# Patient Record
Sex: FEMALE | Race: WHITE | NOT HISPANIC OR LATINO | Employment: UNEMPLOYED | URBAN - METROPOLITAN AREA
[De-identification: names, ages, dates, MRNs, and addresses within clinical notes are randomized per-mention and may not be internally consistent; named-entity substitution may affect disease eponyms.]

---
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
## 2023-05-22 ENCOUNTER — HOSPITAL ENCOUNTER (EMERGENCY)
Facility: OTHER | Age: 18
Discharge: HOME | End: 2023-05-23
Attending: EMERGENCY MEDICINE | Admitting: OBSTETRICS & GYNECOLOGY
Payer: MEDICAID

## 2023-05-22 DIAGNOSIS — Z34.90 PREGNANCY, UNSPECIFIED GESTATIONAL AGE: ICD-10-CM

## 2023-05-22 DIAGNOSIS — R10.31 RLQ ABDOMINAL PAIN: ICD-10-CM

## 2023-05-22 DIAGNOSIS — O00.201 RIGHT OVARIAN PREGNANCY WITHOUT INTRAUTERINE PREGNANCY: Primary | ICD-10-CM

## 2023-05-22 DIAGNOSIS — N93.9 VAGINAL BLEEDING: ICD-10-CM

## 2023-05-22 LAB
ABO + RH BLD: NORMAL
ALBUMIN SERPL BCP-MCNC: 4.6 G/DL (ref 3.5–5.2)
ALP SERPL-CCNC: 91 U/L (ref 55–135)
ALT SERPL W/O P-5'-P-CCNC: 16 U/L (ref 10–44)
ANION GAP SERPL CALC-SCNC: 6 MMOL/L (ref 8–16)
AST SERPL-CCNC: 18 U/L (ref 10–40)
B-HCG UR QL: POSITIVE
BACTERIA GENITAL QL WET PREP: ABNORMAL
BASOPHILS # BLD AUTO: 0.06 K/UL (ref 0–0.2)
BASOPHILS NFR BLD: 0.7 % (ref 0–1.9)
BILIRUB SERPL-MCNC: 0.5 MG/DL (ref 0.1–1)
BLD GP AB SCN CELLS X3 SERPL QL: NORMAL
BUN SERPL-MCNC: 9 MG/DL (ref 6–20)
CALCIUM SERPL-MCNC: 9.7 MG/DL (ref 8.7–10.5)
CHLORIDE SERPL-SCNC: 107 MMOL/L (ref 95–110)
CLUE CELLS VAG QL WET PREP: ABNORMAL
CO2 SERPL-SCNC: 26 MMOL/L (ref 23–29)
CREAT SERPL-MCNC: 0.7 MG/DL (ref 0.5–1.4)
CTP QC/QA: YES
DIFFERENTIAL METHOD: NORMAL
EOSINOPHIL # BLD AUTO: 0.1 K/UL (ref 0–0.5)
EOSINOPHIL NFR BLD: 0.7 % (ref 0–8)
ERYTHROCYTE [DISTWIDTH] IN BLOOD BY AUTOMATED COUNT: 13 % (ref 11.5–14.5)
EST. GFR  (NO RACE VARIABLE): >60 ML/MIN/1.73 M^2
FILAMENT FUNGI VAG WET PREP-#/AREA: ABNORMAL
GLUCOSE SERPL-MCNC: 88 MG/DL (ref 70–110)
HCG INTACT+B SERPL-ACNC: 423 MIU/ML
HCT VFR BLD AUTO: 39.9 % (ref 37–48.5)
HGB BLD-MCNC: 13.3 G/DL (ref 12–16)
IMM GRANULOCYTES # BLD AUTO: 0.02 K/UL (ref 0–0.04)
IMM GRANULOCYTES NFR BLD AUTO: 0.2 % (ref 0–0.5)
LYMPHOCYTES # BLD AUTO: 1.8 K/UL (ref 1–4.8)
LYMPHOCYTES NFR BLD: 20.3 % (ref 18–48)
MCH RBC QN AUTO: 30 PG (ref 27–31)
MCHC RBC AUTO-ENTMCNC: 33.3 G/DL (ref 32–36)
MCV RBC AUTO: 90 FL (ref 82–98)
MONOCYTES # BLD AUTO: 0.7 K/UL (ref 0.3–1)
MONOCYTES NFR BLD: 7.2 % (ref 4–15)
NEUTROPHILS # BLD AUTO: 6.4 K/UL (ref 1.8–7.7)
NEUTROPHILS NFR BLD: 70.9 % (ref 38–73)
NRBC BLD-RTO: 0 /100 WBC
PLATELET # BLD AUTO: 262 K/UL (ref 150–450)
PMV BLD AUTO: 9.8 FL (ref 9.2–12.9)
POTASSIUM SERPL-SCNC: 3.8 MMOL/L (ref 3.5–5.1)
PROT SERPL-MCNC: 7.3 G/DL (ref 6–8.4)
RBC # BLD AUTO: 4.43 M/UL (ref 4–5.4)
SODIUM SERPL-SCNC: 139 MMOL/L (ref 136–145)
SPECIMEN OUTDATE: NORMAL
SPECIMEN SOURCE: ABNORMAL
T VAGINALIS GENITAL QL WET PREP: ABNORMAL
WBC # BLD AUTO: 9.02 K/UL (ref 3.9–12.7)
WBC #/AREA VAG WET PREP: ABNORMAL
YEAST GENITAL QL WET PREP: ABNORMAL

## 2023-05-22 PROCEDURE — 87591 N.GONORRHOEAE DNA AMP PROB: CPT | Performed by: EMERGENCY MEDICINE

## 2023-05-22 PROCEDURE — 87210 SMEAR WET MOUNT SALINE/INK: CPT | Performed by: EMERGENCY MEDICINE

## 2023-05-22 PROCEDURE — 84702 CHORIONIC GONADOTROPIN TEST: CPT | Performed by: EMERGENCY MEDICINE

## 2023-05-22 PROCEDURE — 81025 URINE PREGNANCY TEST: CPT | Performed by: EMERGENCY MEDICINE

## 2023-05-22 PROCEDURE — 99285 EMERGENCY DEPT VISIT HI MDM: CPT | Mod: ,,, | Performed by: EMERGENCY MEDICINE

## 2023-05-22 PROCEDURE — 25000003 PHARM REV CODE 250: Performed by: EMERGENCY MEDICINE

## 2023-05-22 PROCEDURE — 99285 PR EMERGENCY DEPT VISIT,LEVEL V: ICD-10-PCS | Mod: ,,, | Performed by: EMERGENCY MEDICINE

## 2023-05-22 PROCEDURE — 85025 COMPLETE CBC W/AUTO DIFF WBC: CPT | Performed by: EMERGENCY MEDICINE

## 2023-05-22 PROCEDURE — 80053 COMPREHEN METABOLIC PANEL: CPT | Performed by: EMERGENCY MEDICINE

## 2023-05-22 PROCEDURE — 86900 BLOOD TYPING SEROLOGIC ABO: CPT | Performed by: EMERGENCY MEDICINE

## 2023-05-22 RX ADMIN — SODIUM CHLORIDE 1000 ML: 9 INJECTION, SOLUTION INTRAVENOUS at 08:05

## 2023-05-23 ENCOUNTER — TELEPHONE (OUTPATIENT)
Dept: OBSTETRICS AND GYNECOLOGY | Facility: CLINIC | Age: 18
End: 2023-05-23

## 2023-05-23 ENCOUNTER — HOSPITAL ENCOUNTER (EMERGENCY)
Facility: OTHER | Age: 18
Discharge: HOME | End: 2023-05-23
Attending: EMERGENCY MEDICINE
Payer: MEDICAID

## 2023-05-23 VITALS
RESPIRATION RATE: 18 BRPM | DIASTOLIC BLOOD PRESSURE: 72 MMHG | TEMPERATURE: 98 F | BODY MASS INDEX: 21.16 KG/M2 | OXYGEN SATURATION: 99 % | HEIGHT: 62 IN | HEART RATE: 75 BPM | SYSTOLIC BLOOD PRESSURE: 112 MMHG | WEIGHT: 115 LBS

## 2023-05-23 VITALS
DIASTOLIC BLOOD PRESSURE: 69 MMHG | TEMPERATURE: 99 F | WEIGHT: 114 LBS | RESPIRATION RATE: 18 BRPM | BODY MASS INDEX: 20.98 KG/M2 | HEIGHT: 62 IN | SYSTOLIC BLOOD PRESSURE: 117 MMHG | OXYGEN SATURATION: 99 % | HEART RATE: 77 BPM

## 2023-05-23 DIAGNOSIS — N93.9 VAGINAL BLEEDING: Primary | ICD-10-CM

## 2023-05-23 DIAGNOSIS — O00.201 RIGHT OVARIAN PREGNANCY WITHOUT INTRAUTERINE PREGNANCY: ICD-10-CM

## 2023-05-23 LAB
ALBUMIN SERPL BCP-MCNC: 4.5 G/DL (ref 3.5–5.2)
ALP SERPL-CCNC: 75 U/L (ref 55–135)
ALT SERPL W/O P-5'-P-CCNC: 17 U/L (ref 10–44)
ANION GAP SERPL CALC-SCNC: 11 MMOL/L (ref 8–16)
AST SERPL-CCNC: 18 U/L (ref 10–40)
BASOPHILS # BLD AUTO: 0.06 K/UL (ref 0–0.2)
BASOPHILS NFR BLD: 0.7 % (ref 0–1.9)
BILIRUB SERPL-MCNC: 0.9 MG/DL (ref 0.1–1)
BUN SERPL-MCNC: 8 MG/DL (ref 6–20)
C TRACH DNA SPEC QL NAA+PROBE: NOT DETECTED
CALCIUM SERPL-MCNC: 9.6 MG/DL (ref 8.7–10.5)
CHLORIDE SERPL-SCNC: 110 MMOL/L (ref 95–110)
CO2 SERPL-SCNC: 21 MMOL/L (ref 23–29)
CREAT SERPL-MCNC: 0.7 MG/DL (ref 0.5–1.4)
DIFFERENTIAL METHOD: NORMAL
EOSINOPHIL # BLD AUTO: 0.1 K/UL (ref 0–0.5)
EOSINOPHIL NFR BLD: 1.1 % (ref 0–8)
ERYTHROCYTE [DISTWIDTH] IN BLOOD BY AUTOMATED COUNT: 13.1 % (ref 11.5–14.5)
EST. GFR  (NO RACE VARIABLE): >60 ML/MIN/1.73 M^2
GLUCOSE SERPL-MCNC: 78 MG/DL (ref 70–110)
HCG INTACT+B SERPL-ACNC: 462 MIU/ML
HCT VFR BLD AUTO: 41.2 % (ref 37–48.5)
HGB BLD-MCNC: 13.4 G/DL (ref 12–16)
IMM GRANULOCYTES # BLD AUTO: 0.02 K/UL (ref 0–0.04)
IMM GRANULOCYTES NFR BLD AUTO: 0.2 % (ref 0–0.5)
LYMPHOCYTES # BLD AUTO: 1.8 K/UL (ref 1–4.8)
LYMPHOCYTES NFR BLD: 22 % (ref 18–48)
MCH RBC QN AUTO: 29.6 PG (ref 27–31)
MCHC RBC AUTO-ENTMCNC: 32.5 G/DL (ref 32–36)
MCV RBC AUTO: 91 FL (ref 82–98)
MONOCYTES # BLD AUTO: 0.6 K/UL (ref 0.3–1)
MONOCYTES NFR BLD: 8 % (ref 4–15)
N GONORRHOEA DNA SPEC QL NAA+PROBE: NOT DETECTED
NEUTROPHILS # BLD AUTO: 5.5 K/UL (ref 1.8–7.7)
NEUTROPHILS NFR BLD: 68 % (ref 38–73)
NRBC BLD-RTO: 0 /100 WBC
PLATELET # BLD AUTO: 266 K/UL (ref 150–450)
PMV BLD AUTO: 9.5 FL (ref 9.2–12.9)
POTASSIUM SERPL-SCNC: 4.3 MMOL/L (ref 3.5–5.1)
PROT SERPL-MCNC: 7.3 G/DL (ref 6–8.4)
RBC # BLD AUTO: 4.52 M/UL (ref 4–5.4)
SODIUM SERPL-SCNC: 142 MMOL/L (ref 136–145)
WBC # BLD AUTO: 8.05 K/UL (ref 3.9–12.7)

## 2023-05-23 PROCEDURE — 84702 CHORIONIC GONADOTROPIN TEST: CPT | Performed by: NURSE PRACTITIONER

## 2023-05-23 PROCEDURE — 99499 UNLISTED E&M SERVICE: CPT | Mod: ,,, | Performed by: OBSTETRICS & GYNECOLOGY

## 2023-05-23 PROCEDURE — 99284 EMERGENCY DEPT VISIT MOD MDM: CPT | Mod: 25,27

## 2023-05-23 PROCEDURE — 80053 COMPREHEN METABOLIC PANEL: CPT | Performed by: NURSE PRACTITIONER

## 2023-05-23 PROCEDURE — 99283 EMERGENCY DEPT VISIT LOW MDM: CPT | Mod: ,,, | Performed by: OBSTETRICS & GYNECOLOGY

## 2023-05-23 PROCEDURE — 99499 NO LOS: ICD-10-PCS | Mod: ,,, | Performed by: OBSTETRICS & GYNECOLOGY

## 2023-05-23 PROCEDURE — 99284 EMERGENCY DEPT VISIT MOD MDM: CPT

## 2023-05-23 PROCEDURE — 96360 HYDRATION IV INFUSION INIT: CPT

## 2023-05-23 PROCEDURE — 99283 PR EMERGENCY DEPT VISIT,LEVEL III: ICD-10-PCS | Mod: ,,, | Performed by: OBSTETRICS & GYNECOLOGY

## 2023-05-23 PROCEDURE — 85025 COMPLETE CBC W/AUTO DIFF WBC: CPT | Performed by: NURSE PRACTITIONER

## 2023-05-23 RX ORDER — HYDROCODONE BITARTRATE AND ACETAMINOPHEN 5; 325 MG/1; MG/1
1 TABLET ORAL EVERY 8 HOURS PRN
Qty: 6 TABLET | Refills: 0 | Status: SHIPPED | OUTPATIENT
Start: 2023-05-23 | End: 2023-05-25

## 2023-05-23 RX ORDER — ONDANSETRON 2 MG/ML
4 INJECTION INTRAMUSCULAR; INTRAVENOUS
Status: DISCONTINUED | OUTPATIENT
Start: 2023-05-23 | End: 2023-05-23 | Stop reason: HOSPADM

## 2023-05-23 RX ORDER — MORPHINE SULFATE 4 MG/ML
4 INJECTION, SOLUTION INTRAMUSCULAR; INTRAVENOUS
Status: DISCONTINUED | OUTPATIENT
Start: 2023-05-23 | End: 2023-05-23 | Stop reason: HOSPADM

## 2023-05-23 RX ORDER — ONDANSETRON 8 MG/1
8 TABLET, ORALLY DISINTEGRATING ORAL EVERY 6 HOURS PRN
Qty: 15 TABLET | Refills: 0 | Status: ON HOLD | OUTPATIENT
Start: 2023-05-23 | End: 2023-05-30

## 2023-05-23 NOTE — ED PROVIDER NOTES
"     Source of History:  Patient    Chief complaint:  Vaginal Bleeding (Pt was seen in ED yesterday for suspected ectopic pregnancy. States bleeding has increased today and lower abd feels distended and sore. Reports lower abd cramping. Instructed to come back to ED for blood work/hcg level. )      HPI:  Yaquelin Cochran is a 17 y.o. female diagnosed with right ectopic pregnancy in this ER last night.  Patient states that after leaving the hospital she went home and woke up around 7:00 a.m..  She reports that she felt like the bleeding was heavier for the 1st 2 hours after she woke up.  Now she is reporting bright red spotting.  She reports the use of 2 tampons, not fully saturated.  She does report that the pain worsened and feels sharp in the right lower quadrant.  She was discharged home with Norco and Zofran but she did not take either 1 of these medications.  She is also reporting mild nausea, no vomiting.  No dysuria, fever, chills or back pain.    This is the extent to the patients complaints today here in the emergency department.    ROS: As per HPI and below:  Per HPI    Review of patient's allergies indicates:  No Known Allergies    PMH:  As per HPI and below:  Past Medical History:   Diagnosis Date    Behavior problem in childhood     COVID-19 08/08/2021    Suicidal ideation      Past Surgical History:   Procedure Laterality Date    BACK SURGERY  2016    DILATION AND CURETTAGE OF UTERUS         Social History     Tobacco Use    Smoking status: Never    Smokeless tobacco: Never   Substance Use Topics    Alcohol use: No    Drug use: No       Physical Exam:    /72 (BP Location: Right arm, Patient Position: Lying)   Pulse 75   Temp 98.1 °F (36.7 °C) (Oral)   Resp 18   Ht 5' 2" (1.575 m)   Wt 52.2 kg (115 lb)   LMP  (LMP Unknown)   SpO2 99%   BMI 21.03 kg/m²   Nursing note and vital signs reviewed.  Appearance: No acute distress.  Eyes: No conjunctival injection.  ENT: Oropharynx clear.    Chest/ " Respiratory: Clear to auscultation bilaterally.  Good air movement.  No wheezes.  No rhonchi. No rales. No accessory muscle use.  Cardiovascular: Regular rate and rhythm.  No murmurs. No gallops. No rubs.  Abdomen: Soft.  Not distended.  TTP in right lower pelvis. No guarding.  No rebound. Non-peritoneal.  :  Deferred OBGYN  Musculoskeletal: Good range of motion all joints.  No deformities.  Neck supple.  No meningismus.  Skin: No rashes seen.  Good turgor.  No abrasions.  No ecchymoses.  Neurologic: Motor intact.  Sensation intact.  Cerebellar intact.  Cranial nerves intact.  Mental Status:  Alert and oriented x 3.  Appropriate, conversant    Labs that have been ordered have been independently reviewed and interpreted by myself.    I decided to obtain the patient's medical records.  Summary of Medical Records:      Labs Reviewed   COMPREHENSIVE METABOLIC PANEL - Abnormal; Notable for the following components:       Result Value    CO2 21 (*)     All other components within normal limits   HCG, QUANTITATIVE    Narrative:     Release to patient->Immediate   CBC W/ AUTO DIFFERENTIAL       Imaging Results    None             MDM:    Urgent evaluation of 17 y.o. female presenting with increased abdominal pain and vaginal bleeding after being diagnosed with right ectopic pregnancy earlier this morning.  Patient appears uncomfortable but is not toxic.  Abdomen with tenderness in the right lower pelvis.  Beta hCG 462, was 423 20 hours ago.  Lab work is unremarkable.  Patient was evaluated by OBGYN who states that patient is stable for discharge and will follow-up in clinic tomorrow as previously scheduled.  At that time they will likely discuss methotrexate per OBGYN.  Patient offered Zofran and morphine which she declined stating she would prefer to go home.  Patient discharged home in good condition with strict bleeding and pain precautions Patient educated on signs and symptoms to monitor for and when to return to ED.  Patient verbalized understanding agrees with treatment plan. All questions and concerns addressed. .                     Diagnostic Impression:    1. Vaginal bleeding    2. Right ovarian pregnancy without intrauterine pregnancy         ED Disposition Condition    Discharge Good            ED Prescriptions    None       Follow-up Information    None          Jaylan Early NP  05/23/23 3142

## 2023-05-23 NOTE — FIRST PROVIDER EVALUATION
Emergency Department TeleTriage Encounter Note      CHIEF COMPLAINT    Chief Complaint   Patient presents with    Vaginal Bleeding     Pt was seen in ED yesterday for suspected ectopic pregnancy. States bleeding has increased today and lower abd feels distended and sore. Reports lower abd cramping. Instructed to come back to ED for blood work/hcg level.        VITAL SIGNS   Initial Vitals [05/23/23 1256]   BP Pulse Resp Temp SpO2   129/66 88 20 98.9 °F (37.2 °C) 100 %      MAP       --            ALLERGIES    Review of patient's allergies indicates:  No Known Allergies    PROVIDER TRIAGE NOTE  TeleTriage Note: Yaquelin Cochran, a nontoxic/well appearing, 17 y.o. female, presented to the ED with c/o seen yesterday and US showed possible ectopic pregnancy. Increased cramping and bleeding that began today.     All ED beds are full at present; patient notified of this status.  Patient seen and medically screened by Nurse Practitioner via teletriage. Orders initiated at triage to expedite care.  Patient is stable to return to the waiting room and will be placed in an ED bed when available.  Care will be transferred to an alternate provider when patient has been placed in an Exam Room from the [facility] for physical exam, additional orders, and disposition.  1:41 PM Isabel Egan DNP, FNP-C        ORDERS  Labs Reviewed   HCG, QUANTITATIVE       ED Orders (720h ago, onward)      Start Ordered     Status Ordering Provider    05/23/23 1344 05/23/23 1343  hCG, quantitative  Once         Ordered ISABEL EGAN              Virtual Visit Note: The provider triage portion of this emergency department evaluation and documentation was performed via [employer], a HIPAA-compliant telemedicine application, in concert with a tele-presenter in the room. A face to face patient evaluation with one of my colleagues will occur once the patient is placed in an emergency department room.      DISCLAIMER: This note was prepared with  M*Modal voice recognition transcription software. Garbled syntax, mangled pronouns, and other bizarre constructions may be attributed to that software system.

## 2023-05-23 NOTE — CONSULTS
"HISTORY AND PHYSICAL  OBSTETRICS & GYNECOLOGY          Subjective:    Yaquelin Cochran is a 17 y.o.  who presents to ED for cramping and vaginal bleeding. The patient was seen in ED approx 12 hours earlier and dx with pregnancy of unknown location. Please see that GYN consult note for details.     This presentation, the patient has saturated 2 tampons in approximately 8hr. Her cramping is slightly increased. No n/v, fever, chills. She is tolerating PO. Slight discomfort with ambulation.     Review of Systems   Constitutional:  Negative for chills and fever.   HENT: Negative.     Eyes:  Negative for visual disturbance.   Respiratory:  Negative for shortness of breath.    Cardiovascular:  Negative for chest pain and palpitations.   Gastrointestinal:  Positive for abdominal pain. Negative for bloating, constipation, diarrhea, nausea and vomiting.   Genitourinary:  Positive for vaginal bleeding. Negative for vaginal discharge and vaginal pain.   Musculoskeletal:  Negative for back pain and myalgias.   Neurological:  Negative for headaches.   Hematological: Negative.    Psychiatric/Behavioral:  The patient is not nervous/anxious.      Past Medical/Surgical Hx: reviewed in chart  Social/Family Hx: reviewed in chart  Allergies: reviewed in chart  Meds: (Not in a hospital admission)    OBHx:   OB History    Para Term  AB Living   3 1 1 0 0 0   SAB IAB Ectopic Multiple Live Births   0 0 0 0 0      # Outcome Date GA Lbr Fran/2nd Weight Sex Delivery Anes PTL Lv   3 Current            2             1 Term                Objective:    /72 (BP Location: Right arm, Patient Position: Lying)   Pulse 75   Temp 98.1 °F (36.7 °C) (Oral)   Resp 18   Ht 5' 2" (1.575 m)   Wt 52.2 kg (115 lb)   LMP  (LMP Unknown)   SpO2 99%   BMI 21.03 kg/m²     Physical Exam  Constitutional:       Appearance: She is well-developed.   Genitourinary:      Genitourinary Comments: Peripad on for 2-3hr 10% saturated. "   HENT:      Head: Normocephalic.   Eyes:      General: No scleral icterus.     Extraocular Movements: Extraocular movements intact.   Cardiovascular:      Rate and Rhythm: Normal rate.   Pulmonary:      Effort: Pulmonary effort is normal. No respiratory distress.   Abdominal:      General: There is no distension.      Palpations: Abdomen is soft.      Tenderness: There is no abdominal tenderness. There is no guarding or rebound.   Musculoskeletal:         General: Normal range of motion.      Cervical back: Normal range of motion.   Neurological:      Mental Status: She is alert and oriented to person, place, and time.   Skin:     General: Skin is warm and dry.   Psychiatric:         Mood and Affect: Mood normal.         Behavior: Behavior normal.   Vitals reviewed.        Lab Review  Lab Results   Component Value Date    WBC 8.05 05/23/2023    HGB 13.4 05/23/2023    HCT 41.2 05/23/2023    MCV 91 05/23/2023     05/23/2023        BMP  Lab Results   Component Value Date     05/23/2023    K 4.3 05/23/2023     05/23/2023    CO2 21 (L) 05/23/2023    BUN 8 05/23/2023    CREATININE 0.7 05/23/2023    CALCIUM 9.6 05/23/2023    ANIONGAP 11 05/23/2023    ESTGFRAFRICA >60.0 03/22/2022    EGFRNONAA 54.9 (A) 03/22/2022        Imaging  None from this encounter     Assessment:       16 y/o with previously dx pregnancy of unknown location in stable condition     Plan:   Pregnancy of unkown location  - Exam very reassuring  - Hcg essentially unchanged given short timeframe  - No need for repeat imaging  - Should follow up with GYN (5/25) as scheduled with repeat hCG 48hr after initial draw  - Need to confirm that pt did receive rhogam     HENRY Elizondo MD  OBGYN PGY-4

## 2023-05-23 NOTE — TELEPHONE ENCOUNTER
----- Message from Silvana Glover MD sent at 5/23/2023  2:28 AM CDT -----  Hi,    Can we please schedule this patient for a beta HCG on 5/25 with a same day clinic appointment in the family planning clinic to discuss possible methotrexate?    Thank you!  Silvana Glover M.D.  OB/GYN PGY-3

## 2023-05-23 NOTE — ED PROVIDER NOTES
Encounter Date: 5/22/2023    SCRIBE #1 NOTE: I, Amber Yeboah, am scribing for, and in the presence of,  Alberta Cardenas MD.     History     Chief Complaint   Patient presents with    Vaginal Bleeding     Transfer from [facility] for possible ectopic pregnancy, c/o RLQ and abdominal pain     Yaquelin Cochran is a 17 y.o. female, direct ED to ED transfer from [facility], who presents to the ED for evaluation of right lower quadrant abdominal pain with vaginal bleeding. Pt reports she had unprotected sex last week and took plan B that day. She reports onset of vaginal bleeding 2 days after that which has persisted. Denies dizziness, lightheadedness, or palpitations. She reports right lower quadrant abdominal pain for the last 5-6 days which has been waxing and waning in intensity, currently 5/10 intensity. She does state her abdomen feels distended. This is the extent of the patient's complaints at this time.    The history is provided by the patient and the EMS personnel.   Review of patient's allergies indicates:  No Known Allergies  Past Medical History:   Diagnosis Date    Behavior problem in childhood     COVID-19 08/08/2021    Suicidal ideation      Past Surgical History:   Procedure Laterality Date    BACK SURGERY  2016    DILATION AND CURETTAGE OF UTERUS       Family History   Problem Relation Age of Onset    Breast cancer Neg Hx     Diabetes Neg Hx     Colon cancer Neg Hx     Ovarian cancer Neg Hx      Social History     Tobacco Use    Smoking status: Never    Smokeless tobacco: Never   Substance Use Topics    Alcohol use: No    Drug use: No     Review of Systems   Constitutional:  Negative for chills and fever.   HENT:  Negative for congestion and sore throat.    Eyes:  Negative for visual disturbance.   Respiratory:  Negative for cough and shortness of breath.    Cardiovascular:  Negative for chest pain and palpitations.   Gastrointestinal:  Positive for abdominal distention and abdominal pain. Negative for diarrhea  and vomiting.   Genitourinary:  Positive for vaginal bleeding. Negative for decreased urine volume, dysuria and vaginal discharge.   Musculoskeletal:  Negative for joint swelling, neck pain and neck stiffness.   Skin:  Negative for rash and wound.   Neurological:  Negative for weakness, numbness and headaches.   Psychiatric/Behavioral:  Negative for confusion.      Physical Exam     Initial Vitals [05/22/23 1952]   BP Pulse Resp Temp SpO2   122/72 85 15 98.5 °F (36.9 °C) 97 %      MAP       --         Physical Exam    Constitutional: She appears well-developed and well-nourished. She does not have a sickly appearance. No distress.   HENT:   Head: Normocephalic and atraumatic.   Right Ear: External ear normal.   Left Ear: External ear normal.   Eyes: Conjunctivae, EOM and lids are normal. Right eye exhibits no discharge. Left eye exhibits no discharge. Right conjunctiva is not injected. Right conjunctiva has no hemorrhage. Left conjunctiva is not injected. Left conjunctiva has no hemorrhage. No scleral icterus.   Neck: Phonation normal. No stridor present. No tracheal deviation present.   Normal range of motion.  Cardiovascular:  Normal rate, regular rhythm and normal heart sounds.     Exam reveals no friction rub.       No murmur heard.  Pulses:       Radial pulses are 2+ on the right side and 2+ on the left side.        Dorsalis pedis pulses are 2+ on the right side and 2+ on the left side.   Pulmonary/Chest: Breath sounds normal. No respiratory distress. She has no wheezes. She has no rales.   Abdominal: She exhibits distension. There is abdominal tenderness.   Mild diffuse abdominal tenderness. There is no rebound and no guarding.   Musculoskeletal:      Cervical back: Normal range of motion.     Neurological: She is alert and oriented to person, place, and time. She has normal strength. GCS eye subscore is 4. GCS verbal subscore is 5. GCS motor subscore is 6.   Skin: Skin is warm.   Psychiatric: She has a normal  mood and affect. Her speech is normal and behavior is normal. Judgment and thought content normal. Cognition and memory are normal.       ED Course   Procedures  Labs Reviewed   VAGINAL SCREEN - Abnormal; Notable for the following components:       Result Value    Clue Cells Rare (*)     WBC - Vaginal Screen Rare (*)     Bacteria - Vaginal Screen Rare (*)     All other components within normal limits    Narrative:     Release to patient->Immediate   COMPREHENSIVE METABOLIC PANEL - Abnormal; Notable for the following components:    Anion Gap 6 (*)     All other components within normal limits   POCT URINE PREGNANCY - Abnormal; Notable for the following components:    POC Preg Test, Ur Positive (*)     All other components within normal limits   C. TRACHOMATIS/N. GONORRHOEAE BY AMP DNA   CBC W/ AUTO DIFFERENTIAL   HCG, QUANTITATIVE    Narrative:     Release to patient->Immediate   TYPE & SCREEN          Imaging Results               US OB <14 Wks TransAbd & TransVag, Single Gestation (XPD) (Final result)  Result time 05/23/23 00:48:33   Procedure changed from US OB <14 Wks, TransAbd, Single Gestation     Final result by Samy Koch MD (05/23/23 00:48:33)                   Impression:      No intrauterine gestation identified. Complex tubular appearing structure in the right adnexal region which demonstrates a 0.7 cm hypoechoic structure.  Free fluid in the pelvis.  Tubal ectopic pregnancy cannot be excluded.  Recommend OBGYN consultation and continued follow-up with serial beta hCG and short-term follow-up transvaginal ultrasound.    5 cm left adnexal lesion favored to reflect hemorrhagic cyst for which follow-up pelvic ultrasound with transvaginal imaging in 6-12 weeks is recommended.    This critical information above was relayed by Miranda Nuñez MD  by telephone to Mallory Childress MD on 05/22/2023 at 23:48.    This report was flagged in Epic as abnormal.    Electronically signed by resident:  Miranda Nuñez  Date:    2023  Time:    23:31    Electronically signed by: Samy Koch MD  Date:    2023  Time:    00:48               Narrative:    EXAMINATION:  US OB <14 WEEKS, TRANSABDOM & TRANSVAG, SINGLE GESTATION (XPD)    CLINICAL HISTORY:  rlq pain; Right lower quadrant pain    TECHNIQUE:  Transabdominal sonography of the pelvis was performed, followed by transvaginal sonography to better evaluate the uterus and ovaries.    COMPARISON:  Pelvic ultrasound 2022 and multiple priors.    FINDINGS:  Intrauterine gestation(s): None    Mean gestational sac diameter: N/A    Yolk sac: Question yolk sac in the right adnexa.    Crown-rump length (CRL): N/A    Cardiac activity: N/A    Subchorionic hemorrhage: N/A    Uterus measures 7.4 x 3.0 x 5.1 cm.  Trace fluid noted within the endometrium.    Right ovary: 3.7 x 2.1 x 3.1 cm.    Tubular appearing structure in the right adnexa with heterogeneous, largely hypoechoic internal contents and a round central hypoechoic structure measuring 0.7 x 0.7 cm.    Left ovary: Measures 5.9 x 5.4 x 6.1 cm.  There is a 5.0 x 5.0 x 4.8 cm hypoechoic lesion which does not demonstrate internal color Doppler flow with a bandlike appearance favored to reflect a hemorrhagic cyst.    Miscellaneous: Free fluid is noted within the pelvis.                                       Medications   sodium chloride 0.9% bolus 1,000 mL 1,000 mL (0 mLs Intravenous Stopped 23)     Medical Decision Making:   History:   Old Medical Records: I decided to obtain old medical records.  Clinical Tests:   Lab Tests: Ordered and Reviewed  Radiological Study: Ordered and Reviewed  Additional MDM:   Comments: 17-year-old female  transferred from outside hospital for evaluation by OB secondary to concern for an ectopic pregnancy.  She presented with complaint of right lower quadrant pain that has been intermittent for a few days with vaginal spotting.  On my evaluation she was  well-appearing and hemodynamically stable and reported the pain was a 4/10 but declined any medication.  OB evaluated the patient and deemed that she is stable for discharge home with the plan for her to return 48 hours for repeat beta-hCG and same-day clinic appointment.  She will be discharged home with a prescription for Zofran and Norco.  Indications for seeking immediate re-evaluation were given..      Scribe Attestation:   Scribe #1: I performed the above scribed service and the documentation accurately describes the services I performed. I attest to the accuracy of the note.    I, Alberta Cardenas  , personally performed the services described in this documentation. All medical record entries made by the scribe were at my direction and in my presence. I have reviewed the chart and agree that the record reflects my personal performance and is accurate and complete.                    Clinical Impression:   Final diagnoses:  [N93.9] Vaginal bleeding  [R10.31] RLQ abdominal pain  [O00.201] Right ovarian pregnancy without intrauterine pregnancy (Primary)  [Z34.90] Pregnancy, unspecified gestational age        ED Disposition Condition    Discharge Stable          ED Prescriptions       Medication Sig Dispense Start Date End Date Auth. Provider    ondansetron (ZOFRAN-ODT) 8 MG TbDL Take 1 tablet (8 mg total) by mouth every 6 (six) hours as needed (nausea). 15 tablet 5/23/2023 -- Alberta Cardenas MD    HYDROcodone-acetaminophen (NORCO) 5-325 mg per tablet Take 1 tablet by mouth every 8 (eight) hours as needed for Pain (severe pain). 6 tablet 5/23/2023 5/25/2023 Alberta Cardenas MD          Follow-up Information       Follow up With Specialties Details Why Contact Info Additional Information    [facility] - OB GYN Obstetrics and Gynecology Go in 2 days as scheduled for your repeat blood work and clinic appointment [address] [facility] [address]  [facility] [id number] [phone] OB GYN - [facility], 5th Floor  Please park in [place] [address] and use [place] elevators    [facility] - Emergency Dept Emergency Medicine Go to  If symptoms worsen [address] [facility] [id number] [phone]              Alberta Cardenas MD  05/23/23 4425

## 2023-05-23 NOTE — CONSULTS
Consult Note  Gynecology      SUBJECTIVE:     History of Present Illness:  Yaquelin Cochran is a 17 y.o.  currently being evaluated for possible ectopic pregnancy. The patient states she had unprotected intercourse two weeks ago, after which she took Plan B. She states she started experiencing light vaginal bleeding and right lower abdominal pain about three days ago. She also endorses some nausea, but denies vomiting. She is voiding without difficulty and having normal bowel movements.      PMHx:   Past Medical History:   Diagnosis Date    Behavior problem in childhood     COVID-19 2021    Suicidal ideation        PSHx:   Past Surgical History:   Procedure Laterality Date    BACK SURGERY  2016    DILATION AND CURETTAGE OF UTERUS         All: Review of patient's allergies indicates:  No Known Allergies    Meds: (Not in a hospital admission)      SH:   Social History     Socioeconomic History    Marital status: Significant Other   Tobacco Use    Smoking status: Never    Smokeless tobacco: Never   Substance and Sexual Activity    Alcohol use: No    Drug use: No    Sexual activity: Yes     Partners: Male     Birth control/protection: Condom       FH:   Family History   Problem Relation Age of Onset    Breast cancer Neg Hx     Diabetes Neg Hx     Colon cancer Neg Hx     Ovarian cancer Neg Hx        Review of Systems:  Constitutional: no fever or chills  Respiratory: no cough or shortness of breath  Cardiovascular: no chest pain or palpitations  Gastrointestinal: +abdominal pain and nausea, no vomiting, tolerating diet, negative for change in bowel habits  Genitourinary: +vaginal bleeding, no hematuria or dysuria, negative for urinary incontinence  Integument/Breast: no rash or pruritis, negative for breast lump, nipple discharge and skin lesion(s)  Hematologic/Lymphatic: no easy bruising or lymphadenopathy     OBJECTIVE:     Temp:  [98.5 °F (36.9 °C)-98.6 °F (37 °C)] 98.6 °F (37 °C)  Pulse:  [72-85] 72  Resp:   [15-16] 16  SpO2:  [97 %-98 %] 98 %  BP: (114-124)/(72-83) 124/83    Recent Results (from the past 24 hour(s))   POCT urine pregnancy    Collection Time: 05/22/23  8:11 PM   Result Value Ref Range    POC Preg Test, Ur Positive (A) Negative     Acceptable Yes    CBC W/ AUTO DIFFERENTIAL    Collection Time: 05/22/23  8:13 PM   Result Value Ref Range    WBC 9.02 3.90 - 12.70 K/uL    RBC 4.43 4.00 - 5.40 M/uL    Hemoglobin 13.3 12.0 - 16.0 g/dL    Hematocrit 39.9 37.0 - 48.5 %    MCV 90 82 - 98 fL    MCH 30.0 27.0 - 31.0 pg    MCHC 33.3 32.0 - 36.0 g/dL    RDW 13.0 11.5 - 14.5 %    Platelets 262 150 - 450 K/uL    MPV 9.8 9.2 - 12.9 fL    Immature Granulocytes 0.2 0.0 - 0.5 %    Gran # (ANC) 6.4 1.8 - 7.7 K/uL    Immature Grans (Abs) 0.02 0.00 - 0.04 K/uL    Lymph # 1.8 1.0 - 4.8 K/uL    Mono # 0.7 0.3 - 1.0 K/uL    Eos # 0.1 0.0 - 0.5 K/uL    Baso # 0.06 0.00 - 0.20 K/uL    nRBC 0 0 /100 WBC    Gran % 70.9 38.0 - 73.0 %    Lymph % 20.3 18.0 - 48.0 %    Mono % 7.2 4.0 - 15.0 %    Eosinophil % 0.7 0.0 - 8.0 %    Basophil % 0.7 0.0 - 1.9 %    Differential Method Automated    hCG, quantitative, pregnancy    Collection Time: 05/22/23  8:14 PM   Result Value Ref Range    HCG Quant 423 See Text mIU/mL   Comp. Metabolic Panel    Collection Time: 05/22/23  8:15 PM   Result Value Ref Range    Sodium 139 136 - 145 mmol/L    Potassium 3.8 3.5 - 5.1 mmol/L    Chloride 107 95 - 110 mmol/L    CO2 26 23 - 29 mmol/L    Glucose 88 70 - 110 mg/dL    BUN 9 6 - 20 mg/dL    Creatinine 0.7 0.5 - 1.4 mg/dL    Calcium 9.7 8.7 - 10.5 mg/dL    Total Protein 7.3 6.0 - 8.4 g/dL    Albumin 4.6 3.5 - 5.2 g/dL    Total Bilirubin 0.5 0.1 - 1.0 mg/dL    Alkaline Phosphatase 91 55 - 135 U/L    AST 18 10 - 40 U/L    ALT 16 10 - 44 U/L    Anion Gap 6 (L) 8 - 16 mmol/L    eGFR >60.0 >60 mL/min/1.73 m^2   Type & Screen    Collection Time: 05/22/23  9:19 PM   Result Value Ref Range    Group & Rh O NEG     Indirect Barbara NEG     Specimen  Outdate 05/25/2023 23:59    Vaginal Screen    Collection Time: 05/22/23  9:35 PM    Specimen: Vagina   Result Value Ref Range    Trichomonas None None    Clue Cells Rare (A) None    Budding Yeast None None    Fungal Hyphae None None    WBC - Vaginal Screen Rare (A) None    Bacteria - Vaginal Screen Rare (A) None    Wet Prep Source Vagina          Physical Exam:  GEN: No apparent distress. Alert and oriented.   CV: Regular rate and rhythm.   LUNGS: Normal work of breathing. No distress.  ABDOMEN: Soft, non-distended. Moderately tender to deep palpation in RLQ. No guarding or rebound tenderness.  EXT: No erythema, no edema  : patient declined repeat vaginal exam as last exam was painful, minimal bleeding noted on chucks pad    EXAMINATION:  US OB <14 WEEKS, TRANSABDOM & TRANSVAG, SINGLE GESTATION (XPD)     CLINICAL HISTORY:  rlq pain; Right lower quadrant pain     TECHNIQUE:  Transabdominal sonography of the pelvis was performed, followed by transvaginal sonography to better evaluate the uterus and ovaries.     COMPARISON:  Pelvic ultrasound 03/22/2022 and multiple priors.     FINDINGS:  Intrauterine gestation(s): None  Mean gestational sac diameter: N/A  Yolk sac: Question yolk sac in the right adnexa.  Crown-rump length (CRL): N/A  Cardiac activity: N/A  Subchorionic hemorrhage: N/A  Uterus measures 7.4 x 3.0 x 5.1 cm.  Trace fluid noted within the endometrium.  Right ovary: 3.7 x 2.1 x 3.1 cm.  Tubular appearing structure in the right adnexa with heterogeneous, largely hypoechoic internal contents and a round central hypoechoic structure measuring 0.7 x 0.7 cm.  Left ovary: Measures 5.9 x 5.4 x 6.1 cm.  There is a 5.0 x 5.0 x 4.8 cm hypoechoic lesion which does not demonstrate internal color Doppler flow with a bandlike appearance favored to reflect a hemorrhagic cyst.  Miscellaneous: Free fluid is noted within the pelvis.     Impression:  No intrauterine gestation identified. Complex tubular appearing structure  in the right adnexal region which demonstrates a 0.7 cm hypoechoic structure.  Free fluid in the pelvis.  Tubal ectopic pregnancy cannot be excluded.  Recommend OBGYN consultation and continued follow-up with serial beta hCG and short-term follow-up transvaginal ultrasound.     5 cm left adnexal lesion favored to reflect hemorrhagic cyst for which follow-up pelvic ultrasound with transvaginal imaging in 6-12 weeks is recommended.    ASSESSMENT/PLAN:     Assessment:  Yaquelin Cochran is a 17 y.o.  currently being evaluated for possible ectopic pregnancy.    Plan:  Pregnancy of unknown location  - Hemodynamically stable  - Beta   - TVUS: no IUP identified, complex tubular structure in right adnexal, unable to rule out ectopic pregnancy  - Rh negative, recommend rhogam administration  - Dicussed ultrasound images with patient and the diagnosis of pregnancy of unknown location. Instructed patient that we will need to repeat bHCG in 48 hours to determine trend of pregnancy hormone to guide further management  - Discussed options for management should this be an ectopic pregnancy including Methotrexate vs surgical removal  - Patient states she would like to avoid surgery if possible and would desire Methotrexate injection   - CBC and CMP wnl  - Will arrange patient follow up in Family Planning clinic on  with same day lab unique prior to appointment      Silvana Glover M.D.  OB/GYN PGY-3      Discussed plan with staff who was in agreement.

## 2023-05-23 NOTE — ED PROVIDER NOTES
Encounter Date: 2023       History     Chief Complaint   Patient presents with    Vaginal Bleeding     Took plan B 1 week ago, having vaginal bleeding for 5-6 days. Reports light bleeding now. Now having RLQ pain/ tender to touch. Last menstrual cycle ended on .  +nausea and diarrhea.     Pt is a 18 yo F  who presents with severe right lower abdominal pain for the past 5-6 days. She reports associated bleeding, some spotting and some bright red blood.   She states she took plan B on  the same day she had unprotected sex    Pt reports LMP was the very end of April -unsure of exact date but ended on .     The history is provided by the patient.   Review of patient's allergies indicates:  No Known Allergies  Past Medical History:   Diagnosis Date    Behavior problem in childhood     COVID-19 2021    Suicidal ideation      Past Surgical History:   Procedure Laterality Date    BACK SURGERY  2016    DILATION AND CURETTAGE OF UTERUS       Family History   Problem Relation Age of Onset    Breast cancer Neg Hx     Diabetes Neg Hx     Colon cancer Neg Hx     Ovarian cancer Neg Hx      Social History     Tobacco Use    Smoking status: Never    Smokeless tobacco: Never   Substance Use Topics    Alcohol use: No    Drug use: No         Physical Exam     Initial Vitals [23]   BP Pulse Resp Temp SpO2   122/72 85 15 98.5 °F (36.9 °C) 97 %      MAP       --         Physical Exam    Nursing note and vitals reviewed.  Constitutional: She appears well-developed and well-nourished. She is not diaphoretic. No distress.   HENT:   Head: Normocephalic and atraumatic.   Eyes: Conjunctivae and EOM are normal.   Neck: Neck supple.   Normal range of motion.  Cardiovascular:  Normal rate and regular rhythm.           Pulmonary/Chest: No respiratory distress.   Abdominal: She exhibits no distension.   Genitourinary:    Genitourinary Comments: Nurse chaperone present  TTP R adnexa  Cervix is closed  No blood  in vaginal vault     Musculoskeletal:      Cervical back: Normal range of motion and neck supple.     Neurological: She is alert and oriented to person, place, and time. GCS score is 15. GCS eye subscore is 4. GCS verbal subscore is 5. GCS motor subscore is 6.   Skin: Skin is warm and dry.   Psychiatric: She has a normal mood and affect. Her behavior is normal. Judgment and thought content normal.       ED Course   Procedures  Labs Reviewed   VAGINAL SCREEN - Abnormal; Notable for the following components:       Result Value    Clue Cells Rare (*)     WBC - Vaginal Screen Rare (*)     Bacteria - Vaginal Screen Rare (*)     All other components within normal limits    Narrative:     Release to patient->Immediate   COMPREHENSIVE METABOLIC PANEL - Abnormal; Notable for the following components:    Anion Gap 6 (*)     All other components within normal limits   POCT URINE PREGNANCY - Abnormal; Notable for the following components:    POC Preg Test, Ur Positive (*)     All other components within normal limits   C. TRACHOMATIS/N. GONORRHOEAE BY AMP DNA   CBC W/ AUTO DIFFERENTIAL   HCG, QUANTITATIVE    Narrative:     Release to patient->Immediate   TYPE & SCREEN          Imaging Results              US OB <14 Wks TransAbd & TransVag, Single Gestation (XPD) (In process)  Result time 23 22:26:52   Procedure changed from US OB <14 Wks, TransAbd, Single Gestation                    Medications   sodium chloride 0.9% bolus 1,000 mL 1,000 mL (0 mLs Intravenous Stopped 23)     Medical Decision Making:   History:   Old Medical Records: I decided to obtain old medical records.  Old Records Summarized: records from clinic visits and records from previous admission(s).  Differential Diagnosis:   Ectopic pregnancy, threatened , incomplete , pregnancy  Clinical Tests:   Lab Tests: Ordered and Reviewed  Radiological Study: Ordered and Reviewed  ED Management:  Pt presented today with pos preg test, pt  was unaware she was preg  She has signs of an ectopic pregnancy on US   Patient is having pain in the same area  Discussed with Dr. Palmer, Ob-Gyn at [facility] and pt will be transferred to [facility] ED for ob-gyn evaluation  Pt continues to decline pain medication while in the ED but she does endorse ongoing pain  Pt remains hemodynamically stable  Other:   I have discussed this case with another health care provider.       <> Summary of the Discussion: See above           ED Course as of 05/23/23 0000   Mon May 22, 2023   2086 I spoke with the radiology resident and he agrees with tech report that there is a mass near R adnexa concerning for ectopic [GK]      ED Course User Index  [GK] Mallory Childress MD                 Clinical Impression:   Final diagnoses:  [N93.9] Vaginal bleeding  [R10.31] RLQ abdominal pain  [O00.201] Right ovarian pregnancy without intrauterine pregnancy (Primary)  [Z34.90] Pregnancy, unspecified gestational age        ED Disposition Condition    Transfer to Another Facility Stable                Mallory Childress MD  05/23/23 0000

## 2023-05-23 NOTE — ED TRIAGE NOTES
Yaquelin Cochran, a 17 y.o. female presents to the ED w/ complaint of vaginal bleeding. Reports taking plan b one week ago and having vaginal bleeding for the last week. Reports light bleeding now. RLQ tender to touch. LMP 5th, +N/V    Adult Physical Assessment  LOC: Yaquelin Cochran, 17 y.o. female verified via two identifiers.  The patient is awake, alert, oriented and speaking appropriately at this time.  APPEARANCE: Patient resting comfortably and appears to be in no acute distress at this time. Patient is clean and well groomed, patient's clothing is properly fastened.  SKIN:The skin is warm and dry, color consistent with ethnicity, patient has normal skin turgor and moist mucus membranes, skin intact, no breakdown or brusing noted.  MUSCULOSKELETAL: Patient moving all extremities well, no obvious swelling or deformities noted.  RESPIRATORY: Airway is open and patent, respirations are spontaneous, patient has a normal effort and rate, no accessory muscle use noted.  CARDIAC: Patient has a normal rate and rhythm, no periphreal edema noted in any extremity, capillary refill < 3 seconds in all extremities  ABDOMEN: Soft and non tender to palpation, no abdominal distention noted. Bowel sounds present in all four quadrants. Reports taking plan b one week ago and having vaginal bleeding for the last week. Reports light bleeding now. RLQ tender to touch. LMP 5th, +N/V  NEUROLOGIC: Eyes open spontaneously, behavior appropriate to situation, follows commands, facial expression symmetrical, bilateral hand grasp equal and even, purposeful motor response noted, normal sensation in all extremities when touched with a finger.      Triage note:  Chief Complaint   Patient presents with    Vaginal Bleeding     Took plan B 1 week ago, having vaginal bleeding for 5-6 days. Reports light bleeding now. Now having RLQ pain/ tender to touch. Last menstrual cycle ended on 5th.  +nausea and diarrhea.     Review of patient's allergies  indicates:  No Known Allergies  Past Medical History:   Diagnosis Date    Behavior problem in childhood     COVID-19 08/08/2021    Suicidal ideation

## 2023-05-23 NOTE — FIRST PROVIDER EVALUATION
"Medical screening examination initiated.  I have conducted a focused provider triage encounter, findings are as follows:    Brief history of present illness:  18 y/o with abdominal pain and vaginal bleeding for 5 days after taking Plan B    Vitals:    05/22/23 1952   BP: 122/72   BP Location: Right arm   Patient Position: Sitting   Pulse: 85   Resp: 15   Temp: 98.5 °F (36.9 °C)   TempSrc: Oral   SpO2: 97%   Weight: 51.7 kg (114 lb)   Height: 5' 2" (1.575 m)       Pertinent physical exam:  flat affect, NAD    Brief workup plan:  labs, UPT, pelvic    Preliminary workup initiated; this workup will be continued and followed by the physician or advanced practice provider that is assigned to the patient when roomed.  "

## 2023-05-23 NOTE — ED TRIAGE NOTES
Seen yesterday with c/o cramping and bleeding. US showed no gestational sac and was told possible ectopic. Today reports continued bleeding, heavier than yesterday with some clots. No tissue reported. Continues with abdominal cramping.

## 2023-05-24 ENCOUNTER — OFFICE VISIT (OUTPATIENT)
Dept: OBSTETRICS AND GYNECOLOGY | Facility: CLINIC | Age: 18
End: 2023-05-24
Payer: MEDICAID

## 2023-05-24 ENCOUNTER — CLINICAL SUPPORT (OUTPATIENT)
Dept: OBSTETRICS AND GYNECOLOGY | Facility: CLINIC | Age: 18
End: 2023-05-24
Payer: MEDICAID

## 2023-05-24 ENCOUNTER — INFUSION (OUTPATIENT)
Dept: INFUSION THERAPY | Facility: OTHER | Age: 18
End: 2023-05-24
Attending: NURSE PRACTITIONER
Payer: MEDICAID

## 2023-05-24 VITALS
TEMPERATURE: 98 F | BODY MASS INDEX: 20.37 KG/M2 | OXYGEN SATURATION: 100 % | RESPIRATION RATE: 16 BRPM | WEIGHT: 110.69 LBS | SYSTOLIC BLOOD PRESSURE: 119 MMHG | DIASTOLIC BLOOD PRESSURE: 64 MMHG | HEIGHT: 62 IN | HEART RATE: 67 BPM

## 2023-05-24 VITALS
BODY MASS INDEX: 20.24 KG/M2 | WEIGHT: 110.69 LBS | SYSTOLIC BLOOD PRESSURE: 114 MMHG | DIASTOLIC BLOOD PRESSURE: 74 MMHG

## 2023-05-24 DIAGNOSIS — O26.891 RH NEGATIVE STATUS DURING PREGNANCY IN FIRST TRIMESTER: ICD-10-CM

## 2023-05-24 DIAGNOSIS — Z67.91 RH NEGATIVE STATUS DURING PREGNANCY IN FIRST TRIMESTER: ICD-10-CM

## 2023-05-24 DIAGNOSIS — Z29.13 NEED FOR RHOGAM DUE TO RH NEGATIVE MOTHER: Primary | ICD-10-CM

## 2023-05-24 DIAGNOSIS — O00.101 RIGHT TUBAL PREGNANCY WITHOUT INTRAUTERINE PREGNANCY: Primary | ICD-10-CM

## 2023-05-24 PROCEDURE — 99214 OFFICE O/P EST MOD 30 MIN: CPT | Mod: S$PBB,TH,, | Performed by: OBSTETRICS & GYNECOLOGY

## 2023-05-24 PROCEDURE — 96372 THER/PROPH/DIAG INJ SC/IM: CPT | Mod: PBBFAC,59

## 2023-05-24 PROCEDURE — 96402 CHEMO HORMON ANTINEOPL SQ/IM: CPT

## 2023-05-24 PROCEDURE — 1159F MED LIST DOCD IN RCRD: CPT | Mod: CPTII,,, | Performed by: OBSTETRICS & GYNECOLOGY

## 2023-05-24 PROCEDURE — 1160F PR REVIEW ALL MEDS BY PRESCRIBER/CLIN PHARMACIST DOCUMENTED: ICD-10-PCS | Mod: CPTII,,, | Performed by: OBSTETRICS & GYNECOLOGY

## 2023-05-24 PROCEDURE — 3008F PR BODY MASS INDEX (BMI) DOCUMENTED: ICD-10-PCS | Mod: CPTII,,, | Performed by: OBSTETRICS & GYNECOLOGY

## 2023-05-24 PROCEDURE — 3074F SYST BP LT 130 MM HG: CPT | Mod: CPTII,,, | Performed by: OBSTETRICS & GYNECOLOGY

## 2023-05-24 PROCEDURE — 1159F PR MEDICATION LIST DOCUMENTED IN MEDICAL RECORD: ICD-10-PCS | Mod: CPTII,,, | Performed by: OBSTETRICS & GYNECOLOGY

## 2023-05-24 PROCEDURE — 1160F RVW MEDS BY RX/DR IN RCRD: CPT | Mod: CPTII,,, | Performed by: OBSTETRICS & GYNECOLOGY

## 2023-05-24 PROCEDURE — 3078F PR MOST RECENT DIASTOLIC BLOOD PRESSURE < 80 MM HG: ICD-10-PCS | Mod: CPTII,,, | Performed by: OBSTETRICS & GYNECOLOGY

## 2023-05-24 PROCEDURE — 63600175 PHARM REV CODE 636 W HCPCS: Performed by: OBSTETRICS & GYNECOLOGY

## 2023-05-24 PROCEDURE — 99999 PR PBB SHADOW E&M-EST. PATIENT-LVL III: CPT | Mod: PBBFAC,,, | Performed by: OBSTETRICS & GYNECOLOGY

## 2023-05-24 PROCEDURE — 99214 PR OFFICE/OUTPT VISIT, EST, LEVL IV, 30-39 MIN: ICD-10-PCS | Mod: S$PBB,TH,, | Performed by: OBSTETRICS & GYNECOLOGY

## 2023-05-24 PROCEDURE — 3008F BODY MASS INDEX DOCD: CPT | Mod: CPTII,,, | Performed by: OBSTETRICS & GYNECOLOGY

## 2023-05-24 PROCEDURE — 3074F PR MOST RECENT SYSTOLIC BLOOD PRESSURE < 130 MM HG: ICD-10-PCS | Mod: CPTII,,, | Performed by: OBSTETRICS & GYNECOLOGY

## 2023-05-24 PROCEDURE — 99213 OFFICE O/P EST LOW 20 MIN: CPT | Mod: PBBFAC,TH | Performed by: OBSTETRICS & GYNECOLOGY

## 2023-05-24 PROCEDURE — 3078F DIAST BP <80 MM HG: CPT | Mod: CPTII,,, | Performed by: OBSTETRICS & GYNECOLOGY

## 2023-05-24 PROCEDURE — 99999 PR PBB SHADOW E&M-EST. PATIENT-LVL III: ICD-10-PCS | Mod: PBBFAC,,, | Performed by: OBSTETRICS & GYNECOLOGY

## 2023-05-24 RX ORDER — METHOTREXATE 25 MG/ML
50 INJECTION INTRA-ARTERIAL; INTRAMUSCULAR; INTRATHECAL; INTRAVENOUS
OUTPATIENT
Start: 2023-05-31

## 2023-05-24 RX ORDER — METHOTREXATE 25 MG/ML
50 INJECTION INTRA-ARTERIAL; INTRAMUSCULAR; INTRATHECAL; INTRAVENOUS
Status: COMPLETED | OUTPATIENT
Start: 2023-05-24 | End: 2023-05-24

## 2023-05-24 RX ORDER — METHOTREXATE 25 MG/ML
50 INJECTION INTRA-ARTERIAL; INTRAMUSCULAR; INTRATHECAL; INTRAVENOUS
Status: CANCELLED | OUTPATIENT
Start: 2023-05-24

## 2023-05-24 RX ADMIN — RHO(D) IMMUNE GLOBULIN (HUMAN) 300 MCG: 1500 SOLUTION INTRAMUSCULAR at 02:05

## 2023-05-24 RX ADMIN — METHOTREXATE 75 MG: 25 SOLUTION INTRA-ARTERIAL; INTRAMUSCULAR; INTRATHECAL; INTRAVENOUS at 03:05

## 2023-05-24 NOTE — LETTER
May 24, 2023      [facility]  [address] [address]  Phone: [phone]  Fax: [phone]       Patient: Yaquelin Cochran   YOB: 2005  Date of Visit: 05/24/2023    To Whom It May Concern:    Tomasa Cochran  was at [facility] on 05/24/2023. The patient may return to work on 5/25/2023 but should be excused if she has any health concerns. If you have any questions or concerns, or if I can be of further assistance, please do not hesitate to contact me.    Sincerely,      Lulú Xiong MD

## 2023-05-24 NOTE — PROGRESS NOTES
"Past medical, surgical, social, family, and obstetric histories; medications; prior records and results; and available outside records were reviewed and updated in the EMR.  Pertinent findings were noted below.    Reason for Visit   Follow-up    HPI   17 y.o.  F    Patient's last menstrual period was 2023 (approximate).    Patient presents for F/U evaluation in the setting of suspected R tubal ectopic pregnancy. LMP approximately 2023. The patient had unprotected intercourse on  and took Plan B for emergency contraception a few hours after sexual encounter. She initially presented to the ED on 2023 with RLQ abdominal pain and vaginal bleeding. TVUS completed during this visit revealed the following: "complex tubular appearing structure in the right adnexal region which demonstrates a 0.7 cm hypoechoic structure." Patient was discharged home after GYN consultation with plan for repeat hCG. She then re-presented to the ED on  with heavier vaginal bleeding, reports utilizing 2 tampons with bright red spotting thereafter. Pelvic examination was benign and patient was discharged home with plans for follow-up today.  Today the patient endorses RLQ abdominal pain which she describes as a soreness and pelvic tenderness, slightly increased in intensity as compared to prior but waxing and waning. States that pain is worse in certain positions, specifically lying on R side. Reports that pain occasionally twinges to the left side. Reports that vaginal bleeding is light today, has utilized 1 pad with minimal dark brown bleeding. Denies any F/C. Endorses mild nausea but denies any episodes of emesis. Last had intercourse 2 days ago prior to knowing she was pregnant.  Is not taking a PNV and does not take NSAIDs, is currently breastfeeding 1-year-old but is amenable to stopping in order to pursue treatment with MTX for ectopic pregnancy. She does not take any medications on a daily basis.  Obstetric " history is significant for term  (uncomplicated) and missed  managed with suction D&C. Denies any h/o pelvic infections, history of dysmenorrhea/endometriosis, or pelvic surgeries outside of D&C.  hCG trend: 423 () >> 462 () >> 418 ()    Exam   /74   Wt 50.2 kg (110 lb 10.7 oz)   LMP 2023 (Approximate)   BMI 20.24 kg/m²     Physical Exam  Constitutional:       Appearance: Normal appearance. She is normal weight. She is not ill-appearing or diaphoretic.   Genitourinary:      Genitourinary Comments: Deferred in setting of suspected ectopic pregnancy, pad examined with minimal dark blood, no tissue present   HENT:      Head: Normocephalic and atraumatic.   Eyes:      Extraocular Movements: Extraocular movements intact.      Conjunctiva/sclera: Conjunctivae normal.   Cardiovascular:      Rate and Rhythm: Normal rate.   Pulmonary:      Effort: Pulmonary effort is normal. No respiratory distress.   Abdominal:      Palpations: Abdomen is soft. There is no mass.      Tenderness: There is abdominal tenderness (Minimal TTP in RLQ). There is no guarding or rebound.   Musculoskeletal:         General: No swelling. Normal range of motion.      Cervical back: Normal range of motion.   Neurological:      General: No focal deficit present.      Mental Status: She is alert and oriented to person, place, and time.   Skin:     General: Skin is warm and dry.   Psychiatric:         Mood and Affect: Mood normal.         Behavior: Behavior normal.         Thought Content: Thought content normal.   Vitals reviewed.       Imaging (2023):  Narrative & Impression  EXAMINATION:  US OB <14 WEEKS, TRANSABDOM & TRANSVAG, SINGLE GESTATION (XPD)     CLINICAL HISTORY:  rlq pain; Right lower quadrant pain     TECHNIQUE:  Transabdominal sonography of the pelvis was performed, followed by transvaginal sonography to better evaluate the uterus and ovaries.     COMPARISON:  Pelvic ultrasound 2022 and  multiple priors.     FINDINGS:  Intrauterine gestation(s): None     Mean gestational sac diameter: N/A     Yolk sac: Question yolk sac in the right adnexa.     Crown-rump length (CRL): N/A     Cardiac activity: N/A     Subchorionic hemorrhage: N/A     Uterus measures 7.4 x 3.0 x 5.1 cm.  Trace fluid noted within the endometrium.     Right ovary: 3.7 x 2.1 x 3.1 cm.     Tubular appearing structure in the right adnexa with heterogeneous, largely hypoechoic internal contents and a round central hypoechoic structure measuring 0.7 x 0.7 cm.     Left ovary: Measures 5.9 x 5.4 x 6.1 cm.  There is a 5.0 x 5.0 x 4.8 cm hypoechoic lesion which does not demonstrate internal color Doppler flow with a bandlike appearance favored to reflect a hemorrhagic cyst.     Miscellaneous: Free fluid is noted within the pelvis.     Impression:     No intrauterine gestation identified. Complex tubular appearing structure in the right adnexal region which demonstrates a 0.7 cm hypoechoic structure.  Free fluid in the pelvis.  Tubal ectopic pregnancy cannot be excluded.  Recommend OBGYN consultation and continued follow-up with serial beta hCG and short-term follow-up transvaginal ultrasound.     5 cm left adnexal lesion favored to reflect hemorrhagic cyst for which follow-up pelvic ultrasound with transvaginal imaging in 6-12 weeks is recommended.    Assessment and Plan   Right tubal pregnancy without intrauterine pregnancy  -     Cancel: methotrexate (PF) injection 75 mg  -     HCG, QUANTITATIVE, PREGNANCY; Standing    Other orders  -     rho(D) immune globulin injection 300 mcg      Patient counseled on strongly suspected R tubal ectopic pregnancy given abnormal trend in hCG and symptoms. Patient counseled that ectopic pregnancy is unfortunately a non-viable pregnancy and is life-threatening in the event of rupture.  VSS, benign abdominal examination with no peritoneal signs today  Lab work-up as follows:  H/h: 13/41  Platelets: 266  LFTs:  18/17  Cr: 0.7  hCG trended as above, hCG today 418  Overall risks of ectopic pregnancy reviewed including rupture, bleeding, need for further treatment, and possible need for surgery. Patient counseled on options of methotrexate vs. laparoscopy and salpingostomy/salpingectomy:  Reviewed risks and benefits of methotrexate including skin changes, diarrhea, nausea, vomiting, fatigue, alopecia, gastric distress, dizziness, pneumonitis, and neutropenia. Reviewed labs as above and no absolute contraindications to use. Patient understands the risk that surgical intervention may still be indicted if MTX is not successful. Reviewed expected success rate at her current [facility] level.   Reviewed surgical risks of laparoscopic intervention including possible damage to surrounding structures, hemorrhage, and infection.   Patient strongly desires to avoid surgery if possible. Desires to proceed with medical management with single-dose methotrexate regimen. Will return for repeat hCG on D4 (Saturday 5/27) and then will return for repeat hCG on D7 (Tuesday 5/30). Will receive MTX 50 mg/m^2 today. Consent form for MTX reviewed and signed with patient. Patient understands and able to teach back the plan for follow-up.  Rh negative w/ negative indirect jas, Rhogam not administered in ED, recommend administration today, orders placed  Advised on recommendation for pelvic rest/avoiding intercourse, reliable contraception for the next 3 months, avoiding NSAIDs, avoid prolonged sun exposure, to stop taking prenatal vitamin or any medications with folic acid, and to avoid foods high in folate.  Counseled extensively on recommendation to stop breastfeeding, patient expressed understanding and will transition to formula feeding  Strict ectopic rupture/ED return precautions reviewed prior to D/C  Should RTC for contraceptive counseling after hCG negative, will continue to follow weekly to negative if MTX treatment is successful      Lulú  NETO Xiong M.D.  OB/GYN PGY-4

## 2023-05-24 NOTE — PLAN OF CARE
Methotrexate intramuscular injection administered, no reaction. Patient tolerated well. No apparent distress noted. Discharge instructions given to patient. Patient understands instructions.

## 2023-05-29 ENCOUNTER — ANESTHESIA (OUTPATIENT)
Dept: SURGERY | Facility: OTHER | Age: 18
End: 2023-05-29
Payer: MEDICAID

## 2023-05-29 ENCOUNTER — HOSPITAL ENCOUNTER (OUTPATIENT)
Facility: OTHER | Age: 18
Discharge: HOME | End: 2023-05-30
Attending: STUDENT IN AN ORGANIZED HEALTH CARE EDUCATION/TRAINING PROGRAM | Admitting: OBSTETRICS & GYNECOLOGY
Payer: MEDICAID

## 2023-05-29 ENCOUNTER — ANESTHESIA EVENT (OUTPATIENT)
Dept: SURGERY | Facility: OTHER | Age: 18
End: 2023-05-29
Payer: MEDICAID

## 2023-05-29 DIAGNOSIS — Z98.890 STATUS POST LAPAROSCOPY: Primary | ICD-10-CM

## 2023-05-29 DIAGNOSIS — O00.90 ECTOPIC PREGNANCY, UNSPECIFIED LOCATION, UNSPECIFIED WHETHER INTRAUTERINE PREGNANCY PRESENT: ICD-10-CM

## 2023-05-29 DIAGNOSIS — O00.90 ECTOPIC PREGNANCY: ICD-10-CM

## 2023-05-29 LAB
ALBUMIN SERPL BCP-MCNC: 4.2 G/DL (ref 3.5–5.2)
ALP SERPL-CCNC: 80 U/L (ref 55–135)
ALT SERPL W/O P-5'-P-CCNC: 15 U/L (ref 10–44)
ANION GAP SERPL CALC-SCNC: 9 MMOL/L (ref 8–16)
AST SERPL-CCNC: 16 U/L (ref 10–40)
B-HCG UR QL: POSITIVE
BASOPHILS # BLD AUTO: 0.04 K/UL (ref 0–0.2)
BASOPHILS NFR BLD: 0.5 % (ref 0–1.9)
BILIRUB SERPL-MCNC: 0.4 MG/DL (ref 0.1–1)
BILIRUB UR QL STRIP: NEGATIVE
BUN SERPL-MCNC: 6 MG/DL (ref 6–30)
BUN SERPL-MCNC: 7 MG/DL (ref 6–20)
CALCIUM SERPL-MCNC: 9.3 MG/DL (ref 8.7–10.5)
CHLORIDE SERPL-SCNC: 103 MMOL/L (ref 95–110)
CHLORIDE SERPL-SCNC: 107 MMOL/L (ref 95–110)
CLARITY UR REFRACT.AUTO: ABNORMAL
CO2 SERPL-SCNC: 24 MMOL/L (ref 23–29)
COLOR UR AUTO: YELLOW
CREAT SERPL-MCNC: 0.5 MG/DL (ref 0.5–1.4)
CREAT SERPL-MCNC: 0.7 MG/DL (ref 0.5–1.4)
CTP QC/QA: YES
DIFFERENTIAL METHOD: NORMAL
EOSINOPHIL # BLD AUTO: 0.1 K/UL (ref 0–0.5)
EOSINOPHIL NFR BLD: 1.6 % (ref 0–8)
ERYTHROCYTE [DISTWIDTH] IN BLOOD BY AUTOMATED COUNT: 13.4 % (ref 11.5–14.5)
EST. GFR  (NO RACE VARIABLE): >60 ML/MIN/1.73 M^2
GLUCOSE SERPL-MCNC: 86 MG/DL (ref 70–110)
GLUCOSE SERPL-MCNC: 91 MG/DL (ref 70–110)
GLUCOSE UR QL STRIP: NEGATIVE
HCG INTACT+B SERPL-ACNC: 391 MIU/ML
HCT VFR BLD AUTO: 37.6 % (ref 37–48.5)
HCT VFR BLD CALC: 38 %PCV (ref 36–54)
HCV AB SERPL QL IA: NORMAL
HGB BLD-MCNC: 12.8 G/DL (ref 12–16)
HGB UR QL STRIP: ABNORMAL
HIV 1+2 AB+HIV1 P24 AG SERPL QL IA: NORMAL
IMM GRANULOCYTES # BLD AUTO: 0.02 K/UL (ref 0–0.04)
IMM GRANULOCYTES NFR BLD AUTO: 0.3 % (ref 0–0.5)
KETONES UR QL STRIP: NEGATIVE
LEUKOCYTE ESTERASE UR QL STRIP: NEGATIVE
LYMPHOCYTES # BLD AUTO: 1.4 K/UL (ref 1–4.8)
LYMPHOCYTES NFR BLD: 19.1 % (ref 18–48)
MCH RBC QN AUTO: 30.4 PG (ref 27–31)
MCHC RBC AUTO-ENTMCNC: 34 G/DL (ref 32–36)
MCV RBC AUTO: 89 FL (ref 82–98)
MONOCYTES # BLD AUTO: 0.5 K/UL (ref 0.3–1)
MONOCYTES NFR BLD: 7 % (ref 4–15)
NEUTROPHILS # BLD AUTO: 5.4 K/UL (ref 1.8–7.7)
NEUTROPHILS NFR BLD: 71.5 % (ref 38–73)
NITRITE UR QL STRIP: NEGATIVE
NRBC BLD-RTO: 0 /100 WBC
PH UR STRIP: 8 [PH] (ref 5–8)
PLATELET # BLD AUTO: 220 K/UL (ref 150–450)
PMV BLD AUTO: 9.4 FL (ref 9.2–12.9)
POC IONIZED CALCIUM: 1.17 MMOL/L (ref 1.06–1.42)
POC TCO2 (MEASURED): 23 MMOL/L (ref 23–29)
POTASSIUM BLD-SCNC: 4.4 MMOL/L (ref 3.5–5.1)
POTASSIUM SERPL-SCNC: 4.4 MMOL/L (ref 3.5–5.1)
PROT SERPL-MCNC: 6.9 G/DL (ref 6–8.4)
PROT UR QL STRIP: ABNORMAL
RBC # BLD AUTO: 4.21 M/UL (ref 4–5.4)
SAMPLE: NORMAL
SODIUM BLD-SCNC: 140 MMOL/L (ref 136–145)
SODIUM SERPL-SCNC: 140 MMOL/L (ref 136–145)
SP GR UR STRIP: 1.02 (ref 1–1.03)
URN SPEC COLLECT METH UR: ABNORMAL
WBC # BLD AUTO: 7.52 K/UL (ref 3.9–12.7)

## 2023-05-29 PROCEDURE — 37000009 HC ANESTHESIA EA ADD 15 MINS: Performed by: OBSTETRICS & GYNECOLOGY

## 2023-05-29 PROCEDURE — 25000003 PHARM REV CODE 250: Performed by: STUDENT IN AN ORGANIZED HEALTH CARE EDUCATION/TRAINING PROGRAM

## 2023-05-29 PROCEDURE — D9220A PRA ANESTHESIA: ICD-10-PCS | Mod: ANES,,, | Performed by: ANESTHESIOLOGY

## 2023-05-29 PROCEDURE — 36000708 HC OR TIME LEV III 1ST 15 MIN: Performed by: OBSTETRICS & GYNECOLOGY

## 2023-05-29 PROCEDURE — 81025 URINE PREGNANCY TEST: CPT | Performed by: STUDENT IN AN ORGANIZED HEALTH CARE EDUCATION/TRAINING PROGRAM

## 2023-05-29 PROCEDURE — 99285 PR EMERGENCY DEPT VISIT,LEVEL V: ICD-10-PCS | Mod: ,,, | Performed by: STUDENT IN AN ORGANIZED HEALTH CARE EDUCATION/TRAINING PROGRAM

## 2023-05-29 PROCEDURE — 27201423 OPTIME MED/SURG SUP & DEVICES STERILE SUPPLY: Performed by: OBSTETRICS & GYNECOLOGY

## 2023-05-29 PROCEDURE — 86870 RBC ANTIBODY IDENTIFICATION: CPT

## 2023-05-29 PROCEDURE — 87389 HIV-1 AG W/HIV-1&-2 AB AG IA: CPT | Performed by: PHYSICIAN ASSISTANT

## 2023-05-29 PROCEDURE — 00840 ANES IPER PX LOWER ABD NOS: CPT | Performed by: OBSTETRICS & GYNECOLOGY

## 2023-05-29 PROCEDURE — D9220A PRA ANESTHESIA: Mod: CRNA,,, | Performed by: STUDENT IN AN ORGANIZED HEALTH CARE EDUCATION/TRAINING PROGRAM

## 2023-05-29 PROCEDURE — 59151 TREAT ECTOPIC PREGNANCY: CPT | Mod: ,,, | Performed by: OBSTETRICS & GYNECOLOGY

## 2023-05-29 PROCEDURE — 86900 BLOOD TYPING SEROLOGIC ABO: CPT

## 2023-05-29 PROCEDURE — 99284 EMERGENCY DEPT VISIT MOD MDM: CPT | Mod: ,,, | Performed by: OBSTETRICS & GYNECOLOGY

## 2023-05-29 PROCEDURE — 36000709 HC OR TIME LEV III EA ADD 15 MIN: Performed by: OBSTETRICS & GYNECOLOGY

## 2023-05-29 PROCEDURE — D9220A PRA ANESTHESIA: Mod: ANES,,, | Performed by: ANESTHESIOLOGY

## 2023-05-29 PROCEDURE — 81003 URINALYSIS AUTO W/O SCOPE: CPT | Performed by: STUDENT IN AN ORGANIZED HEALTH CARE EDUCATION/TRAINING PROGRAM

## 2023-05-29 PROCEDURE — 59151 PR RX ECTOP PREG BY SCOPE,RMV TUBE/OVRY: ICD-10-PCS | Mod: ,,, | Performed by: OBSTETRICS & GYNECOLOGY

## 2023-05-29 PROCEDURE — 84702 CHORIONIC GONADOTROPIN TEST: CPT | Mod: 91 | Performed by: STUDENT IN AN ORGANIZED HEALTH CARE EDUCATION/TRAINING PROGRAM

## 2023-05-29 PROCEDURE — 86803 HEPATITIS C AB TEST: CPT | Performed by: PHYSICIAN ASSISTANT

## 2023-05-29 PROCEDURE — 63600175 PHARM REV CODE 636 W HCPCS: Performed by: STUDENT IN AN ORGANIZED HEALTH CARE EDUCATION/TRAINING PROGRAM

## 2023-05-29 PROCEDURE — 99285 EMERGENCY DEPT VISIT HI MDM: CPT | Mod: ,,, | Performed by: STUDENT IN AN ORGANIZED HEALTH CARE EDUCATION/TRAINING PROGRAM

## 2023-05-29 PROCEDURE — 80053 COMPREHEN METABOLIC PANEL: CPT | Performed by: STUDENT IN AN ORGANIZED HEALTH CARE EDUCATION/TRAINING PROGRAM

## 2023-05-29 PROCEDURE — 99285 EMERGENCY DEPT VISIT HI MDM: CPT | Mod: 25

## 2023-05-29 PROCEDURE — 85025 COMPLETE CBC W/AUTO DIFF WBC: CPT | Performed by: STUDENT IN AN ORGANIZED HEALTH CARE EDUCATION/TRAINING PROGRAM

## 2023-05-29 PROCEDURE — 37000008 HC ANESTHESIA 1ST 15 MINUTES: Performed by: OBSTETRICS & GYNECOLOGY

## 2023-05-29 PROCEDURE — D9220A PRA ANESTHESIA: ICD-10-PCS | Mod: CRNA,,, | Performed by: STUDENT IN AN ORGANIZED HEALTH CARE EDUCATION/TRAINING PROGRAM

## 2023-05-29 PROCEDURE — 71000033 HC RECOVERY, INTIAL HOUR: Performed by: OBSTETRICS & GYNECOLOGY

## 2023-05-29 PROCEDURE — 99284 PR EMERGENCY DEPT VISIT,LEVEL IV: ICD-10-PCS | Mod: ,,, | Performed by: OBSTETRICS & GYNECOLOGY

## 2023-05-29 PROCEDURE — 71000039 HC RECOVERY, EACH ADD'L HOUR: Performed by: OBSTETRICS & GYNECOLOGY

## 2023-05-29 RX ORDER — FAMOTIDINE 20 MG/1
20 TABLET, FILM COATED ORAL
Status: DISCONTINUED | OUTPATIENT
Start: 2023-05-29 | End: 2023-05-30 | Stop reason: HOSPADM

## 2023-05-29 RX ORDER — SODIUM CHLORIDE 9 MG/ML
INJECTION, SOLUTION INTRAVENOUS CONTINUOUS
Status: CANCELLED | OUTPATIENT
Start: 2023-05-30

## 2023-05-29 RX ORDER — MUPIROCIN 20 MG/G
OINTMENT TOPICAL
Status: CANCELLED | OUTPATIENT
Start: 2023-05-29

## 2023-05-29 RX ADMIN — LIDOCAINE HYDROCHLORIDE 100 MG: 20 INJECTION, SOLUTION INTRAVENOUS at 11:05

## 2023-05-29 RX ADMIN — FENTANYL CITRATE 50 MCG: 50 INJECTION, SOLUTION INTRAMUSCULAR; INTRAVENOUS at 11:05

## 2023-05-29 RX ADMIN — PROPOFOL 200 MG: 10 INJECTION, EMULSION INTRAVENOUS at 11:05

## 2023-05-29 RX ADMIN — ROCURONIUM BROMIDE 50 MG: 10 SOLUTION INTRAVENOUS at 11:05

## 2023-05-29 RX ADMIN — SODIUM CHLORIDE, SODIUM LACTATE, POTASSIUM CHLORIDE, AND CALCIUM CHLORIDE: .6; .31; .03; .02 INJECTION, SOLUTION INTRAVENOUS at 11:05

## 2023-05-29 NOTE — ED NOTES
Patient identifiers verified and correct for  Ms Cochran  C/C:  Abdominal pain RLQ SEE NN  APPEARANCE: awake and alert in NAD. PAIN  7/10  SKIN: warm, dry and intact. No breakdown or bruising.  MUSCULOSKELETAL: Patient moving all extremities spontaneously, no obvious swelling or deformities noted. Ambulates independently.  RESPIRATORY: Denies shortness of breath.Respirations unlabored.   CARDIAC: Denies CP, 2+ distal pulses; no peripheral edema  ABDOMEN: ABdomen soft, report pain to RLQ, reports nausea, no emesis  reports intermittent light bleeding x 2 weeks   : voids spontaneously, denies difficulty  Neurologic: AAO x 4; follows commands equal strength in all extremities; denies numbness/tingling. Denies dizziness  Positive lightheaded,

## 2023-05-29 NOTE — DISCHARGE INSTRUCTIONS
Please go straight to the Emergency Department at [facility] to see OBGYN. You should go directly there.

## 2023-05-29 NOTE — ED NOTES
I-STAT Chem-8+ Results:   Value Reference Range   Sodium 140 136-145 mmol/L   Potassium  4.4 3.5-5.1 mmol/L   Chloride 103  mmol/L   Ionized Calcium 1.17 1.06-1.42 mmol/L   CO2 (measured) 23 23-29 mmol/L   Glucose 91  mg/dL   BUN 6 6-30 mg/dL   Creatinine 0.5 0.5-1.4 mg/dL   Hematocrit 38 36-54%

## 2023-05-29 NOTE — ED NOTES
Patient states onset right lower abd pain this am , states ectopic pregnancy, took Methotrexate 4-5 days ago, Had HCG lab test today. Reports intermittent light bleeding x 2 weeks

## 2023-05-29 NOTE — ED NOTES
Dick DILLARD & Tierney SCOTT at bedside updating patient on findings and plan to transfer to [employer] - patient verbalized understanding.

## 2023-05-29 NOTE — LETTER
May 30, 2023         [address]  [address]  [facility] [address]  Phone: [phone]  Fax: [phone]       Patient: Yaquelin Cochran   YOB: 2005  Date of Visit: 05/30/2023    To Whom It May Concern:    Tomasa Cochran  was at [facility] on 05/30/2023. The patient may return to work/school on 6/5/23 with no restrictions. If you have any questions or concerns, or if I can be of further assistance, please do not hesitate to contact me.    Sincerely,      HENRY Elizondo MD  OBGYN PGY-4

## 2023-05-29 NOTE — ED PROVIDER NOTES
Source of History  patient and EMR    Chief Complaint    Abdominal Pain (Took methotrexate for ectopic preg 5d ago seen at [facility],)      History of Present Illness    Yaquelin Cochran is a 17 y.o. female presenting with right adnexal pain in the setting of recently diagnostic R ectopic pregnancy.  No vaginal bleeding. Rh neg, received rhogam a few days ago. . Received mtx previously.    Review of Systems    As per HPI and below:  Constitutional symptoms:  No fever or chills   Skin symptoms:  No rash    Respiratory symptoms:  No shortness of breath  Cardiovascular symptoms:  No chest pain   Gastrointestinal symptoms:  + abdominal pain RLQ in pelvis, no nausea, no vomiting, no diarrhea, no rectal bleeding  Genitourinary symptoms:  No dysuria, no hematuria, no vaginal bleeding, no vaginal discharge, + pelvic pain  Musculoskeletal symptoms:  No back pain, No joint pains or aches    Neurologic symptoms:  No headache  Endocrine symptoms:  None except as in HPI  Psychiatric symptoms:  None except as in HPI     Past History    As per HPI and below:  Past Medical History:   Diagnosis Date    Behavior problem in childhood     COVID-19 2021    Suicidal ideation        Past Surgical History:   Procedure Laterality Date    BACK SURGERY  2016    DILATION AND CURETTAGE OF UTERUS         Social History     Socioeconomic History    Marital status: Single   Tobacco Use    Smoking status: Never    Smokeless tobacco: Never   Substance and Sexual Activity    Alcohol use: No    Drug use: No    Sexual activity: Yes     Partners: Male     Birth control/protection: Condom       Family History   Problem Relation Age of Onset    Breast cancer Neg Hx     Diabetes Neg Hx     Colon cancer Neg Hx     Ovarian cancer Neg Hx        Review of patient's allergies indicates:  No Known Allergies    No current facility-administered medications on file prior to encounter.     Current Outpatient Medications on File Prior to Encounter  "  Medication Sig Dispense Refill    hydrOXYzine HCL (ATARAX) 25 MG tablet Take 1 tablet (25 mg total) by mouth every 6 (six) hours. (Patient not taking: Reported on 5/24/2023) 30 tablet 1    ondansetron (ZOFRAN-ODT) 4 MG TbDL Take 2 tablets (8 mg total) by mouth every 12 (twelve) hours as needed (nausea/vomiting). (Patient not taking: Reported on 5/24/2023) 6 tablet 0    ondansetron (ZOFRAN-ODT) 8 MG TbDL Take 1 tablet (8 mg total) by mouth every 6 (six) hours as needed (nausea). (Patient not taking: Reported on 5/24/2023) 15 tablet 0    prenat.vits,lenny,min-iron-folic (PRENATAL VITAMIN) Tab Take 1 tablet by mouth once daily. 365 each 0    triamcinolone acetonide 0.1% (KENALOG) 0.1 % cream Apply topically 2 (two) times daily. (Patient not taking: Reported on 3/24/2022) 45 g 0    [DISCONTINUED] amoxicillin-clavulanate 875-125mg (AUGMENTIN) 875-125 mg per tablet Take 1 tablet by mouth 2 (two) times daily. (Patient not taking: Reported on 5/24/2023) 14 tablet 0       Physical Exam    Reviewed nursing notes.  Vitals:    05/29/23 1156 05/29/23 1714   BP: (!) 142/69 (!) 103/59   BP Location:  Right arm   Patient Position:  Sitting   Pulse: 81 72   Resp: 18 18   Temp: 98.2 °F (36.8 °C) 97 °F (36.1 °C)   TempSrc: Oral Oral   SpO2: 100% 100%   Weight: 51.7 kg (114 lb)    Height: 5' 1" (1.549 m)      General:  Alert, no acute distress.    Skin:  Warm, dry, intact.  No rash.  Head:  Normocephalic, atraumatic.    Neck:  Supple.   Eye:  Normal conjunctiva.   Ears, nose, mouth and throat:  Normal phonation.  Cardiovascular:  Regular rate and rhythm, Normal peripheral perfusion, No edema.    Respiratory: respirations are non-labored.  Gastrointestinal:   Soft, non tender, Non distended.   :  External genitalia normal and without lesions.  No active bleeding at introitus.    Speculum:  No discharge observed.  Normal appearance of cervix.  Normal appearance of vaginal vault. No active bleeding.  Bimanual:  Cervical os closed.  No " CMT.  Adnexal fullness to the right with tenderness.  (Chaperone present)  Back:  Nontender.  Normal ambulation with steady gait.  Neurological:  Alert and oriented to person, place, time, and situation.    Psychiatric:  Cooperative, appropriate mood & affect.       Initial MDM and Data Review    17 y.o. female presenting for evaluation of R pelvic pain with recent dx of ectopic pregnancy, no vaginal bleeding, HD stable, appears comfortable time of initial exam.      Differential includes: ruptured ectopic, free fluid, other pelvic pathology seems less likely    Work-up includes: beta quant    Interventions include: pelvic us, ob discussion    The patient has significant medical comorbidities that influence decision making for this acute process, such as: ectopic    I decided to obtain the patient's medical records and review relevant documentation from hospital records.  Pertinent information is noted.  Beta 399 one day ago    Medications - No data to display    Results and ED Course    Labs Reviewed   URINALYSIS, REFLEX TO URINE CULTURE - Abnormal; Notable for the following components:       Result Value    Appearance, UA Hazy (*)     Protein, UA Trace (*)     Occult Blood UA Trace (*)     All other components within normal limits    Narrative:     Specimen Source->Urine   POCT URINE PREGNANCY - Abnormal; Notable for the following components:    POC Preg Test, Ur Positive (*)     All other components within normal limits   HIV 1 / 2 ANTIBODY    Narrative:     Release to patient->Immediate   HEPATITIS C ANTIBODY    Narrative:     Release to patient->Immediate   CBC W/ AUTO DIFFERENTIAL    Narrative:     Release to patient->Immediate   COMPREHENSIVE METABOLIC PANEL    Narrative:     Release to patient->Immediate   HCG, QUANTITATIVE    Narrative:     Release to patient->Immediate   ISTAT PROCEDURE   ISTAT CHEM8       Imaging Results               US OB <14 Wks TransAbd & TransVag, Single Gestation (XPD) (Final  result)  Result time 05/29/23 17:02:53   Procedure changed from US OB <14 Wks, TransAbd, Single Gestation     Final result by Mary Gould MD (05/29/23 17:02:53)                   Impression:      1. Increased size of a hyperechoic elongated structure in the right adnexa with surrounding simple and complex fluid.  This may reflect hematosalpinx, edema of the fallopian tube, or hemorrhage.  Further evaluation with CT may be appropriate if clinically indicated for better characterization in this patient who has been treated with methotrexate.  2. Stable anechoic right adnexal lesion at the margin of the above elongated structure.  As noted on the prior examination, an ectopic gestational sac cannot be excluded.  No fetal pole identified.  3. Complex cystic left ovarian mass, most likely a hemorrhagic functional cyst.  Follow-up with ultrasound in 6-12 weeks is again advised.  4. Trace endometrial fluid and free pelvic fluid, with no dramatic interval change.  5. Recommend continued monitoring of clinical symptoms and serial beta-HCG.  Findings were discussed with the ordering provider, Dr. Moody Jennings, by Dr. Blue by phone at 17:00 on 5-29-23.    This report was flagged in Epic as abnormal.    Electronically signed by resident: Epifanio Blue  Date:    05/29/2023  Time:    15:55    Electronically signed by: Mary Gould MD  Date:    05/29/2023  Time:    17:02               Narrative:    EXAMINATION:  US OB <14 WEEKS, TRANSABDOM & TRANSVAG, SINGLE GESTATION (XPD)    CLINICAL HISTORY:  ectopic; Unspecified ectopic pregnancy without intrauterine pregnancy    TECHNIQUE:  Transabdominal sonography of the pelvis was performed, followed by transvaginal sonography to better evaluate the uterus and ovaries.    COMPARISON:  U/S OB 05/22/2023.    FINDINGS:  Uterus: Measures 7.4 x 4.3 x 5.3 cm.  The endometrium measures 12 mm in thickness.  Small amount of fluid within the endometrial cavity is again noted.  No  intrauterine gestational sac visualized.    Right ovary: Normal.  Measures 3.2 x 2.6 x 2.6 cm with a prominent ovarian follicle.    Left ovary: Measures 6.7 x 5.8 x 6.5 cm.  Complex predominantly anechoic thick-walled cystic mass with internal reticular echoes measuring 5.4 x 5.2 x 5.2 cm (previously 5.0 x 5.0 x 4.8 cm).  Of note, there has been some retraction of portions of the internal echoes, suggesting evolution of clot in a hemorrhagic functional cyst.    Miscellaneous: Free fluid is again identified in the pelvis, not dramatically increased when compared to the prior examination.  Within the right adnexa, the previously identified heterogeneous, elongated, predominantly hyperechoic structure has increased in size, now measuring 3.7 x 2.9 x 2.8 cm (previously 1.8 x 1.2 x 1.4 cm).  Possible tubular nature of this structure is best visualized on the cine images.  Simple and complex free fluid is identified in the right adnexa focally as on the prior study.  At the margin of the hyperechoic structure, rounded, anechoic, well-circumscribed 0.7 x 0.5 x 0.4 cm structure is again identified, stable in size (previously measured up to 0.7 cm).  As previously, no fetal pole is identified.                                      ED Course as of 05/29/23 1750   Mon May 29, 2023   1317 Preg Test, Ur(!): Positive  Ectopic [AC]   1514 HCG Quant: 391  399 8 hours ago [AC]   1706 Right adnexal masses enlarged [AC]   1747 Ob will take ED to ED for eval for ectopic to determine disposition (possible rupture?) [AC]      ED Course User Index  [AC] Moody Jennings, DO       Relevant imaging interpreted by myself  Complex fluid in R adnexal structure    Impression and Plan    17 y.o. female with findings of R adnexal mass c/w ectopic (perhaps with increase in complex fluid which may be concerning for hemorrhage) based on the work up in the emergency department as above.    Important lab/imaging findings include: cbc nml, US as  above    I consulted with: obstetrics will eval in ED at [facility], transfer patient     All tests, treatment options and disposition options were discussed with the patient.  The decision was made to admit the patient to the hospital.  Will need transfer to [facility] ED.    The patient was signed out to oncoming attending in stable condition and all further questions/concerns by patient and/or family were addressed.    PENDING TRANSFER FOR OB EVAL    Critical Care:  Date: 05/29/2023  Performed by: Dr. Moody Jennings  Authorized by: Dr. Moody Jennings   Total critical care time (exclusive of procedural time) : 35 minutes  Upon my evaluation, this patient had a high probability of imminent or life-threatening deterioration due to [ ectopic pregnancy (interval worsening) ] which required my direct attention, intervention and personal management.  Critical care was necessary to treat or prevent imminent or life-threatening deterioration.  This may include review of laboratory data, radiology results, discussion with consultants and family, and monitoring for potential decompensations. Interventions were performed as documented.                   Final diagnoses:  [O00.90] Ectopic pregnancy        ED Disposition Condition    Transfer to Another Facility Stable                  Moody Jennings DO  05/29/23 [id number]

## 2023-05-30 ENCOUNTER — TELEPHONE (OUTPATIENT)
Dept: OBSTETRICS AND GYNECOLOGY | Facility: CLINIC | Age: 18
End: 2023-05-30
Payer: MEDICAID

## 2023-05-30 PROBLEM — Z98.890 STATUS POST LAPAROSCOPY: Status: ACTIVE | Noted: 2023-05-30

## 2023-05-30 LAB
ABO + RH BLD: ABNORMAL
BLD GP AB SCN CELLS X3 SERPL QL: ABNORMAL
BLOOD GROUP ANTIBODIES SERPL: NORMAL
SPECIMEN OUTDATE: ABNORMAL

## 2023-05-30 PROCEDURE — 88305 TISSUE EXAM BY PATHOLOGIST: CPT | Performed by: PATHOLOGY

## 2023-05-30 PROCEDURE — 25000003 PHARM REV CODE 250: Performed by: STUDENT IN AN ORGANIZED HEALTH CARE EDUCATION/TRAINING PROGRAM

## 2023-05-30 PROCEDURE — 63600175 PHARM REV CODE 636 W HCPCS: Performed by: STUDENT IN AN ORGANIZED HEALTH CARE EDUCATION/TRAINING PROGRAM

## 2023-05-30 PROCEDURE — 88305 TISSUE EXAM BY PATHOLOGIST: ICD-10-PCS | Mod: 26,,, | Performed by: PATHOLOGY

## 2023-05-30 PROCEDURE — 25000003 PHARM REV CODE 250: Performed by: OBSTETRICS & GYNECOLOGY

## 2023-05-30 PROCEDURE — 88305 TISSUE EXAM BY PATHOLOGIST: CPT | Mod: 26,,, | Performed by: PATHOLOGY

## 2023-05-30 PROCEDURE — 94761 N-INVAS EAR/PLS OXIMETRY MLT: CPT

## 2023-05-30 PROCEDURE — 25000003 PHARM REV CODE 250

## 2023-05-30 PROCEDURE — 63600175 PHARM REV CODE 636 W HCPCS: Performed by: ANESTHESIOLOGY

## 2023-05-30 PROCEDURE — 36000708 HC OR TIME LEV III 1ST 15 MIN: Performed by: OBSTETRICS & GYNECOLOGY

## 2023-05-30 PROCEDURE — 36000709 HC OR TIME LEV III EA ADD 15 MIN: Performed by: OBSTETRICS & GYNECOLOGY

## 2023-05-30 PROCEDURE — 25000003 PHARM REV CODE 250: Performed by: ANESTHESIOLOGY

## 2023-05-30 RX ORDER — MEPERIDINE HYDROCHLORIDE 25 MG/ML
12.5 INJECTION INTRAMUSCULAR; INTRAVENOUS; SUBCUTANEOUS ONCE AS NEEDED
Status: DISCONTINUED | OUTPATIENT
Start: 2023-05-30 | End: 2023-05-30 | Stop reason: HOSPADM

## 2023-05-30 RX ORDER — DIPHENHYDRAMINE HYDROCHLORIDE 50 MG/ML
25 INJECTION INTRAMUSCULAR; INTRAVENOUS EVERY 6 HOURS PRN
Status: DISCONTINUED | OUTPATIENT
Start: 2023-05-30 | End: 2023-05-30 | Stop reason: HOSPADM

## 2023-05-30 RX ORDER — OXYCODONE HYDROCHLORIDE 5 MG/1
5 TABLET ORAL EVERY 4 HOURS PRN
Qty: 7 TABLET | Refills: 0 | Status: SHIPPED | OUTPATIENT
Start: 2023-05-30 | End: 2023-07-05

## 2023-05-30 RX ORDER — IBUPROFEN 600 MG/1
600 TABLET ORAL 3 TIMES DAILY
Qty: 30 TABLET | Refills: 1 | Status: SHIPPED | OUTPATIENT
Start: 2023-05-30 | End: 2023-07-05

## 2023-05-30 RX ORDER — DIPHENHYDRAMINE HCL 25 MG
25 CAPSULE ORAL EVERY 4 HOURS PRN
Status: DISCONTINUED | OUTPATIENT
Start: 2023-05-30 | End: 2023-05-30 | Stop reason: HOSPADM

## 2023-05-30 RX ORDER — ONDANSETRON 2 MG/ML
INJECTION INTRAMUSCULAR; INTRAVENOUS
Status: DISCONTINUED | OUTPATIENT
Start: 2023-05-30 | End: 2023-05-30

## 2023-05-30 RX ORDER — OXYCODONE HYDROCHLORIDE 5 MG/1
5 TABLET ORAL
Status: DISCONTINUED | OUTPATIENT
Start: 2023-05-30 | End: 2023-05-30 | Stop reason: HOSPADM

## 2023-05-30 RX ORDER — IBUPROFEN 600 MG/1
600 TABLET ORAL EVERY 6 HOURS
Qty: 30 TABLET | Refills: 1 | Status: SHIPPED | OUTPATIENT
Start: 2023-05-30 | End: 2023-05-30 | Stop reason: HOSPADM

## 2023-05-30 RX ORDER — DIPHENHYDRAMINE HYDROCHLORIDE 50 MG/ML
25 INJECTION INTRAMUSCULAR; INTRAVENOUS EVERY 4 HOURS PRN
Status: DISCONTINUED | OUTPATIENT
Start: 2023-05-30 | End: 2023-05-30 | Stop reason: HOSPADM

## 2023-05-30 RX ORDER — ONDANSETRON 8 MG/1
8 TABLET, ORALLY DISINTEGRATING ORAL EVERY 8 HOURS PRN
Status: CANCELLED | OUTPATIENT
Start: 2023-05-30

## 2023-05-30 RX ORDER — ROCURONIUM BROMIDE 10 MG/ML
INJECTION, SOLUTION INTRAVENOUS
Status: DISCONTINUED | OUTPATIENT
Start: 2023-05-29 | End: 2023-05-30

## 2023-05-30 RX ORDER — DEXTROMETHORPHAN HYDROBROMIDE, GUAIFENESIN 5; 100 MG/5ML; MG/5ML
650 LIQUID ORAL EVERY 6 HOURS
Qty: 30 TABLET | Refills: 1 | Status: SHIPPED | OUTPATIENT
Start: 2023-05-30 | End: 2023-05-30 | Stop reason: HOSPADM

## 2023-05-30 RX ORDER — MIDAZOLAM HYDROCHLORIDE 1 MG/ML
1 INJECTION INTRAMUSCULAR; INTRAVENOUS ONCE
Status: DISCONTINUED | OUTPATIENT
Start: 2023-05-30 | End: 2023-05-30 | Stop reason: HOSPADM

## 2023-05-30 RX ORDER — DEXAMETHASONE SODIUM PHOSPHATE 4 MG/ML
INJECTION, SOLUTION INTRA-ARTICULAR; INTRALESIONAL; INTRAMUSCULAR; INTRAVENOUS; SOFT TISSUE
Status: DISCONTINUED | OUTPATIENT
Start: 2023-05-30 | End: 2023-05-30

## 2023-05-30 RX ORDER — FENTANYL CITRATE 50 UG/ML
INJECTION, SOLUTION INTRAMUSCULAR; INTRAVENOUS
Status: DISCONTINUED | OUTPATIENT
Start: 2023-05-29 | End: 2023-05-30

## 2023-05-30 RX ORDER — BUPIVACAINE HYDROCHLORIDE 2.5 MG/ML
INJECTION, SOLUTION EPIDURAL; INFILTRATION; INTRACAUDAL
Status: DISCONTINUED | OUTPATIENT
Start: 2023-05-30 | End: 2023-05-30 | Stop reason: HOSPADM

## 2023-05-30 RX ORDER — SODIUM CHLORIDE 0.9 % (FLUSH) 0.9 %
3 SYRINGE (ML) INJECTION
Status: DISCONTINUED | OUTPATIENT
Start: 2023-05-30 | End: 2023-05-30 | Stop reason: HOSPADM

## 2023-05-30 RX ORDER — HYDROMORPHONE HYDROCHLORIDE 2 MG/ML
0.4 INJECTION, SOLUTION INTRAMUSCULAR; INTRAVENOUS; SUBCUTANEOUS EVERY 5 MIN PRN
Status: DISCONTINUED | OUTPATIENT
Start: 2023-05-30 | End: 2023-05-30 | Stop reason: HOSPADM

## 2023-05-30 RX ORDER — HYDROCODONE BITARTRATE AND ACETAMINOPHEN 5; 325 MG/1; MG/1
1 TABLET ORAL EVERY 4 HOURS PRN
Status: DISCONTINUED | OUTPATIENT
Start: 2023-05-30 | End: 2023-05-30 | Stop reason: HOSPADM

## 2023-05-30 RX ORDER — PROCHLORPERAZINE EDISYLATE 5 MG/ML
5 INJECTION INTRAMUSCULAR; INTRAVENOUS EVERY 30 MIN PRN
Status: DISCONTINUED | OUTPATIENT
Start: 2023-05-30 | End: 2023-05-30 | Stop reason: HOSPADM

## 2023-05-30 RX ORDER — LIDOCAINE HYDROCHLORIDE 20 MG/ML
INJECTION INTRAVENOUS
Status: DISCONTINUED | OUTPATIENT
Start: 2023-05-29 | End: 2023-05-30

## 2023-05-30 RX ORDER — PROCHLORPERAZINE EDISYLATE 5 MG/ML
5 INJECTION INTRAMUSCULAR; INTRAVENOUS EVERY 6 HOURS PRN
Status: CANCELLED | OUTPATIENT
Start: 2023-05-30

## 2023-05-30 RX ORDER — PROPOFOL 10 MG/ML
VIAL (ML) INTRAVENOUS
Status: DISCONTINUED | OUTPATIENT
Start: 2023-05-29 | End: 2023-05-30

## 2023-05-30 RX ORDER — OXYCODONE HYDROCHLORIDE 5 MG/1
5 TABLET ORAL EVERY 4 HOURS PRN
Qty: 7 TABLET | Refills: 0 | Status: SHIPPED | OUTPATIENT
Start: 2023-05-30 | End: 2023-05-30 | Stop reason: SDUPTHER

## 2023-05-30 RX ADMIN — MIDAZOLAM HYDROCHLORIDE 1 MG: 1 INJECTION INTRAMUSCULAR; INTRAVENOUS at 11:05

## 2023-05-30 RX ADMIN — ONDANSETRON HYDROCHLORIDE 4 MG: 2 INJECTION INTRAMUSCULAR; INTRAVENOUS at 12:05

## 2023-05-30 RX ADMIN — DEXAMETHASONE SODIUM PHOSPHATE 4 MG: 4 INJECTION, SOLUTION INTRAMUSCULAR; INTRAVENOUS at 12:05

## 2023-05-30 RX ADMIN — HYDROCODONE BITARTRATE AND ACETAMINOPHEN 1 TABLET: 5; 325 TABLET ORAL at 06:05

## 2023-05-30 RX ADMIN — HYDROMORPHONE HYDROCHLORIDE 0.4 MG: 2 INJECTION INTRAMUSCULAR; INTRAVENOUS; SUBCUTANEOUS at 01:05

## 2023-05-30 RX ADMIN — SODIUM CHLORIDE, SODIUM LACTATE, POTASSIUM CHLORIDE, AND CALCIUM CHLORIDE: .6; .31; .03; .02 INJECTION, SOLUTION INTRAVENOUS at 12:05

## 2023-05-30 RX ADMIN — SUGAMMADEX 200 MG: 100 INJECTION, SOLUTION INTRAVENOUS at 12:05

## 2023-05-30 RX ADMIN — OXYCODONE HYDROCHLORIDE 5 MG: 5 TABLET ORAL at 01:05

## 2023-05-30 NOTE — NURSING
Nurses Note -- 4 Eyes      5/30/2023   6:14 AM      Skin assessed during: Admit      [x] No Altered Skin Integrity Present    []Prevention Measures Documented      [] Yes- Altered Skin Integrity Present or Discovered   [x] LDA Added if Not in Epic (Describe Wound)   [] New Altered Skin Integrity was Present on Admit and Documented in LDA   [] Wound Image Taken    Wound Care Consulted? No    Attending Nurse:  Jake Frey RN     Second RN/Staff Member: Candelaria CHÁVEZ

## 2023-05-30 NOTE — PLAN OF CARE
Discharge orders noted.  IV removed.  AVS printed and reviewed with patient.  Home medications delivered to bedside.  Transport requested.  Will continue to monitor.

## 2023-05-30 NOTE — TELEPHONE ENCOUNTER
----- Message from Lulú Xiong MD sent at 5/29/2023 10:34 AM CDT -----  Called patient to discuss results. D6 hCG 399 (missed appointment on D4). Patient doing well overall following MTX treatment. Continues to endorse R-sided abdominal pain but not worsening since clinic visit. Nausea has resolved. Denies any vaginal bleeding. Will plan to repeat hCG on Thursday 6/1 for comparison value. Strict ED/pain precautions discussed. Could you please schedule her to come back for her next lab visit on Thursday morning?

## 2023-05-30 NOTE — PROGRESS NOTES
Patient is a 17-year-old female with recent diagnosis of ectopic pregnancy being treated with methotrexate who presents for right lower quadrant abdominal pain.  She is Rh negative but received RhoGAM a few days ago.  She has no vaginal bleeding.  Her serial beta HCGs have been flat.  Her ultrasound today shows increased size of a hyperechoic elongated structure in the right adnexa was surrounding simple and complex fluid that may reflect hematosalpinx, edema of the fallopian tube or hemorrhage.  These findings were discussed with OBGYN at [facility].  They agree with transferred to [facility] Emergency Department for OBGYN evaluation.  The patient and her mother were updated on this plan and agreeable to transfer.

## 2023-05-30 NOTE — CARE UPDATE
Resident to bedside for post op eval    Pt reports pain of left shoulder, does not radiate. Reports minimal soreness of abdomen. States she is very hungry and feels the need to urinate.    Temp:  [97 °F (36.1 °C)-98.2 °F (36.8 °C)] 98.1 °F (36.7 °C)  Pulse:  [] 88  Resp:  [16-20] 18  SpO2:  [99 %-100 %] 99 %  BP: (103-142)/(59-81) 113/73    Physical exam:  Abd-appropriate post op tenderness to palpation, 3 laparoscopic incisions clean, dry, intact and covered with dermabond, no oozing  MSK: both shoulders palpated with no TTP, no injuries visualized    Plan:  -Discussed pain in shoulder could be referred pain, muscles stiffness s/p anesthesia, related to chest pain, etc.  --Discussed most likely reason for shoulder pain is muscle stiffness (surgery uncomplicated and free fluid suctioned out, pain does not radiate, not associated with any other symptoms or vital sign abnormalities)  --Offered lidocaine patch and/or flexiril and pt declined  -Pt reports pain improved with PO meds  -Plan for D/C later this AM    Baylee Aviles MD  OBGYN PGY-2

## 2023-05-30 NOTE — ED NOTES
Transfer center call regarding updated ETA for patient transport to [facility]. Transfer center calling [facility] at this time. Pending updated ETA

## 2023-05-30 NOTE — TRANSFER OF CARE
"Anesthesia Transfer of Care Note    Patient: Yaquelin Cochran    Procedure(s) Performed: Procedure(s) (LRB):  LAPAROSCOPY, DIAGNOSTIC (N/A)    Patient location: PACU    Anesthesia Type: general    Transport from OR: Transported from OR on 2-3 L/min O2 by NC with adequate spontaneous ventilation    Post pain: adequate analgesia    Post assessment: no apparent anesthetic complications and tolerated procedure well    Post vital signs: stable    Level of consciousness: awake and alert    Nausea/Vomiting: no nausea/vomiting    Complications: none    Transfer of care protocol was followed      Last vitals:   Visit Vitals  BP (!) 103/59 (BP Location: Right arm, Patient Position: Sitting)   Pulse 72   Temp 36.1 °C (97 °F) (Oral)   Resp 18   Ht 5' 1" (1.549 m)   Wt 51.7 kg (114 lb)   LMP 04/30/2023   SpO2 100%   Breastfeeding Unknown   BMI 21.54 kg/m²     "

## 2023-05-30 NOTE — OP NOTE
OPERATIVE REPORT    Date of procedure: 05/30/2023    PREOPERATIVE DIAGNOSIS  1. Ectopic pregnancy, Right side    POSTOPERATIVE DIAGNOSIS  1. S/p diagnostic laparoscopy, R salpingectomy    PROCEDURE:  1. Diagnostic laparoscopy  2. R salpingectomy    SURGEON: Dr. Juana Fung    ASSISTANT: Baylee Aviles MD -PGY2    ANESTHESIA: General    COMPLICATIONS: None    EBL: 5 cc    IV FLUIDS: see anesthesia report    URINE OUTPUT: see anesthesia report    FINDINGS: Normal uterus, cervix with descensus. Ectopic pregnancy with hematosalpinx encompassing almost entire R fallopian tube, normal left fallopian tube, normal ovary on the right, enlarged left ovary with 4-5 cm hemorrhagic-appearing cyst. Some dark blood (50cc) in the pelvis.  Normal appendix, liver, gallbladder, and stomach visualized. Peristalsis of right ureter visualized at the pelvic brim.     PROCEDURE:   Patient was taken to the operating room where general anesthesia was administered and found to be adequate.  She was prepped and draped in the dorsal lithotomy position, both vaginally and abdominally. A surgical timeout was performed with patient's name, date of birth, allergies, and procedure to be performed verbalized. All OR staff were in agreement. No preoperative antibiotics were administered as none were indicated. An OG tube was placed by anesthesia. A hernandez was placed draining clear urine. Two right angle retractors were placed in the vagina.  The anterior lip of the cervix was grasped with a single tooth tenaculum.  An acorn uterine manipulator was placed in the endometrial cavity and all other instruments were removed from the vagina.    Gloves were changed.  A Veress needle was inserted into the umbilicus under tenting of the anterior abdominal wall.  Placement into the peritoneal cavity was confirmed via saline drop test.  The abdomen was insufflated to 15mm Hg using Carbon dioxide. 0.25% marcaine was injected at the site of each abdominal incision  for improved postoperative analgesia.  A 5 mm infra-umbilical skin incision was made with the scalpel.  A 5 mm Optiview trocar was advanced through this incision.  Excellent hemostasis was noted. The bowel was examined noting no injury during trocar entry. The patient was placed in deep Trendelenburg.  Abdominal and pelvic survey as noted as above. A 5 mm left lateral skin incision was made with a scalpel and a 5 mm trocar was advanced through this incision under direct visualization of the camera.  A 5 mm right lateral skin incision was made with the scalpel.  A 5 mm trocar was advanced through this incision under visualization of the camera.  Excellent hemostasis was noted.      Attention was turned to the right fallopian tube and ectopic pregnancy/hematosalpinx was visualized which was noted to encompass the entirety of the tube. The right tube was excised with the ligasure and then placed in a 5 mm bag and removed through the RLQ port site without difficulty after removal of the trocar. The trocar was reintroduced after successful removal of the specimen.    The pelvis was suctioned.  Excellent hemostasis was noted from all vascular pedicles. The right ureter was examined at the pelvic brim and good peristalsis was noted.      Attention was turned to the anterior abdominal wall. The abdomen was deflated and all trocars were removed.  The skin was closed with 4-0 Monocryl in a subcuticular fashion and Dermabond glue was placed.  Sponge, lap, and needle counts were correct x 2. The acorn uterine manipulator and hernandez catheter were removed. Bleeding was stable at the tenaculum sites. All instruments were removed from the patient's vagina and the patient was taken to the recovery room in stable condition.      Images captured. See chart     Baylee Aviles MD  OBGYN PGY-2    ATTESTATION:    I was present and scrubbed for the entire diagnostic laparoscopy/right salpingectomy for ectopic pregnancy due to failed  outpatient medical management with methotrexate and worsening abdominal pain and progressive hematosalpinx on imaging, I performed key portions of the case.  I agree with the procedure description as documented.        Juana Fung MD  Department of Obstetrics & Gynecology  [facility]

## 2023-05-30 NOTE — CONSULTS
Please see H&P dated 5/29 for further detail    In brief, pt presented to ED with R side pain in setting of known ectopic pregnancy treated with methotrexate, first dose 6 days ago. Pt has now elected for surgical management due to continued pain and anxiety over possible rupture of ectopic.    Temp:  [97 °F (36.1 °C)-98.2 °F (36.8 °C)] 97 °F (36.1 °C)  Pulse:  [72-81] 72  Resp:  [18] 18  SpO2:  [100 %] 100 %  BP: (103-142)/(59-69) 103/59    Case request placed  House supervisor notified    Baylee Aviles MD  OBGYN PGY-2

## 2023-05-30 NOTE — ED PROVIDER NOTES
Source of History:  Patient, the medical record    Chief complaint:  Abdominal Pain (Took methotrexate for ectopic preg 5d ago seen at [facility],)      HPI:  Yaquelin Cochran is a 17 y.o. female presenting with right-sided abdominal pain that worsened today.  The patient was diagnosed with an ectopic pregnancy on May 22nd.  She is been followed closely by OBGYN and received methotrexate on May 24th as well as RhoGAM.  [facility] and had imaging done which revealed concern for increasing area of complex fluid her right pelvis.  The patient denies any fever, chills, vomiting.  She reports that she had increased pain today at work, decided to go into the hospital and had some improvement while she was in the waiting area covered then had increased pain over the past several hours.  Beta-hCG was 462 at max 6 days ago, most recent value today at approximately 12:30 p.m. 391.  Hemoglobin hematocrit remained stable, most recent hemoglobin 12.8 at 12:28 p.m..      This is the extent to the patients complaints today here in the emergency department.    ROS: As per HPI and below:  General: No fever.  No chills.  Eyes: No visual changes.  ENT: No sore throat. No ear pain  Head: No headache.    Respiratory: No shortness of breath.  Cardiovascular: No chest pain.  Abdomen:  Abdominal pain as above No nausea or vomiting.  Genito-Urinary: No abnormal urination.  Neurologic: No focal weakness.  No numbness.  MSK: no back pain.  Integument: No rashes or lesions.  Hematologic: No easy bruising.  Endocrine: No excessive thirst or urination.      Review of patient's allergies indicates:  No Known Allergies    PMH:  As per HPI and below:  Past Medical History:   Diagnosis Date    Behavior problem in childhood     COVID-19 08/08/2021    Suicidal ideation      Past Surgical History:   Procedure Laterality Date    BACK SURGERY  2016    DILATION AND CURETTAGE OF UTERUS         Social History     Tobacco Use  "   Smoking status: Never    Smokeless tobacco: Never   Substance Use Topics    Alcohol use: No    Drug use: No       Physical Exam:    BP (!) 103/59 (BP Location: Right arm, Patient Position: Sitting)   Pulse 72   Temp 97 °F (36.1 °C) (Oral)   Resp 18   Ht 5' 1" (1.549 m)   Wt 51.7 kg (114 lb)   LMP 04/30/2023   SpO2 100%   Breastfeeding Unknown   BMI 21.54 kg/m²   Physical Exam  Vitals and nursing note reviewed.   Constitutional:       General: She is not in acute distress.     Appearance: She is not ill-appearing.   HENT:      Head: Normocephalic and atraumatic.      Right Ear: External ear normal.      Left Ear: External ear normal.   Eyes:      Extraocular Movements: Extraocular movements intact.   Cardiovascular:      Rate and Rhythm: Normal rate and regular rhythm.      Heart sounds: Normal heart sounds.   Pulmonary:      Effort: Pulmonary effort is normal. No respiratory distress.      Breath sounds: Normal breath sounds. No stridor. No wheezing, rhonchi or rales.   Abdominal:      General: Abdomen is flat. There is no distension.      Palpations: Abdomen is soft.      Tenderness: There is abdominal tenderness in the right lower quadrant and suprapubic area. There is no guarding or rebound. Negative signs include McBurney's sign.   Musculoskeletal:         General: Normal range of motion.   Skin:     General: Skin is warm.      Findings: No rash.   Neurological:      General: No focal deficit present.      Mental Status: She is alert. Mental status is at baseline.           I decided to obtain the patient's medical records.  Summary of Medical Records:  Several recent ED and OBGYN office visits reviewed.  Please see summary above in HPI.    Imaging:  Imaging Results               US OB <14 Wks TransAbd & TransVag, Single Gestation (XPD) (Final result)  Result time 05/29/23 17:02:53   Procedure changed from US OB <14 Wks, TransAbd, Single Gestation     Final result by Mary Gould MD (05/29/23 " 17:02:53)                   Impression:      1. Increased size of a hyperechoic elongated structure in the right adnexa with surrounding simple and complex fluid.  This may reflect hematosalpinx, edema of the fallopian tube, or hemorrhage.  Further evaluation with CT may be appropriate if clinically indicated for better characterization in this patient who has been treated with methotrexate.  2. Stable anechoic right adnexal lesion at the margin of the above elongated structure.  As noted on the prior examination, an ectopic gestational sac cannot be excluded.  No fetal pole identified.  3. Complex cystic left ovarian mass, most likely a hemorrhagic functional cyst.  Follow-up with ultrasound in 6-12 weeks is again advised.  4. Trace endometrial fluid and free pelvic fluid, with no dramatic interval change.  5. Recommend continued monitoring of clinical symptoms and serial beta-HCG.  Findings were discussed with the ordering provider, Dr. Moody Jennings, by Dr. Blue by phone at 17:00 on 5-29-23.    This report was flagged in Epic as abnormal.    Electronically signed by resident: Epifanio Blue  Date:    05/29/2023  Time:    15:55    Electronically signed by: Mary Gould MD  Date:    05/29/2023  Time:    17:02               Narrative:    EXAMINATION:  US OB <14 WEEKS, TRANSABDOM & TRANSVAG, SINGLE GESTATION (XPD)    CLINICAL HISTORY:  ectopic; Unspecified ectopic pregnancy without intrauterine pregnancy    TECHNIQUE:  Transabdominal sonography of the pelvis was performed, followed by transvaginal sonography to better evaluate the uterus and ovaries.    COMPARISON:  U/S OB 05/22/2023.    FINDINGS:  Uterus: Measures 7.4 x 4.3 x 5.3 cm.  The endometrium measures 12 mm in thickness.  Small amount of fluid within the endometrial cavity is again noted.  No intrauterine gestational sac visualized.    Right ovary: Normal.  Measures 3.2 x 2.6 x 2.6 cm with a prominent ovarian follicle.    Left ovary: Measures 6.7 x 5.8 x  6.5 cm.  Complex predominantly anechoic thick-walled cystic mass with internal reticular echoes measuring 5.4 x 5.2 x 5.2 cm (previously 5.0 x 5.0 x 4.8 cm).  Of note, there has been some retraction of portions of the internal echoes, suggesting evolution of clot in a hemorrhagic functional cyst.    Miscellaneous: Free fluid is again identified in the pelvis, not dramatically increased when compared to the prior examination.  Within the right adnexa, the previously identified heterogeneous, elongated, predominantly hyperechoic structure has increased in size, now measuring 3.7 x 2.9 x 2.8 cm (previously 1.8 x 1.2 x 1.4 cm).  Possible tubular nature of this structure is best visualized on the cine images.  Simple and complex free fluid is identified in the right adnexa focally as on the prior study.  At the margin of the hyperechoic structure, rounded, anechoic, well-circumscribed 0.7 x 0.5 x 0.4 cm structure is again identified, stable in size (previously measured up to 0.7 cm).  As previously, no fetal pole is identified.                                      Labs:  Results for orders placed or performed during the hospital encounter of 05/29/23   HIV 1/2 Ag/Ab (4th Gen)   Result Value Ref Range    HIV 1/2 Ag/Ab Non-reactive Non-reactive   Hepatitis C Antibody   Result Value Ref Range    Hepatitis C Ab Non-reactive Non-reactive   CBC W/ AUTO DIFFERENTIAL   Result Value Ref Range    WBC 7.52 3.90 - 12.70 K/uL    RBC 4.21 4.00 - 5.40 M/uL    Hemoglobin 12.8 12.0 - 16.0 g/dL    Hematocrit 37.6 37.0 - 48.5 %    MCV 89 82 - 98 fL    MCH 30.4 27.0 - 31.0 pg    MCHC 34.0 32.0 - 36.0 g/dL    RDW 13.4 11.5 - 14.5 %    Platelets 220 150 - 450 K/uL    MPV 9.4 9.2 - 12.9 fL    Immature Granulocytes 0.3 0.0 - 0.5 %    Gran # (ANC) 5.4 1.8 - 7.7 K/uL    Immature Grans (Abs) 0.02 0.00 - 0.04 K/uL    Lymph # 1.4 1.0 - 4.8 K/uL    Mono # 0.5 0.3 - 1.0 K/uL    Eos # 0.1 0.0 - 0.5 K/uL    Baso # 0.04 0.00 - 0.20 K/uL    nRBC 0 0 /100  WBC    Gran % 71.5 38.0 - 73.0 %    Lymph % 19.1 18.0 - 48.0 %    Mono % 7.0 4.0 - 15.0 %    Eosinophil % 1.6 0.0 - 8.0 %    Basophil % 0.5 0.0 - 1.9 %    Differential Method Automated    Comp. Metabolic Panel   Result Value Ref Range    Sodium 140 136 - 145 mmol/L    Potassium 4.4 3.5 - 5.1 mmol/L    Chloride 107 95 - 110 mmol/L    CO2 24 23 - 29 mmol/L    Glucose 86 70 - 110 mg/dL    BUN 7 6 - 20 mg/dL    Creatinine 0.7 0.5 - 1.4 mg/dL    Calcium 9.3 8.7 - 10.5 mg/dL    Total Protein 6.9 6.0 - 8.4 g/dL    Albumin 4.2 3.5 - 5.2 g/dL    Total Bilirubin 0.4 0.1 - 1.0 mg/dL    Alkaline Phosphatase 80 55 - 135 U/L    AST 16 10 - 40 U/L    ALT 15 10 - 44 U/L    Anion Gap 9 8 - 16 mmol/L    eGFR >60.0 >60 mL/min/1.73 m^2   Urinalysis, Reflex to Urine Culture Urine, Clean Catch    Specimen: Urine   Result Value Ref Range    Specimen UA Urine, Clean Catch     Color, UA Yellow Yellow, Straw, Corinne    Appearance, UA Hazy (A) Clear    pH, UA 8.0 5.0 - 8.0    Specific Gravity, UA 1.020 1.005 - 1.030    Protein, UA Trace (A) Negative    Glucose, UA Negative Negative    Ketones, UA Negative Negative    Bilirubin (UA) Negative Negative    Occult Blood UA Trace (A) Negative    Nitrite, UA Negative Negative    Leukocytes, UA Negative Negative   hCG, quantitative, pregnancy   Result Value Ref Range    HCG Quant 391 See Text mIU/mL   POCT urine pregnancy   Result Value Ref Range    POC Preg Test, Ur Positive (A) Negative     Acceptable Yes    ISTAT PROCEDURE   Result Value Ref Range    POC Glucose 91 70 - 110 mg/dL    POC BUN 6 6 - 30 mg/dL    POC Creatinine 0.5 0.5 - 1.4 mg/dL    POC Sodium 140 136 - 145 mmol/L    POC Potassium 4.4 3.5 - 5.1 mmol/L    POC Chloride 103 95 - 110 mmol/L    POC TCO2 (MEASURED) 23 23 - 29 mmol/L    POC Ionized Calcium 1.17 1.06 - 1.42 mmol/L    POC Hematocrit 38 36 - 54 %PCV    Sample ASHANTI          Initial Impression  17 y.o. female with right-sided abdominal pain which worsened today.  The  patient was transferred from [place] [id number] OBGYN evaluation.  Paged OB service on patient arrival.      Differential Dx:  Ectopic pregnancy, hemoperitoneum, threatened , spontaneous , implantation bleeding, placental abnormality including placenta previa or abruptio, vaginitis, UTI, rectal bleeding      MDM:      Patient evaluated by the Ob gyn service who will be admitting her for surgery tonight.  Patient is hemodynamically stable and updated on the plan.  Placed NPO.                 Diagnostic Impression:    1. Ectopic pregnancy, unspecified location, unspecified whether intrauterine pregnancy present    2. Ectopic pregnancy         ED Disposition Condition    Observation Stable                  Martina Aranda MD  [phone]

## 2023-05-30 NOTE — ANESTHESIA POSTPROCEDURE EVALUATION
Anesthesia Post Evaluation    Patient: Yaquelin Cochran    Procedure(s) Performed: Procedure(s) (LRB):  LAPAROSCOPY, DIAGNOSTIC (N/A)    Final Anesthesia Type: general      Patient location during evaluation: PACU  Patient participation: Yes- Able to Participate  Level of consciousness: awake and alert  Post-procedure vital signs: reviewed and stable  Pain management: adequate  Airway patency: patent    PONV status at discharge: No PONV  Anesthetic complications: no      Cardiovascular status: blood pressure returned to baseline and stable  Respiratory status: room air  Hydration status: euvolemic  Follow-up not needed.          Vitals Value Taken Time   /81 05/30/23 0107   Temp 97 05/30/23 0116   Pulse 91 05/30/23 0115   Resp 18 05/30/23 0116   SpO2 100 % 05/30/23 0115   Vitals shown include unvalidated device data.      No case tracking events are documented in the log.      Pain/Moe Score: No data recorded

## 2023-05-30 NOTE — PLAN OF CARE
Follow up added to AVS.  Hospital to provide Lyft ride home.  No CM needs identified.   05/30/23 1022   Final Note   Assessment Type Final Discharge Note   Anticipated Discharge Disposition Home   Hospital Resources/Appts/Education Provided Provided patient/caregiver with written discharge plan information;Appointments scheduled and added to AVS   Post-Acute Status   Discharge Delays None known at this time     [facility] Med [facility]urg ([address])  Discharge Final Note    Primary Care Provider: Primary Doctor No    Expected Discharge Date:     Final Discharge Note (most recent)       Final Note - 05/30/23 1022          Final Note    Assessment Type Final Discharge Note (P)      Anticipated Discharge Disposition Home or Self Care (P)      Hospital Resources/Appts/Education Provided Provided patient/caregiver with written discharge plan information;Appointments scheduled and added to AVS (P)         Post-Acute Status    Discharge Delays None known at this time (P)                      Important Message from Medicare             Contact Info       Resident Family Planning        Next Steps: Follow up in 2 week(s)    Instructions: post op, s/p R salpingectomy for ectopic    [place] [facility]   Specialty: Obstetrics and Gynecology    [address] [place] [address], [address]  [facility] [address]   Phone: [phone]       Next Steps: Follow up on 6/14/2023    Instructions: 2:20pm

## 2023-05-30 NOTE — H&P
H&P  Gynecology      SUBJECTIVE:     History of Present Illness:  Yaquelin Cochran is a 17 y.o.  who presents with right side abdominal pain that began earlier today in setting of known right side ectopic pregnancy (diagnosed ). Pt was managed with methotrexate, current D#6. She also received rhogam at this time. She represented to [facility] [place] today due to onset of sharp right side pain that radiates to her groin. She was transferred to [facility] for further evaluation by Gyn team.   Denies fever, chills, N/V/D, syncope, LOC. Reports very mild vaginal bleeding, less than a period, no clots. Denies abnormal vaginal discharge/itching/irritation.   Denies chest pain, SOB, headache.     Review of patient's allergies indicates:  No Known Allergies    Past Medical History:   Diagnosis Date    Behavior problem in childhood     COVID-19 2021    Suicidal ideation      Past Surgical History:   Procedure Laterality Date    BACK SURGERY  2016    DILATION AND CURETTAGE OF UTERUS       OB History          3    Para   1    Term   1       0    AB   1    Living   1         SAB   0    IAB   0    Ectopic   0    Multiple   0    Live Births   0               Family History   Problem Relation Age of Onset    Breast cancer Neg Hx     Diabetes Neg Hx     Colon cancer Neg Hx     Ovarian cancer Neg Hx      Social History     Tobacco Use    Smoking status: Never    Smokeless tobacco: Never   Substance Use Topics    Alcohol use: No    Drug use: No       Current Facility-Administered Medications   Medication    famotidine tablet 20 mg     Current Outpatient Medications   Medication Sig    hydrOXYzine HCL (ATARAX) 25 MG tablet Take 1 tablet (25 mg total) by mouth every 6 (six) hours. (Patient not taking: Reported on 2023)    ondansetron (ZOFRAN-ODT) 4 MG TbDL Take 2 tablets (8 mg total) by mouth every 12 (twelve) hours as needed (nausea/vomiting). (Patient not taking: Reported on 2023)    ondansetron  (ZOFRAN-ODT) 8 MG TbDL Take 1 tablet (8 mg total) by mouth every 6 (six) hours as needed (nausea). (Patient not taking: Reported on 5/24/2023)    prenat.vits,lenny,min-iron-folic (PRENATAL VITAMIN) Tab Take 1 tablet by mouth once daily.    triamcinolone acetonide 0.1% (KENALOG) 0.1 % cream Apply topically 2 (two) times daily. (Patient not taking: Reported on 3/24/2022)       Review of Systems:  Constitutional: no significant weight change, fever, fatigue  Eyes:  No vision changes  Cardiovascular: No chest pain  Respiratory: No shortness of breath or cough  Gastrointestinal: + Right side abdominal pain, No diarrhea, bloody stool, nausea/vomiting, constipation  Genitourinary: + vaginal bleeding, No blood in urine, painful urination, urgency of urination, frequency of urination  Skin/Breast: No painful breasts, nipple discharge, masses  Neurological: No headache  Endocrine: No hot flushes  Psychiatric: No depression or anxiety     OBJECTIVE:     Vital Signs  Temp:  [97 °F (36.1 °C)-98.2 °F (36.8 °C)] 97 °F (36.1 °C)  Pulse:  [72-81] 72  Resp:  [18] 18  SpO2:  [100 %] 100 %  BP: (103-142)/(59-69) 103/59    Physical Exam:  Gen: A&Ox3, NAD  CV: Regular rate  Pulm: normal effort/work of breathing   Abd: Soft, non-distended, minimally tender to palpation on R side/suprapubic area, without rebound or guarding, no peritoneal signs  Belly button ring present  Ext: PPP, no peripheral edema  : declined speculum/pelvic exam upon transfer to [facility]   Per OSH:    Speculum:  No discharge observed.  Normal appearance of cervix.    Normal appearance of vaginal vault. No active bleeding.  Bimanual:  Cervical os closed.  No CMT.  Adnexal fullness to the right with tenderness.  (Chaperone present)    Laboratory  Recent Results (from the past 96 hour(s))   HCG, QUANTITATIVE, PREGNANCY    Collection Time: 05/29/23  7:13 AM   Result Value Ref Range    HCG Quant 399 See Text mIU/mL   ISTAT PROCEDURE    Collection Time: 05/29/23 12:25 PM    Result Value Ref Range    POC Glucose 91 70 - 110 mg/dL    POC BUN 6 6 - 30 mg/dL    POC Creatinine 0.5 0.5 - 1.4 mg/dL    POC Sodium 140 136 - 145 mmol/L    POC Potassium 4.4 3.5 - 5.1 mmol/L    POC Chloride 103 95 - 110 mmol/L    POC TCO2 (MEASURED) 23 23 - 29 mmol/L    POC Ionized Calcium 1.17 1.06 - 1.42 mmol/L    POC Hematocrit 38 36 - 54 %PCV    Sample ASHANTI    HIV 1/2 Ag/Ab (4th Gen)    Collection Time: 05/29/23 12:28 PM   Result Value Ref Range    HIV 1/2 Ag/Ab Non-reactive Non-reactive   Hepatitis C Antibody    Collection Time: 05/29/23 12:28 PM   Result Value Ref Range    Hepatitis C Ab Non-reactive Non-reactive   CBC W/ AUTO DIFFERENTIAL    Collection Time: 05/29/23 12:28 PM   Result Value Ref Range    WBC 7.52 3.90 - 12.70 K/uL    RBC 4.21 4.00 - 5.40 M/uL    Hemoglobin 12.8 12.0 - 16.0 g/dL    Hematocrit 37.6 37.0 - 48.5 %    MCV 89 82 - 98 fL    MCH 30.4 27.0 - 31.0 pg    MCHC 34.0 32.0 - 36.0 g/dL    RDW 13.4 11.5 - 14.5 %    Platelets 220 150 - 450 K/uL    MPV 9.4 9.2 - 12.9 fL    Immature Granulocytes 0.3 0.0 - 0.5 %    Gran # (ANC) 5.4 1.8 - 7.7 K/uL    Immature Grans (Abs) 0.02 0.00 - 0.04 K/uL    Lymph # 1.4 1.0 - 4.8 K/uL    Mono # 0.5 0.3 - 1.0 K/uL    Eos # 0.1 0.0 - 0.5 K/uL    Baso # 0.04 0.00 - 0.20 K/uL    nRBC 0 0 /100 WBC    Gran % 71.5 38.0 - 73.0 %    Lymph % 19.1 18.0 - 48.0 %    Mono % 7.0 4.0 - 15.0 %    Eosinophil % 1.6 0.0 - 8.0 %    Basophil % 0.5 0.0 - 1.9 %    Differential Method Automated    Comp. Metabolic Panel    Collection Time: 05/29/23 12:28 PM   Result Value Ref Range    Sodium 140 136 - 145 mmol/L    Potassium 4.4 3.5 - 5.1 mmol/L    Chloride 107 95 - 110 mmol/L    CO2 24 23 - 29 mmol/L    Glucose 86 70 - 110 mg/dL    BUN 7 6 - 20 mg/dL    Creatinine 0.7 0.5 - 1.4 mg/dL    Calcium 9.3 8.7 - 10.5 mg/dL    Total Protein 6.9 6.0 - 8.4 g/dL    Albumin 4.2 3.5 - 5.2 g/dL    Total Bilirubin 0.4 0.1 - 1.0 mg/dL    Alkaline Phosphatase 80 55 - 135 U/L    AST 16 10 - 40 U/L     ALT 15 10 - 44 U/L    Anion Gap 9 8 - 16 mmol/L    eGFR >60.0 >60 mL/min/1.73 m^2   hCG, quantitative, pregnancy    Collection Time: 05/29/23 12:28 PM   Result Value Ref Range    HCG Quant 391 See Text mIU/mL   Urinalysis, Reflex to Urine Culture Urine, Clean Catch    Collection Time: 05/29/23 12:30 PM    Specimen: Urine   Result Value Ref Range    Specimen UA Urine, Clean Catch     Color, UA Yellow Yellow, Straw, Corinne    Appearance, UA Hazy (A) Clear    pH, UA 8.0 5.0 - 8.0    Specific Gravity, UA 1.020 1.005 - 1.030    Protein, UA Trace (A) Negative    Glucose, UA Negative Negative    Ketones, UA Negative Negative    Bilirubin (UA) Negative Negative    Occult Blood UA Trace (A) Negative    Nitrite, UA Negative Negative    Leukocytes, UA Negative Negative   POCT urine pregnancy    Collection Time: 05/29/23 12:32 PM   Result Value Ref Range    POC Preg Test, Ur Positive (A) Negative     Acceptable Yes        Diagnostic Results:  [facility] [place] (D#6) (down from 418 five days ago, 462 six days ago); current D#6 s/p methotrexate     TVUS 5/29  FINDINGS:  Uterus: Measures 7.4 x 4.3 x 5.3 cm.  The endometrium measures 12 mm in thickness.  Small amount of fluid within the endometrial cavity is again noted.  No intrauterine gestational sac visualized.     Right ovary: Normal.  Measures 3.2 x 2.6 x 2.6 cm with a prominent ovarian follicle.     Left ovary: Measures 6.7 x 5.8 x 6.5 cm.  Complex predominantly anechoic thick-walled cystic mass with internal reticular echoes measuring 5.4 x 5.2 x 5.2 cm (previously 5.0 x 5.0 x 4.8 cm).  Of note, there has been some retraction of portions of the internal echoes, suggesting evolution of clot in a hemorrhagic functional cyst.     Miscellaneous: Free fluid is again identified in the pelvis, not dramatically increased when compared to the prior examination.  Within the right adnexa, the previously identified heterogeneous, elongated, predominantly hyperechoic structure has  increased in size, now measuring 3.7 x 2.9 x 2.8 cm (previously 1.8 x 1.2 x 1.4 cm).  Possible tubular nature of this structure is best visualized on the cine images.  Simple and complex free fluid is identified in the right adnexa focally as on the prior study.  At the margin of the hyperechoic structure, rounded, anechoic, well-circumscribed 0.7 x 0.5 x 0.4 cm structure is again identified, stable in size (previously measured up to 0.7 cm).  As previously, no fetal pole is identified.     Impression:     1. Increased size of a hyperechoic elongated structure in the right adnexa with surrounding simple and complex fluid.  This may reflect hematosalpinx, edema of the fallopian tube, or hemorrhage.  Further evaluation with CT may be appropriate if clinically indicated for better characterization in this patient who has been treated with methotrexate.  2. Stable anechoic right adnexal lesion at the margin of the above elongated structure.  As noted on the prior examination, an ectopic gestational sac cannot be excluded.  No fetal pole identified.  3. Complex cystic left ovarian mass, most likely a hemorrhagic functional cyst.  Follow-up with ultrasound in 6-12 weeks is again advised.  4. Trace endometrial fluid and free pelvic fluid, with no dramatic interval change.  5. Recommend continued monitoring of clinical symptoms and serial beta-HCG.    ASSESSMENT/PLAN:     Active Hospital Problems    Diagnosis  POA    Ectopic pregnancy [O00.90]  Yes      Resolved Hospital Problems   No resolved problems to display.       Yaquelin Cochran is a 17 y.o.  who presents with R side abdominal pain in setting of known ectopic pregnancy    1. Right side ectopic pregnancy   -VSS and WNL  -Pt currently asymptomatic  -Physical exam benign, no peritoneal signs  -Labs stable  Bhcg down-trending although did not have bhcg drawn on appropriate days s/p methotrexate (given )  -s/p rhogam  -Pt counseled on all options including  continued medical management, with another dose of methotrexate to be given on day #7 (tomorrow) if decrease in hcg is <15%, expectant management, and surgical management  -Pt desires surgery at this time. All R/B/A to surgery discussed  -Consents signed and to chart  -Case request placed, house supervisor notified  -Pt last ate at 0600 this morning  -To OR for R salpingostomy/salpingectomy/possible left ovarian cyst drainage   -Pt agrees with above plan      Baylee Aviles MD  OBGYN PGY-2

## 2023-05-30 NOTE — ANESTHESIA PREPROCEDURE EVALUATION
05/29/2023  Yaquelin Cochran is a 17 y.o., female.      Pre-op Assessment    I have reviewed the Patient Summary Reports.     I have reviewed the Nursing Notes.    I have reviewed the Medications.     Review of Systems  Anesthesia Hx:  Denies Family Hx of Anesthesia complications.   Denies Personal Hx of Anesthesia complications.   Social:  Non-Smoker    Hematology/Oncology:  Hematology Normal   Oncology Normal     EENT/Dental:EENT/Dental Normal   Cardiovascular:  Cardiovascular Normal     Pulmonary:  Pulmonary Normal    Renal/:  Renal/ Normal     Hepatic/GI:  Hepatic/GI Normal    Musculoskeletal:  Musculoskeletal Normal    Neurological:  Neurology Normal    Endocrine:  Endocrine Normal    Dermatological:  Skin Normal    Psych:  Psychiatric Normal           Physical Exam  General: Well nourished, Cooperative, Alert and Oriented    Airway:  Mallampati: II   Mouth Opening: Normal  TM Distance: Normal  Tongue: Normal  Neck ROM: Normal ROM    Dental:  Intact        Anesthesia Plan  Type of Anesthesia, risks & benefits discussed:    Anesthesia Type: Gen ETT  Intra-op Monitoring Plan: Standard ASA Monitors  Post Op Pain Control Plan: multimodal analgesia  Induction:  IV  Airway Plan: Video  Informed Consent: Informed consent signed with the Patient and all parties understand the risks and agree with anesthesia plan.  All questions answered.   ASA Score: 2 Emergent    Ready For Surgery From Anesthesia Perspective.     .

## 2023-05-30 NOTE — OR NURSING
Pt's V/S stable on 2L oxygen while in PACU. Lap sites to abdomen clean, dry, intact. Analisa-pad with minimal drainage. PRN pain meds and anxiolytics given with good relief of symptoms. Patient resting comfortably at this time, safety precautions in place. Report given to JENY Joseph on 3S. Will transfer to room 380 via stretcher, safety precautions in place. Friend updated on plan of care.

## 2023-05-30 NOTE — DISCHARGE SUMMARY
Discharge Summary  Gynecology      Admit Date: 2023    Discharge Date and Time: 2023     Attending Physician: Juana Fung MD    Principal Diagnoses: Status post laparoscopy    Active Hospital Problems    Diagnosis  POA    *Status post diagnostic laparoscopy, R salpingectomy for ectopic pregnancy [Z98.890]  Not Applicable    Ectopic pregnancy [O00.90]  Yes      Resolved Hospital Problems   No resolved problems to display.       Procedures: Procedure(s) (LRB):  LAPAROSCOPY, DIAGNOSTIC (N/A)    Discharged Condition: good    Hospital Course:   Yaquelin Cochran is a 17 y.o. y.o.  female who presented on 2023   for above procedures for the treatment of right side ectopic pregnancy. Patient tolerated procedure.Post-operative course was uncomplicated.  On day of discharge, patient was urinating, ambulating, and tolerating a regular diet without difficulty. Pain was well controlled on PO medication. She was discharged home on POD#1 in stable condition with instructions to follow up with [facility] in 2 weeks.     Pt declined birth control but would like to discuss at a follow up visit with the family planning clinic. TVUS ordered (per primary surgeon) for 4-6 weeks from now to follow up enlarged suspected corpus luteum cyst on left ovary (Pt asymptomatic).  Discussed post op pain control, management of asymptomatic adnexal masses, contraception. Pt confirms understanding and is in agreement with plan.  Return precautions given. All questions answered.     Consults: None    Significant Diagnostic Studies:  Recent Labs   Lab 23  1506 23  1225 23  1228   WBC 8.05  --  7.52   HGB 13.4  --  12.8   HCT 41.2 38 37.6   MCV 91  --  89     --  220          Treatments:   1. Surgery as above    Disposition: Home or Self Care    Patient Instructions:   Current Discharge Medication List        START taking these medications    Details   ibuprofen (ADVIL,MOTRIN) 600 MG  tablet Take 1 tablet (600 mg total) by mouth 3 (three) times daily. Take 3 times daily for next 3-5 days, then take as needed for pain.  Qty: 30 tablet, Refills: 1      oxyCODONE (ROXICODONE) 5 MG immediate release tablet Take 1 tablet (5 mg total) by mouth every 4 (four) hours as needed for Pain.  Qty: 7 tablet, Refills: 0    Comments: Quantity prescribed more than 7 day supply? No           STOP taking these medications       hydrOXYzine HCL (ATARAX) 25 MG tablet Comments:   Reason for Stopping:         ondansetron (ZOFRAN-ODT) 4 MG TbDL Comments:   Reason for Stopping:         ondansetron (ZOFRAN-ODT) 8 MG TbDL Comments:   Reason for Stopping:         prenat.vits,lenny,min-iron-folic (PRENATAL VITAMIN) Tab Comments:   Reason for Stopping:         triamcinolone acetonide 0.1% (KENALOG) 0.1 % cream Comments:   Reason for Stopping:               Discharge Procedure Orders   US Transvaginal Non OB   Standing Status: Future Standing Exp. Date: 05/30/24     Order Specific Question Answer Comments   May the Radiologist modify the order per protocol to meet the clinical needs of the patient? Yes    Release to patient Immediate      Diet Adult Regular     Keep surgical extremity elevated     Ice to affected area     Lifting restrictions     Other restrictions (specify):   Order Comments: Notify MD if bleeding 1 pad/hour for 2 consecutive hours.     No driving until:   Order Comments: Do not drive while taking narcotics.     Pelvic Rest   Order Comments: Pelvic rest until cleared by MD. Nothing in vagina till cleared by MD including tampons, douching, intercourse     No dressing needed     Notify your health care provider if you experience any of the following:  temperature >100.4     Notify your health care provider if you experience any of the following:  persistent nausea and vomiting or diarrhea     Notify your health care provider if you experience any of the following:  severe uncontrolled pain     Notify your health  care provider if you experience any of the following:  redness, tenderness, or signs of infection (pain, swelling, redness, odor or green/yellow discharge around incision site)     Notify your health care provider if you experience any of the following:  difficulty breathing or increased cough     Notify your health care provider if you experience any of the following:  severe persistent headache     Notify your health care provider if you experience any of the following:  worsening rash     Notify your health care provider if you experience any of the following:  persistent dizziness, light-headedness, or visual disturbances     Notify your health care provider if you experience any of the following:  increased confusion or weakness     Notify your health care provider if you experience any of the following:   Order Comments: Notify MD if bleeding 1 pad/hour for 2 consecutive hours.     Activity as tolerated     Weight bearing restrictions (specify):        Follow-up Information       [facility] Follow up in 2 week(s).    Why: post op, s/p R salpingectomy for ectopic             [facility] up in 2 week(s).    Specialty: Obstetrics and Gynecology  Why: family planning clinic f/u  Contact information:  [address], [address]  [facility] [address] [phone]                         HENRY Elizondo MD  OBGYN PGY-4

## 2023-05-30 NOTE — ANESTHESIA PROCEDURE NOTES
Intubation    Date/Time: 5/29/2023 11:59 PM  Performed by: Jerome Ludwig CRNA  Authorized by: Andrey Medel MD     Intubation:     Induction:  Intravenous    Intubated:  Postinduction    Mask Ventilation:  Easy mask    Attempts:  1    Attempted By:  CRNA    Method of Intubation:  Video laryngoscopy    Blade:  Logan 3    Laryngeal View Grade: Grade I - full view of cords      Difficult Airway Encountered?: No      Complications:  None    Airway Device:  Oral endotracheal tube    Airway Device Size:  7.0    Style/Cuff Inflation:  Cuffed (inflated to minimal occlusive pressure)    Tube secured:  20    Secured at:  The lips    Placement Verified By:  Capnometry    Complicating Factors:  None    Findings Post-Intubation:  BS equal bilateral and atraumatic/condition of teeth unchanged

## 2023-05-31 ENCOUNTER — HOSPITAL ENCOUNTER (EMERGENCY)
Facility: HOSPITAL | Age: 18
Discharge: HOME | End: 2023-05-31
Attending: EMERGENCY MEDICINE
Payer: MEDICAID

## 2023-05-31 ENCOUNTER — NURSE TRIAGE (OUTPATIENT)
Dept: ADMINISTRATIVE | Facility: CLINIC | Age: 18
End: 2023-05-31
Payer: MEDICAID

## 2023-05-31 VITALS
HEART RATE: 73 BPM | TEMPERATURE: 99 F | HEIGHT: 62 IN | SYSTOLIC BLOOD PRESSURE: 107 MMHG | DIASTOLIC BLOOD PRESSURE: 72 MMHG | WEIGHT: 110 LBS | OXYGEN SATURATION: 100 % | BODY MASS INDEX: 20.24 KG/M2 | RESPIRATION RATE: 18 BRPM

## 2023-05-31 VITALS
BODY MASS INDEX: 21.1 KG/M2 | RESPIRATION RATE: 18 BRPM | OXYGEN SATURATION: 97 % | SYSTOLIC BLOOD PRESSURE: 109 MMHG | DIASTOLIC BLOOD PRESSURE: 67 MMHG | WEIGHT: 114.63 LBS | HEIGHT: 62 IN | HEART RATE: 83 BPM | TEMPERATURE: 99 F

## 2023-05-31 DIAGNOSIS — R50.9 FEVER: ICD-10-CM

## 2023-05-31 DIAGNOSIS — N30.00 ACUTE CYSTITIS WITHOUT HEMATURIA: ICD-10-CM

## 2023-05-31 DIAGNOSIS — N61.0 MASTITIS: Primary | ICD-10-CM

## 2023-05-31 LAB
ALBUMIN SERPL BCP-MCNC: 4.6 G/DL (ref 3.5–5.2)
ALP SERPL-CCNC: 81 U/L (ref 55–135)
ALT SERPL W/O P-5'-P-CCNC: 18 U/L (ref 10–44)
AMORPH CRY UR QL COMP ASSIST: ABNORMAL
ANION GAP SERPL CALC-SCNC: 9 MMOL/L (ref 8–16)
AST SERPL-CCNC: 18 U/L (ref 10–40)
BACTERIA #/AREA URNS AUTO: ABNORMAL /HPF
BASOPHILS # BLD AUTO: 0.02 K/UL (ref 0–0.2)
BASOPHILS NFR BLD: 0.5 % (ref 0–1.9)
BILIRUB SERPL-MCNC: 0.6 MG/DL (ref 0.1–1)
BILIRUB UR QL STRIP: NEGATIVE
BUN SERPL-MCNC: 8 MG/DL (ref 6–20)
CALCIUM SERPL-MCNC: 9.5 MG/DL (ref 8.7–10.5)
CHLORIDE SERPL-SCNC: 104 MMOL/L (ref 95–110)
CLARITY UR REFRACT.AUTO: ABNORMAL
CO2 SERPL-SCNC: 25 MMOL/L (ref 23–29)
COLOR UR AUTO: YELLOW
CREAT SERPL-MCNC: 0.7 MG/DL (ref 0.5–1.4)
DIFFERENTIAL METHOD: ABNORMAL
EOSINOPHIL # BLD AUTO: 0 K/UL (ref 0–0.5)
EOSINOPHIL NFR BLD: 0.5 % (ref 0–8)
ERYTHROCYTE [DISTWIDTH] IN BLOOD BY AUTOMATED COUNT: 13.3 % (ref 11.5–14.5)
EST. GFR  (NO RACE VARIABLE): >60 ML/MIN/1.73 M^2
GLUCOSE SERPL-MCNC: 87 MG/DL (ref 70–110)
GLUCOSE UR QL STRIP: NEGATIVE
HCT VFR BLD AUTO: 40.8 % (ref 37–48.5)
HGB BLD-MCNC: 13.6 G/DL (ref 12–16)
HGB UR QL STRIP: ABNORMAL
IMM GRANULOCYTES # BLD AUTO: 0.02 K/UL (ref 0–0.04)
IMM GRANULOCYTES NFR BLD AUTO: 0.5 % (ref 0–0.5)
INFLUENZA A, MOLECULAR: NEGATIVE
INFLUENZA B, MOLECULAR: NEGATIVE
KETONES UR QL STRIP: NEGATIVE
LACTATE SERPL-SCNC: 0.9 MMOL/L (ref 0.5–2.2)
LEUKOCYTE ESTERASE UR QL STRIP: ABNORMAL
LYMPHOCYTES # BLD AUTO: 0.5 K/UL (ref 1–4.8)
LYMPHOCYTES NFR BLD: 13.2 % (ref 18–48)
MCH RBC QN AUTO: 29.9 PG (ref 27–31)
MCHC RBC AUTO-ENTMCNC: 33.3 G/DL (ref 32–36)
MCV RBC AUTO: 90 FL (ref 82–98)
MICROSCOPIC COMMENT: ABNORMAL
MONOCYTES # BLD AUTO: 0.3 K/UL (ref 0.3–1)
MONOCYTES NFR BLD: 7.4 % (ref 4–15)
NEUTROPHILS # BLD AUTO: 2.9 K/UL (ref 1.8–7.7)
NEUTROPHILS NFR BLD: 77.9 % (ref 38–73)
NITRITE UR QL STRIP: NEGATIVE
NRBC BLD-RTO: 0 /100 WBC
PH UR STRIP: 7 [PH] (ref 5–8)
PLATELET # BLD AUTO: 186 K/UL (ref 150–450)
PMV BLD AUTO: 9.2 FL (ref 9.2–12.9)
POTASSIUM SERPL-SCNC: 4.1 MMOL/L (ref 3.5–5.1)
PROT SERPL-MCNC: 7.7 G/DL (ref 6–8.4)
PROT UR QL STRIP: NEGATIVE
RBC # BLD AUTO: 4.55 M/UL (ref 4–5.4)
RBC #/AREA URNS AUTO: 1 /HPF (ref 0–4)
SARS-COV-2 RDRP RESP QL NAA+PROBE: NEGATIVE
SODIUM SERPL-SCNC: 138 MMOL/L (ref 136–145)
SP GR UR STRIP: 1.02 (ref 1–1.03)
SPECIMEN SOURCE: NORMAL
SQUAMOUS #/AREA URNS AUTO: 11 /HPF
URN SPEC COLLECT METH UR: ABNORMAL
WBC # BLD AUTO: 3.78 K/UL (ref 3.9–12.7)
WBC #/AREA URNS AUTO: 28 /HPF (ref 0–5)

## 2023-05-31 PROCEDURE — 87086 URINE CULTURE/COLONY COUNT: CPT

## 2023-05-31 PROCEDURE — 99285 EMERGENCY DEPT VISIT HI MDM: CPT | Mod: 25

## 2023-05-31 PROCEDURE — 85025 COMPLETE CBC W/AUTO DIFF WBC: CPT

## 2023-05-31 PROCEDURE — 83605 ASSAY OF LACTIC ACID: CPT

## 2023-05-31 PROCEDURE — 80053 COMPREHEN METABOLIC PANEL: CPT

## 2023-05-31 PROCEDURE — 87502 INFLUENZA DNA AMP PROBE: CPT

## 2023-05-31 PROCEDURE — U0002 COVID-19 LAB TEST NON-CDC: HCPCS

## 2023-05-31 PROCEDURE — 81001 URINALYSIS AUTO W/SCOPE: CPT

## 2023-05-31 PROCEDURE — 25500020 PHARM REV CODE 255: Performed by: EMERGENCY MEDICINE

## 2023-05-31 PROCEDURE — 99284 EMERGENCY DEPT VISIT MOD MDM: CPT | Mod: ,,,

## 2023-05-31 PROCEDURE — 25000003 PHARM REV CODE 250

## 2023-05-31 PROCEDURE — 99284 PR EMERGENCY DEPT VISIT,LEVEL IV: ICD-10-PCS | Mod: ,,,

## 2023-05-31 PROCEDURE — 96360 HYDRATION IV INFUSION INIT: CPT

## 2023-05-31 RX ORDER — CEPHALEXIN 500 MG/1
500 CAPSULE ORAL
Status: DISCONTINUED | OUTPATIENT
Start: 2023-05-31 | End: 2023-05-31

## 2023-05-31 RX ORDER — ACETAMINOPHEN 325 MG/1
650 TABLET ORAL
Status: COMPLETED | OUTPATIENT
Start: 2023-05-31 | End: 2023-05-31

## 2023-05-31 RX ORDER — CEPHALEXIN 500 MG/1
500 CAPSULE ORAL 4 TIMES DAILY
Qty: 28 CAPSULE | Refills: 0 | Status: SHIPPED | OUTPATIENT
Start: 2023-05-31 | End: 2023-06-07

## 2023-05-31 RX ORDER — CEPHALEXIN 500 MG/1
500 CAPSULE ORAL
Status: COMPLETED | OUTPATIENT
Start: 2023-05-31 | End: 2023-05-31

## 2023-05-31 RX ADMIN — ACETAMINOPHEN 650 MG: 325 TABLET ORAL at 01:05

## 2023-05-31 RX ADMIN — IOHEXOL 75 ML: 350 INJECTION, SOLUTION INTRAVENOUS at 03:05

## 2023-05-31 RX ADMIN — CEPHALEXIN 500 MG: 500 CAPSULE ORAL at 06:05

## 2023-05-31 RX ADMIN — SODIUM CHLORIDE 1000 ML: 9 INJECTION, SOLUTION INTRAVENOUS at 01:05

## 2023-05-31 NOTE — ED NOTES
Pt presents to ED via personal transport w/ c/o fever. Pt reports subjective fever at home (TMAX 99.7); 100.2 upon arrival to ED. Pt had right salpingectomy yesterday at [facility]. Pt states she was prescribed oxycodone post-surgery; took 1 PO this morning. States due to hospital visits + medication, she has had difficulty breastfeeding. States when she has tried to breastfeed, minimal milk is expressed and she is not feeling relieved. Reports discomfort in both breasts. Also endorsing bilateral shoulder pain and headache.    Patient identifiers for Yaquelin Cochran 17 y.o. female checked and correct.  Chief Complaint   Patient presents with    Fever     Currently breast feeding. Concerned for mastitis. Recent R salpingectomy for ectopic

## 2023-05-31 NOTE — DISCHARGE INSTRUCTIONS
You will be started on antibiotics for UTI and mastitis   Take as directed  If you feel worse such as fever, diarrhea, pain, nausea or vomiting return for re-evaluation.   Continue appts with OBGYN

## 2023-05-31 NOTE — TELEPHONE ENCOUNTER
Patient states s/p Salpingectomy for Ectopic Pregnancy on 5/29/23. Patient states current c/o chest pain radiating to her jaw and shoulder.    Care Advice given to Go to ED Now for evaluation/treatment and to have another adult drive or transport to ED. Patient states understanding of care advice.     Reason for Disposition   Pain also in shoulder(s) or arm(s) or jaw    Additional Information   Negative: Sounds like a life-threatening emergency to the triager   Chest pain   Negative: SEVERE difficulty breathing (e.g., struggling for each breath, speaks in single words)   Negative: Passed out (i.e., fainted, collapsed and was not responding)   Negative: Difficult to awaken or acting confused (e.g., disoriented, slurred speech)   Negative: Shock suspected (e.g., cold/pale/clammy skin, too weak to stand, low BP, rapid pulse)   Negative: Chest pain lasting longer than 5 minutes and ANY of the following:* Over 44 years old* Over 30 years old and at least one cardiac risk factor (e.g., diabetes mellitus, high blood pressure, high cholesterol, smoker, or strong family history of heart disease)* History of heart disease (i.e., angina, heart attack, heart failure, bypass surgery, takes nitroglycerin)* Pain is crushing, pressure-like, or heavy   Negative: Heart beating < 50 beats per minute OR > 140 beats per minute   Negative: Visible sweat on face or sweat dripping down face   Negative: Sounds like a life-threatening emergency to the triager   Negative: Followed an injury to chest   Negative: SEVERE chest pain    Protocols used: Post-Op Symptoms and [id number], Chest Pain-A-OH

## 2023-05-31 NOTE — ED PROVIDER NOTES
Encounter Date: 2023       History     Chief Complaint   Patient presents with    Fever     Currently breast feeding. Concerned for mastitis. Recent R salpingectomy for ectopic      17-year-old female  s/p R. Salpingectomy 23 due to ectopic pregnancy presents to [facility] ED for new onset fever this morning. Fever at home reported 99.6. Pt also endorses difficulty breast feeding and pain in breast bilaterally. She reports her abdominal pain is controlled at this time only experiencing minimal soreness. Her vaginal bleeding she reports is light in presentation only using two pads per day, previously took methotrexate.  She denies change in vaginal bleeding since she was last evaluated. No new cough or sore throat. No urinary changes.     Review of patient's allergies indicates:  No Known Allergies  Past Medical History:   Diagnosis Date    Behavior problem in childhood     COVID-19 2021    Suicidal ideation      Past Surgical History:   Procedure Laterality Date    BACK SURGERY  2016    DIAGNOSTIC LAPAROSCOPY N/A 2023    Procedure: LAPAROSCOPY, DIAGNOSTIC;  Surgeon: Juana Fung MD;  Location: [facility];  Service: OB/GYN;  Laterality: N/A;    DILATION AND CURETTAGE OF UTERUS       Family History   Problem Relation Age of Onset    Breast cancer Neg Hx     Diabetes Neg Hx     Colon cancer Neg Hx     Ovarian cancer Neg Hx      Social History     Tobacco Use    Smoking status: Never    Smokeless tobacco: Never   Substance Use Topics    Alcohol use: No    Drug use: No     Review of Systems   Constitutional:  Positive for fever. Negative for activity change.   HENT:  Negative for congestion.    Gastrointestinal:  Negative for abdominal pain, diarrhea, nausea and vomiting.   Genitourinary:  Negative for difficulty urinating, menstrual problem and vaginal pain.   Musculoskeletal:  Positive for myalgias.   Skin:  Negative for color change.   Allergic/Immunologic: Negative for immunocompromised state.    Neurological:  Negative for dizziness.     Physical Exam     Initial Vitals [05/31/23 1133]   BP Pulse Resp Temp SpO2   137/77 110 15 100.2 °F (37.9 °C) 98 %      MAP       --         Physical Exam    Nursing note and vitals reviewed.  Constitutional: She appears well-developed and well-nourished.   Pt appears flushed    HENT:   Head: Normocephalic and atraumatic.   Eyes: Conjunctivae and EOM are normal.   Neck:   Normal range of motion.  Cardiovascular:  Normal rate.           Pulmonary/Chest: No respiratory distress.   Abdominal: Abdomen is soft. She exhibits no distension.   Laparoscopic incisional scar on abdomen, no drainage or erythema    Musculoskeletal:         General: Normal range of motion.      Cervical back: Normal range of motion.     Neurological: She is alert and oriented to person, place, and time.   Skin: Skin is warm and dry.   Chaperoned breast exam performed   Breast tenderess bilaterally, no area of erythema,induration or fluctuance.    Psychiatric: She has a normal mood and affect. Thought content normal.       ED Course   Procedures  Labs Reviewed   CBC W/ AUTO DIFFERENTIAL - Abnormal; Notable for the following components:       Result Value    WBC 3.78 (*)     Lymph # 0.5 (*)     Gran % 77.9 (*)     Lymph % 13.2 (*)     All other components within normal limits   URINALYSIS, REFLEX TO URINE CULTURE - Abnormal; Notable for the following components:    Appearance, UA Cloudy (*)     Occult Blood UA 2+ (*)     Leukocytes, UA 2+ (*)     All other components within normal limits    Narrative:     Specimen Source->Urine   URINALYSIS MICROSCOPIC - Abnormal; Notable for the following components:    WBC, UA 28 (*)     All other components within normal limits    Narrative:     Specimen Source->Urine   INFLUENZA A & B BY MOLECULAR   CULTURE, URINE   SARS-COV-2 RNA AMPLIFICATION, QUAL   COMPREHENSIVE METABOLIC PANEL   LACTIC ACID, PLASMA          Imaging Results               CT Abdomen Pelvis With  Contrast (Final result)  Result time 05/31/23 16:36:28      Final result by Tadeo Davidson MD (05/31/23 16:36:28)                   Impression:      Pelvic free fluid with attenuation slightly higher than expected for simple fluid, possibly evolving blood products in the setting of recent salpingectomy.  Diffuse intraperitoneal and extraperitoneal free air likely related to recent surgery.  Clinical correlation advised.    Left ovarian hypodense mass measuring 6.3 x 4.6 cm.  Please see prior pelvic ultrasound for follow-up recommendations.    Nonobstructive left nephrolithiasis.    This report was flagged in Epic as abnormal.    Electronically signed by resident: Raoul Cooney  Date:    05/31/2023  Time:    16:12    Electronically signed by: Tadeo Davidson MD  Date:    05/31/2023  Time:    16:36               Narrative:    EXAMINATION:  CT ABDOMEN PELVIS WITH CONTRAST    CLINICAL HISTORY:  Abdominal abscess/infection suspected;s/p r. salpingectomy fever;    TECHNIQUE:  The patient was surveyed from the lung bases through the pelvis after the administration of 75 cc Omni 350 IV contrast.  Oral contrast was administered. The data was reconstructed for coronal, sagittal, and axial images.    COMPARISON:  Ultrasound 05/29/2023    FINDINGS:  Lungs/Pleura: Lung bases are unremarkable.  No focal consolidation, pneumothorax, or pleural effusion is present.    Heart: The visualized portions of the heart are normal. No pericardial effusion.    Liver: The liver is normal in size and attenuation.  Small geographic focus of hypoattenuation at the anterior left lobe likely focal fatty infiltration.    Gallbladder/Bile ducts: The gallbladder is unremarkable.  No intrahepatic or extrahepatic biliary ductal dilatation.    Spleen:Unremarkable.    Stomach: Unremarkable.    Pancreas: Unremarkable.    Adrenals: Unremarkable.    Renal/Ureters: The kidneys are normal in size and location. Mild prominence of the right collecting system  without evidence of overt hydronephrosis.  Nonobstructive 3-4 mm left upper pole renal stone.    The ureters are normal in course and caliber. The urinary bladder is unremarkable.    Reproductive: Uterus is tilted towards the right.  There is fluid within the endometrial canal.left adnexal well-circumscribed oval-shaped hypodense mass measuring 6.3 x 4.6 cm with average 19 Hounsfield units and thin enhancing rim.  Right ovary is unremarkable.    Bowel: The visualized loops of small and large bowel show no evidence of obstruction or inflammation.  Appendix not definitely visualized however no evidence of cecal wall thickening or adjacent inflammatory stranding.    Peritoneum: Free fluid in the pelvis with attenuation higher than expected for simple cyst likely representing evolving blood products in the setting of recent salpingectomy.  There is scattered intraperitoneal free air compatible recent surgery.    Lymph Nodes: No pathologic parveen enlargement in the abdomen or pelvis.    Vasculature: The abdominal aorta is normal in course and caliber.  No  atherosclerotic calcifications.    Bones: Posterior instrumented fusion of the thoracolumbar spine.  No acute fractures or osseous destructive lesions.    Soft Tissues: Small amount of air along the subcutaneous soft tissues along the ventral abdominal wall and musculature.                                       X-Ray Chest PA And Lateral (Final result)  Result time 05/31/23 13:53:39      Final result by Mitchel Graves MD (05/31/23 13:53:39)                   Impression:      Free air under the diaphragm likely due to recent surgery.  Recommend clinical correlation.      Electronically signed by: Mitchel Graves MD  Date:    05/31/2023  Time:    13:53               Narrative:    EXAMINATION:  XR CHEST PA AND LATERAL    CLINICAL HISTORY:  Fever, unspecified    TECHNIQUE:  PA and lateral views of the chest were performed.    COMPARISON:  Chest radiograph dated October 23,  2022    FINDINGS:  The trachea and cardiomediastinal silhouette are within normal limits.  There is no evidence of pleural effusions, pneumothoraces or consolidations.  Lungs are clear.  Osseous structures demonstrate no evidence for acute fractures or dislocations.  Spinal rods are noted.    There appears to be free air under the diaphragm bilaterally.  When correlated to recent history of laparoscopic salpingectomy, free air likely is due to recent surgery.                                       Medications   acetaminophen tablet 650 mg (650 mg Oral Given 5/31/23 1318)   sodium chloride 0.9% bolus 1,000 mL 1,000 mL (0 mLs Intravenous Stopped 5/31/23 1430)   iohexoL (OMNIPAQUE 350) injection 75 mL (75 mLs Intravenous Given 5/31/23 1510)   cephALEXin capsule 500 mg (500 mg Oral Given 5/31/23 1819)     Medical Decision Making:   Differential Diagnosis:   Viral syndrome, Mastitis, atelectasis, considered intraabdominal abscess post procedure   Clinical Tests:   Lab Tests: Ordered  Radiological Study: Ordered  ED Management:  16 y/o f presents with fever, breast pain and myalgia s/p day 1 r. Salpingectomy. She was previously breast feeding her one year old but due to difficulty is pumping. Fluid given with improvement of tachycardia. Fever also improved with tylenol.   No leukocytosis, normal lactate. CXR not concerning for post surgical pneumonia.   Although low suspicion of intraabdominal infection at this time CT abd ordered to r/o concern of abscess. Complex fluid seen likely secondary to surgery, however no concern of focal fluid collection at this time. No abd discomfort on exam, appropriate for pt to continue to f/u with GYN.   UA contaminated but possible UTI. I will treat pt with abx covering both mastitis due to clinical sxs and UTI   Return precautions discussed   PT discharged home           Attending Attestation:     Physician Attestation Statement for NP/PA:   I have directed and reviewed the workup  performed by the PA/NP.  I performed the substantive portion of the medical decision making.     Other NP/PA Attestation Additions:    History of Present Illness: Fever    Medical Decision Making: I performed a face-to-face evaluation of the patient                        Clinical Impression:   Final diagnoses:  [R50.9] Fever  [N30.00] Acute cystitis without hematuria  [N61.0] Mastitis (Primary)        ED Disposition Condition    Discharge Stable          ED Prescriptions       Medication Sig Dispense Start Date End Date Auth. Provider    cephALEXin (KEFLEX) 500 MG capsule Take 1 capsule (500 mg total) by mouth 4 (four) times daily. for 7 days 28 capsule 5/31/2023 6/7/2023 Adela Bolivar PA-C          Follow-up Information    None          Adela Bolivar PA-C  05/31/23 2119       Adela Bolivar PA-C  05/31/23 2119       Basim Sheikh III, MD  06/01/23 2126

## 2023-06-01 LAB
BACTERIA UR CULT: NORMAL
BACTERIA UR CULT: NORMAL

## 2023-06-02 LAB
FINAL PATHOLOGIC DIAGNOSIS: NORMAL
GROSS: NORMAL
Lab: NORMAL

## 2023-06-07 ENCOUNTER — HOSPITAL ENCOUNTER (EMERGENCY)
Facility: HOSPITAL | Age: 18
Discharge: HOME | End: 2023-06-07
Attending: EMERGENCY MEDICINE
Payer: MEDICAID

## 2023-06-07 ENCOUNTER — TELEPHONE (OUTPATIENT)
Dept: OBSTETRICS AND GYNECOLOGY | Facility: CLINIC | Age: 18
End: 2023-06-07
Payer: MEDICAID

## 2023-06-07 ENCOUNTER — NURSE TRIAGE (OUTPATIENT)
Dept: ADMINISTRATIVE | Facility: CLINIC | Age: 18
End: 2023-06-07
Payer: MEDICAID

## 2023-06-07 VITALS
OXYGEN SATURATION: 100 % | RESPIRATION RATE: 18 BRPM | DIASTOLIC BLOOD PRESSURE: 72 MMHG | SYSTOLIC BLOOD PRESSURE: 114 MMHG | TEMPERATURE: 99 F | HEART RATE: 88 BPM

## 2023-06-07 DIAGNOSIS — O00.90 ECTOPIC PREGNANCY, UNSPECIFIED LOCATION, UNSPECIFIED WHETHER INTRAUTERINE PREGNANCY PRESENT: Primary | ICD-10-CM

## 2023-06-07 PROCEDURE — 99283 EMERGENCY DEPT VISIT LOW MDM: CPT | Mod: ,,, | Performed by: PHYSICIAN ASSISTANT

## 2023-06-07 PROCEDURE — 99282 EMERGENCY DEPT VISIT SF MDM: CPT | Mod: 25

## 2023-06-07 PROCEDURE — 99283 PR EMERGENCY DEPT VISIT,LEVEL III: ICD-10-PCS | Mod: ,,, | Performed by: PHYSICIAN ASSISTANT

## 2023-06-07 NOTE — TELEPHONE ENCOUNTER
Under belly button has an incision from surgery for tubal removal on 5/31/23. Incision is open and she can see inside of her open incision  Reason for Disposition   [1] Incision gaping open AND [2] < 48 hours since wound re-opened    Additional Information   Negative: [1] Major abdominal surgical incision AND [2] wound gaping open AND [3] visible internal organs   Negative: Sounds like a life-threatening emergency to the triager   Negative: Patient has a Negative Pressure Wound Therapy device   Negative: Patient is followed by a wound clinic or wound specialist for this wound   Negative: [1] Bleeding from incision AND [2] won't stop after 10 minutes of direct pressure   Negative: [1] Bleeding (more than a few drops) from incision AND [2] tracheostomy or blood vessel surgery (e.g., carotid endarterectomy, femoral bypass graft, kidney dialysis fistula)   Negative: [1] Widespread rash AND [2] bright red, sunburn-like   Negative: Severe pain in the incision    Protocols used: Post-Op Incision Symptoms and [id number]

## 2023-06-07 NOTE — ED PROVIDER NOTES
Encounter Date: 6/7/2023       History     Chief Complaint   Patient presents with    Post-op Problem     Pt states that she had a tube removed laparoscopically on the 29th. Pt states that she has incision site on abdomen that was glued toghther, glue came off tonight and pt was concerned. No bleeding or drainage noted.       17-year-old presents to the ER concerned about wound dehiscence.  Patient recently had laparoscopic surgery at [facility] with OB for an ectopic pregnancy.  The incision site just below the umbilicus slightly opened up.  The patient was picking at the glue on the skin, she accidentally removed the glue and became concerned.  This prompted her visit to the ED.  No pain.  No swelling.  No redness.  No drainage.    Review of patient's allergies indicates:  No Known Allergies  Past Medical History:   Diagnosis Date    Behavior problem in childhood     COVID-19 08/08/2021    Suicidal ideation      Past Surgical History:   Procedure Laterality Date    BACK SURGERY  2016    DIAGNOSTIC LAPAROSCOPY N/A 5/29/2023    Procedure: LAPAROSCOPY, DIAGNOSTIC;  Surgeon: Juana Fung MD;  Location: [facility];  Service: OB/GYN;  Laterality: N/A;    DILATION AND CURETTAGE OF UTERUS       Family History   Problem Relation Age of Onset    Breast cancer Neg Hx     Diabetes Neg Hx     Colon cancer Neg Hx     Ovarian cancer Neg Hx      Social History     Tobacco Use    Smoking status: Never    Smokeless tobacco: Never   Substance Use Topics    Alcohol use: No    Drug use: No     Review of Systems   Constitutional:  Negative for fever.   HENT:  Negative for sore throat.    Respiratory:  Negative for shortness of breath.    Cardiovascular:  Negative for chest pain.   Gastrointestinal:  Negative for nausea.   Genitourinary:  Negative for dysuria.   Musculoskeletal:  Negative for back pain.   Skin:  Negative for rash.   Neurological:  Negative for weakness.   Hematological:  Does not bruise/bleed easily.     Physical Exam      Initial Vitals [06/07/23 0245]   BP Pulse Resp Temp SpO2   114/72 88 18 98.5 °F (36.9 °C) 100 %      MAP       --         Physical Exam    Constitutional: Vital signs are normal. She appears well-developed and well-nourished.   HENT:   Head: Normocephalic and atraumatic.   Right Ear: Hearing normal.   Left Ear: Hearing normal.   Eyes: Conjunctivae are normal.   Cardiovascular:  Normal rate and regular rhythm.           Abdominal: Abdomen is soft. Bowel sounds are normal.   Musculoskeletal:         General: Normal range of motion.     Neurological: She is alert and oriented to person, place, and time.   Skin: Skin is warm and intact.   Three laparoscopic incision sites identified on the abdomen, the 2 in sinus to the pelvic region or completely closed with glue over the skin.  The incision site just below the umbilicus the patient picked away the glue.  There is no significant wound dehiscence.  There is minimal opening of the skin, subcentimeter.  No drainage, no redness, no swelling, no pain   Psychiatric: She has a normal mood and affect. Her speech is normal and behavior is normal. Cognition and memory are normal.       ED Course   Procedures  Labs Reviewed - No data to display       Imaging Results    None          Medications - No data to display  Medical Decision Making:   History:   Old Medical Records: I decided to obtain old medical records.  Old Records Summarized: records from clinic visits.  Initial Assessment:   17-year-old female presenting after she accidentally removed the glue from a recent laparoscopic incision site.  Surgery was performed on the 30th of May, approximately 7 days ago.  Differential Diagnosis:   Wound dehiscence, cellulitis, abscess, seroma, postop wound  ED Management:  Plan:   Reassuring physical exam.  No evidence infection.  There was no fluctuance.  No redness, no swelling, no drainage.  I considered but I am not concerned about a postop infection, cellulitis or seroma.  No  signs of an abscess.  No significant wound dehiscence requiring emergent intervention.  Patient does not require transfer for OB care.  I cleaned the area and applied a Steri-Strip.  She was advised to call OBGYN in the morning.                        Clinical Impression:   Final diagnoses:  [O00.90] Ectopic pregnancy, unspecified location, unspecified whether intrauterine pregnancy present (Primary)        ED Disposition Condition    Discharge Stable          ED Prescriptions    None       Follow-up Information    None          Ramsey Mckenzie PA-C  06/07/23 7135

## 2023-06-07 NOTE — TELEPHONE ENCOUNTER
Called pt in regards to nurse triage notes. No answer. LVM. Message was also forwarded to Dr. Lulú Xiong, who say pt with Dr[facility] Javier [facility] to further advise due to Dr. Javier being out the office.

## 2023-06-07 NOTE — DISCHARGE INSTRUCTIONS
Keep the wound clean daily with soap and water   Call your OB tomorrow   Return to the ED as needed    Thank you for coming to our Emergency Department today. It is important to remember that some problems are difficult to diagnose and may not be found during your Emergency Department visit. Be sure to follow up with your primary care doctor and review all labs/imaging/tests that were performed during this visit with them. Some labs/tests may be outside of the normal range and require non-emergent follow-up and further investigation to help diagnose/exclude/prevent complications or other medical conditions.    If you do not have a primary care doctor, you may contact the one listed on your discharge paperwork or you may also call the [facility] Appointment Desk at [phone] to schedule an appointment and establish care with one. It is important to your health that you have a primary care doctor.    Please take all medications as directed. All medications may potentially have side-effects and it is impossible to predict which medications may give you side-effects or what side-effects (if any) they will give you.. If you feel that you are having a negative effect or side-effect of any medication you should immediately stop taking them and seek medical attention. If you feel that you are having a life-threatening reaction call 911.    Return to the ER with any questions/concerns, new/concerning symptoms, worsening or failure to improve.     Do not drive, swim, climb to height, take a bath or make any important decisions for 24 hours if you have received any pain medications, sedatives or mood altering drugs during your ER visit.

## 2023-06-08 ENCOUNTER — TELEPHONE (OUTPATIENT)
Dept: OBSTETRICS AND GYNECOLOGY | Facility: HOSPITAL | Age: 18
End: 2023-06-08
Payer: MEDICAID

## 2023-06-08 PROCEDURE — 99284 EMERGENCY DEPT VISIT MOD MDM: CPT | Mod: ,,, | Performed by: EMERGENCY MEDICINE

## 2023-06-08 PROCEDURE — 99284 PR EMERGENCY DEPT VISIT,LEVEL IV: ICD-10-PCS | Mod: ,,, | Performed by: EMERGENCY MEDICINE

## 2023-06-08 PROCEDURE — 99284 EMERGENCY DEPT VISIT MOD MDM: CPT | Mod: 25

## 2023-06-08 NOTE — TELEPHONE ENCOUNTER
"Attempted to call patient to check in following ER visit for umbilical incision site. Per ED note "no significant wound dehiscence" noted and patient discharged home after reassuring examination. No answer, left voicemail with call-back information. Patient has post-operative F/U scheduled on 6/14.    Lulú Xiong M.D.  OB/GYN PGY-4  "

## 2023-06-09 ENCOUNTER — TELEPHONE (OUTPATIENT)
Dept: OBSTETRICS AND GYNECOLOGY | Facility: HOSPITAL | Age: 18
End: 2023-06-09
Payer: MEDICAID

## 2023-06-09 ENCOUNTER — HOSPITAL ENCOUNTER (EMERGENCY)
Facility: HOSPITAL | Age: 18
Discharge: HOME | End: 2023-06-09
Attending: EMERGENCY MEDICINE
Payer: MEDICAID

## 2023-06-09 VITALS
DIASTOLIC BLOOD PRESSURE: 56 MMHG | SYSTOLIC BLOOD PRESSURE: 105 MMHG | OXYGEN SATURATION: 99 % | TEMPERATURE: 99 F | WEIGHT: 110 LBS | RESPIRATION RATE: 16 BRPM | BODY MASS INDEX: 20.12 KG/M2 | HEART RATE: 71 BPM

## 2023-06-09 DIAGNOSIS — R50.9 FEVER, UNSPECIFIED FEVER CAUSE: Primary | ICD-10-CM

## 2023-06-09 DIAGNOSIS — R50.9 FEVER: ICD-10-CM

## 2023-06-09 LAB
ALBUMIN SERPL BCP-MCNC: 3.9 G/DL (ref 3.5–5.2)
ALP SERPL-CCNC: 97 U/L (ref 55–135)
ALT SERPL W/O P-5'-P-CCNC: 37 U/L (ref 10–44)
ANION GAP SERPL CALC-SCNC: 9 MMOL/L (ref 8–16)
AST SERPL-CCNC: 38 U/L (ref 10–40)
B-HCG UR QL: NEGATIVE
BASOPHILS # BLD AUTO: ABNORMAL K/UL (ref 0–0.2)
BASOPHILS NFR BLD: 0 % (ref 0–1.9)
BILIRUB SERPL-MCNC: 0.8 MG/DL (ref 0.1–1)
BILIRUB UR QL STRIP: NEGATIVE
BUN SERPL-MCNC: 7 MG/DL (ref 6–20)
BURR CELLS BLD QL SMEAR: ABNORMAL
CALCIUM SERPL-MCNC: 9.2 MG/DL (ref 8.7–10.5)
CHLORIDE SERPL-SCNC: 105 MMOL/L (ref 95–110)
CLARITY UR REFRACT.AUTO: CLEAR
CO2 SERPL-SCNC: 24 MMOL/L (ref 23–29)
COLOR UR AUTO: YELLOW
CREAT SERPL-MCNC: 0.7 MG/DL (ref 0.5–1.4)
CTP QC/QA: YES
DIFFERENTIAL METHOD: ABNORMAL
EOSINOPHIL # BLD AUTO: ABNORMAL K/UL (ref 0–0.5)
EOSINOPHIL NFR BLD: 1 % (ref 0–8)
ERYTHROCYTE [DISTWIDTH] IN BLOOD BY AUTOMATED COUNT: 13.6 % (ref 11.5–14.5)
EST. GFR  (NO RACE VARIABLE): >60 ML/MIN/1.73 M^2
GLUCOSE SERPL-MCNC: 83 MG/DL (ref 70–110)
GLUCOSE UR QL STRIP: NEGATIVE
HCT VFR BLD AUTO: 36.7 % (ref 37–48.5)
HGB BLD-MCNC: 12 G/DL (ref 12–16)
HGB UR QL STRIP: NEGATIVE
IMM GRANULOCYTES # BLD AUTO: ABNORMAL K/UL (ref 0–0.04)
IMM GRANULOCYTES NFR BLD AUTO: ABNORMAL % (ref 0–0.5)
INFLUENZA A, MOLECULAR: NOT DETECTED
INFLUENZA B, MOLECULAR: NOT DETECTED
KETONES UR QL STRIP: NEGATIVE
LEUKOCYTE ESTERASE UR QL STRIP: NEGATIVE
LYMPHOCYTES # BLD AUTO: ABNORMAL K/UL (ref 1–4.8)
LYMPHOCYTES NFR BLD: 18 % (ref 18–48)
MCH RBC QN AUTO: 28.8 PG (ref 27–31)
MCHC RBC AUTO-ENTMCNC: 32.7 G/DL (ref 32–36)
MCV RBC AUTO: 88 FL (ref 82–98)
METAMYELOCYTES NFR BLD MANUAL: 1 %
MONOCYTES # BLD AUTO: ABNORMAL K/UL (ref 0.3–1)
MONOCYTES NFR BLD: 11 % (ref 4–15)
MYELOCYTES NFR BLD MANUAL: 1 %
NEUTROPHILS NFR BLD: 60 % (ref 38–73)
NEUTS BAND NFR BLD MANUAL: 8 %
NITRITE UR QL STRIP: NEGATIVE
NRBC BLD-RTO: 0 /100 WBC
PH UR STRIP: 6 [PH] (ref 5–8)
PLATELET # BLD AUTO: 192 K/UL (ref 150–450)
PLATELET BLD QL SMEAR: ABNORMAL
PMV BLD AUTO: 9.7 FL (ref 9.2–12.9)
POTASSIUM SERPL-SCNC: 3.7 MMOL/L (ref 3.5–5.1)
PROT SERPL-MCNC: 7.1 G/DL (ref 6–8.4)
PROT UR QL STRIP: NEGATIVE
RBC # BLD AUTO: 4.16 M/UL (ref 4–5.4)
RSV AG BY MOLECULAR METHOD: NOT DETECTED
SARS-COV-2 RNA RESP QL NAA+PROBE: NOT DETECTED
SODIUM SERPL-SCNC: 138 MMOL/L (ref 136–145)
SP GR UR STRIP: 1.02 (ref 1–1.03)
URN SPEC COLLECT METH UR: NORMAL
WBC # BLD AUTO: 4.74 K/UL (ref 3.9–12.7)

## 2023-06-09 PROCEDURE — 96374 THER/PROPH/DIAG INJ IV PUSH: CPT

## 2023-06-09 PROCEDURE — 80053 COMPREHEN METABOLIC PANEL: CPT | Performed by: EMERGENCY MEDICINE

## 2023-06-09 PROCEDURE — 81003 URINALYSIS AUTO W/O SCOPE: CPT | Performed by: EMERGENCY MEDICINE

## 2023-06-09 PROCEDURE — 63600175 PHARM REV CODE 636 W HCPCS: Performed by: EMERGENCY MEDICINE

## 2023-06-09 PROCEDURE — 96361 HYDRATE IV INFUSION ADD-ON: CPT

## 2023-06-09 PROCEDURE — 85007 BL SMEAR W/DIFF WBC COUNT: CPT | Performed by: EMERGENCY MEDICINE

## 2023-06-09 PROCEDURE — 0241U SARS-COV2 (COVID) WITH FLU/RSV BY PCR: CPT | Performed by: EMERGENCY MEDICINE

## 2023-06-09 PROCEDURE — 25000003 PHARM REV CODE 250: Performed by: EMERGENCY MEDICINE

## 2023-06-09 PROCEDURE — 87040 BLOOD CULTURE FOR BACTERIA: CPT | Mod: 59 | Performed by: EMERGENCY MEDICINE

## 2023-06-09 PROCEDURE — 85027 COMPLETE CBC AUTOMATED: CPT | Performed by: EMERGENCY MEDICINE

## 2023-06-09 PROCEDURE — 81025 URINE PREGNANCY TEST: CPT | Performed by: EMERGENCY MEDICINE

## 2023-06-09 RX ORDER — KETOROLAC TROMETHAMINE 30 MG/ML
15 INJECTION, SOLUTION INTRAMUSCULAR; INTRAVENOUS
Status: COMPLETED | OUTPATIENT
Start: 2023-06-09 | End: 2023-06-09

## 2023-06-09 RX ORDER — ACETAMINOPHEN 500 MG
1000 TABLET ORAL
Status: COMPLETED | OUTPATIENT
Start: 2023-06-09 | End: 2023-06-09

## 2023-06-09 RX ADMIN — SODIUM CHLORIDE 1000 ML: 9 INJECTION, SOLUTION INTRAVENOUS at 12:06

## 2023-06-09 RX ADMIN — SODIUM CHLORIDE 1000 ML: 9 INJECTION, SOLUTION INTRAVENOUS at 01:06

## 2023-06-09 RX ADMIN — KETOROLAC TROMETHAMINE 15 MG: 30 INJECTION, SOLUTION INTRAMUSCULAR; INTRAVENOUS at 12:06

## 2023-06-09 RX ADMIN — ACETAMINOPHEN 1000 MG: 500 TABLET ORAL at 12:06

## 2023-06-09 NOTE — TELEPHONE ENCOUNTER
Attempted to call patient to check in after ER visit for fever last night. No answer. Left voicemail with call-back information for on-call Gynecology team.    Lulú Xiong M.D.  OB/GYN PGY-4

## 2023-06-09 NOTE — ED TRIAGE NOTES
"Yaquelin Cochran, a 17 y.o. female presents to the ED w/ complaint of fever that started on 6/1-6/2. Hx of fallopian tube remooval on 5/30. Fevers have been in 102's every night since being discharged from hospital. Noticed pain on one of the incision sites. Right breast tenderness. +Chills, +cough, "just feel really bad". Takes tylenol with minimal relief.     Triage note:  Chief Complaint   Patient presents with    Fever     C/o fever,cough, and chills x1 week. Tmax 102-103 at home. Pt had R fallopian tube removed on may 30th, no complications.       Review of patient's allergies indicates:  No Known Allergies  Past Medical History:   Diagnosis Date    Behavior problem in childhood     COVID-19 08/08/2021    Suicidal ideation        "

## 2023-06-09 NOTE — ED PROVIDER NOTES
Encounter Date: 6/8/2023       History     Chief Complaint   Patient presents with    Fever     C/o fever,cough, and chills x1 week. Tmax 102-103 at home. Pt had R fallopian tube removed on may 30th, no complications.       HPI    This is a pleasant 17-year-old female who presents the ER for evaluation of fever.  Onset 1 week intermittent.  Also endorses a mild cough as well.  T-max 103° at home.  Some improvement Tylenol, but she has been taking.  She reports she had a salpingectomy for ectopic pregnancy on May 30th.  She was recently seen in the ER for a minor wound dehiscence.  She denies any significant abdominal pain or skin changes over the incisional sites.  She is also endorsing some minor right breast pain she has experienced before.  Given duration of symptoms she came to  the ER for further evaluation.    Review of patient's allergies indicates:  No Known Allergies  Past Medical History:   Diagnosis Date    Behavior problem in childhood     COVID-19 08/08/2021    Suicidal ideation      Past Surgical History:   Procedure Laterality Date    BACK SURGERY  2016    DIAGNOSTIC LAPAROSCOPY N/A 5/29/2023    Procedure: LAPAROSCOPY, DIAGNOSTIC;  Surgeon: Juana Fung MD;  Location: [facility];  Service: OB/GYN;  Laterality: N/A;    DILATION AND CURETTAGE OF UTERUS       Family History   Problem Relation Age of Onset    Breast cancer Neg Hx     Diabetes Neg Hx     Colon cancer Neg Hx     Ovarian cancer Neg Hx      Social History     Tobacco Use    Smoking status: Never    Smokeless tobacco: Never   Substance Use Topics    Alcohol use: No    Drug use: No     Review of Systems   Constitutional:  Positive for fatigue and fever.   Respiratory:  Positive for cough.    All other systems reviewed and are negative.    Physical Exam     Initial Vitals [06/08/23 2321]   BP Pulse Resp Temp SpO2   110/71 105 16 (!) 102.2 °F (39 °C) 100 %      MAP       --         Physical Exam    Nursing note and vitals  reviewed.  Constitutional: She appears well-developed and well-nourished. No distress.   HENT:   Head: Normocephalic and atraumatic.   Eyes: Pupils are equal, round, and reactive to light.   Neck:   Normal range of motion.  Cardiovascular:  Normal rate, regular rhythm and normal heart sounds.           Pulmonary/Chest: Breath sounds normal. No respiratory distress. She has no wheezes. She has no rales.   Breast exam done with nurse chaperone    Left breast:  Normal external exam no tenderness to palpation, no retraction of the nipple    Right breast:  No external skin changes noted, minor area of tenderness to palpation upon 3 o'clock position, bedside ultrasound performed by myself revealed no cobblestoning no hypoechoic changes no abscess, no retraction of nipple no discharge   Abdominal: Abdomen is soft. She exhibits no distension. There is no abdominal tenderness.   Well-healing postop incisional wounds noted, minor dehiscence in the infraumbilical site no purulent discharge no significant abdominal tenderness no guarding or There is no rebound and no guarding.   Musculoskeletal:         General: Normal range of motion.      Cervical back: Normal range of motion.     Neurological: She is alert and oriented to person, place, and time. She has normal strength. GCS score is 15. GCS eye subscore is 4. GCS verbal subscore is 5. GCS motor subscore is 6.   Skin: Skin is warm and dry. Capillary refill takes less than 2 seconds.   Psychiatric: She has a normal mood and affect. Thought content normal.       ED Course   Procedures  Labs Reviewed   CBC W/ AUTO DIFFERENTIAL - Abnormal; Notable for the following components:       Result Value    Hematocrit 36.7 (*)     All other components within normal limits   CULTURE, BLOOD   CULTURE, BLOOD   COMPREHENSIVE METABOLIC PANEL   URINALYSIS, REFLEX TO URINE CULTURE    Narrative:     Specimen Source->Urine   SARS-COV2 (COVID) WITH FLU/RSV BY PCR   POCT URINE PREGNANCY           Imaging Results              X-Ray Chest PA And Lateral (Final result)  Result time 06/09/23 01:29:06      Final result by Alice Nazario MD (06/09/23 01:29:06)                   Impression:      No acute intrathoracic abnormality.      Electronically signed by: Alice Nazario  Date:    06/09/2023  Time:    01:29               Narrative:    EXAMINATION:  CHEST PA AND LATERAL    CLINICAL HISTORY:  Fever, unspecified    TECHNIQUE:  PA and lateral chest radiograph    COMPARISON:  05/31/2023    FINDINGS:  The cardiac silhouette is within normal limits.   There is no focal consolidation, pneumothorax, or pleural effusion.  There are spinal rods.  No free air is appreciated on the diaphragm on the current exam.                                       Medications   sodium chloride 0.9% bolus 1,000 mL 1,000 mL (0 mLs Intravenous Stopped 6/9/23 0153)   acetaminophen tablet 1,000 mg (1,000 mg Oral Given 6/9/23 0050)   ketorolac injection 15 mg (15 mg Intravenous Given 6/9/23 0051)   sodium chloride 0.9% bolus 1,000 mL 1,000 mL (0 mLs Intravenous Stopped 6/9/23 0301)     Medical Decision Making:   Initial Assessment:   This is a pleasant 17 female who presents the ER for evaluation of 1 week of fevers.  She is a proximally 10 days postop.  She has no significant findings on her abdominal exam.  Her lungs are clear to auscultation.  She was complaining of breast pain which had a normal external exam, and bedside ultrasound final I understand did not reveal any cobblestoning or abscess.  Differential includes viral syndrome, bacteremia pneumonia other cause.  Will plan blood work x-ray cultures symptomatic support reassess.           ED Course as of 06/09/23 0535   Fri Jun 09, 2023   0253 Patient resting comfortably in bed no acute distress feeling much better.  Labs reviewed.  Overall greatly reassuring.  No leukocytosis or left shift on CBC, CMP no significant abnormality noted, patient COVID flu and RSV negative, UA  without signs of infection.  X-ray without acute process.  Discussed with patient.  Overall she is feeling better with no obvious source of infection at this time.  Her abdomen remained soft nontender without overlying skin changes.  I discussed with patient, could be prolonged viral syndrome, low suspicion for postop infection or bacteremia at this time.  I discussed with patient, I discussed risk benefits of a CT scan but she declined.  Not unreasonable at this time.  Given reassuring examined blood work, she had postop infection had suspect some sort abnormalities at this point.  Patient's appointment PCP in a few days she will follow-up with them.  Return precautions were discussed, fever control was discussed and patient will be discharged. [SE]      ED Course User Index  [SE] Augusto Ramon MD                 Clinical Impression:   Final diagnoses:  [R50.9] Fever  [R50.9] Fever, unspecified fever cause (Primary)        ED Disposition Condition    Discharge Stable          ED Prescriptions    None       Follow-up Information    None          Augusto Ramon MD  06/09/23 1643

## 2023-06-09 NOTE — Clinical Note
"Yaquelin "Martha Cochran was seen and treated in our emergency department on 6/8/2023.  She may return to work on 06/12/2023.       If you have any questions or concerns, please don't hesitate to call.      Augusto Ramon MD"

## 2023-06-09 NOTE — DISCHARGE INSTRUCTIONS
Please follow-up with primary care as discussed.  Please take a 1000 mg of Tylenol, then 4 hours later 600 mg of ibuprofen/Motrin to help control your fever and symptoms.  Please continue to monitor and observe your symptoms.  Return the ER for any concerning reason.

## 2023-06-14 ENCOUNTER — HOSPITAL ENCOUNTER (EMERGENCY)
Facility: HOSPITAL | Age: 18
Discharge: HOME | End: 2023-06-14
Attending: EMERGENCY MEDICINE
Payer: MEDICAID

## 2023-06-14 VITALS
SYSTOLIC BLOOD PRESSURE: 113 MMHG | TEMPERATURE: 99 F | BODY MASS INDEX: 20.77 KG/M2 | HEART RATE: 90 BPM | RESPIRATION RATE: 16 BRPM | WEIGHT: 110 LBS | OXYGEN SATURATION: 99 % | DIASTOLIC BLOOD PRESSURE: 51 MMHG | HEIGHT: 61 IN

## 2023-06-14 DIAGNOSIS — R06.02 SHORTNESS OF BREATH: ICD-10-CM

## 2023-06-14 DIAGNOSIS — R05.9 COUGH: ICD-10-CM

## 2023-06-14 DIAGNOSIS — R50.9 FEVER, UNSPECIFIED FEVER CAUSE: Primary | ICD-10-CM

## 2023-06-14 LAB
ALBUMIN SERPL BCP-MCNC: 3.9 G/DL (ref 3.5–5.2)
ALLENS TEST: ABNORMAL
ALP SERPL-CCNC: 114 U/L (ref 55–135)
ALT SERPL W/O P-5'-P-CCNC: 104 U/L (ref 10–44)
ANION GAP SERPL CALC-SCNC: 8 MMOL/L (ref 8–16)
AST SERPL-CCNC: 90 U/L (ref 10–40)
B-HCG UR QL: NEGATIVE
BACTERIA BLD CULT: NORMAL
BACTERIA BLD CULT: NORMAL
BASOPHILS # BLD AUTO: 0.11 K/UL (ref 0–0.2)
BASOPHILS NFR BLD: 1.6 % (ref 0–1.9)
BILIRUB SERPL-MCNC: 0.7 MG/DL (ref 0.1–1)
BILIRUB UR QL STRIP: NEGATIVE
BUN SERPL-MCNC: 9 MG/DL (ref 6–20)
CALCIUM SERPL-MCNC: 9.2 MG/DL (ref 8.7–10.5)
CHLORIDE SERPL-SCNC: 106 MMOL/L (ref 95–110)
CLARITY UR REFRACT.AUTO: CLEAR
CO2 SERPL-SCNC: 25 MMOL/L (ref 23–29)
COLOR UR AUTO: YELLOW
CREAT SERPL-MCNC: 0.7 MG/DL (ref 0.5–1.4)
CTP QC/QA: YES
D DIMER PPP IA.FEU-MCNC: 1.75 MG/L FEU
DIFFERENTIAL METHOD: ABNORMAL
EOSINOPHIL # BLD AUTO: 0 K/UL (ref 0–0.5)
EOSINOPHIL NFR BLD: 0.3 % (ref 0–8)
ERYTHROCYTE [DISTWIDTH] IN BLOOD BY AUTOMATED COUNT: 14.1 % (ref 11.5–14.5)
EST. GFR  (NO RACE VARIABLE): >60 ML/MIN/1.73 M^2
GLUCOSE SERPL-MCNC: 107 MG/DL (ref 70–110)
GLUCOSE UR QL STRIP: NEGATIVE
HCT VFR BLD AUTO: 37.3 % (ref 37–48.5)
HGB BLD-MCNC: 12.1 G/DL (ref 12–16)
HGB UR QL STRIP: NEGATIVE
IMM GRANULOCYTES # BLD AUTO: 0.03 K/UL (ref 0–0.04)
IMM GRANULOCYTES NFR BLD AUTO: 0.4 % (ref 0–0.5)
KETONES UR QL STRIP: NEGATIVE
LDH SERPL L TO P-CCNC: 0.47 MMOL/L (ref 0.5–2.2)
LEUKOCYTE ESTERASE UR QL STRIP: NEGATIVE
LYMPHOCYTES # BLD AUTO: 4.1 K/UL (ref 1–4.8)
LYMPHOCYTES NFR BLD: 57.9 % (ref 18–48)
MCH RBC QN AUTO: 29.2 PG (ref 27–31)
MCHC RBC AUTO-ENTMCNC: 32.4 G/DL (ref 32–36)
MCV RBC AUTO: 90 FL (ref 82–98)
MONOCYTES # BLD AUTO: 0.5 K/UL (ref 0.3–1)
MONOCYTES NFR BLD: 6.7 % (ref 4–15)
NEUTROPHILS # BLD AUTO: 2.3 K/UL (ref 1.8–7.7)
NEUTROPHILS NFR BLD: 33.1 % (ref 38–73)
NITRITE UR QL STRIP: NEGATIVE
NRBC BLD-RTO: 0 /100 WBC
PH UR STRIP: 7 [PH] (ref 5–8)
PLATELET # BLD AUTO: 216 K/UL (ref 150–450)
PLATELET BLD QL SMEAR: ABNORMAL
PMV BLD AUTO: 9.6 FL (ref 9.2–12.9)
POTASSIUM SERPL-SCNC: 4.3 MMOL/L (ref 3.5–5.1)
PROCALCITONIN SERPL IA-MCNC: 0.2 NG/ML
PROT SERPL-MCNC: 7.4 G/DL (ref 6–8.4)
PROT UR QL STRIP: ABNORMAL
RBC # BLD AUTO: 4.15 M/UL (ref 4–5.4)
SAMPLE: ABNORMAL
SITE: ABNORMAL
SODIUM SERPL-SCNC: 139 MMOL/L (ref 136–145)
SP GR UR STRIP: 1.02 (ref 1–1.03)
URN SPEC COLLECT METH UR: ABNORMAL
WBC # BLD AUTO: 7.05 K/UL (ref 3.9–12.7)

## 2023-06-14 PROCEDURE — 84145 PROCALCITONIN (PCT): CPT | Performed by: EMERGENCY MEDICINE

## 2023-06-14 PROCEDURE — 83605 ASSAY OF LACTIC ACID: CPT

## 2023-06-14 PROCEDURE — 93010 ELECTROCARDIOGRAM REPORT: CPT | Mod: ,,, | Performed by: INTERNAL MEDICINE

## 2023-06-14 PROCEDURE — 99900035 HC TECH TIME PER 15 MIN (STAT)

## 2023-06-14 PROCEDURE — 25000242 PHARM REV CODE 250 ALT 637 W/ HCPCS: Performed by: EMERGENCY MEDICINE

## 2023-06-14 PROCEDURE — 85025 COMPLETE CBC W/AUTO DIFF WBC: CPT | Performed by: EMERGENCY MEDICINE

## 2023-06-14 PROCEDURE — 93005 ELECTROCARDIOGRAM TRACING: CPT | Mod: 59

## 2023-06-14 PROCEDURE — 87040 BLOOD CULTURE FOR BACTERIA: CPT | Mod: 59 | Performed by: EMERGENCY MEDICINE

## 2023-06-14 PROCEDURE — 93010 EKG 12-LEAD: ICD-10-PCS | Mod: ,,, | Performed by: INTERNAL MEDICINE

## 2023-06-14 PROCEDURE — 36620 INSERTION CATHETER ARTERY: CPT

## 2023-06-14 PROCEDURE — 25500020 PHARM REV CODE 255: Performed by: EMERGENCY MEDICINE

## 2023-06-14 PROCEDURE — 80053 COMPREHEN METABOLIC PANEL: CPT | Performed by: EMERGENCY MEDICINE

## 2023-06-14 PROCEDURE — 81025 URINE PREGNANCY TEST: CPT | Performed by: EMERGENCY MEDICINE

## 2023-06-14 PROCEDURE — 85379 FIBRIN DEGRADATION QUANT: CPT | Performed by: EMERGENCY MEDICINE

## 2023-06-14 PROCEDURE — 94761 N-INVAS EAR/PLS OXIMETRY MLT: CPT

## 2023-06-14 PROCEDURE — 99285 EMERGENCY DEPT VISIT HI MDM: CPT | Mod: 25

## 2023-06-14 PROCEDURE — 99284 PR EMERGENCY DEPT VISIT,LEVEL IV: ICD-10-PCS | Mod: ,,, | Performed by: EMERGENCY MEDICINE

## 2023-06-14 PROCEDURE — 27100098 HC SPACER

## 2023-06-14 PROCEDURE — 81003 URINALYSIS AUTO W/O SCOPE: CPT | Performed by: EMERGENCY MEDICINE

## 2023-06-14 PROCEDURE — 94640 AIRWAY INHALATION TREATMENT: CPT

## 2023-06-14 PROCEDURE — 99284 EMERGENCY DEPT VISIT MOD MDM: CPT | Mod: ,,, | Performed by: EMERGENCY MEDICINE

## 2023-06-14 RX ORDER — ALBUTEROL SULFATE 90 UG/1
4 AEROSOL, METERED RESPIRATORY (INHALATION)
Status: COMPLETED | OUTPATIENT
Start: 2023-06-14 | End: 2023-06-14

## 2023-06-14 RX ADMIN — ALBUTEROL SULFATE 4 PUFF: 108 INHALANT RESPIRATORY (INHALATION) at 05:06

## 2023-06-14 RX ADMIN — IOHEXOL 75 ML: 350 INJECTION, SOLUTION INTRAVENOUS at 07:06

## 2023-06-14 NOTE — ED PROVIDER NOTES
Source of History:  Patient  Chart    Chief complaint:  Cough (Cough x 3 days, denies fever or chills)      HPI:  Yaquelin Cochran is a 17 y.o. female with history of recent salpingectomy for ectopic pregnancy presenting to emergency department with complaint of fever, fatigue, shortness of breath, chest pain, and cough.    Patient states that she has had ongoing fevers since her salpingectomy on 05/29.  She has had 3 days of a cough that is worse when trying to talk.  Cough is nonproductive.  She has associated chest pain and shortness of breath.  She has no abdominal pain.  T-max at home was 102.  She denies any dysuria or hematuria.  No vaginal discharge.      She was seen in this emergency department 5 days ago for fever.  At that time, no leukocytosis.  CBC and CMP were unremarkable.  Urine pregnancy test was negative.  She was negative for COVID and flu.  Her urinalysis was unremarkable.    Patient states that she has continued to feel unwell.  She states that she has fatigue, chest pain that is worse with deep inspiration, a nonproductive cough.  No dysuria or hematuria.  She also has shortness of breath.  Her surgical sites have been fine.  She has no abdominal pain.    Review of patient's allergies indicates:  No Known Allergies    No current facility-administered medications on file prior to encounter.     Current Outpatient Medications on File Prior to Encounter   Medication Sig Dispense Refill    ibuprofen (ADVIL,MOTRIN) 600 MG tablet Take 1 tablet (600 mg total) by mouth 3 (three) times daily for next 3-5 days, then take as needed for pain. 30 tablet 1    oxyCODONE (ROXICODONE) 5 MG immediate release tablet Take 1 tablet (5 mg total) by mouth every 4 (four) hours as needed for Pain. 7 tablet 0       PMH:  As per HPI and below:  Past Medical History:   Diagnosis Date    Behavior problem in childhood     COVID-19 08/08/2021    Suicidal ideation      Past Surgical History:   Procedure Laterality Date     BACK SURGERY  2016    DIAGNOSTIC LAPAROSCOPY N/A 5/29/2023    Procedure: LAPAROSCOPY, DIAGNOSTIC;  Surgeon: Juana Fung MD;  Location: [facility];  Service: OB/GYN;  Laterality: N/A;    DILATION AND CURETTAGE OF UTERUS         Social History     Socioeconomic History    Marital status: Single   Tobacco Use    Smoking status: Never    Smokeless tobacco: Never   Substance and Sexual Activity    Alcohol use: No    Drug use: No    Sexual activity: Yes     Partners: Male     Birth control/protection: Condom       Family History   Problem Relation Age of Onset    Breast cancer Neg Hx     Diabetes Neg Hx     Colon cancer Neg Hx     Ovarian cancer Neg Hx        Physical Exam:      Vitals:    06/14/23 0524   BP:    Pulse: 93   Resp: 20   Temp:      Gen: No acute distress.  Nontoxic.  Well appearing.  Bronchospastic cough present.  Mental Status:  Alert and oriented .  Appropriate, conversant.  Skin: Warm, dry. No rashes seen.  Eyes: No conjunctival injection.  Pulm: CTAB. No increased work of breathing.  No significant tachypnea.  No audible stridor or wheezing.  No conversational dyspnea.    CV: Regular rate. Regular rhythm.   Abd: Soft.  Not distended.  Nontender.  Surgical sites clean, dry, intact.  MSK: Good range of motion all joints.  No deformities.    Neuro: Awake. Speech normal. No focal neuro deficit observed.    Laboratory Studies:  Labs Reviewed   CULTURE, BLOOD   CULTURE, BLOOD   CBC W/ AUTO DIFFERENTIAL   COMPREHENSIVE METABOLIC PANEL   URINALYSIS, REFLEX TO URINE CULTURE   PROCALCITONIN   D DIMER, QUANTITATIVE       EKG (independently interpreted by me):  Normal sinus rhythm, rate 87.  No STEMI.  QRS 84 milliseconds.   milliseconds.    Chart reviewed.     Imaging Results    None         Medications Given:  Medications   albuterol inhaler 4 puff (4 puffs Inhalation Given 6/14/23 0524)     MDM:    17 y.o. female with fever, shortness of breath, chest pain, and bronchospastic cough in the setting of  recent salpingectomy for ectopic pregnancy.  Here she is afebrile and hemodynamically stable.    EKG without ischemic change.  No tachycardia.      Will obtain labs, chest x-ray, UA.  Plan signed out to oncoming team pending remainder of evaluation for final disposition.    Diagnostic Impression:    1. Fever, unspecified fever cause    2. Cough    3. Shortness of breath               Patient understands the plan and is in agreement, verbalized understanding, questions answered    Yolanda Neumann MD  Emergency Medicine         Yolanda Neumann MD  06/14/23 2328

## 2023-06-14 NOTE — ED NOTES
Patient turned over to me by Dr. Neumann at 0600    Briefly:   17-year-old female status post salpingectomy presents with intermittent fevers.  Positive D-dimer.  Awaiting CTA of the chest and CT of the abdomen and pelvis.    Excluding the D-dimer, labs unremarkable.  CT of the chest negative for PE.  CT of the abdomen and pelvis negative for acute surgical pathology.  No acute SBI found.  Will DC home.  Patient will take Tylenol over the counter as directed on packaging.  She has PCP f/up today.  Patient will return to ED for worsening symptoms, inability to eat/drink, fever greater than 100.4, or any other concerns. Did bedside teaching with return precautions.  All questions answered.  The patient acknowledges understanding.  Gave verbal discharge instructions.      Sean Lin MD  06/14/23 1979

## 2023-06-19 LAB
BACTERIA BLD CULT: NORMAL
BACTERIA BLD CULT: NORMAL

## 2023-06-23 ENCOUNTER — TELEPHONE (OUTPATIENT)
Dept: OBSTETRICS AND GYNECOLOGY | Facility: CLINIC | Age: 18
End: 2023-06-23
Payer: MEDICAID

## 2023-06-23 ENCOUNTER — TELEPHONE (OUTPATIENT)
Dept: OBSTETRICS AND GYNECOLOGY | Facility: HOSPITAL | Age: 18
End: 2023-06-23
Payer: MEDICAID

## 2023-06-23 NOTE — TELEPHONE ENCOUNTER
Attempted to call patient to check in post-operatively. No answer. Left voicemail with call-back information for on-call Gynecology team. Will have office staff call to re-schedule post-operative visit.     Lulú Xiong M.D.  OB/GYN PGY-4

## 2023-06-28 ENCOUNTER — HOSPITAL ENCOUNTER (EMERGENCY)
Facility: HOSPITAL | Age: 18
Discharge: HOME | End: 2023-06-28
Attending: EMERGENCY MEDICINE
Payer: MEDICAID

## 2023-06-28 VITALS
HEIGHT: 61 IN | TEMPERATURE: 98 F | DIASTOLIC BLOOD PRESSURE: 86 MMHG | WEIGHT: 111 LBS | SYSTOLIC BLOOD PRESSURE: 128 MMHG | HEART RATE: 85 BPM | OXYGEN SATURATION: 100 % | BODY MASS INDEX: 20.96 KG/M2 | RESPIRATION RATE: 18 BRPM

## 2023-06-28 DIAGNOSIS — R10.2 PELVIC PAIN: Primary | ICD-10-CM

## 2023-06-28 DIAGNOSIS — N64.4 BREAST PAIN: ICD-10-CM

## 2023-06-28 LAB
ALBUMIN SERPL BCP-MCNC: 4.1 G/DL (ref 3.5–5.2)
ALP SERPL-CCNC: 90 U/L (ref 55–135)
ALT SERPL W/O P-5'-P-CCNC: 58 U/L (ref 10–44)
ANION GAP SERPL CALC-SCNC: 9 MMOL/L (ref 8–16)
AST SERPL-CCNC: 37 U/L (ref 10–40)
B-HCG UR QL: NEGATIVE
BACTERIA GENITAL QL WET PREP: ABNORMAL
BASOPHILS # BLD AUTO: 0.06 K/UL (ref 0–0.2)
BASOPHILS NFR BLD: 1 % (ref 0–1.9)
BILIRUB SERPL-MCNC: 0.5 MG/DL (ref 0.1–1)
BILIRUB UR QL STRIP: NEGATIVE
BUN SERPL-MCNC: 11 MG/DL (ref 6–20)
CALCIUM SERPL-MCNC: 9.3 MG/DL (ref 8.7–10.5)
CHLORIDE SERPL-SCNC: 105 MMOL/L (ref 95–110)
CLARITY UR REFRACT.AUTO: CLEAR
CLUE CELLS VAG QL WET PREP: ABNORMAL
CO2 SERPL-SCNC: 25 MMOL/L (ref 23–29)
COLOR UR AUTO: YELLOW
CREAT SERPL-MCNC: 0.7 MG/DL (ref 0.5–1.4)
CTP QC/QA: YES
DIFFERENTIAL METHOD: ABNORMAL
EOSINOPHIL # BLD AUTO: 0.1 K/UL (ref 0–0.5)
EOSINOPHIL NFR BLD: 2.2 % (ref 0–8)
ERYTHROCYTE [DISTWIDTH] IN BLOOD BY AUTOMATED COUNT: 14.4 % (ref 11.5–14.5)
EST. GFR  (NO RACE VARIABLE): >60 ML/MIN/1.73 M^2
FILAMENT FUNGI VAG WET PREP-#/AREA: ABNORMAL
GLUCOSE SERPL-MCNC: 96 MG/DL (ref 70–110)
GLUCOSE UR QL STRIP: NEGATIVE
HCG INTACT+B SERPL-ACNC: <2.4 MIU/ML
HCT VFR BLD AUTO: 40.8 % (ref 37–48.5)
HGB BLD-MCNC: 13 G/DL (ref 12–16)
HGB UR QL STRIP: NEGATIVE
IMM GRANULOCYTES # BLD AUTO: 0.01 K/UL (ref 0–0.04)
IMM GRANULOCYTES NFR BLD AUTO: 0.2 % (ref 0–0.5)
KETONES UR QL STRIP: NEGATIVE
LEUKOCYTE ESTERASE UR QL STRIP: NEGATIVE
LYMPHOCYTES # BLD AUTO: 2.8 K/UL (ref 1–4.8)
LYMPHOCYTES NFR BLD: 48 % (ref 18–48)
MCH RBC QN AUTO: 29.7 PG (ref 27–31)
MCHC RBC AUTO-ENTMCNC: 31.9 G/DL (ref 32–36)
MCV RBC AUTO: 93 FL (ref 82–98)
MONOCYTES # BLD AUTO: 0.6 K/UL (ref 0.3–1)
MONOCYTES NFR BLD: 9.6 % (ref 4–15)
NEUTROPHILS # BLD AUTO: 2.3 K/UL (ref 1.8–7.7)
NEUTROPHILS NFR BLD: 39 % (ref 38–73)
NITRITE UR QL STRIP: NEGATIVE
NRBC BLD-RTO: 0 /100 WBC
PH UR STRIP: 7 [PH] (ref 5–8)
PLATELET # BLD AUTO: 214 K/UL (ref 150–450)
PMV BLD AUTO: 9.5 FL (ref 9.2–12.9)
POTASSIUM SERPL-SCNC: 4.6 MMOL/L (ref 3.5–5.1)
PROT SERPL-MCNC: 7.3 G/DL (ref 6–8.4)
PROT UR QL STRIP: NEGATIVE
RBC # BLD AUTO: 4.37 M/UL (ref 4–5.4)
SODIUM SERPL-SCNC: 139 MMOL/L (ref 136–145)
SP GR UR STRIP: 1.01 (ref 1–1.03)
SPECIMEN SOURCE: ABNORMAL
T VAGINALIS GENITAL QL WET PREP: ABNORMAL
URN SPEC COLLECT METH UR: NORMAL
WBC # BLD AUTO: 5.85 K/UL (ref 3.9–12.7)
WBC #/AREA VAG WET PREP: ABNORMAL
YEAST GENITAL QL WET PREP: ABNORMAL

## 2023-06-28 PROCEDURE — 81025 URINE PREGNANCY TEST: CPT | Performed by: EMERGENCY MEDICINE

## 2023-06-28 PROCEDURE — 99285 EMERGENCY DEPT VISIT HI MDM: CPT | Mod: 25

## 2023-06-28 PROCEDURE — 81003 URINALYSIS AUTO W/O SCOPE: CPT | Performed by: EMERGENCY MEDICINE

## 2023-06-28 PROCEDURE — 87591 N.GONORRHOEAE DNA AMP PROB: CPT | Performed by: EMERGENCY MEDICINE

## 2023-06-28 PROCEDURE — 80053 COMPREHEN METABOLIC PANEL: CPT | Performed by: EMERGENCY MEDICINE

## 2023-06-28 PROCEDURE — 87210 SMEAR WET MOUNT SALINE/INK: CPT | Performed by: EMERGENCY MEDICINE

## 2023-06-28 PROCEDURE — 85025 COMPLETE CBC W/AUTO DIFF WBC: CPT | Performed by: EMERGENCY MEDICINE

## 2023-06-28 PROCEDURE — 84702 CHORIONIC GONADOTROPIN TEST: CPT | Performed by: EMERGENCY MEDICINE

## 2023-06-28 PROCEDURE — 25500020 PHARM REV CODE 255: Performed by: EMERGENCY MEDICINE

## 2023-06-28 RX ADMIN — IOHEXOL 75 ML: 350 INJECTION, SOLUTION INTRAVENOUS at 03:06

## 2023-06-28 NOTE — ED PROVIDER NOTES
History:  Yaquelin Cochran is a 17 y.o. female who presents to the ED with Abdominal Pain (R sided abd pain X 2-3 days, had R fallopian tube removed last month after ectopic pregnancy, also states lump to L breast)    Described as 17-year-old  with history of recent ectopic pregnancy status post right salpingectomy on 2023 presenting to the emergency department with pelvic pain.  She reports for the past few days she is had intermittent pain in her right lower abdomen, feeling like a possible ovarian cyst.  She denies any vaginal bleeding or discharge.  She was concerned given her recent ectopic pregnancy surgery.  She denies any fevers or chills, urinary symptoms, vomiting, or diarrhea.  Last bowel movement was yesterday and normal.    Review of Systems: All systems reviewed and are negative except as per history of present illness.    Medications:   Discharge Medication List as of 2023  6:16 AM        CONTINUE these medications which have NOT CHANGED    Details   ibuprofen (ADVIL,MOTRIN) 600 MG tablet Take 1 tablet (600 mg total) by mouth 3 (three) times daily for next 3-5 days, then take as needed for pain., Starting 2023, Normal      oxyCODONE (ROXICODONE) 5 MG immediate release tablet Take 1 tablet (5 mg total) by mouth every 4 (four) hours as needed for Pain., Starting 2023, Normal             PMH:   Past Medical History:   Diagnosis Date    Behavior problem in childhood     COVID-19 2021    Suicidal ideation      PSH:   Past Surgical History:   Procedure Laterality Date    BACK SURGERY  2016    DIAGNOSTIC LAPAROSCOPY N/A 2023    Procedure: LAPAROSCOPY, DIAGNOSTIC;  Surgeon: Juana Fung MD;  Location: [facility];  Service: OB/GYN;  Laterality: N/A;    DILATION AND CURETTAGE OF UTERUS       Allergies: She has No Known Allergies.  Social History: Marital Status: single. She  reports that she has never smoked. She has never used smokeless tobacco.. She  reports no  "history of alcohol use..       Exam:  VITAL SIGNS:   Vitals:    06/28/23 0101   BP: 128/86   Pulse: 85   Resp: 18   Temp: 98.3 °F (36.8 °C)   TempSrc: Oral   SpO2: 100%   Weight: 50.3 kg (111 lb)   Height: 5' 1" (1.549 m)     Const: Awake and alert, NAD   Head: Atraumatic  Eyes: Normal Conjunctiva  ENT: Normal External Ears, Nose and Mouth.  Neck: Full range of motion. No meningismus.  Resp: Normal respiratory effort, No distress  Cardio: Equal and intact distal pulses  Breast: L breast slight fullness to 2 o'clock laterally with pea sized palpable lymph node in tail of breast.   Abd: Soft, mild tenderness to palpation her right lower quadrant, non distended.   Pelvic Exam, chaperone present  Abdomen: Nontender  External Genitalia: Normal Skin  Speculum: Normal vaginal mucosa, normal cervical discharge   Bimanual: No adnexal masses or tenderness, No CMT   Skin: No petechiae or rashes  Ext: No cyanosis, or edema  Neur: Awake and alert  Psych: Normal Mood and Affect    Data:  Results for orders placed or performed during the hospital encounter of 06/28/23   C. trachomatis/N. gonorrhoeae by AMP DNA [facility]; Vagina    Specimen: Genital   Result Value Ref Range    Chlamydia, Amplified DNA Not Detected Not Detected    N gonorrhoeae, amplified DNA Not Detected Not Detected   Vaginal Screen    Specimen: Vaginal swab   Result Value Ref Range    Trichomonas None None    Clue Cells Occasional (A) None    Budding Yeast None None    Fungal Hyphae None None    WBC - Vaginal Screen Rare (A) None    Bacteria - Vaginal Screen Moderate (A) None    Wet Prep Source Vagina    CBC auto differential   Result Value Ref Range    WBC 5.85 3.90 - 12.70 K/uL    RBC 4.37 4.00 - 5.40 M/uL    Hemoglobin 13.0 12.0 - 16.0 g/dL    Hematocrit 40.8 37.0 - 48.5 %    MCV 93 82 - 98 fL    MCH 29.7 27.0 - 31.0 pg    MCHC 31.9 (L) 32.0 - 36.0 g/dL    RDW 14.4 11.5 - 14.5 %    Platelets 214 150 - 450 K/uL    MPV 9.5 9.2 - 12.9 fL    Immature Granulocytes " 0.2 0.0 - 0.5 %    Gran # (ANC) 2.3 1.8 - 7.7 K/uL    Immature Grans (Abs) 0.01 0.00 - 0.04 K/uL    Lymph # 2.8 1.0 - 4.8 K/uL    Mono # 0.6 0.3 - 1.0 K/uL    Eos # 0.1 0.0 - 0.5 K/uL    Baso # 0.06 0.00 - 0.20 K/uL    nRBC 0 0 /100 WBC    Gran % 39.0 38.0 - 73.0 %    Lymph % 48.0 18.0 - 48.0 %    Mono % 9.6 4.0 - 15.0 %    Eosinophil % 2.2 0.0 - 8.0 %    Basophil % 1.0 0.0 - 1.9 %    Differential Method Automated    Comprehensive metabolic panel   Result Value Ref Range    Sodium 139 136 - 145 mmol/L    Potassium 4.6 3.5 - 5.1 mmol/L    Chloride 105 95 - 110 mmol/L    CO2 25 23 - 29 mmol/L    Glucose 96 70 - 110 mg/dL    BUN 11 6 - 20 mg/dL    Creatinine 0.7 0.5 - 1.4 mg/dL    Calcium 9.3 8.7 - 10.5 mg/dL    Total Protein 7.3 6.0 - 8.4 g/dL    Albumin 4.1 3.5 - 5.2 g/dL    Total Bilirubin 0.5 0.1 - 1.0 mg/dL    Alkaline Phosphatase 90 55 - 135 U/L    AST 37 10 - 40 U/L    ALT 58 (H) 10 - 44 U/L    eGFR >60.0 >60 mL/min/1.73 m^2    Anion Gap 9 8 - 16 mmol/L   Urinalysis, Reflex to Urine Culture Urine, Clean Catch    Specimen: Urine   Result Value Ref Range    Specimen UA Urine, Clean Catch     Color, UA Yellow Yellow, Straw, Corinne    Appearance, UA Clear Clear    pH, UA 7.0 5.0 - 8.0    Specific Gravity, UA 1.010 1.005 - 1.030    Protein, UA Negative Negative    Glucose, UA Negative Negative    Ketones, UA Negative Negative    Bilirubin (UA) Negative Negative    Occult Blood UA Negative Negative    Nitrite, UA Negative Negative    Leukocytes, UA Negative Negative   hCG, quantitative   Result Value Ref Range    HCG Quant <2.4 See Text mIU/mL   POCT urine pregnancy   Result Value Ref Range    POC Preg Test, Ur Negative Negative     Acceptable Yes      Imaging Results              US Transvaginal Non OB (Final result)  Result time 06/28/23 04:28:47      Final result by Samy Koch MD (06/28/23 04:28:47)                   Impression:      Retroverted uterus.  Prominent bilateral follicles.  Small  volume free fluid, likely physiological.    Electronically signed by resident: Nolberto Smith  Date:    06/28/2023  Time:    03:56    Electronically signed by: Samy Koch MD  Date:    06/28/2023  Time:    04:28               Narrative:    EXAMINATION:  US TRANSVAGINAL NON OB    CLINICAL HISTORY:  Pelvic and perineal pain    TECHNIQUE:  Transabdominal sonography of the pelvis was performed, followed by transvaginal sonography to better evaluate the uterus and ovaries.    COMPARISON:  CT abdomen pelvis 06/14/2023    FINDINGS:  Uterus:    Size: 7.7 x 3.7 x 4.3 cm.  Retroverted uterus.    Masses: None    Endometrium: Normal in this pre menopausal patient, measuring 13 mm.    Cervix:    Masses: None.    Right ovary:    Size: 2.8 x 3.4 x 2.5 cm    Appearance: Normal appearing follicles, some prominent.    Vascular flow: Normal.    Left ovary:    Size: 1.6 x 2.4 x 2.5 cm    Appearance: Normal appearing follicles, some prominent.    Vascular Flow: Normal.    Free Fluid:    Small volume free fluid is present.                                        CT Abdomen Pelvis With Contrast (Final result)  Result time 06/28/23 04:25:47      Final result by Samy Koch MD (06/28/23 04:25:47)                   Impression:      Heterogeneously enhancing retroverted uterus with fluid in the endometrial canal, which could be related to menstrual cycle or mild PID.  Correlate clinically.  Normal appearing ovarian cysts.    Mild nonspecific wall thickening of the proximal stomach without associated inflammatory changes, possible gastritis or under distension, noting similar appearance 06/14/2023.    Nonobstructing left renal stone.    This report was flagged in Epic as abnormal.    Electronically signed by resident: Nolberto Smith  Date:    06/28/2023  Time:    03:16    Electronically signed by: Samy Koch MD  Date:    06/28/2023  Time:    04:25               Narrative:    EXAMINATION:  CT ABDOMEN PELVIS WITH CONTRAST    CLINICAL  HISTORY:  RLQ abdominal pain (Age >= 14y);    TECHNIQUE:  The patient was surveyed from the lung bases through the pelvis after the administration of 75 cc Omni 350 IV contrast.  No oral contrast was administered.  Data was reconstructed for coronal, sagittal, and axial images.    COMPARISON:  CT abdomen pelvis 06/14/2023    FINDINGS:  Images are degraded by significant streak artifact from spinal fusion hardware.    Lungs: No consolidation or pleural effusion in the visualized lung bases.    Heart: Normal size.  No pericardial effusion.    Liver: Normal size with smooth contours.  No focal abnormality.  Portal vasculature is patent.    Gallbladder: No pericholecystic inflammatory changes.    Bile ducts: No intrahepatic or extrahepatic biliary ductal dilatation.    Spleen: Normal size.    Pancreas: No mass.  No ductal dilatation. No peripancreatic fat stranding.    Adrenals: No significant abnormality    Renal/Ureters: The kidneys are normal in size and enhance symmetrically.  Nonobstructing left renal stone.  No hydroureteronephrosis.  The ureters are normal in course and caliber. The urinary bladder is partially distended with smooth wall contours.    Reproductive: Retroverted uterus enhances heterogeneously.  Fluid within endometrial canal, likely related to menstrual cycle.  Bilateral ovarian cysts.    Stomach/Bowel: Mild wall thickening of the proximal stomach without inflammatory changes..  Small bowel is normal caliber without obstruction or inflammation. Appendix is normal.  Large bowel is normal caliber without obstruction or inflammation. Moderate amount of stool throughout the proximal mid colon.  Mild amount of stool within the rectum.    Peritoneum: Trace free fluid within the pelvis.  No intraperitoneal free air.    Lymph Nodes: No pathologic parveen enlargement in the abdomen or pelvis.    Vasculature: Abdominal aorta is normal in caliber.    Bones: Scoliosis with spinal fusion hardware.  No acute  fractures or osseous destructive lesions.    Soft Tissues: No significant abnormality.                                    Labs & Imaging studies were reviewed independently by me.     Medical Decision Makin-year-old female presenting to the emergency department with pelvic pain.  Initially, she declined pelvic examination with no concern for STIs and no complaint of vaginal discharge.  She was concerned about ovarian cyst.  Considered ovarian torsion.  She declined pain medication in the emergency room.  Ultrasound was performed as well CT imaging.  Ultrasound shows normal flow, doubt ovarian torsion.  She was prominent follicles, has follow up with her OB tomorrow, encouraged to discuss this.  CT imaging concerning for fluid in her endometrial canal, possibly PID versus menstrual cycle.  Patient was not are menstrual cycle.  Considered PID.  Doubt cholecystitis, appendicitis, pancreatitis, SBO, diverticulitis, or any other acute abdominal surgical emergency. She then consented to having pelvic exam performed.  This was normal.  She was no cervical motion tenderness or discomfort on examination.  She requested to be discharged prior to results of her vaginal screen or GC/chlamydia test results.  Regarding her breast pain, considered possible, block stalked she recently stopped breastfeeding.  No clinical signs of mastitis at this time.  Encouraged he was warm compresses, breast self-examination and follow up with OBGYN tomorrow for further evaluation of likely lymph node in tail of breast.  Patient is aware that the purpose of this visit was to screen for an acute medical emergency requiring emergent stabilization. Chronic conditions, including malignancies, have not been ruled out.  She was encouraged to have her OBGYN follow up these results and to return to the ER with any new or worsening symptoms.       Clinical Impression:  1. Pelvic pain    2. Breast pain             Medication List        ASK your  doctor about these medications      ibuprofen 600 MG tablet  Commonly known as: ADVIL,MOTRIN  Take 1 tablet (600 mg total) by mouth 3 (three) times daily for next 3-5 days, then take as needed for pain.     oxyCODONE 5 MG immediate release tablet  Commonly known as: ROXICODONE  Take 1 tablet (5 mg total) by mouth every 4 (four) hours as needed for Pain.              Follow-up Information       Your OB/GYN. Go in 1 day.    Why: As scheduled. Have your OB/GYN follow up on your pelvic swab results.                             Maria De Jesus Weiner MD  07/01/23 [id number]       Maria De Jesus Weiner MD  07/01/23 [id number]

## 2023-06-28 NOTE — ED NOTES
Patient identifiers for Yaquelin Cochran checked and correct.    LOC: The patient is awake, alert and aware of environment with an appropriate affect, the patient is oriented x 4 and speaking appropriately.    APPEARANCE: Patient resting comfortably and in no acute distress, patient is clean and well groomed, patient's clothing is properly fastened.    SKIN: The skin is warm and dry, color consistent with ethnicity, patient has normal skin turgor and moist mucus membranes, skin intact, no breakdown or bruising noted.    MUSCULOSKELETAL: Patient moving all extremities well, no obvious swelling or deformities noted.    RESPIRATORY: Airway is open and patent, respirations are spontaneous and even, patient has a normal effort and rate.    CARDIAC: Patient has a normal rate and rhythm, no periphreal edema noted, capillary refill < 3 seconds.    ABDOMEN: c/o rt side abdomen pain x3 days.      NEUROLOGIC: Eyes open spontaneously, PERRL, behavior appropriate to situation, follows commands, facial expression symmetrical, bilateral hand grasp equal and even, purposeful motor response noted, normal sensation in all extremities.     HEENT: No abnormalities noted. White sclera and pupils equal round and reactive to light. Denies headache, dizziness.     : Pt voids independently, denies dysuria, hematuria, frequency.

## 2023-06-28 NOTE — DISCHARGE INSTRUCTIONS
Use warm compresses on your breast with ibuprofen. If you begin to have fevers, worsened pain, or redness, you may need an antibiotic from your OB for mastitis. Follow up with OB or PCP until resolution of the breast lump or for outpatient ultrasound.

## 2023-06-28 NOTE — ED NOTES
Yaquelin COLON Dimas, a 17 y.o. female presents to the ED w/ complaint of Rt side Abd pain x3 days. Had fallopian tube removed 5/30/23 following an ectopic pregnancy. Sore Left Breast    Triage note:  Chief Complaint   Patient presents with    Abdominal Pain     R sided abd pain X 2-3 days, had R fallopian tube removed last month after ectopic pregnancy, also states lump to L breast     Review of patient's allergies indicates:  No Known Allergies  Past Medical History:   Diagnosis Date    Behavior problem in childhood     COVID-19 08/08/2021    Suicidal ideation

## 2023-06-29 ENCOUNTER — OFFICE VISIT (OUTPATIENT)
Dept: OBSTETRICS AND GYNECOLOGY | Facility: CLINIC | Age: 18
End: 2023-06-29
Payer: MEDICAID

## 2023-06-29 VITALS
HEIGHT: 61 IN | SYSTOLIC BLOOD PRESSURE: 100 MMHG | WEIGHT: 110.69 LBS | DIASTOLIC BLOOD PRESSURE: 60 MMHG | BODY MASS INDEX: 20.9 KG/M2

## 2023-06-29 DIAGNOSIS — Z98.890 STATUS POST LAPAROSCOPY: Primary | ICD-10-CM

## 2023-06-29 LAB
C TRACH DNA SPEC QL NAA+PROBE: NOT DETECTED
N GONORRHOEA DNA SPEC QL NAA+PROBE: NOT DETECTED

## 2023-06-29 PROCEDURE — 3074F SYST BP LT 130 MM HG: CPT | Mod: CPTII,,,

## 2023-06-29 PROCEDURE — 99024 POSTOP FOLLOW-UP VISIT: CPT | Mod: ,,,

## 2023-06-29 PROCEDURE — 1159F MED LIST DOCD IN RCRD: CPT | Mod: CPTII,,,

## 2023-06-29 PROCEDURE — 3008F PR BODY MASS INDEX (BMI) DOCUMENTED: ICD-10-PCS | Mod: CPTII,,,

## 2023-06-29 PROCEDURE — 99212 OFFICE O/P EST SF 10 MIN: CPT | Mod: PBBFAC

## 2023-06-29 PROCEDURE — 99024 PR POST-OP FOLLOW-UP VISIT: ICD-10-PCS | Mod: ,,,

## 2023-06-29 PROCEDURE — 99999 PR PBB SHADOW E&M-EST. PATIENT-LVL II: ICD-10-PCS | Mod: PBBFAC,,,

## 2023-06-29 PROCEDURE — 1159F PR MEDICATION LIST DOCUMENTED IN MEDICAL RECORD: ICD-10-PCS | Mod: CPTII,,,

## 2023-06-29 PROCEDURE — 3074F PR MOST RECENT SYSTOLIC BLOOD PRESSURE < 130 MM HG: ICD-10-PCS | Mod: CPTII,,,

## 2023-06-29 PROCEDURE — 3008F BODY MASS INDEX DOCD: CPT | Mod: CPTII,,,

## 2023-06-29 PROCEDURE — 3078F PR MOST RECENT DIASTOLIC BLOOD PRESSURE < 80 MM HG: ICD-10-PCS | Mod: CPTII,,,

## 2023-06-29 PROCEDURE — 99999 PR PBB SHADOW E&M-EST. PATIENT-LVL II: CPT | Mod: PBBFAC,,,

## 2023-06-29 PROCEDURE — 3078F DIAST BP <80 MM HG: CPT | Mod: CPTII,,,

## 2023-06-29 RX ORDER — METRONIDAZOLE 500 MG/1
500 TABLET ORAL EVERY 12 HOURS
Qty: 14 TABLET | Refills: 0 | Status: SHIPPED | OUTPATIENT
Start: 2023-06-29 | End: 2023-07-06

## 2023-06-29 NOTE — PROGRESS NOTES
"  Past medical, surgical, social, family, and obstetric histories; medications; prior records and results; and available outside records were reviewed and updated in the EMR.  Pertinent findings were noted below.    Reason for Visit   Post Op    HPI   17 y.o. female     Patient's last menstrual period was 2023 (approximate).    S/p dx lap w/ R salpingectomy d/t ectopic pregnancy of R fallopian tube on 23. Pathology reviewed today.  Patient reports post-op fever approx 1-2 weeks after surgery. Has not had any fevers/chills for approx 2 weeks.   Reports VB from 6/3-, felt like her period, no VB since  Reports low abdominal/pelvic sharp pains that last a few seconds, went to the ER yesterday for evaluation, w/u overall unremarkable. TVUS with dominant follicles noted on bilateral ovaries.   Denies issues with incision site healing  Otherwise asymptomatic  May desire pregnancy in near future, however for now will be using condoms, declines other contraception options.       Exam   /60   Ht 5' 1" (1.549 m)   Wt 50.2 kg (110 lb 10.7 oz)   LMP 2023 (Approximate)   BMI 20.91 kg/m²     Physical Exam  Constitutional:       General: She is not in acute distress.     Appearance: Normal appearance.   HENT:      Head: Normocephalic and atraumatic.   Eyes:      Extraocular Movements: Extraocular movements intact.   Cardiovascular:      Rate and Rhythm: Normal rate.   Pulmonary:      Effort: Pulmonary effort is normal. No respiratory distress.   Abdominal:      General: Abdomen is flat. There is no distension.      Palpations: Abdomen is soft.      Tenderness: There is no abdominal tenderness.      Comments: Laparoscopic port site incisions clean/dry/intact, healing very well.   Musculoskeletal:         General: Normal range of motion.      Cervical back: Normal range of motion.   Neurological:      General: No focal deficit present.      Mental Status: She is alert and oriented to person, place, " and time. Mental status is at baseline.   Skin:     General: Skin is warm and dry.   Psychiatric:         Mood and Affect: Mood normal.         Behavior: Behavior normal.         Thought Content: Thought content normal.         Judgment: Judgment normal.     Assessment and Plan   Status post diagnostic laparoscopy, R salpingectomy for ectopic pregnancy    Other orders  -     metroNIDAZOLE (FLAGYL) 500 MG tablet; Take 1 tablet (500 mg total) by mouth every 12 (twelve) hours. for 7 days  Dispense: 14 tablet; Refill: 0        Meeting post operative milestones  Discussed pathology results with patient  Questions answered  Flagyl rx given for BV+ vaginosis swab yesterday  Counseled on contraception options including combined oral contraceptive pills, progestin-only pills, NuvaRing, patches, DepoProvera, Nexplanon, as well as hormonal and non-hormonal IUDs. Counseled on R/B/A of each. All questions answered. Discussed need for additional barrier protection, such as a condoms, to reduce the risk of STIs. Patient desires to use condoms for contraception at this time.     Follow up: PRN, annual well woman exam      Juana Miller MD   OB/GYN PGY-2

## 2023-07-20 ENCOUNTER — HOSPITAL ENCOUNTER (EMERGENCY)
Facility: HOSPITAL | Age: 18
Discharge: HOME | End: 2023-07-20
Attending: EMERGENCY MEDICINE
Payer: MEDICAID

## 2023-07-20 ENCOUNTER — TELEPHONE (OUTPATIENT)
Dept: OBSTETRICS AND GYNECOLOGY | Facility: CLINIC | Age: 18
End: 2023-07-20
Payer: MEDICAID

## 2023-07-20 VITALS
HEART RATE: 79 BPM | DIASTOLIC BLOOD PRESSURE: 64 MMHG | OXYGEN SATURATION: 100 % | RESPIRATION RATE: 18 BRPM | SYSTOLIC BLOOD PRESSURE: 106 MMHG | TEMPERATURE: 99 F

## 2023-07-20 DIAGNOSIS — Z34.90 PREGNANCY, UNSPECIFIED GESTATIONAL AGE: Primary | ICD-10-CM

## 2023-07-20 LAB
ABO + RH BLD: NORMAL
ALBUMIN SERPL BCP-MCNC: 4.5 G/DL (ref 3.5–5.2)
ALP SERPL-CCNC: 73 U/L (ref 55–135)
ALT SERPL W/O P-5'-P-CCNC: 16 U/L (ref 10–44)
ANION GAP SERPL CALC-SCNC: 7 MMOL/L (ref 8–16)
AST SERPL-CCNC: 21 U/L (ref 10–40)
B-HCG UR QL: POSITIVE
BASOPHILS # BLD AUTO: 0.05 K/UL (ref 0–0.2)
BASOPHILS NFR BLD: 0.8 % (ref 0–1.9)
BILIRUB SERPL-MCNC: 0.5 MG/DL (ref 0.1–1)
BUN SERPL-MCNC: 7 MG/DL (ref 6–20)
CALCIUM SERPL-MCNC: 9.4 MG/DL (ref 8.7–10.5)
CHLORIDE SERPL-SCNC: 107 MMOL/L (ref 95–110)
CO2 SERPL-SCNC: 25 MMOL/L (ref 23–29)
CREAT SERPL-MCNC: 0.7 MG/DL (ref 0.5–1.4)
CTP QC/QA: YES
DIFFERENTIAL METHOD: NORMAL
EOSINOPHIL # BLD AUTO: 0.1 K/UL (ref 0–0.5)
EOSINOPHIL NFR BLD: 1.3 % (ref 0–8)
ERYTHROCYTE [DISTWIDTH] IN BLOOD BY AUTOMATED COUNT: 13.5 % (ref 11.5–14.5)
EST. GFR  (NO RACE VARIABLE): >60 ML/MIN/1.73 M^2
GLUCOSE SERPL-MCNC: 93 MG/DL (ref 70–110)
HCG INTACT+B SERPL-ACNC: 1809 MIU/ML
HCT VFR BLD AUTO: 38.7 % (ref 37–48.5)
HGB BLD-MCNC: 13.1 G/DL (ref 12–16)
IMM GRANULOCYTES # BLD AUTO: 0.02 K/UL (ref 0–0.04)
IMM GRANULOCYTES NFR BLD AUTO: 0.3 % (ref 0–0.5)
LYMPHOCYTES # BLD AUTO: 2.7 K/UL (ref 1–4.8)
LYMPHOCYTES NFR BLD: 43.3 % (ref 18–48)
MCH RBC QN AUTO: 29.8 PG (ref 27–31)
MCHC RBC AUTO-ENTMCNC: 33.9 G/DL (ref 32–36)
MCV RBC AUTO: 88 FL (ref 82–98)
MONOCYTES # BLD AUTO: 0.4 K/UL (ref 0.3–1)
MONOCYTES NFR BLD: 6.4 % (ref 4–15)
NEUTROPHILS # BLD AUTO: 3 K/UL (ref 1.8–7.7)
NEUTROPHILS NFR BLD: 47.9 % (ref 38–73)
NRBC BLD-RTO: 0 /100 WBC
PLATELET # BLD AUTO: 223 K/UL (ref 150–450)
PMV BLD AUTO: 9.3 FL (ref 9.2–12.9)
POTASSIUM SERPL-SCNC: 3.5 MMOL/L (ref 3.5–5.1)
PROT SERPL-MCNC: 7.4 G/DL (ref 6–8.4)
RBC # BLD AUTO: 4.39 M/UL (ref 4–5.4)
SODIUM SERPL-SCNC: 139 MMOL/L (ref 136–145)
WBC # BLD AUTO: 6.23 K/UL (ref 3.9–12.7)

## 2023-07-20 PROCEDURE — 81025 URINE PREGNANCY TEST: CPT | Performed by: EMERGENCY MEDICINE

## 2023-07-20 PROCEDURE — 84702 CHORIONIC GONADOTROPIN TEST: CPT | Performed by: EMERGENCY MEDICINE

## 2023-07-20 PROCEDURE — 25000003 PHARM REV CODE 250: Performed by: EMERGENCY MEDICINE

## 2023-07-20 PROCEDURE — 80053 COMPREHEN METABOLIC PANEL: CPT | Performed by: EMERGENCY MEDICINE

## 2023-07-20 PROCEDURE — 86900 BLOOD TYPING SEROLOGIC ABO: CPT | Performed by: EMERGENCY MEDICINE

## 2023-07-20 PROCEDURE — 99284 EMERGENCY DEPT VISIT MOD MDM: CPT | Mod: 25

## 2023-07-20 PROCEDURE — 85025 COMPLETE CBC W/AUTO DIFF WBC: CPT | Performed by: EMERGENCY MEDICINE

## 2023-07-20 RX ORDER — ACETAMINOPHEN 325 MG/1
650 TABLET ORAL
Status: COMPLETED | OUTPATIENT
Start: 2023-07-20 | End: 2023-07-20

## 2023-07-20 RX ADMIN — ACETAMINOPHEN 650 MG: 325 TABLET ORAL at 08:07

## 2023-07-20 NOTE — TELEPHONE ENCOUNTER
----- Message from Rosita Sanchez sent at 7/19/2023  2:56 PM CDT -----  Patient sent message stating she had a procedure done on 5/29 for Ectopic Pregnancy. She took a pregnancy test today and it showed positive. Pt was told if she gets a positive test to call office to come in.       Please contact patient to schedule appt

## 2023-07-21 NOTE — ED PROVIDER NOTES
"Encounter Date: 7/20/2023       History     Chief Complaint   Patient presents with    Abdominal Pain     Recent ectopic. Found out the other day pregnant      17-year-old past medical history of ectopic pregnancy status post right salpingectomy presenting with 4 days of intermittent right lower quadrant pain.  Patient mentions pain is nonradiating 7/10 at worse associated with nausea.  Denies any vomiting denies any fevers, chills, night sweats, diarrhea, abnormal vaginal discharge or bleeding.  Patient mentions this pain " feels similar to prior ectopic pregnancy.".  Patient had prior ectopic last month.    Review of patient's allergies indicates:  No Known Allergies  Past Medical History:   Diagnosis Date    Behavior problem in childhood     COVID-19 08/08/2021    Suicidal ideation      Past Surgical History:   Procedure Laterality Date    BACK SURGERY  2016    DIAGNOSTIC LAPAROSCOPY N/A 5/29/2023    Procedure: LAPAROSCOPY, DIAGNOSTIC;  Surgeon: Juana Fung MD;  Location: [facility];  Service: OB/GYN;  Laterality: N/A;    DILATION AND CURETTAGE OF UTERUS       Family History   Problem Relation Age of Onset    Breast cancer Neg Hx     Diabetes Neg Hx     Colon cancer Neg Hx     Ovarian cancer Neg Hx      Social History     Tobacco Use    Smoking status: Never    Smokeless tobacco: Never   Substance Use Topics    Alcohol use: No    Drug use: No     Review of Systems    Physical Exam     Initial Vitals [07/20/23 1902]   BP Pulse Resp Temp SpO2   124/76 91 14 98.5 °F (36.9 °C) 99 %      MAP       --         Physical Exam    Constitutional: She appears well-developed and well-nourished. She is not diaphoretic. No distress.   HENT:   Head: Normocephalic and atraumatic.   Eyes: Conjunctivae and EOM are normal. Pupils are equal, round, and reactive to light. Right eye exhibits no discharge. Left eye exhibits no discharge. No scleral icterus.   Neck: Neck supple.   Normal range of motion.  Cardiovascular:  Normal rate " and regular rhythm.     Exam reveals no friction rub.       No murmur heard.  Pulmonary/Chest: Breath sounds normal. No respiratory distress. She has no wheezes. She has no rales.   Abdominal: Abdomen is soft. Bowel sounds are normal. She exhibits no distension. There is abdominal tenderness.   Suprapubic and right lower quadrant tenderness to palpation no guarding rigidity. There is no rebound and no guarding.   Genitourinary:    No vaginal discharge.      Genitourinary Comments: Pelvic exam chaperoned by kike Busby:  No noted abnormal vaginal discharge no CMT cervical os is closed no active bleeding ,right adnexal tenderness to palpation.     Musculoskeletal:         General: Normal range of motion.      Cervical back: Normal range of motion and neck supple.     Neurological: She is alert and oriented to person, place, and time. No cranial nerve deficit or sensory deficit. GCS score is 15. GCS eye subscore is 4. GCS verbal subscore is 5. GCS motor subscore is 6.   Skin: Skin is warm and dry. No erythema. No pallor.   Psychiatric: She has a normal mood and affect. Her behavior is normal. Judgment and thought content normal.       ED Course   Procedures  Labs Reviewed   COMPREHENSIVE METABOLIC PANEL - Abnormal; Notable for the following components:       Result Value    Anion Gap 7 (*)     All other components within normal limits    Narrative:     Release to patient->Immediate   POCT URINE PREGNANCY - Abnormal; Notable for the following components:    POC Preg Test, Ur Positive (*)     All other components within normal limits   CBC W/ AUTO DIFFERENTIAL    Narrative:     Release to patient->Immediate   HCG, QUANTITATIVE    Narrative:     Release to patient->Immediate   GROUP & RH          Imaging Results              US OB <14 Wks TransAbd & TransVag, Single Gestation (XPD) (Final result)  Result time 07/20/23 22:42:52      Final result by Poli Mansfield MD (07/20/23 22:42:52)                   Impression:       Single early intrauterine pregnancy with estimated gestational age 5 weeks 1 days. and an ANGELY of the 03/20/2024.    Consider follow-up as clinically warranted.      Electronically signed by: Poli Mansfield  Date:    07/20/2023  Time:    22:42               Narrative:    EXAMINATION:  US OB <14 WEEKS, TRANSABDOM & TRANSVAG, SINGLE GESTATION (XPD)    CLINICAL HISTORY:  Vag Bleeding;    TECHNIQUE:  Transabdominal sonography of the pelvis was performed, followed by transvaginal sonography to better evaluate the uterus and ovaries.    COMPARISON:  None.    FINDINGS:  Intrauterine gestation(s): Single    Mean gestational sac diameter: 0.6 cm    Yolk sac: Not present    Crown-rump length (CRL): Not present    Cardiac activity: Not present    Subchorionic hemorrhage: None.    Right ovary: 3.7 x 4.4 x 2.5 cm with 1.8 cm anechoic cyst or follicle..    Left ovary: 2.4 x 4.1 x 1.9 cm with a 1.2 cm anechoic cyst or follicle.    Miscellaneous: No Free Fluid.                                       Medications   acetaminophen tablet 650 mg (650 mg Oral Given 7/20/23 2022)     Medical Decision Making:   History:   Old Medical Records: I decided to obtain old medical records.  Initial Assessment:   17-year-old presenting with right lower quadrant abdominal pain.  Differential Diagnosis:   Ddx includes but is not limited to:   Biliary colic, cholecystitis, gallstones, pancreatitis, hepatitis, cholangitis, appendicitis, diverticulitis, GERD, gastroenteritis, UTI    Clinical Tests:   Lab Tests: Ordered and Reviewed  Radiological Study: Ordered and Reviewed  ED Management:  Plan:  Obtain CBC, CMP, lipase, ultrasound, Tylenol and reassess.           ED Course as of 07/20/23 2319   Thu Jul 20, 2023   2308 Laboratory testing unremarkable no noted white count no abnormality in hemoglobin hCG 1809.  Ultrasound noted for IUP 5 weeks 1 day.  Discussed findings with patient no indication for emergency hospitalization at this time.  Discussed  with patient return to emergency room having worsening abdominal pain, abnormal bleeding, fevers, chills.  Patient understanding of plan safe for plan discharge home at this time.   [DC]      ED Course User Index  [DC] Emiliano Kaufman Jr., MD                 Clinical Impression:   Final diagnoses:  [Z34.90] Pregnancy, unspecified gestational age (Primary)        ED Disposition Condition    Discharge Stable          ED Prescriptions    None       Follow-up Information       Follow up With Specialties Details Why Contact Info    [facility] - Emergency Dept Emergency Medicine   [address] [place] [id number] [phone]             Emiliano Kaufman Jr., MD  07/20/23 3938

## 2023-07-21 NOTE — ED NOTES
Patient had an ectopic pregnancy last month, had a procedure where her right right fallopian tube was removed to her ectopic pregnancy. Patient found out that pregnant yesterday, decided to come to the ED due to having abdominal and pelvic pain similar to when she had an ectopic pregnancy.   Last period was the 7th of July.

## 2023-08-03 ENCOUNTER — TELEPHONE (OUTPATIENT)
Dept: OBSTETRICS AND GYNECOLOGY | Facility: CLINIC | Age: 18
End: 2023-08-03

## 2023-08-04 ENCOUNTER — CLINICAL SUPPORT (OUTPATIENT)
Dept: OBSTETRICS AND GYNECOLOGY | Facility: CLINIC | Age: 18
End: 2023-08-04
Payer: MEDICAID

## 2023-08-04 ENCOUNTER — TELEPHONE (OUTPATIENT)
Dept: OBSTETRICS AND GYNECOLOGY | Facility: CLINIC | Age: 18
End: 2023-08-04
Payer: MEDICAID

## 2023-08-04 DIAGNOSIS — Z34.90 PREGNANCY, UNSPECIFIED GESTATIONAL AGE: ICD-10-CM

## 2023-08-04 DIAGNOSIS — N91.2 AMENORRHEA: Primary | ICD-10-CM

## 2023-08-04 NOTE — TELEPHONE ENCOUNTER
Called patient and scheduled Initial for August 14,2023 at 1:00. Patient agreed with time and date.

## 2023-08-09 ENCOUNTER — PATIENT MESSAGE (OUTPATIENT)
Dept: OBSTETRICS AND GYNECOLOGY | Facility: CLINIC | Age: 18
End: 2023-08-09
Payer: MEDICAID

## 2023-08-10 ENCOUNTER — HOSPITAL ENCOUNTER (OUTPATIENT)
Dept: PERINATAL CARE | Facility: OTHER | Age: 18
Discharge: HOME | End: 2023-08-10
Attending: OBSTETRICS & GYNECOLOGY
Payer: MEDICAID

## 2023-08-10 DIAGNOSIS — N91.2 AMENORRHEA: ICD-10-CM

## 2023-08-10 PROCEDURE — 76801 US OB/GYN PROCEDURE (VIEWPOINT): ICD-10-PCS | Mod: 26,,, | Performed by: OBSTETRICS & GYNECOLOGY

## 2023-08-10 PROCEDURE — 76801 OB US < 14 WKS SINGLE FETUS: CPT

## 2023-08-10 PROCEDURE — 76801 OB US < 14 WKS SINGLE FETUS: CPT | Mod: 26,,, | Performed by: OBSTETRICS & GYNECOLOGY

## 2023-08-29 ENCOUNTER — TELEPHONE (OUTPATIENT)
Dept: OBSTETRICS AND GYNECOLOGY | Facility: CLINIC | Age: 18
End: 2023-08-29
Payer: MEDICAID

## 2023-08-29 NOTE — TELEPHONE ENCOUNTER
Called pt to schedule iob appointment pt pt states she did not want to schedule with Dr. Florence what would be her options. Pt advised she could schedule with any obgyn with in the system. Call was disconnected. Attempted to call pt back voice mail came on unable to lvm. Will forward to OB Navigator for assistance

## 2023-08-29 NOTE — TELEPHONE ENCOUNTER
----- Message from HENRY Florence MD sent at 8/29/2023  9:27 AM CDT -----  Please schedule patient for initial prenatal appointment.  Thanks.

## 2023-10-24 ENCOUNTER — HOSPITAL ENCOUNTER (EMERGENCY)
Facility: HOSPITAL | Age: 18
Discharge: HOME | End: 2023-10-25
Attending: EMERGENCY MEDICINE
Payer: MEDICAID

## 2023-10-24 DIAGNOSIS — O23.599 BACTERIAL VAGINOSIS IN PREGNANCY: ICD-10-CM

## 2023-10-24 DIAGNOSIS — B96.89 BACTERIAL VAGINOSIS IN PREGNANCY: ICD-10-CM

## 2023-10-24 DIAGNOSIS — O46.92 VAGINAL BLEEDING IN PREGNANCY, SECOND TRIMESTER: Primary | ICD-10-CM

## 2023-10-24 PROCEDURE — 99284 EMERGENCY DEPT VISIT MOD MDM: CPT

## 2023-10-24 PROCEDURE — 81025 URINE PREGNANCY TEST: CPT | Performed by: EMERGENCY MEDICINE

## 2023-10-24 PROCEDURE — 96360 HYDRATION IV INFUSION INIT: CPT

## 2023-10-25 VITALS
OXYGEN SATURATION: 100 % | HEIGHT: 61 IN | WEIGHT: 115 LBS | DIASTOLIC BLOOD PRESSURE: 68 MMHG | BODY MASS INDEX: 21.71 KG/M2 | HEART RATE: 80 BPM | TEMPERATURE: 98 F | RESPIRATION RATE: 18 BRPM | SYSTOLIC BLOOD PRESSURE: 102 MMHG

## 2023-10-25 PROBLEM — O46.92 VAGINAL BLEEDING IN PREGNANCY, SECOND TRIMESTER: Status: ACTIVE | Noted: 2023-10-25

## 2023-10-25 LAB
ABO + RH BLD: NORMAL
ABO + RH BLD: NORMAL
ALBUMIN SERPL BCP-MCNC: 3.9 G/DL (ref 3.5–5.2)
ALP SERPL-CCNC: 57 U/L (ref 55–135)
ALT SERPL W/O P-5'-P-CCNC: 16 U/L (ref 10–44)
ANION GAP SERPL CALC-SCNC: 12 MMOL/L (ref 8–16)
AST SERPL-CCNC: 21 U/L (ref 10–40)
B-HCG UR QL: POSITIVE
BACTERIA GENITAL QL WET PREP: ABNORMAL
BASOPHILS # BLD AUTO: 0.05 K/UL (ref 0–0.2)
BASOPHILS NFR BLD: 0.5 % (ref 0–1.9)
BILIRUB SERPL-MCNC: 0.9 MG/DL (ref 0.1–1)
BILIRUB UR QL STRIP: NEGATIVE
BLD GP AB SCN CELLS X3 SERPL QL: NORMAL
BUN SERPL-MCNC: 9 MG/DL (ref 6–20)
CALCIUM SERPL-MCNC: 9.3 MG/DL (ref 8.7–10.5)
CHLORIDE SERPL-SCNC: 108 MMOL/L (ref 95–110)
CLARITY UR REFRACT.AUTO: CLEAR
CLUE CELLS VAG QL WET PREP: ABNORMAL
CO2 SERPL-SCNC: 20 MMOL/L (ref 23–29)
COLOR UR AUTO: YELLOW
CREAT SERPL-MCNC: 0.7 MG/DL (ref 0.5–1.4)
CTP QC/QA: YES
DIFFERENTIAL METHOD: ABNORMAL
EOSINOPHIL # BLD AUTO: 0 K/UL (ref 0–0.5)
EOSINOPHIL NFR BLD: 0.3 % (ref 0–8)
ERYTHROCYTE [DISTWIDTH] IN BLOOD BY AUTOMATED COUNT: 13.6 % (ref 11.5–14.5)
EST. GFR  (NO RACE VARIABLE): >60 ML/MIN/1.73 M^2
FETAL CELL SCN BLD QL ROSETTE: NORMAL
FILAMENT FUNGI VAG WET PREP-#/AREA: ABNORMAL
GLUCOSE SERPL-MCNC: 68 MG/DL (ref 70–110)
GLUCOSE UR QL STRIP: NEGATIVE
HCG INTACT+B SERPL-ACNC: NORMAL MIU/ML
HCT VFR BLD AUTO: 36.3 % (ref 37–48.5)
HGB BLD-MCNC: 12.1 G/DL (ref 12–16)
HGB UR QL STRIP: ABNORMAL
IMM GRANULOCYTES # BLD AUTO: 0.04 K/UL (ref 0–0.04)
IMM GRANULOCYTES NFR BLD AUTO: 0.4 % (ref 0–0.5)
INJECT RH IG VOL PATIENT: NORMAL ML
KETONES UR QL STRIP: ABNORMAL
LEUKOCYTE ESTERASE UR QL STRIP: NEGATIVE
LYMPHOCYTES # BLD AUTO: 2 K/UL (ref 1–4.8)
LYMPHOCYTES NFR BLD: 21.6 % (ref 18–48)
MCH RBC QN AUTO: 30.9 PG (ref 27–31)
MCHC RBC AUTO-ENTMCNC: 33.3 G/DL (ref 32–36)
MCV RBC AUTO: 93 FL (ref 82–98)
MONOCYTES # BLD AUTO: 0.4 K/UL (ref 0.3–1)
MONOCYTES NFR BLD: 4.4 % (ref 4–15)
NEUTROPHILS # BLD AUTO: 6.7 K/UL (ref 1.8–7.7)
NEUTROPHILS NFR BLD: 72.8 % (ref 38–73)
NITRITE UR QL STRIP: NEGATIVE
NRBC BLD-RTO: 0 /100 WBC
PH UR STRIP: 6 [PH] (ref 5–8)
PLATELET # BLD AUTO: 211 K/UL (ref 150–450)
PMV BLD AUTO: 9.2 FL (ref 9.2–12.9)
POTASSIUM SERPL-SCNC: 3.7 MMOL/L (ref 3.5–5.1)
PROT SERPL-MCNC: 7.2 G/DL (ref 6–8.4)
PROT UR QL STRIP: ABNORMAL
RBC # BLD AUTO: 3.92 M/UL (ref 4–5.4)
SODIUM SERPL-SCNC: 140 MMOL/L (ref 136–145)
SP GR UR STRIP: >1.03 (ref 1–1.03)
SPECIMEN OUTDATE: NORMAL
SPECIMEN SOURCE: ABNORMAL
T VAGINALIS GENITAL QL WET PREP: ABNORMAL
URN SPEC COLLECT METH UR: ABNORMAL
WBC # BLD AUTO: 9.17 K/UL (ref 3.9–12.7)
WBC #/AREA VAG WET PREP: ABNORMAL
YEAST GENITAL QL WET PREP: ABNORMAL

## 2023-10-25 PROCEDURE — 63600519 RHOGAM PHARM REV CODE 636 ALT 250 W HCPCS: Performed by: EMERGENCY MEDICINE

## 2023-10-25 PROCEDURE — 85461 HEMOGLOBIN FETAL: CPT | Performed by: EMERGENCY MEDICINE

## 2023-10-25 PROCEDURE — 81003 URINALYSIS AUTO W/O SCOPE: CPT | Performed by: EMERGENCY MEDICINE

## 2023-10-25 PROCEDURE — 85025 COMPLETE CBC W/AUTO DIFF WBC: CPT | Performed by: EMERGENCY MEDICINE

## 2023-10-25 PROCEDURE — 25000003 PHARM REV CODE 250: Performed by: EMERGENCY MEDICINE

## 2023-10-25 PROCEDURE — 87491 CHLMYD TRACH DNA AMP PROBE: CPT | Performed by: EMERGENCY MEDICINE

## 2023-10-25 PROCEDURE — 87591 N.GONORRHOEAE DNA AMP PROB: CPT | Performed by: EMERGENCY MEDICINE

## 2023-10-25 PROCEDURE — 86900 BLOOD TYPING SEROLOGIC ABO: CPT | Mod: 91 | Performed by: EMERGENCY MEDICINE

## 2023-10-25 PROCEDURE — 84702 CHORIONIC GONADOTROPIN TEST: CPT | Performed by: EMERGENCY MEDICINE

## 2023-10-25 PROCEDURE — 96372 THER/PROPH/DIAG INJ SC/IM: CPT | Performed by: EMERGENCY MEDICINE

## 2023-10-25 PROCEDURE — 87210 SMEAR WET MOUNT SALINE/INK: CPT | Performed by: EMERGENCY MEDICINE

## 2023-10-25 PROCEDURE — 80053 COMPREHEN METABOLIC PANEL: CPT | Performed by: EMERGENCY MEDICINE

## 2023-10-25 PROCEDURE — 86901 BLOOD TYPING SEROLOGIC RH(D): CPT | Performed by: EMERGENCY MEDICINE

## 2023-10-25 RX ORDER — METRONIDAZOLE 500 MG/1
500 TABLET ORAL EVERY 12 HOURS
Qty: 13 TABLET | Refills: 0 | Status: SHIPPED | OUTPATIENT
Start: 2023-10-25 | End: 2023-10-25 | Stop reason: SDUPTHER

## 2023-10-25 RX ORDER — METRONIDAZOLE 500 MG/1
500 TABLET ORAL EVERY 12 HOURS
Qty: 13 TABLET | Refills: 0 | Status: SHIPPED | OUTPATIENT
Start: 2023-10-25 | End: 2023-11-01

## 2023-10-25 RX ORDER — METRONIDAZOLE 500 MG/1
500 TABLET ORAL
Status: COMPLETED | OUTPATIENT
Start: 2023-10-25 | End: 2023-10-25

## 2023-10-25 RX ADMIN — SODIUM CHLORIDE 1000 ML: 9 INJECTION, SOLUTION INTRAVENOUS at 01:10

## 2023-10-25 RX ADMIN — HUMAN RHO(D) IMMUNE GLOBULIN 300 MCG: 1500 SOLUTION INTRAMUSCULAR; INTRAVENOUS at 01:10

## 2023-10-25 RX ADMIN — METRONIDAZOLE 500 MG: 500 TABLET ORAL at 05:10

## 2023-10-25 NOTE — ED TRIAGE NOTES
Yaquelin Cochran, a 18 y.o. female presents to the ED w/ complaint of spotting in pregnancy. Per patent she lifted more then normal today at work prior to noticing the spotting.    Triage note:  Chief Complaint   Patient presents with    Abdominal Pain     18 wks pregnant, started with cramping today and spotting. Denies previous OB care during pregnancy.      Review of patient's allergies indicates:  No Known Allergies  Past Medical History:   Diagnosis Date    Behavior problem in childhood     COVID-19 08/08/2021    Suicidal ideation       LOC: The patient is awake, alert, aware of environment with an appropriate affect. Oriented x4, speaking appropriately  APPEARANCE: Pt resting comfortably, in no acute distress, pt is clean and well groomed, clothing properly fastened  SKIN:The skin is warm and dry, color consistent with ethnicity, patient has normal skin turgor and moist mucus membranes, no bruising noted   RESPIRATORY:Airway is open and patent, respirations are spontaneous, patient has a normal effort and rate, no accessory muscle use noted.  CARDIAC: Normal rate and rhythm, no peripheral edema noted, capillary refill < 3 seconds, bilateral radial pulses 2+.  ABDOMEN: Soft, non tender, non distended. Pregnant. Denies current pain or bleeding.  NEUROLOGIC: PERRLA, facial expression is symmetrical, patient moving all extremities spontaneously, normal sensation in all extremities when touched with a finger.  Follows all commands appropriately  MUSCULOSKELETAL: Patient moving all extremities spontaneously, no obvious swelling or deformities noted.

## 2023-10-25 NOTE — ED PROVIDER NOTES
Encounter Date: 10/24/2023       History     Chief Complaint   Patient presents with    Abdominal Pain     18 wks pregnant, started with cramping today and spotting. Denies previous OB care during pregnancy.      Pt is a 17 yo F with PMH of etopic pregnancy s/p R salpingectomy 5/30/23 who presents with contractions that started at work after she was working hard on her feet all day. She states they were 6-7 minutes but have now subsided. She also noted a very small amount of pinking brown spotting.    Denies hematuria or dysuria    Pt with O neg blood type    8/10/23 IUP at about 7 weeks seen on US per chart review    The history is provided by the patient.     Review of patient's allergies indicates:  No Known Allergies  Past Medical History:   Diagnosis Date    Behavior problem in childhood     COVID-19 08/08/2021    Suicidal ideation      Past Surgical History:   Procedure Laterality Date    BACK SURGERY  2016    DIAGNOSTIC LAPAROSCOPY N/A 5/29/2023    Procedure: LAPAROSCOPY, DIAGNOSTIC;  Surgeon: Juana Fung MD;  Location: [facility];  Service: OB/GYN;  Laterality: N/A;    DILATION AND CURETTAGE OF UTERUS       Family History   Problem Relation Age of Onset    Breast cancer Neg Hx     Diabetes Neg Hx     Colon cancer Neg Hx     Ovarian cancer Neg Hx      Social History     Tobacco Use    Smoking status: Never    Smokeless tobacco: Never   Substance Use Topics    Alcohol use: No    Drug use: No         Physical Exam     Initial Vitals [10/24/23 2256]   BP Pulse Resp Temp SpO2   111/69 93 16 98.5 °F (36.9 °C) 98 %      MAP       --         Physical Exam    Nursing note and vitals reviewed.  Constitutional: She appears well-developed and well-nourished. She is not diaphoretic. No distress.   HENT:   Head: Normocephalic and atraumatic.   Eyes: Conjunctivae and EOM are normal.   Neck: Neck supple.   Normal range of motion.  Cardiovascular:  Normal rate and regular rhythm.           Pulmonary/Chest: No respiratory  distress.   Abdominal: She exhibits no distension. There is no abdominal tenderness.   Genitourinary:    Genitourinary Comments: Nurse chaperone present  Friable cervix but cervix is closed     Musculoskeletal:      Cervical back: Normal range of motion and neck supple.     Neurological: She is alert and oriented to person, place, and time. GCS score is 15. GCS eye subscore is 4. GCS verbal subscore is 5. GCS motor subscore is 6.   Skin: Skin is warm and dry.   Psychiatric: She has a normal mood and affect. Her behavior is normal. Judgment and thought content normal.         ED Course   Procedures  Labs Reviewed   VAGINAL SCREEN - Abnormal; Notable for the following components:       Result Value    Clue Cells Few (*)     WBC - Vaginal Screen Few (*)     Bacteria - Vaginal Screen Moderate (*)     All other components within normal limits    Narrative:     Release to patient->Immediate   CBC W/ AUTO DIFFERENTIAL - Abnormal; Notable for the following components:    RBC 3.92 (*)     Hematocrit 36.3 (*)     All other components within normal limits    Narrative:     Release to patient->Immediate   COMPREHENSIVE METABOLIC PANEL - Abnormal; Notable for the following components:    CO2 20 (*)     Glucose 68 (*)     All other components within normal limits    Narrative:     Release to patient->Immediate   URINALYSIS, REFLEX TO URINE CULTURE - Abnormal; Notable for the following components:    Specific Gravity, UA >1.030 (*)     Protein, UA Trace (*)     Ketones, UA 3+ (*)     Occult Blood UA Trace (*)     All other components within normal limits    Narrative:     Specimen Source->Urine   POCT URINE PREGNANCY - Abnormal; Notable for the following components:    POC Preg Test, Ur Positive (*)     All other components within normal limits   C. TRACHOMATIS/N. GONORRHOEAE BY AMP DNA    Narrative:     Sources by Resulting Lab:->[facility]  Release to patient->Immediate   HCG, QUANTITATIVE    Narrative:     Release to  patient->Immediate   GROUP & RH   TYPE & SCREEN   NAUN - [facility] (FETAL BLEED SCREEN)   PREPARE RH IMMUNE GLOBULIN          Imaging Results              US OB Limited 1 Or More Gestations (Final result)  Result time 10/25/23 03:02:35   Procedure changed from US OB <14 Wks TransAbd & TransVag, Single Gestation (XPD)     Final result by Fidencio Miner MD (10/25/23 03:02:35)                   Impression:      Single live intrauterine gestation with sonographic estimated gestational age of 19 weeks 1 day.  Fetal heart rate 150 beats per minute.    Correlate clinically, and with follow-up imaging as clinically appropriate (including a follow-up outpatient fetal anatomic survey).      Electronically signed by: Fidencio Miner  Date:    10/25/2023  Time:    03:02               Narrative:    EXAMINATION:  US OB LIMITED 1 OR MORE GESTATIONS    CLINICAL HISTORY:  contractions and vaginal spotting;    TECHNIQUE:  Real-time and static grayscale limited sonographic imaging of the intrauterine pregnancy was performed in the longitudinal and transverse imaging planes via the transabdominal scanning approach.    Limited color Doppler and M-mode Doppler interrogation was also applied.  Static images and a cine loop are submitted and reviewed.    COMPARISON:  1st trimester OB sonogram 07/20/2023    FINDINGS:  Single live intrauterine pregnancy with fetal heart rate of 150 beats per minute and fetal heart motion that is visible on cine loop images.    Posterior fundal placenta.  No sonographic guidance of placental abruption.    By subjective criteria, the volume of amniotic fluid is adequate.    Biparietal diameter: 4.3 cm (19 weeks 1 day).    Incompletely visualized fetal anatomy.                                       Medications   sodium chloride 0.9% bolus 1,000 mL 1,000 mL (0 mLs Intravenous Stopped 10/25/23 0209)   rho(d) immune globulin injection 300 mcg (300 mcg Intramuscular Given 10/25/23 0115)   metroNIDAZOLE tablet 500 mg  (500 mg Oral Given 10/25/23 0537)     Medical Decision Making  DDX: Threatened , UTI, herbert bateman, round ligament pain, uterine rupture, placental abruption  Pt is well appearing do not suspect labor, uterine rupture or placental abruption  She does report spotting and has Rh negative blood so rhogam was ordered  Care transferred to Dr. Zendejas pending US    Amount and/or Complexity of Data Reviewed  Labs: ordered.  Radiology: ordered.    Risk  Prescription drug management.               ED Course as of 10/31/23 1334   Wed Oct 25, 2023   041 This patient received in sign-out from Dr. Childress.  Initial concern for vaginal bleeding in 2nd trimester.  She had a stable period of ED observation without vaginal discharge or bleeding.  Large workup including CBC is negative for evidence of acute blood loss anemia.  Vaginal screen does indicate moderate bacteria with few clue cells.  Will treat with Flagyl considering patient concern for vaginal bleeding versus atypical discharge.  Ultrasound appears within normal limits for a single IUP, 2nd trimester.  Prior attending ordered RhoGAM but type and screen was not obtained.  Will obtain this so the patient can receive RhoGAM prior to discharge.  Strongly encouraged to follow up with her OBGYN as soon as possible or to return the emergency department immediately for any new, concerning, worsening symptoms.  Patient was agreeable with this plan and was discharged in stable condition. [ST]      ED Course User Index  [ST] Manish Zendejas MD                    Clinical Impression:   Final diagnoses:  [O46.92] Vaginal bleeding in pregnancy, second trimester (Primary)  [O23.599, B96.89] Bacterial vaginosis in pregnancy        ED Disposition Condition    Discharge Stable          ED Prescriptions       Medication Sig Dispense Start Date End Date Auth. Provider    metroNIDAZOLE (FLAGYL) 500 MG tablet  (Status: Discontinued) Take 1 tablet (500 mg total) by mouth every 12  (twelve) hours. for 7 days 13 tablet 10/25/2023 10/25/2023 Manish Zendejas MD    metroNIDAZOLE (FLAGYL) 500 MG tablet Take 1 tablet (500 mg total) by mouth every 12 (twelve) hours. for 7 days 13 tablet 10/25/2023 11/1/2023 Manish Zendejas MD          Follow-up Information       Follow up With Specialties Details Why Contact Info    HENRY Florence MD Obstetrics and Gynecology, Obstetrics, Gynecology Schedule an appointment as soon as possible for a visit   [address] [place] [id number]  [phone]               Mallory Childress MD  10/31/23 9736

## 2023-10-26 LAB
C TRACH DNA SPEC QL NAA+PROBE: NOT DETECTED
N GONORRHOEA DNA SPEC QL NAA+PROBE: NOT DETECTED

## 2023-11-03 ENCOUNTER — TELEPHONE (OUTPATIENT)
Dept: OBSTETRICS AND GYNECOLOGY | Facility: CLINIC | Age: 18
End: 2023-11-03
Payer: MEDICAID

## 2023-11-03 NOTE — TELEPHONE ENCOUNTER
Called patient to schedule an Initial appointment November 6 at 10:00 am. Pt agreed with time and date.

## 2023-11-06 ENCOUNTER — INITIAL PRENATAL (OUTPATIENT)
Dept: OBSTETRICS AND GYNECOLOGY | Facility: CLINIC | Age: 18
End: 2023-11-06
Payer: MEDICAID

## 2023-11-06 ENCOUNTER — PATIENT MESSAGE (OUTPATIENT)
Dept: MATERNAL FETAL MEDICINE | Facility: CLINIC | Age: 18
End: 2023-11-06
Payer: MEDICAID

## 2023-11-06 ENCOUNTER — LAB VISIT (OUTPATIENT)
Dept: LAB | Facility: OTHER | Age: 18
End: 2023-11-06
Attending: ADVANCED PRACTICE MIDWIFE
Payer: MEDICAID

## 2023-11-06 VITALS — WEIGHT: 119.5 LBS | SYSTOLIC BLOOD PRESSURE: 100 MMHG | BODY MASS INDEX: 22.58 KG/M2 | DIASTOLIC BLOOD PRESSURE: 62 MMHG

## 2023-11-06 DIAGNOSIS — Z34.90 PREGNANCY, UNSPECIFIED GESTATIONAL AGE: ICD-10-CM

## 2023-11-06 DIAGNOSIS — Z86.59 HISTORY OF DEPRESSION: ICD-10-CM

## 2023-11-06 DIAGNOSIS — F41.9 ANXIETY: Primary | ICD-10-CM

## 2023-11-06 DIAGNOSIS — Z67.91 RH NEGATIVE STATUS DURING PREGNANCY IN FIRST TRIMESTER: ICD-10-CM

## 2023-11-06 DIAGNOSIS — Z87.59 HISTORY OF ECTOPIC PREGNANCY: ICD-10-CM

## 2023-11-06 DIAGNOSIS — Z34.92 PREGNANCY WITH ADOPTION PLANNED, CURRENTLY IN SECOND TRIMESTER: ICD-10-CM

## 2023-11-06 DIAGNOSIS — O26.891 RH NEGATIVE STATUS DURING PREGNANCY IN FIRST TRIMESTER: ICD-10-CM

## 2023-11-06 DIAGNOSIS — N89.8 VAGINAL ITCHING: ICD-10-CM

## 2023-11-06 PROBLEM — Z98.890 STATUS POST LAPAROSCOPY: Status: RESOLVED | Noted: 2023-05-30 | Resolved: 2023-11-06

## 2023-11-06 PROBLEM — O46.92 VAGINAL BLEEDING IN PREGNANCY, SECOND TRIMESTER: Status: RESOLVED | Noted: 2023-10-25 | Resolved: 2023-11-06

## 2023-11-06 PROBLEM — R07.9 CHEST PAIN: Status: RESOLVED | Noted: 2021-10-25 | Resolved: 2023-11-06

## 2023-11-06 LAB
ABO + RH BLD: ABNORMAL
BASOPHILS # BLD AUTO: 0.04 K/UL (ref 0–0.2)
BASOPHILS NFR BLD: 0.6 % (ref 0–1.9)
BLD GP AB SCN CELLS X3 SERPL QL: ABNORMAL
BLOOD GROUP ANTIBODIES SERPL: NORMAL
DIFFERENTIAL METHOD: ABNORMAL
EOSINOPHIL # BLD AUTO: 0.1 K/UL (ref 0–0.5)
EOSINOPHIL NFR BLD: 1.2 % (ref 0–8)
ERYTHROCYTE [DISTWIDTH] IN BLOOD BY AUTOMATED COUNT: 13.9 % (ref 11.5–14.5)
HBV SURFACE AG SERPL QL IA: NORMAL
HCT VFR BLD AUTO: 36.5 % (ref 37–48.5)
HGB BLD-MCNC: 12.1 G/DL (ref 12–16)
HGB S BLD QL SOLY: NEGATIVE
HIV 1+2 AB+HIV1 P24 AG SERPL QL IA: NORMAL
IMM GRANULOCYTES # BLD AUTO: 0.02 K/UL (ref 0–0.04)
IMM GRANULOCYTES NFR BLD AUTO: 0.3 % (ref 0–0.5)
LYMPHOCYTES # BLD AUTO: 1.8 K/UL (ref 1–4.8)
LYMPHOCYTES NFR BLD: 27.4 % (ref 18–48)
MCH RBC QN AUTO: 31 PG (ref 27–31)
MCHC RBC AUTO-ENTMCNC: 33.2 G/DL (ref 32–36)
MCV RBC AUTO: 94 FL (ref 82–98)
MONOCYTES # BLD AUTO: 0.5 K/UL (ref 0.3–1)
MONOCYTES NFR BLD: 6.8 % (ref 4–15)
NEUTROPHILS # BLD AUTO: 4.2 K/UL (ref 1.8–7.7)
NEUTROPHILS NFR BLD: 63.7 % (ref 38–73)
NRBC BLD-RTO: 0 /100 WBC
PLATELET # BLD AUTO: 225 K/UL (ref 150–450)
PMV BLD AUTO: 9 FL (ref 9.2–12.9)
RBC # BLD AUTO: 3.9 M/UL (ref 4–5.4)
SPECIMEN OUTDATE: ABNORMAL
WBC # BLD AUTO: 6.64 K/UL (ref 3.9–12.7)

## 2023-11-06 PROCEDURE — 86592 SYPHILIS TEST NON-TREP QUAL: CPT | Performed by: ADVANCED PRACTICE MIDWIFE

## 2023-11-06 PROCEDURE — 87340 HEPATITIS B SURFACE AG IA: CPT | Performed by: ADVANCED PRACTICE MIDWIFE

## 2023-11-06 PROCEDURE — 85660 RBC SICKLE CELL TEST: CPT | Performed by: ADVANCED PRACTICE MIDWIFE

## 2023-11-06 PROCEDURE — 87088 URINE BACTERIA CULTURE: CPT | Performed by: ADVANCED PRACTICE MIDWIFE

## 2023-11-06 PROCEDURE — 86762 RUBELLA ANTIBODY: CPT | Performed by: ADVANCED PRACTICE MIDWIFE

## 2023-11-06 PROCEDURE — 99213 OFFICE O/P EST LOW 20 MIN: CPT | Mod: TH,S$PBB,, | Performed by: ADVANCED PRACTICE MIDWIFE

## 2023-11-06 PROCEDURE — 81511 FTL CGEN ABNOR FOUR ANAL: CPT | Performed by: ADVANCED PRACTICE MIDWIFE

## 2023-11-06 PROCEDURE — 85025 COMPLETE CBC W/AUTO DIFF WBC: CPT | Performed by: ADVANCED PRACTICE MIDWIFE

## 2023-11-06 PROCEDURE — 87086 URINE CULTURE/COLONY COUNT: CPT | Performed by: ADVANCED PRACTICE MIDWIFE

## 2023-11-06 PROCEDURE — 86870 RBC ANTIBODY IDENTIFICATION: CPT | Performed by: ADVANCED PRACTICE MIDWIFE

## 2023-11-06 PROCEDURE — 87389 HIV-1 AG W/HIV-1&-2 AB AG IA: CPT | Performed by: ADVANCED PRACTICE MIDWIFE

## 2023-11-06 PROCEDURE — 99999 PR PBB SHADOW E&M-EST. PATIENT-LVL II: CPT | Mod: PBBFAC,,, | Performed by: ADVANCED PRACTICE MIDWIFE

## 2023-11-06 PROCEDURE — 99213 PR OFFICE/OUTPT VISIT, EST, LEVL III, 20-29 MIN: ICD-10-PCS | Mod: TH,S$PBB,, | Performed by: ADVANCED PRACTICE MIDWIFE

## 2023-11-06 PROCEDURE — 86850 RBC ANTIBODY SCREEN: CPT | Performed by: ADVANCED PRACTICE MIDWIFE

## 2023-11-06 PROCEDURE — 99999 PR PBB SHADOW E&M-EST. PATIENT-LVL II: ICD-10-PCS | Mod: PBBFAC,,, | Performed by: ADVANCED PRACTICE MIDWIFE

## 2023-11-06 PROCEDURE — 99212 OFFICE O/P EST SF 10 MIN: CPT | Mod: PBBFAC,TH | Performed by: ADVANCED PRACTICE MIDWIFE

## 2023-11-06 RX ORDER — FLUCONAZOLE 200 MG/1
200 TABLET ORAL DAILY
Qty: 3 TABLET | Refills: 0 | Status: SHIPPED | OUTPATIENT
Start: 2023-11-06 | End: 2023-11-09
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
## 2023-11-07 LAB
BACTERIA UR CULT: ABNORMAL
RPR SER QL: NORMAL
RUBV IGG SER-ACNC: 13.4 IU/ML
RUBV IGG SER-IMP: REACTIVE

## 2023-11-08 ENCOUNTER — SOCIAL WORK (OUTPATIENT)
Dept: CASE MANAGEMENT | Facility: OTHER | Age: 18
End: 2023-11-08
Payer: MEDICAID

## 2023-11-08 NOTE — PROGRESS NOTES
SW contacted pt after receiving referral from OB clinic. Pt did not answer the phone and SW left name and number for pt to contact SW back. SW can be reached at [phone].      [FreeTextEntry1] : Discussed at length with patient regarding symptoms and diagnosis.  Treatment options discussed and patient's questions answered and patient expresses understanding.  Recommendation for MRI evaluation to better evaluate meniscus and ligaments.\par \par Patient to follow-up in office if persistent or recurrent symptoms.  Patient instructed to call if any questions or concerns.\par

## 2023-11-09 ENCOUNTER — PATIENT MESSAGE (OUTPATIENT)
Dept: OBSTETRICS AND GYNECOLOGY | Facility: CLINIC | Age: 18
End: 2023-11-09
Payer: MEDICAID

## 2023-11-09 ENCOUNTER — PROCEDURE VISIT (OUTPATIENT)
Dept: MATERNAL FETAL MEDICINE | Facility: CLINIC | Age: 18
End: 2023-11-09
Payer: MEDICAID

## 2023-11-09 DIAGNOSIS — Z34.90 PREGNANCY, UNSPECIFIED GESTATIONAL AGE: ICD-10-CM

## 2023-11-09 DIAGNOSIS — O28.5 ABNORMAL CHROMOSOMAL AND GENETIC FINDING ON ANTENATAL SCREENING OF MOTHER: Primary | ICD-10-CM

## 2023-11-09 LAB
# FETUSES US: ABNORMAL
2ND TRIMESTER 4 SCREEN PNL SERPL: POSITIVE
2ND TRIMESTER 4 SCREEN SERPL-IMP: ABNORMAL
AFP MOM SERPL: 0.65
AFP SERPL-MCNC: 47.5 NG/ML
AGE AT DELIVERY: 20
B-HCG MOM SERPL: 2.12
B-HCG SERPL-ACNC: 51.9 IU/ML
FET TS 21 RISK FROM MAT AGE: ABNORMAL
GA (DAYS): 3 D
GA (WEEKS): 20 WK
GA METHOD: ABNORMAL
IDDM PATIENT QL: ABNORMAL
INHIBIN A MOM SERPL: 1.29
INHIBIN A SERPL-MCNC: 223.1 PG/ML
SMOKING STATUS FTND: NO
TS 18 RISK FETUS: ABNORMAL
TS 21 RISK FETUS: ABNORMAL
U ESTRIOL MOM SERPL: 0.74
U ESTRIOL SERPL-MCNC: 2.3 NG/ML

## 2023-11-09 PROCEDURE — 76805 OB US >/= 14 WKS SNGL FETUS: CPT | Mod: PBBFAC | Performed by: OBSTETRICS & GYNECOLOGY

## 2023-11-09 PROCEDURE — 76805 US MFM PROCEDURE (VIEWPOINT): ICD-10-PCS | Mod: 26,S$PBB,, | Performed by: OBSTETRICS & GYNECOLOGY

## 2023-11-10 ENCOUNTER — PATIENT MESSAGE (OUTPATIENT)
Dept: MATERNAL FETAL MEDICINE | Facility: CLINIC | Age: 18
End: 2023-11-10
Payer: MEDICAID

## 2023-11-10 ENCOUNTER — TELEPHONE (OUTPATIENT)
Dept: MATERNAL FETAL MEDICINE | Facility: CLINIC | Age: 18
End: 2023-11-10
Payer: MEDICAID

## 2023-11-10 DIAGNOSIS — O28.9 POSITIVE ANTENATAL SCREENING TEST: Primary | ICD-10-CM

## 2023-11-10 NOTE — TELEPHONE ENCOUNTER
Spoke with patient today and she will be picking up [id number] paperwork from [employer] today.  Patient scheduled to see Genetic Counselor and then will be set up for another ultrasound for targeted views.  Patient verbalized her understanding.      ----- Message from Moody Rivera MA sent at 11/10/2023 10:07 AM CST -----  Patient called regards to scheduling appointment and trying to get MAT 21 testing done.    Patient contact: [phone]

## 2023-11-10 NOTE — TELEPHONE ENCOUNTER
Called patient and scheduled her.      -- Message from Moody Rivera MA sent at 11/10/2023 10:07 AM CST -----  Patient called regards to scheduling appointment and trying to get MAT 21 testing done.    Patient contact: [phone]

## 2023-11-16 ENCOUNTER — OFFICE VISIT (OUTPATIENT)
Dept: MATERNAL FETAL MEDICINE | Facility: CLINIC | Age: 18
End: 2023-11-16
Payer: MEDICAID

## 2023-11-16 ENCOUNTER — PROCEDURE VISIT (OUTPATIENT)
Dept: MATERNAL FETAL MEDICINE | Facility: CLINIC | Age: 18
End: 2023-11-16
Payer: MEDICAID

## 2023-11-16 VITALS
WEIGHT: 119.69 LBS | DIASTOLIC BLOOD PRESSURE: 68 MMHG | SYSTOLIC BLOOD PRESSURE: 112 MMHG | BODY MASS INDEX: 22.62 KG/M2

## 2023-11-16 DIAGNOSIS — O28.9 POSITIVE ANTENATAL SCREENING TEST: ICD-10-CM

## 2023-11-16 DIAGNOSIS — O28.5 ABNORMAL CHROMOSOMAL AND GENETIC FINDING ON ANTENATAL SCREENING OF MOTHER: ICD-10-CM

## 2023-11-16 DIAGNOSIS — O28.0 ABNORMAL QUAD SCREEN: Primary | ICD-10-CM

## 2023-11-16 PROCEDURE — 99999 PR PBB SHADOW E&M-EST. PATIENT-LVL III: CPT | Mod: PBBFAC,,, | Performed by: OBSTETRICS & GYNECOLOGY

## 2023-11-16 PROCEDURE — 1159F PR MEDICATION LIST DOCUMENTED IN MEDICAL RECORD: ICD-10-PCS | Mod: CPTII,,, | Performed by: OBSTETRICS & GYNECOLOGY

## 2023-11-16 PROCEDURE — 3074F PR MOST RECENT SYSTOLIC BLOOD PRESSURE < 130 MM HG: ICD-10-PCS | Mod: CPTII,,, | Performed by: OBSTETRICS & GYNECOLOGY

## 2023-11-16 PROCEDURE — 99203 PR OFFICE/OUTPT VISIT, NEW, LEVL III, 30-44 MIN: ICD-10-PCS | Mod: S$PBB,TH,, | Performed by: OBSTETRICS & GYNECOLOGY

## 2023-11-16 PROCEDURE — 3078F PR MOST RECENT DIASTOLIC BLOOD PRESSURE < 80 MM HG: ICD-10-PCS | Mod: CPTII,,, | Performed by: OBSTETRICS & GYNECOLOGY

## 2023-11-16 PROCEDURE — 3078F DIAST BP <80 MM HG: CPT | Mod: CPTII,,, | Performed by: OBSTETRICS & GYNECOLOGY

## 2023-11-16 PROCEDURE — 76811 PR US, OB FETAL EVAL & EXAM, TRANSABDOM,FIRST GESTATION: ICD-10-PCS | Mod: 26,S$PBB,, | Performed by: OBSTETRICS & GYNECOLOGY

## 2023-11-16 PROCEDURE — 3008F BODY MASS INDEX DOCD: CPT | Mod: CPTII,,, | Performed by: OBSTETRICS & GYNECOLOGY

## 2023-11-16 PROCEDURE — 99203 OFFICE O/P NEW LOW 30 MIN: CPT | Mod: S$PBB,TH,, | Performed by: OBSTETRICS & GYNECOLOGY

## 2023-11-16 PROCEDURE — 76811 OB US DETAILED SNGL FETUS: CPT | Mod: PBBFAC | Performed by: OBSTETRICS & GYNECOLOGY

## 2023-11-16 PROCEDURE — 99213 OFFICE O/P EST LOW 20 MIN: CPT | Mod: PBBFAC,TH,25 | Performed by: OBSTETRICS & GYNECOLOGY

## 2023-11-16 PROCEDURE — 3008F PR BODY MASS INDEX (BMI) DOCUMENTED: ICD-10-PCS | Mod: CPTII,,, | Performed by: OBSTETRICS & GYNECOLOGY

## 2023-11-16 PROCEDURE — 3074F SYST BP LT 130 MM HG: CPT | Mod: CPTII,,, | Performed by: OBSTETRICS & GYNECOLOGY

## 2023-11-16 PROCEDURE — 1159F MED LIST DOCD IN RCRD: CPT | Mod: CPTII,,, | Performed by: OBSTETRICS & GYNECOLOGY

## 2023-11-16 PROCEDURE — 99999 PR PBB SHADOW E&M-EST. PATIENT-LVL III: ICD-10-PCS | Mod: PBBFAC,,, | Performed by: OBSTETRICS & GYNECOLOGY

## 2023-11-16 PROCEDURE — 76811 OB US DETAILED SNGL FETUS: CPT | Mod: 26,S$PBB,, | Performed by: OBSTETRICS & GYNECOLOGY

## 2023-11-16 NOTE — ASSESSMENT & PLAN NOTE
Abnormal serum screen - Down Syndrome    Yaquelin is a 18 y.o. year old  A recent maternal serum screen showed an increased risk for Down Syndrome (DS) of 1 in 240. The risk for trisomy 18 and open neural tube defects (NTDs) was not elevated on this screen. The patient denies any known family history of DS, other chromosome/genetic disorders, or serious birth defects in her family.    We reviewed the testing options for further evaluation of fetal Down Syndrome and other chromosome abnormalities. Specifically, we discussed further screening with cell-free DNA (cfDNA) testing and reviewed the benefits, limitations, and test performance characteristics of cfDNA testing. We also discussed that even when cfDNA results are low risk for Down Syndrome, there remains a 2% chance for other fetal chromosome abnormalities. As part of this discussion, genes, chromosomes and Down Syndrome were reviewed with the patient, and she was provided an opportunity to have her questions answered.     Ultrasound as a screening tool for DS was also reviewed with the patient. The sensitivity of prenatal ultrasound for the detection of DS ranges from 50 - 90%; thus, it remains a screening test with limited ability to confirm or exclude DS. However, with a normal fetal anatomic survey ultrasound exam, the a priori risk for Down Syndrome can be revised (typically by 0.5X the a priori risk).  The patient's exam today was normal without evidence of major structural fetal malformation or minor sonographic markers. Therefore, the risk for DS can be revised to 1:480.    We also discussed genetic amniocentesis as the diagnostic prenatal testing option for detection of DS. The risks, benefits, and test performance characteristics of amniocentesis were reviewed. A risk of 1 in 900 for pregnancy loss post-amniocentesis was also reviewed.     Risks and benefits of testing were discussed today. Including the options for pregnancy continuation vs.  termination. This was discussed briefly for the purposes of anticipatory guidance and were discussed non-directively.     After discussion, the patient desires cfDNA screening. Test previously ordered by her primary OB. If this test returns abnormal please reconsult MFM. If the testing returns low risk, no further testing or ultrasound monitoring is indicated.

## 2023-11-16 NOTE — PROGRESS NOTES
MATERNAL-FETAL MEDICINE   CONSULT NOTE    Provider requesting consultation: Ashley Fernando CNM    SUBJECTIVE:     Ms. Yaquelin Cochran is a 18 y.o.  female with IUP at 21w6d who is seen in consultation by LEATHA for evaluation and management of:  Problem   Abnormal Quad Screen     Doing well today without complaints.        Medication List with Changes/Refills   Current Medications    PRENATAL VIT NO.124/IRON/FOLIC (PRENATAL VITAMIN ORAL)    Take by mouth.       Review of patient's allergies indicates:  No Known Allergies    PMH:  Past Medical History:   Diagnosis Date    Behavior problem in childhood     COVID-19 2021    Ectopic pregnancy 2023    Suicidal ideation        PObHx:  OB History    Para Term  AB Living   4 1 1 0 2 1   SAB IAB Ectopic Multiple Live Births   1 0 1 0 1      # Outcome Date GA Lbr Fran/2nd Weight Sex Delivery Anes PTL Lv   4 Current            3 Ectopic 2023     ECTOPIC      2 Term 22 40w0d  2.637 kg (5 lb 13 oz) F Vag-Spont  N HERMANN   1 SAB      SAB         Complications: Blighted ovum, History of D&C       PSH:  Past Surgical History:   Procedure Laterality Date    BACK SURGERY      Rods in place, had issues with previous epidural    DIAGNOSTIC LAPAROSCOPY N/A 2023    Procedure: LAPAROSCOPY, DIAGNOSTIC;  Surgeon: Juana Fung MD;  Location: [facility];  Service: OB/GYN;  Laterality: N/A;    DILATION AND CURETTAGE OF UTERUS         Family history:family history includes No Known Problems in her father and mother; Throat cancer in her paternal grandfather.    Social history: reports that she has never smoked. She has never used smokeless tobacco. She reports that she does not drink alcohol and does not use drugs.    Genetic history:  The patient denies any inherited genetic diseases or birth defects in herself or her partner's personal history or family.    Objective:   /68 (BP Location: Left arm, Patient Position: Sitting, BP Method:  Medium (Manual))   Wt 54.3 kg (119 lb 11.4 oz)   LMP 2023 (Approximate)   BMI 22.62 kg/m²     Physical Exam    Ultrasound performed. See viewpoint for full ultrasound report.  A detailed fetal anatomic ultrasound examination was performed today due to the following high risk indication: Quad screen + T21 (1:240).  No fetal structural abnormalities are identified today.  AF subjectively normal   Routine anatomic survey previously cleared  Placental location is posterior without evidence of previa.       ASSESSMENT/PLAN:     18 y.o.  female with IUP at 21w6d     Abnormal quad screen  Abnormal serum screen - Down Syndrome    Yaquelin is a 18 y.o. year old  A recent maternal serum screen showed an increased risk for Down Syndrome (DS) of 1 in 240. The risk for trisomy 18 and open neural tube defects (NTDs) was not elevated on this screen. The patient denies any known family history of DS, other chromosome/genetic disorders, or serious birth defects in her family.    We reviewed the testing options for further evaluation of fetal Down Syndrome and other chromosome abnormalities. Specifically, we discussed further screening with cell-free DNA (cfDNA) testing and reviewed the benefits, limitations, and test performance characteristics of cfDNA testing. We also discussed that even when cfDNA results are low risk for Down Syndrome, there remains a 2% chance for other fetal chromosome abnormalities. As part of this discussion, genes, chromosomes and Down Syndrome were reviewed with the patient, and she was provided an opportunity to have her questions answered.     Ultrasound as a screening tool for DS was also reviewed with the patient. The sensitivity of prenatal ultrasound for the detection of DS ranges from 50 - 90%; thus, it remains a screening test with limited ability to confirm or exclude DS. However, with a normal fetal anatomic survey ultrasound exam, the a priori risk for Down Syndrome can be  revised (typically by 0.5X the a priori risk).  The patient's exam today was normal without evidence of major structural fetal malformation or minor sonographic markers. Therefore, the risk for DS can be revised to 1:480.    We also discussed genetic amniocentesis as the diagnostic prenatal testing option for detection of DS. The risks, benefits, and test performance characteristics of amniocentesis were reviewed. A risk of 1 in 900 for pregnancy loss post-amniocentesis was also reviewed.     Risks and benefits of testing were discussed today. Including the options for pregnancy continuation vs. termination. This was discussed briefly for the purposes of anticipatory guidance and were discussed non-directively.     After discussion, the patient desires cfDNA screening. Test previously ordered by her primary OB. If this test returns abnormal please reconsult MFM. If the testing returns low risk, no further testing or ultrasound monitoring is indicated.        FOLLOW UP:   No further ultrasounds or visits were scheduled    30 minutes of total time spent on the encounter, which includes face to face time and non-face to face time preparing to see the patient (eg, review of tests), obtaining and/or reviewing separately obtained history, documenting clinical information in the electronic or other health record, independently interpreting results (not separately reported) and communicating results to the patient/family/caregiver, or care coordination (not separately reported).      Reji Owens MD  PGY 7  Maternal Fetal Medicine  [facility]      Electronically Signed by Reji Owens November 16, 2023

## 2023-11-27 ENCOUNTER — TELEPHONE (OUTPATIENT)
Dept: OBSTETRICS AND GYNECOLOGY | Facility: CLINIC | Age: 18
End: 2023-11-27
Payer: MEDICAID

## 2023-12-01 ENCOUNTER — PATIENT MESSAGE (OUTPATIENT)
Dept: OTHER | Facility: OTHER | Age: 18
End: 2023-12-01
Payer: MEDICAID

## 2023-12-04 ENCOUNTER — ROUTINE PRENATAL (OUTPATIENT)
Dept: OBSTETRICS AND GYNECOLOGY | Facility: CLINIC | Age: 18
End: 2023-12-04
Payer: MEDICAID

## 2023-12-04 VITALS
WEIGHT: 123.69 LBS | BODY MASS INDEX: 23.37 KG/M2 | SYSTOLIC BLOOD PRESSURE: 101 MMHG | DIASTOLIC BLOOD PRESSURE: 68 MMHG

## 2023-12-04 DIAGNOSIS — Z87.59 HISTORY OF ECTOPIC PREGNANCY: ICD-10-CM

## 2023-12-04 DIAGNOSIS — O28.5 ABNORMAL CHROMOSOMAL AND GENETIC FINDING ON ANTENATAL SCREENING OF MOTHER: ICD-10-CM

## 2023-12-04 DIAGNOSIS — F41.9 ANXIETY: Primary | ICD-10-CM

## 2023-12-04 DIAGNOSIS — Z86.59 HISTORY OF DEPRESSION: ICD-10-CM

## 2023-12-04 DIAGNOSIS — O26.891 RH NEGATIVE STATUS DURING PREGNANCY IN FIRST TRIMESTER: ICD-10-CM

## 2023-12-04 DIAGNOSIS — Z67.91 RH NEGATIVE STATUS DURING PREGNANCY IN FIRST TRIMESTER: ICD-10-CM

## 2023-12-04 DIAGNOSIS — O28.0 ABNORMAL QUAD SCREEN: ICD-10-CM

## 2023-12-04 PROCEDURE — 99999 PR PBB SHADOW E&M-EST. PATIENT-LVL II: ICD-10-PCS | Mod: PBBFAC,,, | Performed by: ADVANCED PRACTICE MIDWIFE

## 2023-12-04 PROCEDURE — 99999 PR PBB SHADOW E&M-EST. PATIENT-LVL II: CPT | Mod: PBBFAC,,, | Performed by: ADVANCED PRACTICE MIDWIFE

## 2023-12-04 PROCEDURE — 99214 OFFICE O/P EST MOD 30 MIN: CPT | Mod: TH,S$PBB,, | Performed by: ADVANCED PRACTICE MIDWIFE

## 2023-12-04 PROCEDURE — 99214 PR OFFICE/OUTPT VISIT, EST, LEVL IV, 30-39 MIN: ICD-10-PCS | Mod: TH,S$PBB,, | Performed by: ADVANCED PRACTICE MIDWIFE

## 2023-12-04 PROCEDURE — 99212 OFFICE O/P EST SF 10 MIN: CPT | Mod: PBBFAC,TH | Performed by: ADVANCED PRACTICE MIDWIFE
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
## 2023-12-15 ENCOUNTER — PATIENT MESSAGE (OUTPATIENT)
Dept: OTHER | Facility: OTHER | Age: 18
End: 2023-12-15
Payer: MEDICAID

## 2023-12-20 ENCOUNTER — ROUTINE PRENATAL (OUTPATIENT)
Dept: OBSTETRICS AND GYNECOLOGY | Facility: CLINIC | Age: 18
End: 2023-12-20
Payer: MEDICAID

## 2023-12-20 VITALS
BODY MASS INDEX: 23.37 KG/M2 | SYSTOLIC BLOOD PRESSURE: 112 MMHG | WEIGHT: 123.69 LBS | DIASTOLIC BLOOD PRESSURE: 76 MMHG

## 2023-12-20 DIAGNOSIS — Z67.91 RH NEGATIVE, ANTEPARTUM, THIRD TRIMESTER: Primary | ICD-10-CM

## 2023-12-20 DIAGNOSIS — Z34.90 PREGNANCY WITH ADOPTION PLANNED, ANTEPARTUM: ICD-10-CM

## 2023-12-20 DIAGNOSIS — Z3A.26 26 WEEKS GESTATION OF PREGNANCY: ICD-10-CM

## 2023-12-20 DIAGNOSIS — O26.893 RH NEGATIVE, ANTEPARTUM, THIRD TRIMESTER: Primary | ICD-10-CM

## 2023-12-20 PROBLEM — N91.2 AMENORRHEA: Status: RESOLVED | Noted: 2023-08-10 | Resolved: 2023-12-20

## 2023-12-20 PROCEDURE — 99999 PR PBB SHADOW E&M-EST. PATIENT-LVL I: ICD-10-PCS | Mod: PBBFAC,,, | Performed by: ADVANCED PRACTICE MIDWIFE

## 2023-12-20 PROCEDURE — 99213 OFFICE O/P EST LOW 20 MIN: CPT | Mod: S$PBB,TH,, | Performed by: ADVANCED PRACTICE MIDWIFE

## 2023-12-20 PROCEDURE — 99213 PR OFFICE/OUTPT VISIT, EST, LEVL III, 20-29 MIN: ICD-10-PCS | Mod: S$PBB,TH,, | Performed by: ADVANCED PRACTICE MIDWIFE

## 2023-12-20 PROCEDURE — 99211 OFF/OP EST MAY X REQ PHY/QHP: CPT | Mod: PBBFAC,TH | Performed by: ADVANCED PRACTICE MIDWIFE

## 2023-12-20 PROCEDURE — 99999 PR PBB SHADOW E&M-EST. PATIENT-LVL I: CPT | Mod: PBBFAC,,, | Performed by: ADVANCED PRACTICE MIDWIFE

## 2023-12-20 NOTE — PROGRESS NOTES
Routine Prenatal Visit    18 y.o. female  at 26w5d, by Estimated Date of Delivery: 3/22/24    Complaints today: new onset sore throat and body aches. Managed well with comfort measures.  Denies UCs, LOF, or VB. Reports daily FM. Here with planned adoptive parents of baby.    Reviewed TW lbs    PHYSICAL EXAM  /76   Wt 56.1 kg (123 lb 10.9 oz)   LMP 2023 (Approximate)   BMI 23.37 kg/m²     GENERAL: No acute distress  HEENT: Normocephalic, atraumatic  NEURO: Alert and oriented x3  PSYCH: Normal mood and affect  PULMONARY: Non-labored respiration; no tachypnea  ABD: Soft, gravid, nontender      ASSESSMENT AND PLAN  Rh negative, antepartum, third trimester  -     (In Office Administered) Rho(D) Immune Globulin    26 weeks gestation of pregnancy    Pregnancy with adoption planned, antepartum      Discussed comfort measures; discussed if worsening throat pain consider urgent care or PCP for r/o strep  Reviewed upcoming 28wk labs, (O NEG) and orders placed with Rhogam  Education regarding warning signs of PreEclampsia, reviewed normal FM,  labor precautions, and how/when to call.    Follow-up: 2 weeks, call or present sooner PENELOPEN    Liset Hoff, JAYLA, APRN  .

## 2023-12-29 ENCOUNTER — PATIENT MESSAGE (OUTPATIENT)
Dept: OTHER | Facility: OTHER | Age: 18
End: 2023-12-29
Payer: MEDICAID

## 2024-01-03 ENCOUNTER — ROUTINE PRENATAL (OUTPATIENT)
Dept: OBSTETRICS AND GYNECOLOGY | Facility: CLINIC | Age: 19
End: 2024-01-03
Payer: MEDICAID

## 2024-01-03 VITALS
WEIGHT: 122.56 LBS | SYSTOLIC BLOOD PRESSURE: 113 MMHG | HEART RATE: 93 BPM | BODY MASS INDEX: 23.16 KG/M2 | DIASTOLIC BLOOD PRESSURE: 65 MMHG

## 2024-01-03 DIAGNOSIS — Z34.83 ENCOUNTER FOR SUPERVISION OF OTHER NORMAL PREGNANCY IN THIRD TRIMESTER: Primary | ICD-10-CM

## 2024-01-03 PROCEDURE — 99212 OFFICE O/P EST SF 10 MIN: CPT | Mod: PBBFAC,TH | Performed by: ADVANCED PRACTICE MIDWIFE

## 2024-01-03 PROCEDURE — 99213 OFFICE O/P EST LOW 20 MIN: CPT | Mod: S$PBB,TH,, | Performed by: ADVANCED PRACTICE MIDWIFE

## 2024-01-03 PROCEDURE — 99999 PR PBB SHADOW E&M-EST. PATIENT-LVL II: CPT | Mod: PBBFAC,,, | Performed by: ADVANCED PRACTICE MIDWIFE
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
## 2024-01-04 ENCOUNTER — CLINICAL SUPPORT (OUTPATIENT)
Dept: OBSTETRICS AND GYNECOLOGY | Facility: CLINIC | Age: 19
End: 2024-01-04
Payer: MEDICAID

## 2024-01-04 ENCOUNTER — LAB VISIT (OUTPATIENT)
Dept: LAB | Facility: OTHER | Age: 19
End: 2024-01-04
Attending: ADVANCED PRACTICE MIDWIFE
Payer: MEDICAID

## 2024-01-04 ENCOUNTER — TELEPHONE (OUTPATIENT)
Dept: OBSTETRICS AND GYNECOLOGY | Facility: CLINIC | Age: 19
End: 2024-01-04
Payer: MEDICAID

## 2024-01-04 DIAGNOSIS — O26.891 RH NEGATIVE STATUS DURING PREGNANCY IN FIRST TRIMESTER: Primary | ICD-10-CM

## 2024-01-04 DIAGNOSIS — Z67.91 RH NEGATIVE STATUS DURING PREGNANCY IN FIRST TRIMESTER: Primary | ICD-10-CM

## 2024-01-04 DIAGNOSIS — Z67.91 RH NEGATIVE STATUS DURING PREGNANCY IN FIRST TRIMESTER: ICD-10-CM

## 2024-01-04 DIAGNOSIS — O26.891 RH NEGATIVE STATUS DURING PREGNANCY IN FIRST TRIMESTER: ICD-10-CM

## 2024-01-04 DIAGNOSIS — Z3A.28 28 WEEKS GESTATION OF PREGNANCY: ICD-10-CM

## 2024-01-04 DIAGNOSIS — Z34.83 ENCOUNTER FOR SUPERVISION OF OTHER NORMAL PREGNANCY IN THIRD TRIMESTER: ICD-10-CM

## 2024-01-04 LAB
ABO + RH BLD: ABNORMAL
BASOPHILS # BLD AUTO: 0.03 K/UL (ref 0–0.2)
BASOPHILS NFR BLD: 0.4 % (ref 0–1.9)
BLD GP AB SCN CELLS X3 SERPL QL: ABNORMAL
BLOOD GROUP ANTIBODIES SERPL: NORMAL
DIFFERENTIAL METHOD BLD: ABNORMAL
EOSINOPHIL # BLD AUTO: 0.1 K/UL (ref 0–0.5)
EOSINOPHIL NFR BLD: 0.6 % (ref 0–8)
ERYTHROCYTE [DISTWIDTH] IN BLOOD BY AUTOMATED COUNT: 12.6 % (ref 11.5–14.5)
GLUCOSE SERPL-MCNC: 95 MG/DL (ref 70–140)
HCT VFR BLD AUTO: 34.2 % (ref 37–48.5)
HGB BLD-MCNC: 11.3 G/DL (ref 12–16)
HIV 1+2 AB+HIV1 P24 AG SERPL QL IA: NORMAL
IMM GRANULOCYTES # BLD AUTO: 0.04 K/UL (ref 0–0.04)
IMM GRANULOCYTES NFR BLD AUTO: 0.5 % (ref 0–0.5)
LYMPHOCYTES # BLD AUTO: 1.9 K/UL (ref 1–4.8)
LYMPHOCYTES NFR BLD: 24.4 % (ref 18–48)
MCH RBC QN AUTO: 30.6 PG (ref 27–31)
MCHC RBC AUTO-ENTMCNC: 33 G/DL (ref 32–36)
MCV RBC AUTO: 93 FL (ref 82–98)
MONOCYTES # BLD AUTO: 0.4 K/UL (ref 0.3–1)
MONOCYTES NFR BLD: 5.3 % (ref 4–15)
NEUTROPHILS # BLD AUTO: 5.3 K/UL (ref 1.8–7.7)
NEUTROPHILS NFR BLD: 68.8 % (ref 38–73)
NRBC BLD-RTO: 0 /100 WBC
PLATELET # BLD AUTO: 192 K/UL (ref 150–450)
PMV BLD AUTO: 9.6 FL (ref 9.2–12.9)
RBC # BLD AUTO: 3.69 M/UL (ref 4–5.4)
RPR SER QL: NORMAL
SPECIMEN OUTDATE: ABNORMAL
WBC # BLD AUTO: 7.7 K/UL (ref 3.9–12.7)

## 2024-01-04 PROCEDURE — 85025 COMPLETE CBC W/AUTO DIFF WBC: CPT | Performed by: ADVANCED PRACTICE MIDWIFE

## 2024-01-04 PROCEDURE — 96372 THER/PROPH/DIAG INJ SC/IM: CPT | Mod: PBBFAC

## 2024-01-04 PROCEDURE — 99999PBSHW FLU VACCINE (QUAD) GREATER THAN OR EQUAL TO 3YO PRESERVATIVE FREE IM: Mod: PBBFAC,,,

## 2024-01-04 PROCEDURE — 86850 RBC ANTIBODY SCREEN: CPT | Performed by: ADVANCED PRACTICE MIDWIFE

## 2024-01-04 PROCEDURE — 86870 RBC ANTIBODY IDENTIFICATION: CPT | Performed by: ADVANCED PRACTICE MIDWIFE

## 2024-01-04 PROCEDURE — 90471 IMMUNIZATION ADMIN: CPT | Mod: PBBFAC

## 2024-01-04 PROCEDURE — 87389 HIV-1 AG W/HIV-1&-2 AB AG IA: CPT | Performed by: ADVANCED PRACTICE MIDWIFE

## 2024-01-04 PROCEDURE — 99999PBSHW RHO (D) IMMUNE GLOBULIN: Mod: PBBFAC,,,

## 2024-01-04 PROCEDURE — 86592 SYPHILIS TEST NON-TREP QUAL: CPT | Performed by: ADVANCED PRACTICE MIDWIFE

## 2024-01-04 PROCEDURE — 36415 COLL VENOUS BLD VENIPUNCTURE: CPT | Performed by: ADVANCED PRACTICE MIDWIFE

## 2024-01-04 PROCEDURE — 82950 GLUCOSE TEST: CPT | Performed by: ADVANCED PRACTICE MIDWIFE

## 2024-01-04 NOTE — TELEPHONE ENCOUNTER
Tried calling patient to reschedule her appointment. Patient did not answer and was unable to leave message being that her voicemail is full.

## 2024-01-04 NOTE — PROGRESS NOTES
Here for FLU VACCINE. Patient without complaint of pain at this time, injection given. Tolerated well no pain noted post injection advised to wait in lobby 15 minutes and report any adverse reactions.      Site:LD        Here for rhogam injection, no complaints at this time verified patient is Rh negative. No complaints of pain prior to or post injection patient advised to wait 15 minutes before leaving and to report and adverse reactions.        Site:RB

## 2024-01-05 ENCOUNTER — HOSPITAL ENCOUNTER (EMERGENCY)
Facility: OTHER | Age: 19
Discharge: HOME | End: 2024-01-05
Attending: OBSTETRICS & GYNECOLOGY
Payer: MEDICAID

## 2024-01-05 VITALS
RESPIRATION RATE: 16 BRPM | SYSTOLIC BLOOD PRESSURE: 103 MMHG | DIASTOLIC BLOOD PRESSURE: 66 MMHG | OXYGEN SATURATION: 99 % | TEMPERATURE: 98 F | HEART RATE: 75 BPM

## 2024-01-05 DIAGNOSIS — O26.899 VAGINAL DISCHARGE DURING PREGNANCY, ANTEPARTUM: Primary | ICD-10-CM

## 2024-01-05 DIAGNOSIS — N89.8 VAGINAL DISCHARGE DURING PREGNANCY, ANTEPARTUM: Primary | ICD-10-CM

## 2024-01-05 DIAGNOSIS — Z3A.29 29 WEEKS GESTATION OF PREGNANCY: ICD-10-CM

## 2024-01-05 PROCEDURE — 99283 EMERGENCY DEPT VISIT LOW MDM: CPT | Mod: 25,,, | Performed by: OBSTETRICS & GYNECOLOGY

## 2024-01-05 PROCEDURE — 59025 FETAL NON-STRESS TEST: CPT | Mod: 26,,, | Performed by: OBSTETRICS & GYNECOLOGY

## 2024-01-05 PROCEDURE — 59025 FETAL NON-STRESS TEST: CPT

## 2024-01-05 PROCEDURE — 99284 EMERGENCY DEPT VISIT MOD MDM: CPT | Mod: 25
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
## 2024-01-05 NOTE — DISCHARGE INSTRUCTIONS
Stay hydrated by drinking 2-3 liters of water per day. Call or come to the OB Emergency Department for signs of labor such as painful contractions, leaking of fluid, or bleeding. You may take Tylenol (2 regular strength or 1 extra strength) for discomforts of pregnancy such as mild cramping, soreness, and back pain. If you have a history of elevated blood pressures, call us for increased blood pressure readings at home, a headache that persists after taking Tylenol, blurred vision, shortness of breath, or sharp upper abdominal pain. Monitor your baby's movements. If you are concerned that your baby's movements are decreased, call the [facility] for further evaluation.     OB Emergency Department: [phone]

## 2024-01-10 ENCOUNTER — ANESTHESIA (OUTPATIENT)
Dept: OBSTETRICS AND GYNECOLOGY | Facility: OTHER | Age: 19
End: 2024-01-10

## 2024-01-10 ENCOUNTER — HOSPITAL ENCOUNTER (OUTPATIENT)
Facility: OTHER | Age: 19
Discharge: HOME | End: 2024-01-11
Attending: OBSTETRICS & GYNECOLOGY | Admitting: OBSTETRICS & GYNECOLOGY
Payer: MEDICAID

## 2024-01-10 ENCOUNTER — ANESTHESIA EVENT (OUTPATIENT)
Dept: OBSTETRICS AND GYNECOLOGY | Facility: OTHER | Age: 19
End: 2024-01-10

## 2024-01-10 DIAGNOSIS — O34.30 PREMATURE DILATION OF CERVIX DURING PREGNANCY, ANTEPARTUM: ICD-10-CM

## 2024-01-10 DIAGNOSIS — O47.9 UTERINE CONTRACTIONS DURING PREGNANCY: Primary | ICD-10-CM

## 2024-01-10 DIAGNOSIS — Z3A.29 29 WEEKS GESTATION OF PREGNANCY: ICD-10-CM

## 2024-01-10 LAB
ABO + RH BLD: ABNORMAL
ALBUMIN SERPL BCP-MCNC: 3.3 G/DL (ref 3.5–5.2)
ALP SERPL-CCNC: 77 U/L (ref 55–135)
ALT SERPL W/O P-5'-P-CCNC: 18 U/L (ref 10–44)
ANION GAP SERPL CALC-SCNC: 10 MMOL/L (ref 8–16)
AST SERPL-CCNC: 25 U/L (ref 10–40)
BASOPHILS # BLD AUTO: 0.04 K/UL (ref 0–0.2)
BASOPHILS NFR BLD: 0.4 % (ref 0–1.9)
BILIRUB SERPL-MCNC: 0.9 MG/DL (ref 0.1–1)
BILIRUB SERPL-MCNC: NORMAL MG/DL
BLD GP AB SCN CELLS X3 SERPL QL: ABNORMAL
BLOOD GROUP ANTIBODIES SERPL: NORMAL
BLOOD URINE, POC: 250
BUN SERPL-MCNC: 7 MG/DL (ref 6–20)
CALCIUM SERPL-MCNC: 9.6 MG/DL (ref 8.7–10.5)
CHLORIDE SERPL-SCNC: 104 MMOL/L (ref 95–110)
CO2 SERPL-SCNC: 20 MMOL/L (ref 23–29)
COLOR, POC UA: YELLOW
CREAT SERPL-MCNC: 0.6 MG/DL (ref 0.5–1.4)
DIFFERENTIAL METHOD BLD: ABNORMAL
EOSINOPHIL # BLD AUTO: 0 K/UL (ref 0–0.5)
EOSINOPHIL NFR BLD: 0.4 % (ref 0–8)
ERYTHROCYTE [DISTWIDTH] IN BLOOD BY AUTOMATED COUNT: 12.7 % (ref 11.5–14.5)
EST. GFR  (NO RACE VARIABLE): >60 ML/MIN/1.73 M^2
FIBRONECTIN FETAL SPEC QL: POSITIVE
GLUCOSE SERPL-MCNC: 65 MG/DL (ref 70–110)
GLUCOSE UR QL STRIP: NORMAL
HCT VFR BLD AUTO: 35.8 % (ref 37–48.5)
HGB BLD-MCNC: 11.9 G/DL (ref 12–16)
IMM GRANULOCYTES # BLD AUTO: 0.04 K/UL (ref 0–0.04)
IMM GRANULOCYTES NFR BLD AUTO: 0.4 % (ref 0–0.5)
KETONES UR QL STRIP: NORMAL
LEUKOCYTE ESTERASE URINE, POC: NORMAL
LYMPHOCYTES # BLD AUTO: 2.4 K/UL (ref 1–4.8)
LYMPHOCYTES NFR BLD: 25.4 % (ref 18–48)
MCH RBC QN AUTO: 30.1 PG (ref 27–31)
MCHC RBC AUTO-ENTMCNC: 33.2 G/DL (ref 32–36)
MCV RBC AUTO: 91 FL (ref 82–98)
MONOCYTES # BLD AUTO: 0.6 K/UL (ref 0.3–1)
MONOCYTES NFR BLD: 6.3 % (ref 4–15)
NEUTROPHILS # BLD AUTO: 6.2 K/UL (ref 1.8–7.7)
NEUTROPHILS NFR BLD: 67.1 % (ref 38–73)
NITRITE, POC UA: NORMAL
NRBC BLD-RTO: 0 /100 WBC
PH, POC UA: NORMAL
PLATELET # BLD AUTO: 180 K/UL (ref 150–450)
PMV BLD AUTO: 10.3 FL (ref 9.2–12.9)
POTASSIUM SERPL-SCNC: 3.6 MMOL/L (ref 3.5–5.1)
PROT SERPL-MCNC: 6.3 G/DL (ref 6–8.4)
PROTEIN, POC: NORMAL
RBC # BLD AUTO: 3.95 M/UL (ref 4–5.4)
SODIUM SERPL-SCNC: 134 MMOL/L (ref 136–145)
SPECIFIC GRAVITY, POC UA: NORMAL
SPECIMEN OUTDATE: ABNORMAL
UROBILINOGEN, POC UA: NORMAL
WBC # BLD AUTO: 9.3 K/UL (ref 3.9–12.7)

## 2024-01-10 PROCEDURE — 63600175 PHARM REV CODE 636 W HCPCS

## 2024-01-10 PROCEDURE — 96361 HYDRATE IV INFUSION ADD-ON: CPT

## 2024-01-10 PROCEDURE — 82731 ASSAY OF FETAL FIBRONECTIN: CPT | Performed by: OBSTETRICS & GYNECOLOGY

## 2024-01-10 PROCEDURE — 59025 FETAL NON-STRESS TEST: CPT

## 2024-01-10 PROCEDURE — 99285 EMERGENCY DEPT VISIT HI MDM: CPT | Mod: 25

## 2024-01-10 PROCEDURE — 85025 COMPLETE CBC W/AUTO DIFF WBC: CPT | Performed by: OBSTETRICS & GYNECOLOGY

## 2024-01-10 PROCEDURE — 81002 URINALYSIS NONAUTO W/O SCOPE: CPT

## 2024-01-10 PROCEDURE — 63600175 PHARM REV CODE 636 W HCPCS: Performed by: GENERAL PRACTICE

## 2024-01-10 PROCEDURE — 86870 RBC ANTIBODY IDENTIFICATION: CPT | Performed by: OBSTETRICS & GYNECOLOGY

## 2024-01-10 PROCEDURE — G0378 HOSPITAL OBSERVATION PER HR: HCPCS

## 2024-01-10 PROCEDURE — 87081 CULTURE SCREEN ONLY: CPT | Performed by: GENERAL PRACTICE

## 2024-01-10 PROCEDURE — 81514 NFCT DS BV&VAGINITIS DNA ALG: CPT

## 2024-01-10 PROCEDURE — 99222 1ST HOSP IP/OBS MODERATE 55: CPT | Mod: 25,,, | Performed by: OBSTETRICS & GYNECOLOGY

## 2024-01-10 PROCEDURE — 86901 BLOOD TYPING SEROLOGIC RH(D): CPT | Performed by: OBSTETRICS & GYNECOLOGY

## 2024-01-10 PROCEDURE — 59025 FETAL NON-STRESS TEST: CPT | Mod: 26,,, | Performed by: OBSTETRICS & GYNECOLOGY

## 2024-01-10 PROCEDURE — 96372 THER/PROPH/DIAG INJ SC/IM: CPT | Performed by: GENERAL PRACTICE

## 2024-01-10 PROCEDURE — 80053 COMPREHEN METABOLIC PANEL: CPT | Performed by: OBSTETRICS & GYNECOLOGY

## 2024-01-10 PROCEDURE — 96374 THER/PROPH/DIAG INJ IV PUSH: CPT

## 2024-01-10 PROCEDURE — 87491 CHLMYD TRACH DNA AMP PROBE: CPT

## 2024-01-10 PROCEDURE — 99284 EMERGENCY DEPT VISIT MOD MDM: CPT | Mod: 25,,, | Performed by: OBSTETRICS & GYNECOLOGY

## 2024-01-10 RX ORDER — ONDANSETRON 8 MG/1
8 TABLET, ORALLY DISINTEGRATING ORAL EVERY 8 HOURS PRN
Status: DISCONTINUED | OUTPATIENT
Start: 2024-01-10 | End: 2024-01-11 | Stop reason: HOSPADM

## 2024-01-10 RX ORDER — DIPHENHYDRAMINE HCL 25 MG
25 CAPSULE ORAL EVERY 4 HOURS PRN
Status: DISCONTINUED | OUTPATIENT
Start: 2024-01-10 | End: 2024-01-11 | Stop reason: HOSPADM

## 2024-01-10 RX ORDER — SODIUM CHLORIDE 0.9 % (FLUSH) 0.9 %
10 SYRINGE (ML) INJECTION
Status: DISCONTINUED | OUTPATIENT
Start: 2024-01-10 | End: 2024-01-11 | Stop reason: HOSPADM

## 2024-01-10 RX ORDER — ACETAMINOPHEN 325 MG/1
650 TABLET ORAL EVERY 6 HOURS PRN
Status: DISCONTINUED | OUTPATIENT
Start: 2024-01-10 | End: 2024-01-11 | Stop reason: HOSPADM

## 2024-01-10 RX ORDER — DEXAMETHASONE SODIUM PHOSPHATE 4 MG/ML
6 INJECTION, SOLUTION INTRA-ARTICULAR; INTRALESIONAL; INTRAMUSCULAR; INTRAVENOUS; SOFT TISSUE EVERY 12 HOURS
Status: DISCONTINUED | OUTPATIENT
Start: 2024-01-10 | End: 2024-01-11 | Stop reason: HOSPADM

## 2024-01-10 RX ORDER — SIMETHICONE 80 MG
1 TABLET,CHEWABLE ORAL EVERY 6 HOURS PRN
Status: DISCONTINUED | OUTPATIENT
Start: 2024-01-10 | End: 2024-01-11 | Stop reason: HOSPADM

## 2024-01-10 RX ORDER — PROCHLORPERAZINE EDISYLATE 5 MG/ML
5 INJECTION INTRAMUSCULAR; INTRAVENOUS EVERY 6 HOURS PRN
Status: DISCONTINUED | OUTPATIENT
Start: 2024-01-10 | End: 2024-01-11 | Stop reason: HOSPADM

## 2024-01-10 RX ORDER — DIPHENHYDRAMINE HYDROCHLORIDE 50 MG/ML
25 INJECTION, SOLUTION INTRAMUSCULAR; INTRAVENOUS EVERY 4 HOURS PRN
Status: DISCONTINUED | OUTPATIENT
Start: 2024-01-10 | End: 2024-01-11 | Stop reason: HOSPADM

## 2024-01-10 RX ORDER — PRENATAL WITH FERROUS FUM AND FOLIC ACID 3080; 920; 120; 400; 22; 1.84; 3; 20; 10; 1; 12; 200; 27; 25; 2 [IU]/1; [IU]/1; MG/1; [IU]/1; MG/1; MG/1; MG/1; MG/1; MG/1; MG/1; UG/1; MG/1; MG/1; MG/1; MG/1
1 TABLET ORAL DAILY
Status: DISCONTINUED | OUTPATIENT
Start: 2024-01-11 | End: 2024-01-11 | Stop reason: HOSPADM

## 2024-01-10 RX ORDER — AMOXICILLIN 250 MG
1 CAPSULE ORAL NIGHTLY PRN
Status: DISCONTINUED | OUTPATIENT
Start: 2024-01-10 | End: 2024-01-11 | Stop reason: HOSPADM

## 2024-01-10 RX ADMIN — DEXAMETHASONE SODIUM PHOSPHATE 6 MG: 4 INJECTION INTRA-ARTICULAR; INTRALESIONAL; INTRAMUSCULAR; INTRAVENOUS; SOFT TISSUE at 08:01

## 2024-01-10 RX ADMIN — SODIUM CHLORIDE, POTASSIUM CHLORIDE, SODIUM LACTATE AND CALCIUM CHLORIDE 1000 ML: 600; 310; 30; 20 INJECTION, SOLUTION INTRAVENOUS at 03:01
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
## 2024-01-10 NOTE — ED NOTES
Pt reports +ctx every 5 minutes   reports +fm  denies vb or lof    6/10 pain noted   dr mcduffie notified

## 2024-01-11 ENCOUNTER — HOSPITAL ENCOUNTER (EMERGENCY)
Facility: OTHER | Age: 19
Discharge: HOME | End: 2024-01-11
Attending: OBSTETRICS & GYNECOLOGY
Payer: MEDICAID

## 2024-01-11 VITALS
TEMPERATURE: 98 F | SYSTOLIC BLOOD PRESSURE: 102 MMHG | DIASTOLIC BLOOD PRESSURE: 58 MMHG | RESPIRATION RATE: 16 BRPM | HEART RATE: 81 BPM | OXYGEN SATURATION: 99 %

## 2024-01-11 DIAGNOSIS — O47.00 THREATENED PREMATURE LABOR, ANTEPARTUM: Primary | ICD-10-CM

## 2024-01-11 DIAGNOSIS — Z3A.30 30 WEEKS GESTATION OF PREGNANCY: ICD-10-CM

## 2024-01-11 LAB
BACTERIAL VAGINOSIS DNA: NEGATIVE
C TRACH DNA SPEC QL NAA+PROBE: NOT DETECTED
CANDIDA GLABRATA DNA: NEGATIVE
CANDIDA KRUSEI DNA: NEGATIVE
CANDIDA RRNA VAG QL PROBE: POSITIVE
N GONORRHOEA DNA SPEC QL NAA+PROBE: NOT DETECTED
T VAGINALIS RRNA GENITAL QL PROBE: NEGATIVE

## 2024-01-11 PROCEDURE — 96376 TX/PRO/DX INJ SAME DRUG ADON: CPT

## 2024-01-11 PROCEDURE — 96372 THER/PROPH/DIAG INJ SC/IM: CPT | Performed by: GENERAL PRACTICE

## 2024-01-11 PROCEDURE — 96372 THER/PROPH/DIAG INJ SC/IM: CPT | Performed by: OBSTETRICS & GYNECOLOGY

## 2024-01-11 PROCEDURE — 99284 EMERGENCY DEPT VISIT MOD MDM: CPT | Mod: 25

## 2024-01-11 PROCEDURE — 25000003 PHARM REV CODE 250: Performed by: GENERAL PRACTICE

## 2024-01-11 PROCEDURE — 99238 HOSP IP/OBS DSCHRG MGMT 30/<: CPT | Mod: ,,, | Performed by: OBSTETRICS & GYNECOLOGY

## 2024-01-11 PROCEDURE — 63600175 PHARM REV CODE 636 W HCPCS: Performed by: OBSTETRICS & GYNECOLOGY

## 2024-01-11 PROCEDURE — 59025 FETAL NON-STRESS TEST: CPT

## 2024-01-11 PROCEDURE — 99282 EMERGENCY DEPT VISIT SF MDM: CPT | Mod: ,,, | Performed by: OBSTETRICS & GYNECOLOGY

## 2024-01-11 PROCEDURE — G0378 HOSPITAL OBSERVATION PER HR: HCPCS

## 2024-01-11 PROCEDURE — 63600175 PHARM REV CODE 636 W HCPCS: Performed by: GENERAL PRACTICE

## 2024-01-11 RX ORDER — DEXAMETHASONE SODIUM PHOSPHATE 4 MG/ML
6 INJECTION, SOLUTION INTRA-ARTICULAR; INTRALESIONAL; INTRAMUSCULAR; INTRAVENOUS; SOFT TISSUE ONCE
Status: COMPLETED | OUTPATIENT
Start: 2024-01-11 | End: 2024-01-11

## 2024-01-11 RX ORDER — DEXAMETHASONE SODIUM PHOSPHATE 4 MG/ML
6 INJECTION, SOLUTION INTRA-ARTICULAR; INTRALESIONAL; INTRAMUSCULAR; INTRAVENOUS; SOFT TISSUE ONCE
Status: DISCONTINUED | OUTPATIENT
Start: 2024-01-11 | End: 2024-01-11

## 2024-01-11 RX ADMIN — DEXAMETHASONE SODIUM PHOSPHATE 6 MG: 4 INJECTION INTRA-ARTICULAR; INTRALESIONAL; INTRAMUSCULAR; INTRAVENOUS; SOFT TISSUE at 09:01

## 2024-01-11 RX ADMIN — PRENATAL VIT W/ FE FUMARATE-FA TAB 27-0.8 MG 1 TABLET: 27-0.8 TAB at 09:01

## 2024-01-11 NOTE — PLAN OF CARE
Problem:  Fall Injury Risk  Goal: Absence of Fall, Infant Drop and Related Injury  Outcome: Met     Problem: Adult Inpatient Plan of Care  Goal: Plan of Care Review  Outcome: Met  Goal: Patient-Specific Goal (Individualized)  Outcome: Met  Goal: Absence of Hospital-Acquired Illness or Injury  Outcome: Met  Goal: Optimal Comfort and Wellbeing  Outcome: Met  Goal: Readiness for Transition of Care  Outcome: Met     Problem: Maternal-Fetal Wellbeing  Goal: Optimal Maternal-Fetal Wellbeing  Outcome: Met     Problem:  Labor  Goal: Delayed  Delivery  Outcome: Met
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
## 2024-01-11 NOTE — PLAN OF CARE
Problem: Adult Inpatient Plan of Care  Goal: Plan of Care Review  Outcome: Ongoing, Progressing  Goal: Patient-Specific Goal (Individualized)  Outcome: Ongoing, Progressing  Goal: Absence of Hospital-Acquired Illness or Injury  Outcome: Ongoing, Progressing  Goal: Optimal Comfort and Wellbeing  Outcome: Ongoing, Progressing  Goal: Readiness for Transition of Care  Outcome: Ongoing, Progressing     Problem: Maternal-Fetal Wellbeing  Goal: Optimal Maternal-Fetal Wellbeing  Outcome: Ongoing, Progressing     Problem:  Labor  Goal: Delayed  Delivery  Outcome: Ongoing, Progressing

## 2024-01-11 NOTE — DISCHARGE INSTRUCTIONS
Labor Precautions  Return if you experience contractions, uterine tightening or cramps(more than 4 in 1 hour for 2 consecutive hours)  Backache that comes and goes  Pelvic pressure  Change in vaginal discharge  Vaginal Bleeding  Leaking of fluid  Call the OB [phone]) during regular business hours or L&D([phone]) after hours for any questions or concerns  Keep previously scheduled clinic appointment  Drink 8-10 glasses of water a day
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
## 2024-01-11 NOTE — PROGRESS NOTES
PROGRESS NOTE  ANTEPARTUM    Admit Date: 1/10/2024   LOS: 0 days     Reason for Admission:  <principal problem not specified>    SUBJECTIVE:     Yaquelin Cochran is a 18 y.o. female at 29w6d who is here for tPTL. Patient reports continued intermittent contractions but they are not painful or frequent. She called out overnight with contractions and was rechecked. Her cervix was unchanged.   Patient denies vaginal bleeding, denies LOF.   Fetal Movement: normal.      Scheduled Meds:   dexAMETHasone  6 mg Intramuscular Q12H    prenatal vitamin  1 tablet Oral Daily     Continuous Infusions:  PRN Meds:acetaminophen, diphenhydrAMINE, diphenhydrAMINE, ondansetron, prochlorperazine, senna-docusate 8.6-50 mg, simethicone, sodium chloride 0.9%    Review of patient's allergies indicates:  No Known Allergies    OBJECTIVE:     Vital Signs (Most Recent)  Temp: 98.3 °F (36.8 °C) (01/11/24 0333)  Pulse: 68 (01/11/24 0333)  Resp: 18 (01/10/24 1939)  BP: (!) 101/58 (01/11/24 0332)  SpO2: 98 % (01/11/24 0333)    Temperature Range Min/Max (Last 24H):  Temp:  [97.5 °F (36.4 °C)-98.3 °F (36.8 °C)]     Vital Signs Range (Last 24H):  Temp:  [97.5 °F (36.4 °C)-98.3 °F (36.8 °C)]   Pulse:  [67-89]   Resp:  [16-20]   BP: ()/(46-75)   SpO2:  [97 %-100 %]     I & O (Last 24H):No intake or output data in the 24 hours ending 01/11/24 0637    Physical Exam:  Physical Exam:   Constitutional: She is oriented to person, place, and time. She appears well-developed and well-nourished.    HENT:   Head: Normocephalic and atraumatic.    Eyes: EOM are normal.     Cardiovascular:  Normal rate.             Pulmonary/Chest: Effort normal. No respiratory distress.         Abdominal: There is no abdominal tenderness. There is no guarding.   Gravid                 Neurological: She is alert and oriented to person, place, and time.    Skin: Skin is warm and dry.    Psychiatric: She has a normal mood and affect. Her behavior is normal. Thought content  normal.     NST  EFM: 115-120 bpm, mod variability, + accels, - decels; overall reassuring and appropriate for gestational   TOCO: no contractions noted        ASSESSMENT/PLAN:     Active Hospital Problems    Diagnosis  POA    Premature dilation of cervix during pregnancy, antepartum [O34.30]  Unknown    Rh negative, antepartum, third trimester [O26.893, Z67.91]  Yes     Rhogam given after vaginal bleeding 17 + weeks. No previa.      History of depression [Z86.59]  Not Applicable    Pregnancy with adoption planned, currently in second trimester [Z34.92]  Not Applicable     Social Work consulted as patient is considering adoption      Anxiety [F41.9]  Yes     Stable.         Resolved Hospital Problems   No resolved problems to display.       Assessment:   18 y.o.female  at 29w6d HD#2 for tPTL      Plan:  Threatened  labor  - SVE: /-3- exam unchanged.   - SSE: Normal physiologic discharge in posterior vaginal vault. No cervical mucus.   - Positive FFN  - Udip positive for 3+ ketones. S/p D5LR 1L IV bolus  - GC and vaginosis collected. POCT wet prep negative for yeast, BV.   - Continue DMZ.      Rh negative  - Patient received Rhogam 2024  - Will need postpartum rhogam work up     -Mood Disorder  - Mood stable; no meds  - Will need 1-2 week postpartum mood check      Choosing not to parent  - Social work consult ordered     Positive Quad Screen  - Negative cfDNA  - Normal anatomy at 21 week ultrasound      29 weeks gestation   - Primary OB: CNM  - Delivery consents, including vaginal, operative, and , and blood consents reviewed and signed at time of admission.   - Presentation: vertex; If moving forward with delivery, will rescan to determine fetal pesentation   - Growth Ultrasound: EFW =1359g   - Diet: Regular   - Monitoring: NST/TOCO continuous  - Labs: up to date  - GBS: collected;   - Magnesium to be administered (6+2) for fetal neuroprotection if delivery indicated   - Tdap: Needs  between 27-36 weeks   - Continue PNV  - Contraception: Undecided; Will continue to discuss
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
## 2024-01-11 NOTE — HOSPITAL COURSE
01/10/2024 HD#1: Patient admitted for observation and DMZ due to threatened  labor. Her cervix was 2 cm dilated on exam. Prior exam she was 0 cm dilated.   2024 HD#2: Patient called out overnight for vaginal pressure. Her cervical exam was unchanged. Patient reports her contractions have spaced out and are no longer painful. She denies vaginal bleeding and LOF. Reports normal fetal movement.

## 2024-01-11 NOTE — CARE UPDATE
EFM/TOCO:     EFM: 115-120 bpm, mod variability, + accels, - decels; overall reassuring and appropriate for gestational   TOCO: no contractions noted     - Overnight, patient called out with pressure. Dr. Fernando to bedside to evaluate and patient had no cervical change. Will continue plan and hold tocolytics at this time in the setting of no cervical change.     Delisa Archuleta MD/MPH  OB/GYN PGY-3  [facility]
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
## 2024-01-11 NOTE — SUBJECTIVE & OBJECTIVE
OB History    Para Term  AB Living   4 1 1 0 2 1   SAB IAB Ectopic Multiple Live Births   1 0 1 0 1      # Outcome Date GA Lbr Fran/2nd Weight Sex Delivery Anes PTL Lv   4 Current            3 Ectopic 2023     ECTOPIC      2 Term 22 40w0d  2.637 kg (5 lb 13 oz) F Vag-Spont  N HERMANN   1 SAB      SAB         Complications: Blighted ovum, History of D&C     Past Medical History:   Diagnosis Date    Behavior problem in childhood     COVID-19 2021    Ectopic pregnancy 2023    Suicidal ideation      Past Surgical History:   Procedure Laterality Date    BACK SURGERY      Rods in place, had issues with previous epidural    DIAGNOSTIC LAPAROSCOPY N/A 2023    Procedure: LAPAROSCOPY, DIAGNOSTIC;  Surgeon: Juana Fung MD;  Location: [facility];  Service: OB/GYN;  Laterality: N/A;    DILATION AND CURETTAGE OF UTERUS         (Not in a hospital admission)      Review of patient's allergies indicates:  No Known Allergies     Family History       Problem Relation (Age of Onset)    No Known Problems Father, Mother    Throat cancer Paternal Grandfather          Tobacco Use    Smoking status: Never    Smokeless tobacco: Never   Substance and Sexual Activity    Alcohol use: No    Drug use: No    Sexual activity: Yes     Partners: Male     Birth control/protection: Condom     Review of Systems   Constitutional:  Negative for chills and fever.   HENT:  Negative for nasal congestion.    Eyes:  Negative for visual disturbance.   Respiratory:  Negative for cough and shortness of breath.    Cardiovascular:  Negative for chest pain.   Gastrointestinal:  Negative for abdominal pain, constipation and diarrhea.   Genitourinary:  Negative for dysuria, frequency, urgency, vaginal bleeding and vaginal discharge.   Musculoskeletal:  Negative for myalgias.   Integumentary:  Negative for rash.   Neurological:  Negative for headaches.   Hematological:  Does not bruise/bleed easily.    Psychiatric/Behavioral:  The patient is not nervous/anxious.       Objective:     Vital Signs (Most Recent):  Temp: 97.5 °F (36.4 °C) (01/10/24 1439)  Pulse: 70 (01/10/24 1802)  Resp: 16 (01/10/24 1446)  BP: 114/65 (01/10/24 1800)  SpO2: 100 % (01/10/24 1801) Vital Signs (24h Range):  Temp:  [97.5 °F (36.4 °C)] 97.5 °F (36.4 °C)  Pulse:  [67-89] 70  Resp:  [16-20] 16  SpO2:  [97 %-100 %] 100 %  BP: (106-118)/(62-75) 114/65        There is no height or weight on file to calculate BMI.    FHT: 140 Cat 1 (reassuring)  TOCO:  Q 8-10 minutes     Physical Exam:   Constitutional: She is oriented to person, place, and time. She appears well-developed and well-nourished.    HENT:   Head: Normocephalic and atraumatic.    Eyes: EOM are normal.     Cardiovascular:  Normal rate.             Pulmonary/Chest: Effort normal. No respiratory distress.        Abdominal: There is no abdominal tenderness. There is no guarding.   Gravid                 Neurological: She is alert and oriented to person, place, and time.    Skin: Skin is warm and dry.    Psychiatric: She has a normal mood and affect. Her behavior is normal. Thought content normal.        Cervix: Exam performed by    Dilation:  2  Effacement:  60  Station: -3  Presentation: Vertex     Significant Labs:  Lab Results   Component Value Date    GROUPTRH O NEG 01/10/2024    HEPBSAG Non-reactive 11/06/2023       I have personallly reviewed all pertinent lab results from the last 24 hours.  Recent Lab Results         01/10/24  1722   01/10/24  1620   01/10/24  1550        Color, UA Yellow           Bilirubin, POC neg           Spec Grav UA 1..005           pH, UA neg           Protein, POC trace           Urobilinogen, UA norm           Nitrite, UA neg           Albumin   3.3         ALP   77         ALT   18         Anion Gap   10         Antibody ID   POS  Comment: Rhogam Antibody         AST   25         Baso #   0.04         Basophil %   0.4         BILIRUBIN  TOTAL   0.9  Comment: For infants and newborns, interpretation of results should be based  on gestational age, weight and in agreement with clinical  observations.    Premature Infant recommended reference ranges:  Up to 24 hours.............<8.0 mg/dL  Up to 48 hours............<12.0 mg/dL  3-5 days..................<15.0 mg/dL  6-29 days.................<15.0 mg/dL           BUN   7         Calcium   9.6         Chloride   104         CO2   20         Creatinine   0.6         Differential Method   Automated         eGFR   >60         Eos #   0.0         Eosinophil %   0.4         Fetal Fibronectin     Positive       Glucose   65         Glucose, UA norm           Gran # (ANC)   6.2         Gran %   67.1         Group & Rh   O NEG         Hematocrit   35.8         Hemoglobin   11.9         Immature Grans (Abs)   0.04  Comment: Mild elevation in immature granulocytes is non specific and   can be seen in a variety of conditions including stress response,   acute inflammation, trauma and pregnancy. Correlation with other   laboratory and clinical findings is essential.           Immature Granulocytes   0.4         INDIRECT THOMAS   POS         Ketones, UA +++           Lymph #   2.4         Lymph %   25.4         MCH   30.1         MCHC   33.2         MCV   91         Mono #   0.6         Mono %   6.3         MPV   10.3         nRBC   0         Platelet Count   180         Potassium   3.6         PROTEIN TOTAL   6.3         RBC   3.95         Blood,            RDW   12.7         Sodium   134         Specimen Outdate   01/13/2024 23:59         WBC, UA neg           WBC   9.30

## 2024-01-12 ENCOUNTER — PATIENT MESSAGE (OUTPATIENT)
Dept: OBSTETRICS AND GYNECOLOGY | Facility: HOSPITAL | Age: 19
End: 2024-01-12
Payer: MEDICAID

## 2024-01-12 ENCOUNTER — PATIENT MESSAGE (OUTPATIENT)
Dept: OTHER | Facility: OTHER | Age: 19
End: 2024-01-12
Payer: MEDICAID

## 2024-01-12 NOTE — NURSING
Dr. Evans in delivery when patient initially arrived to [facility]. Pt received 3rd steroid shot. PT refusing NST and leaving AMA. Patient was educated on risks to maternal life, fetal life, and PTL. AMA papers signed and witnessed.
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
## 2024-01-13 LAB — BACTERIA SPEC AEROBE CULT: NORMAL

## 2024-01-17 ENCOUNTER — ROUTINE PRENATAL (OUTPATIENT)
Dept: OBSTETRICS AND GYNECOLOGY | Facility: CLINIC | Age: 19
End: 2024-01-17
Payer: MEDICAID

## 2024-01-17 VITALS
BODY MASS INDEX: 23.31 KG/M2 | HEART RATE: 107 BPM | SYSTOLIC BLOOD PRESSURE: 110 MMHG | WEIGHT: 123.38 LBS | DIASTOLIC BLOOD PRESSURE: 64 MMHG

## 2024-01-17 DIAGNOSIS — O26.893 RH NEGATIVE, ANTEPARTUM, THIRD TRIMESTER: ICD-10-CM

## 2024-01-17 DIAGNOSIS — O34.30 PREMATURE DILATION OF CERVIX DURING PREGNANCY, ANTEPARTUM: Primary | ICD-10-CM

## 2024-01-17 DIAGNOSIS — Z34.93 PREGNANCY WITH ADOPTION PLANNED, CURRENTLY IN THIRD TRIMESTER: ICD-10-CM

## 2024-01-17 DIAGNOSIS — Z67.91 RH NEGATIVE, ANTEPARTUM, THIRD TRIMESTER: ICD-10-CM

## 2024-01-17 DIAGNOSIS — Z86.59 HISTORY OF DEPRESSION: ICD-10-CM

## 2024-01-17 PROCEDURE — 99214 OFFICE O/P EST MOD 30 MIN: CPT | Mod: S$PBB,TH,,

## 2024-01-17 PROCEDURE — 99999 PR PBB SHADOW E&M-EST. PATIENT-LVL II: CPT | Mod: PBBFAC,,,

## 2024-01-17 PROCEDURE — 99212 OFFICE O/P EST SF 10 MIN: CPT | Mod: PBBFAC,TH

## 2024-01-17 NOTE — PROGRESS NOTES
18 y.o.,  at 30w5d    Presents today for routine OB appt. Complaints today: still having occasional contractions, nothing regular.  Doing well without concerns. Did not get 4th DMZ.    TW lbs   Denies UCs, LOF, VB. Denies HA, visual disturbances, or upper abdominal pain; no nausea/vomiting. Reports positive fetal movement.    PHYSICAL EXAM  GENERAL: No acute distress  HEENT: Normocephalic, atraumatic  NEURO: Alert and oriented x3  PULMONARY: Non-labored respiration; no tachypnea  ABD: Soft, gravid, nontender.      ASSESSMENT AND PLAN  Premature dilation of cervix during pregnancy, antepartum    Pregnancy with adoption planned, currently in third trimester    Rh negative, antepartum, third trimester    History of depression      Reviewed 28wk labs  Received/Declines TDAP today   Had Rhogam 24  Is  taking Childbirth Education classes.  Education regarding options for postpartum contraception, patient desires depo  SVE unchanged since [facility] visit  Reviewed warning s/s for PreE, PTL, FMC, and discussed how/when to call.    Birth Center Risk Assessment: 0- Meets birth center guidelines    CNM management in [facility]  CNM management on L&D  Consultation with OB to develop  plan of care  Collaborative CNM/OB management with delivery on L&D  Permanent referral of care to MD      Follow-up: 2 weeks, call or present sooner GEORGE Kemp CNM, APRN

## 2024-01-22 ENCOUNTER — PATIENT MESSAGE (OUTPATIENT)
Dept: OBSTETRICS AND GYNECOLOGY | Facility: CLINIC | Age: 19
End: 2024-01-22
Payer: MEDICAID

## 2024-01-22 ENCOUNTER — HOSPITAL ENCOUNTER (EMERGENCY)
Facility: OTHER | Age: 19
Discharge: HOME | End: 2024-01-22
Attending: OBSTETRICS & GYNECOLOGY
Payer: MEDICAID

## 2024-01-22 VITALS
HEART RATE: 80 BPM | RESPIRATION RATE: 15 BRPM | OXYGEN SATURATION: 99 % | TEMPERATURE: 98 F | DIASTOLIC BLOOD PRESSURE: 65 MMHG | SYSTOLIC BLOOD PRESSURE: 104 MMHG

## 2024-01-22 DIAGNOSIS — Z3A.31 31 WEEKS GESTATION OF PREGNANCY: ICD-10-CM

## 2024-01-22 DIAGNOSIS — R10.9 ABDOMINAL PAIN, UNSPECIFIED ABDOMINAL LOCATION: Primary | ICD-10-CM

## 2024-01-22 LAB
BILIRUB SERPL-MCNC: NORMAL MG/DL
BILIRUB UR QL STRIP: NEGATIVE
BLOOD URINE, POC: NORMAL
CLARITY UR: CLEAR
COLOR UR: YELLOW
COLOR, POC UA: NORMAL
GLUCOSE UR QL STRIP: NEGATIVE
GLUCOSE UR QL STRIP: NORMAL
HGB UR QL STRIP: ABNORMAL
KETONES UR QL STRIP: NEGATIVE
KETONES UR QL STRIP: NORMAL
LEUKOCYTE ESTERASE UR QL STRIP: NEGATIVE
LEUKOCYTE ESTERASE URINE, POC: NORMAL
MICROSCOPIC COMMENT: ABNORMAL
NITRITE UR QL STRIP: NEGATIVE
NITRITE, POC UA: NORMAL
PH UR STRIP: 6 [PH] (ref 5–8)
PH, POC UA: NORMAL
PROT UR QL STRIP: ABNORMAL
PROTEIN, POC: NORMAL
RBC #/AREA URNS HPF: >100 /HPF (ref 0–4)
SP GR UR STRIP: 1.02 (ref 1–1.03)
SPECIFIC GRAVITY, POC UA: NORMAL
SQUAMOUS #/AREA URNS HPF: 2 /HPF
UNIDENT CRYS URNS QL MICRO: ABNORMAL
URN SPEC COLLECT METH UR: ABNORMAL
UROBILINOGEN UR STRIP-ACNC: NEGATIVE EU/DL
UROBILINOGEN, POC UA: NORMAL
WBC #/AREA URNS HPF: 4 /HPF (ref 0–5)

## 2024-01-22 PROCEDURE — 59025 FETAL NON-STRESS TEST: CPT

## 2024-01-22 PROCEDURE — 25000003 PHARM REV CODE 250

## 2024-01-22 PROCEDURE — 81002 URINALYSIS NONAUTO W/O SCOPE: CPT

## 2024-01-22 PROCEDURE — 99284 EMERGENCY DEPT VISIT MOD MDM: CPT | Mod: 25

## 2024-01-22 PROCEDURE — 59025 FETAL NON-STRESS TEST: CPT | Mod: 26,,, | Performed by: OBSTETRICS & GYNECOLOGY

## 2024-01-22 PROCEDURE — 81000 URINALYSIS NONAUTO W/SCOPE: CPT

## 2024-01-22 PROCEDURE — 99284 EMERGENCY DEPT VISIT MOD MDM: CPT | Mod: 25,,, | Performed by: OBSTETRICS & GYNECOLOGY

## 2024-01-22 RX ORDER — FLUCONAZOLE 150 MG/1
150 TABLET ORAL ONCE
Status: COMPLETED | OUTPATIENT
Start: 2024-01-22 | End: 2024-01-22

## 2024-01-22 RX ADMIN — FLUCONAZOLE 150 MG: 150 TABLET ORAL at 03:01

## 2024-01-22 NOTE — ED PROVIDER NOTES
Encounter Date: 2024       History     Chief Complaint   Patient presents with    Abdominal Pain     HPI  Yaquelin Cochran is a 18 y.o. [id number] at 31w3d presents complaining of middle lower abdominal pain. She states that the pain progressed from intermittent  discomfort for the past 2 or 3 weeks to now being constant pain since last night. She rates the pain at 6/10 and describes it as pressure. She describes the feeling of a full bladder, but the pain is not alleviated with urination. She denies dysuria or hematuria. She also describes some vaginal itchiness and an increase in the quantity of discharge, but denies a change in the quality. She also denies any bloody discharge or blood in the stool. She notes some increased lethargy due to lack of sleep, but denies fever, headache, nausea, vomiting, or change in bowel habits. Chart review indicates a yeast infection on 1/10 but the patient denies receiving treatment.   Recently tested for GC and trich and negative results.    This IUP is complicated by threatened  labor, s/p steroids, Rh negative, mood disorder, CNTP.     Patient denies contractions, denies vaginal bleeding, denies LOF. Fetal Movement: normal.     Review of patient's allergies indicates:  No Known Allergies  Past Medical History:   Diagnosis Date    Behavior problem in childhood     COVID-19 2021    Ectopic pregnancy 2023    Suicidal ideation      Past Surgical History:   Procedure Laterality Date    BACK SURGERY  2016    Rods in place, had issues with previous epidural    DIAGNOSTIC LAPAROSCOPY N/A 2023    Procedure: LAPAROSCOPY, DIAGNOSTIC;  Surgeon: Juana Fung MD;  Location: [facility];  Service: OB/GYN;  Laterality: N/A;    DILATION AND CURETTAGE OF UTERUS       Family History   Problem Relation Age of Onset    Throat cancer Paternal Grandfather     No Known Problems Father     No Known Problems Mother     Breast cancer Neg Hx     Diabetes Neg Hx     Colon  cancer Neg Hx     Ovarian cancer Neg Hx      Social History     Tobacco Use    Smoking status: Never    Smokeless tobacco: Never   Substance Use Topics    Alcohol use: No    Drug use: No     Review of Systems   Constitutional:  Negative for chills, diaphoresis and fever.   HENT:  Negative for congestion.    Respiratory:  Negative for shortness of breath.    Cardiovascular:  Negative for chest pain and leg swelling.   Gastrointestinal:  Negative for abdominal pain, constipation, nausea and vomiting.   Genitourinary:  Positive for pelvic pain. Negative for dysuria, vaginal bleeding and vaginal discharge.   Musculoskeletal:  Negative for back pain.   Skin:  Negative for rash.   Neurological:  Negative for light-headedness and headaches.   Psychiatric/Behavioral:  Negative for confusion.        Physical Exam     Initial Vitals [01/22/24 1402]   BP Pulse Resp Temp SpO2   (!) 102/58 94 14 98 °F (36.7 °C) 96 %      MAP       --       Temp:  [98 °F (36.7 °C)-98.2 °F (36.8 °C)] 98.2 °F (36.8 °C)  Pulse:  [75-95] 80  Resp:  [14-15] 15  SpO2:  [96 %-100 %] 99 %  BP: (102-107)/(58-68) 104/65    Physical Exam    Vitals reviewed.  Constitutional: She appears well-developed and well-nourished.   HENT:   Head: Normocephalic.   Eyes: EOM are normal.   Cardiovascular:  Normal rate.           Pulmonary/Chest: Breath sounds normal. No respiratory distress.   Abdominal: Abdomen is soft. She exhibits no distension. There is no abdominal tenderness. There is no rebound and no guarding.     Neurological: She is alert and oriented to person, place, and time.   Skin: Skin is warm and dry.   Psychiatric: She has a normal mood and affect. Thought content normal.     OB LABOR EXAM:   Pre-Term Labor: No.   Membranes ruptured: No.   Method: Sterile vaginal exam per MD and Sterile speculum exam per MD.   Vaginal Bleeding: none present.     Dilatation: 2.   Station: -3.   Effacement: 50%.   Amniotic Fluid Color: no fluid.     Comments: Thin clear  discharge present   SVE unchanged from prior exam 1/10/24       ED Course   Obtain Fetal nonstress test (NST)    Date/Time: 1/22/2024 2:39 PM    Performed by: Campos Walker MD  Authorized by: Britton Ramos MD    Nonstress Test:     Variability:  6-25 BPM    Decelerations:  None    Accelerations:  15 bpm    Acoustic Stimulator: No      Baseline:  130    Uterine Irritability: No      Contractions:  Not present  Biophysical Profile:     Nonstress Test Interpretation: reactive      Overall Impression:  Reassuring  Post-procedure:     Patient tolerance:  Patient tolerated the procedure well with no immediate complications    Labs Reviewed   URINALYSIS - Abnormal; Notable for the following components:       Result Value    Protein, UA Trace (*)     Occult Blood UA 2+ (*)     All other components within normal limits   URINALYSIS MICROSCOPIC - Abnormal; Notable for the following components:    RBC, UA >100 (*)     All other components within normal limits   POCT URINALYSIS, DIPSTICK OR TABLET REAGENT, AUTOMATED, WITH MICROSCOP   POCT URINALYSIS, DIPSTICK OR TABLET REAGENT, AUTOMATED, WITH MICROSCOP          Imaging Results    None          Medications   fluconazole tablet 150 mg (150 mg Oral Given 1/22/24 1509)     Medical Decision Making  Pelvic pressure  - VSS, afebrile  - SVE 2/50/-3, stable from 2 weeks ago.  - Speculum exam: thin clear discharge.  - Wet prep negative for clue cells and trichomonas.   - Urinalysis negative for nitrites and leukocytes. Negative for ketones. See results above.  - NST: FHT RR, toco with no contractions.  - Patient was recently diagnosed with a yeast infection on 1/10, but was not treated for it. Fluconazole 150 mg PO given in the [facility].   - Patient stable for discharge at this time.  - Labor precautions and ED precautions discussed with patient.         Amount and/or Complexity of Data Reviewed  Labs: ordered.    Risk  Prescription drug management.              Attending  Attestation:   Physician Attestation Statement for Resident:  As the supervising MD   Physician Attestation Statement: I have personally seen and examined this patient.   I agree with the above history.  -:   As the supervising MD I agree with the above PE.     As the supervising MD I agree with the above treatment, course, plan, and disposition.   -: I agree with the above edited resident note. Pt seen and examined, chart and labs reviewed.    Briefly, 17 yo  at 31w3d presents with lower abdominal pressure. Afebrile, VSS. NST Cat I reactive, [place] quiet. SVE unchanged from prior on 1/10/24, SVE /-3. SSE with thin white discharge, prior vaginosis swab positive for yeast infection. Pt denies prior treatment, fluconazole 150mg PO given in [facility]. Udip neg for infection or dehydration. Pt reassured by benign findings. Maternity belt, PO hydration discussed.     All questions answered. Stable for d/c home with outpatient follow up.    Arlyn Sharp MD  OB Hospitalist  2024     I was personally present during the critical portions of the procedure(s) performed by the resident and was immediately available in the ED to provide services and assistance as needed during the entire procedure.  I have reviewed and agree with the residents interpretation of the following: lab data.  I have reviewed the following: old records at this facility.                                       Clinical Impression:  Final diagnoses:  [R10.9] Abdominal pain, unspecified abdominal location (Primary)  [Z3A.31] 31 weeks gestation of pregnancy          ED Disposition Condition    Discharge Good          ED Prescriptions    None       Follow-up Information    None          Campos Walker MD  Resident  [phone]       Arlyn Sharp MD  [phone]

## 2024-01-22 NOTE — Clinical Note
"Yaquelin Cochran (Maddie) was seen and treated in our emergency department on 1/22/2024.  She may return to work on 01/25/2024.  Patient was in the emergency room today. She was treated for a yeast infection.   When patient goes back to work, she will need breaks to stay hydrated and rest given her gestational age.      If you have any questions or concerns, please don't hesitate to call.      Campos Walker MD"

## 2024-01-22 NOTE — DISCHARGE INSTRUCTIONS
Call clinic [phone] or L & D after hours at [phone] for vaginal bleeding, leakage of fluids, contractions 4-5 in one hour, decreased fetal movements ( 10 kicks in 2 hours), headache not relieved by Tylenol, blurry vision, or temp of 100.4 or greater.  Begin doing fetal kick counts, at least 10 movements in 2 hours starting at 28 weeks gestation.  Keep next clinic appointment

## 2024-01-23 ENCOUNTER — PATIENT MESSAGE (OUTPATIENT)
Dept: OBSTETRICS AND GYNECOLOGY | Facility: CLINIC | Age: 19
End: 2024-01-23
Payer: MEDICAID

## 2024-01-26 ENCOUNTER — PATIENT MESSAGE (OUTPATIENT)
Dept: OTHER | Facility: OTHER | Age: 19
End: 2024-01-26
Payer: MEDICAID

## 2024-01-31 ENCOUNTER — ROUTINE PRENATAL (OUTPATIENT)
Dept: OBSTETRICS AND GYNECOLOGY | Facility: CLINIC | Age: 19
End: 2024-01-31
Payer: MEDICAID

## 2024-01-31 VITALS
BODY MASS INDEX: 23.45 KG/M2 | HEART RATE: 81 BPM | SYSTOLIC BLOOD PRESSURE: 97 MMHG | WEIGHT: 124.13 LBS | DIASTOLIC BLOOD PRESSURE: 52 MMHG

## 2024-01-31 DIAGNOSIS — Z67.91 RH NEGATIVE, ANTEPARTUM, THIRD TRIMESTER: ICD-10-CM

## 2024-01-31 DIAGNOSIS — O26.893 RH NEGATIVE, ANTEPARTUM, THIRD TRIMESTER: ICD-10-CM

## 2024-01-31 DIAGNOSIS — Z3A.32 32 WEEKS GESTATION OF PREGNANCY: Primary | ICD-10-CM

## 2024-01-31 DIAGNOSIS — Z71.85 VACCINE COUNSELING: ICD-10-CM

## 2024-01-31 PROBLEM — N89.8 VAGINAL ITCHING: Status: RESOLVED | Noted: 2023-11-06 | Resolved: 2024-01-31

## 2024-01-31 PROBLEM — O00.90 ECTOPIC PREGNANCY: Status: RESOLVED | Noted: 2023-05-24 | Resolved: 2024-01-31

## 2024-01-31 PROBLEM — Z34.90 PREGNANCY WITH ADOPTION PLANNED, ANTEPARTUM: Status: RESOLVED | Noted: 2023-12-20 | Resolved: 2024-01-31

## 2024-01-31 PROCEDURE — 99212 OFFICE O/P EST SF 10 MIN: CPT | Mod: PBBFAC,TH,PO | Performed by: ADVANCED PRACTICE MIDWIFE

## 2024-01-31 PROCEDURE — 99214 OFFICE O/P EST MOD 30 MIN: CPT | Mod: TH,S$PBB,, | Performed by: ADVANCED PRACTICE MIDWIFE

## 2024-01-31 PROCEDURE — 99999 PR PBB SHADOW E&M-EST. PATIENT-LVL II: CPT | Mod: PBBFAC,,, | Performed by: ADVANCED PRACTICE MIDWIFE

## 2024-01-31 NOTE — PROGRESS NOTES
18 y.o. female  at 32w5d, by Estimated Date of Delivery: 3/22/24  Pt is accompanied today by significant other and her toddler daughter.    Reports + FM, denies VB, LOF or regular CTX  Doing well without concerns. Reports aware of rare mild herbert bateman - has experienced a total of approx 5 throughout the entire day today (less than 1/hr). Denies frequent or painful cramps/waves.    TWG: 15 lbs     PHYSICAL EXAM  BP (!) 97/52   Pulse 81   Wt 56.3 kg (124 lb 1.6 oz)   LMP 2023 (Approximate)   BMI 23.45 kg/m²   GENERAL: No acute distress  HEENT: Normocephalic, atraumatic  NEURO: Alert and oriented x3  PULMONARY: Non-labored respiration; no tachypnea  ABD: Soft, gravid, nontender.      ASSESSMENT AND PLAN  32 weeks gestation of pregnancy  -     BREAST PUMP FOR HOME USE    Rh negative, antepartum, third trimester    WIC referral given  Patient reports she plans to breast feed - reviewed breast feeding class here; pt reports does not feel she needs this. Patient does request Breast Pump Rx today.  It's a boy! They not desire circumcision.  Pediatrician: [facility] [place] ([facility])    Vaccine Counseling:   Education regarding vaccine recommendations for TDAP, RSV, and covid-19 in pregnancy; reviewed risks/benefits to this, and questions answered.   - Has received Influenza vaccine for this season.   - Has not received Covid-19 vaccine.   Patient desires - discussed options of locations for receiving. Pt plans to obtain at [facility] outpatient pharmacy.    Reviewed warning signs, normal FM,  labor precautions, and how/when to call.  Confirmed pt has after-hours number.    Follow-up: 2 weeks, call or present sooner PRN    Caro Cotton CNM/FERMIN

## 2024-02-05 ENCOUNTER — PATIENT MESSAGE (OUTPATIENT)
Dept: OBSTETRICS AND GYNECOLOGY | Facility: CLINIC | Age: 19
End: 2024-02-05
Payer: MEDICAID

## 2024-02-14 ENCOUNTER — HOSPITAL ENCOUNTER (EMERGENCY)
Facility: OTHER | Age: 19
Discharge: HOME | End: 2024-02-14
Attending: OBSTETRICS & GYNECOLOGY
Payer: MEDICAID

## 2024-02-14 VITALS
WEIGHT: 130 LBS | TEMPERATURE: 98 F | BODY MASS INDEX: 24.55 KG/M2 | HEIGHT: 61 IN | RESPIRATION RATE: 18 BRPM | SYSTOLIC BLOOD PRESSURE: 100 MMHG | HEART RATE: 75 BPM | OXYGEN SATURATION: 98 % | DIASTOLIC BLOOD PRESSURE: 58 MMHG

## 2024-02-14 DIAGNOSIS — O47.9 UTERINE CONTRACTIONS: ICD-10-CM

## 2024-02-14 DIAGNOSIS — Z3A.34 34 WEEKS GESTATION OF PREGNANCY: ICD-10-CM

## 2024-02-14 DIAGNOSIS — B37.9 YEAST INFECTION: Primary | ICD-10-CM

## 2024-02-14 LAB
BACTERIA #/AREA URNS HPF: ABNORMAL /HPF
BILIRUB SERPL-MCNC: NORMAL MG/DL
BILIRUB UR QL STRIP: NEGATIVE
BLOOD URINE, POC: NORMAL
CLARITY UR: CLEAR
COLOR UR: YELLOW
COLOR, POC UA: NORMAL
GLUCOSE UR QL STRIP: NEGATIVE
GLUCOSE UR QL STRIP: NORMAL
HGB UR QL STRIP: ABNORMAL
HYALINE CASTS #/AREA URNS LPF: 1 /LPF
KETONES UR QL STRIP: NEGATIVE
KETONES UR QL STRIP: NORMAL
LEUKOCYTE ESTERASE UR QL STRIP: ABNORMAL
LEUKOCYTE ESTERASE URINE, POC: NORMAL
MICROSCOPIC COMMENT: ABNORMAL
NITRITE UR QL STRIP: NEGATIVE
NITRITE, POC UA: NORMAL
PH UR STRIP: 7 [PH] (ref 5–8)
PH, POC UA: 5
PROT UR QL STRIP: ABNORMAL
PROTEIN, POC: NORMAL
RBC #/AREA URNS HPF: >100 /HPF (ref 0–4)
SP GR UR STRIP: 1.02 (ref 1–1.03)
SPECIFIC GRAVITY, POC UA: 1.02
SQUAMOUS #/AREA URNS HPF: 5 /HPF
URN SPEC COLLECT METH UR: ABNORMAL
UROBILINOGEN UR STRIP-ACNC: NEGATIVE EU/DL
UROBILINOGEN, POC UA: NORMAL
WBC #/AREA URNS HPF: 4 /HPF (ref 0–5)

## 2024-02-14 PROCEDURE — 81514 NFCT DS BV&VAGINITIS DNA ALG: CPT

## 2024-02-14 PROCEDURE — 99283 EMERGENCY DEPT VISIT LOW MDM: CPT | Mod: 25,,, | Performed by: OBSTETRICS & GYNECOLOGY

## 2024-02-14 PROCEDURE — 81002 URINALYSIS NONAUTO W/O SCOPE: CPT

## 2024-02-14 PROCEDURE — 99284 EMERGENCY DEPT VISIT MOD MDM: CPT | Mod: 25

## 2024-02-14 PROCEDURE — 87491 CHLMYD TRACH DNA AMP PROBE: CPT

## 2024-02-14 PROCEDURE — 81000 URINALYSIS NONAUTO W/SCOPE: CPT

## 2024-02-14 PROCEDURE — 59025 FETAL NON-STRESS TEST: CPT | Mod: 26,,, | Performed by: OBSTETRICS & GYNECOLOGY

## 2024-02-14 PROCEDURE — 63700000 PHARM REV CODE 250 ALT 637 W/O HCPCS

## 2024-02-14 PROCEDURE — 25000003 PHARM REV CODE 250

## 2024-02-14 PROCEDURE — 59025 FETAL NON-STRESS TEST: CPT

## 2024-02-14 RX ORDER — ACETAMINOPHEN 500 MG
1000 TABLET ORAL ONCE
Status: COMPLETED | OUTPATIENT
Start: 2024-02-14 | End: 2024-02-14

## 2024-02-14 RX ORDER — FLUCONAZOLE 100 MG/1
100 TABLET ORAL ONCE
Status: COMPLETED | OUTPATIENT
Start: 2024-02-14 | End: 2024-02-14

## 2024-02-14 RX ADMIN — FLUCONAZOLE 100 MG: 100 TABLET ORAL at 09:02

## 2024-02-14 RX ADMIN — ACETAMINOPHEN 1000 MG: 500 TABLET ORAL at 08:02

## 2024-02-14 NOTE — Clinical Note
"Yaquelin"Martha Cochran was seen and treated in our emergency department on 2/14/2024.  She may return to work on 02/14/2024.       If you have any questions or concerns, please don't hesitate to call.       RN    "
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
## 2024-02-15 LAB
C TRACH DNA SPEC QL NAA+PROBE: NOT DETECTED
N GONORRHOEA DNA SPEC QL NAA+PROBE: NOT DETECTED

## 2024-02-16 ENCOUNTER — PATIENT MESSAGE (OUTPATIENT)
Dept: OTHER | Facility: OTHER | Age: 19
End: 2024-02-16
Payer: MEDICAID

## 2024-02-16 LAB
BACTERIAL VAGINOSIS DNA: NEGATIVE
CANDIDA GLABRATA DNA: NEGATIVE
CANDIDA KRUSEI DNA: NEGATIVE
CANDIDA RRNA VAG QL PROBE: POSITIVE
T VAGINALIS RRNA GENITAL QL PROBE: NEGATIVE

## 2024-02-20 ENCOUNTER — ROUTINE PRENATAL (OUTPATIENT)
Dept: OBSTETRICS AND GYNECOLOGY | Facility: CLINIC | Age: 19
End: 2024-02-20
Payer: MEDICAID

## 2024-02-20 VITALS
WEIGHT: 131.19 LBS | DIASTOLIC BLOOD PRESSURE: 68 MMHG | BODY MASS INDEX: 24.79 KG/M2 | SYSTOLIC BLOOD PRESSURE: 108 MMHG | HEART RATE: 80 BPM

## 2024-02-20 DIAGNOSIS — Z67.91 RH NEGATIVE, ANTEPARTUM, THIRD TRIMESTER: ICD-10-CM

## 2024-02-20 DIAGNOSIS — Z86.59 HISTORY OF DEPRESSION: ICD-10-CM

## 2024-02-20 DIAGNOSIS — Z34.93 PREGNANCY WITH ADOPTION PLANNED, CURRENTLY IN THIRD TRIMESTER: ICD-10-CM

## 2024-02-20 DIAGNOSIS — O26.893 RH NEGATIVE, ANTEPARTUM, THIRD TRIMESTER: ICD-10-CM

## 2024-02-20 DIAGNOSIS — Z3A.35 35 WEEKS GESTATION OF PREGNANCY: Primary | ICD-10-CM

## 2024-02-20 DIAGNOSIS — O26.843 FUNDAL HEIGHT LOW FOR DATES IN THIRD TRIMESTER: ICD-10-CM

## 2024-02-20 PROCEDURE — 99999 PR PBB SHADOW E&M-EST. PATIENT-LVL II: CPT | Mod: PBBFAC,,,

## 2024-02-20 PROCEDURE — 99212 OFFICE O/P EST SF 10 MIN: CPT | Mod: PBBFAC,TH

## 2024-02-20 PROCEDURE — 99214 OFFICE O/P EST MOD 30 MIN: CPT | Mod: S$PBB,TH,,

## 2024-02-20 NOTE — PROGRESS NOTES
18 y.o.,  at 35w4d, presents for routine OB appt.  Denies LOF, VB, or UCs. Reports good FM.  Doing well today, without concerns.   TW lbs     Was seen / in [facility] for contractions, cl/th/h    EPDS  1) I have been able to laugh and see the funny side of things.   As much as I always could 0   Not quite so much now 1   Definitely not so much now 2   Not at all 3    2) I have looked forward with enjoyment to things.   As much as I ever did 0   Rather less than I used to 1   Definitely less than I used to 2   Hardly at all 3    3) I have blamed myself unnecessarily when things went wrong.   Yes, most of the time 3   Yes, some of the time 2   Not very often 1   No, never 0    4) I have been anxious or worried for no good reason.   No not at all 0   Hardly ever 1   Yes, sometimes 2   Yes, very often 3    5) I have felt scared or panicky for no very good reason.   Yes, quite a lot 3   Yes, sometimes 2   No, not much 1   No, not at all 0    6) Things have been getting on top of me.   Yes, most of the time I havent been able to cope at all 3   Yes, sometimes I havent been coping as well as usual 2   No, most of the time I have coped quite well 1   No, I have been coping as well as ever 0    7) I have been so unhappy that I have had difficulty sleeping.   Yes, most of the time 3   Yes, sometimes 2   Not very often 1   No, not at all 0    8) I have felt sad or miserable.   Yes, most of the time 3   Yes, sometimes 2   Not very often 1   No, not at all 0    9) I have been so unhappy that I have been crying.   Yes, most of the time 3   Yes, quite often 2   Only occasionally 1   No, never 0    10) The thought of harming myself has occurred to me.   Yes, quite often 3   Sometimes 2   Hardly ever 1   Never 0    Score: 8     PHYSICAL EXAM  GENERAL: No acute distress  HEENT: Normocephalic, atraumatic  NEURO: Alert and oriented x3  PULMONARY: Non-labored respiration; no tachypnea  ABD: Soft, gravid,  nontender.    ASSESSMENT AND PLAN  35 weeks gestation of pregnancy  -     (In Office Administered) Tdap Vaccine    Fundal height low for dates in third trimester  -     US MFM Procedure ([facility])-Future; Future    History of depression    Pregnancy with adoption planned, currently in third trimester    Rh negative, antepartum, third trimester      Reviewed upcoming 36wk labs.   Reviewed patient does not have a history of genital HSV.   FH < Dates, growth ordered    Reviewed ABC risk assessment with the patient:  Birth Center Risk Assessment: 0- Meets birth center guidelines  CNM management in ABC  CNM management on L&D  Consultation with OB to develop  plan of care  Collaborative CNM/OB management with delivery on L&D  Permanent referral of care to MD  Patient understands is currently a candidate for the ABC. Reviewed potential risks which could arise, that could change this status.    Reviewed warning signs, normal FM,  labor precautions, and how/when to call. Confirmed pt has after-hours number.    Follow-up: 2 weeks, call or present sooner GEORGE Kemp, JERZYM, APRN

## 2024-02-21 ENCOUNTER — PATIENT MESSAGE (OUTPATIENT)
Dept: MATERNAL FETAL MEDICINE | Facility: CLINIC | Age: 19
End: 2024-02-21
Payer: MEDICAID

## 2024-03-02 ENCOUNTER — HOSPITAL ENCOUNTER (EMERGENCY)
Facility: OTHER | Age: 19
Discharge: HOME | End: 2024-03-02
Attending: OBSTETRICS & GYNECOLOGY
Payer: MEDICAID

## 2024-03-02 VITALS
SYSTOLIC BLOOD PRESSURE: 106 MMHG | OXYGEN SATURATION: 99 % | TEMPERATURE: 98 F | RESPIRATION RATE: 17 BRPM | HEART RATE: 70 BPM | DIASTOLIC BLOOD PRESSURE: 69 MMHG

## 2024-03-02 DIAGNOSIS — O47.9 UTERINE CONTRACTIONS DURING PREGNANCY: ICD-10-CM

## 2024-03-02 DIAGNOSIS — O47.1 FALSE LABOR AFTER 37 COMPLETED WEEKS OF GESTATION: ICD-10-CM

## 2024-03-02 DIAGNOSIS — O47.9 UTERINE CONTRACTIONS AT GREATER THAN 20 WEEKS OF GESTATION: Primary | ICD-10-CM

## 2024-03-02 PROCEDURE — 99283 EMERGENCY DEPT VISIT LOW MDM: CPT | Mod: 25,,, | Performed by: OBSTETRICS & GYNECOLOGY

## 2024-03-02 PROCEDURE — 59025 FETAL NON-STRESS TEST: CPT

## 2024-03-02 PROCEDURE — 59025 FETAL NON-STRESS TEST: CPT | Mod: 26,,, | Performed by: OBSTETRICS & GYNECOLOGY

## 2024-03-02 PROCEDURE — 99284 EMERGENCY DEPT VISIT MOD MDM: CPT | Mod: 25

## 2024-03-02 PROCEDURE — 25000003 PHARM REV CODE 250: Performed by: ADVANCED PRACTICE MIDWIFE

## 2024-03-02 RX ORDER — ACETAMINOPHEN 500 MG
1000 TABLET ORAL ONCE
Status: COMPLETED | OUTPATIENT
Start: 2024-03-02 | End: 2024-03-02

## 2024-03-02 RX ADMIN — ACETAMINOPHEN 1000 MG: 500 TABLET ORAL at 05:03

## 2024-03-02 NOTE — ED PROVIDER NOTES
Encounter Date: 3/2/2024       History     Chief Complaint   Patient presents with    Contractions     Ctx every 5-10min       History of Present Illness:   Yaquelin Cochran is a 18 y.o. female  at 37w1d with Estimated Date of Delivery: 3/22/24 based on 5 week US who presents to [facility] alone. She was at work at [employer] and because of increased pressure in lower back and pubic bone area that she has had over the past 2 weeks, she felt she should be evaluated. Denies FVB or LOF; endorses FM adequate to gestation.     Reports irregular uterine contractions that she feels come every 5-10 mins, but not really aware of what time they started at that today. They are now 2-7  minutes apart, lasting 60-90 seconds and moderate to palpation.     Last ate macaroni and drank water at 1600.     This pregnancy has been complicated by the following:  Patient Active Problem List:     Anxiety     Rh negative, antepartum, third trimester     History of ectopic pregnancy     Pregnancy with adoption planned, currently in third trimester     History of depression     Abnormal chromosomal and genetic finding on  screening of mother      Abnormal quad screen Test: Quad/ Penta Screen   Result of other maternal screening test: Positive   DSR 1:240   T18 1:12,000   AFP MoM 0.65   Premature dilation of cervix during pregnancy, antepartum       Presentation: cephalic  Estimated Fetal Weight: 6 lbs    Birth Center Risk Assessment: 1-management on labor and Delivery    CNM management in ABC  CNM management on L&D  Consultation with OB to develop  plan of care  Collaborative CNM/OB management with delivery on L&D   4- Permanent referral of care to MD       Review of patient's allergies indicates:  No Known Allergies  Past Medical History:   Diagnosis Date    Behavior problem in childhood     COVID-19 2021    Ectopic pregnancy 2023    Suicidal ideation      Past Surgical History:   Procedure Laterality Date    BACK  SURGERY  2016    Rods in place, had issues with previous epidural    DIAGNOSTIC LAPAROSCOPY N/A 05/29/2023    Procedure: LAPAROSCOPY, DIAGNOSTIC;  Surgeon: Juana Fung MD;  Location: [facility];  Service: OB/GYN;  Laterality: N/A;    DILATION AND CURETTAGE OF UTERUS       Family History   Problem Relation Age of Onset    Throat cancer Paternal Grandfather     No Known Problems Father     No Known Problems Mother     Breast cancer Neg Hx     Diabetes Neg Hx     Colon cancer Neg Hx     Ovarian cancer Neg Hx      Social History     Tobacco Use    Smoking status: Never    Smokeless tobacco: Never   Substance Use Topics    Alcohol use: No    Drug use: No     Review of Systems   Constitutional: Negative.    HENT: Negative.     Eyes: Negative.    Respiratory: Negative.     Cardiovascular: Negative.    Gastrointestinal:  Positive for abdominal distention (d/t gravid uterus).   Endocrine: Negative.    Genitourinary:  Positive for pelvic pain (pressure in area of pubic bone).   Musculoskeletal:  Positive for back pain (w/ frequent BH contractions).   Skin: Negative.    Allergic/Immunologic: Negative.    Neurological: Negative.    Hematological: Negative.    Psychiatric/Behavioral: Negative.         Physical Exam     Initial Vitals [03/02/24 1640]   BP Pulse Resp Temp SpO2   106/66 72 17 98 °F (36.7 °C) 98 %      MAP       --         Physical Exam    Vitals reviewed.  Constitutional: She appears well-developed and well-nourished.   HENT:   Head: Normocephalic and atraumatic.   Right Ear: External ear normal.   Left Ear: External ear normal.   Nose: Nose normal.   Mouth/Throat: Oropharynx is clear and moist.   Eyes: Conjunctivae and EOM are normal.   Neck: Neck supple.   Normal range of motion.  Cardiovascular:  Normal rate, regular rhythm, normal heart sounds and intact distal pulses.           Pulmonary/Chest: Breath sounds normal. She exhibits no tenderness.   Abdominal: Abdomen is soft. She exhibits distension (d/t gravid  uterus).   Genitourinary:    Vagina and uterus normal.     Musculoskeletal:         General: Normal range of motion.      Cervical back: Normal range of motion and neck supple.     Neurological: She is alert and oriented to person, place, and time.   Skin: Skin is warm and dry.   Psychiatric: She has a normal mood and affect. Her behavior is normal. Judgment and thought content normal.         ED Course   Obtain Fetal nonstress test (NST)    Date/Time: 3/2/2024 5:27 PM    Performed by: Nan Moore CNM  Authorized by: Nan Moore CNM    Nonstress Test:     Variability:  6-25 BPM    Decelerations:  None    Accelerations:  15 bpm    Acoustic Stimulator: No      Baseline:  125    Uterine Irritability: No      Contractions:  Irregular    Contraction Frequency:  2-7 mins  Biophysical Profile:     Nonstress Test Interpretation: reactive      Overall Impression:  Reassuring    Labs Reviewed - No data to display       Imaging Results    None          Medications   acetaminophen tablet 1,000 mg (1,000 mg Oral Given 3/2/24 1703)     Medical Decision Making  1655 Pt received to [facility] noting that while at work today (she stands for long periods of time as a  at [employer]) she felt increased pelvic pressure and pain at area of pubic bone. She denies LOF or FVB and endorses FM adequate to gestation. She states she is having frequent BH contractions. Upon SVE she is noted to be 3 cm/80%/-1 station w/ slightly BBOW during UC. Dr. Archuleta had begun triaging her prior to my arrival and had ordered acetaminophen 1 gm for her discomfort. After reassuring EFM tracing obtained, pt up to ambulate throughout hospital and return in 90 minutes to 2 hours for reevaluation.   1920 Pt back to [facility], endorses less pain during contractions which have spaced out. She consented to SVE and was found to be unchanged 3/80/-1 with no BBOW and more posterior per JAYLA Moore. Discussed early labor precautions. Patient will discharge  to home for rest. Missed clinic appointment and ultrasound with MFM, strict instructions to call early am Monday and will follow up in clinic following rescheduled MFM ultrasound. Patient verbalized understanding. Reviewed labor precautions.     Risk  OTC drugs.              Attending Attestation:   Physician Attestation Statement for Resident:  As the supervising MD   Physician Attestation Statement: I have personally seen and examined this patient.   I agree with the above history.  -:   As the supervising MD I agree with the above PE.     As the supervising MD I agree with the above treatment, course, plan, and disposition.   I was personally present during the critical portions of the procedure(s) performed by the resident and was immediately available in the ED to provide services and assistance as needed during the entire procedure.              Attending ED Notes:   I have personally seen and examined the patient. I agree with the resident's note . Questions welcomed and answered to patient satisfaction.      @ 37w1d  presenting for pelvic pressure  NST: 125/mod/+acce;s/-decels    Agree with discharge to home, and given the following instructions: Resume normal activities, encouraged to hydrate well (3L or 100oz of fluids daily). Return to the OB ED for acute concerns such as vaginal bleeding, frequent or painful contractions, loss of fluid or decreased fetal movement.     Return to clinic as scheduled.     Jing Greer MD  3/3/2024 1:25 AM                                 Clinical Impression:  Pelvic pressure in pregnancy, uterine contractions   Final diagnoses:  [O47.9] Uterine contractions at greater than 20 weeks of gestation (Primary)  [O47.9] Uterine contractions during pregnancy  [O47.1] False labor after 37 completed weeks of gestation          ED Disposition Condition    Discharge Stable          ED Prescriptions    None       Follow-up Information    None          Liset Hoff  NAVARRO, JERZY  03/02/24 1944       Liset Hoff, JERZY  03/02/24 1948       Jing Levy MD  03/03/24 0127

## 2024-03-02 NOTE — Clinical Note
"Yaquelin "Martha Cochran was seen and treated in our emergency department on 3/2/2024.  She may return to work on 03/04/2024.       If you have any questions or concerns, please don't hesitate to call.      Kenia Mckeon RN RN    "

## 2024-03-02 NOTE — ED NOTES
Pt out of [facility]. Walking around for 1-2hours - instructed by JAYLA. Patient educated on the following to return to [facility]: vaginal bleeding, increased pain, rupture, dizzy, lightheaded. Instructed to stay inside hospital walls. Pt verbalized understanding.

## 2024-03-03 NOTE — DISCHARGE INSTRUCTIONS
Call clinic [phone] or L & D after hours at [phone] for vaginal bleeding, leakage of fluids, regular contractions every 5 mins for 2 hours, decreased fetal movements ( 10 kicks in 2 hours), headache not relieved by Tylenol, blurry vision, or temp of 100.4 or greater.  Begin doing fetal kick counts, at least 10 movements in 2 hours starting at 28 weeks gestation.  Keep next clinic appointment.

## 2024-03-06 ENCOUNTER — HOSPITAL ENCOUNTER (INPATIENT)
Facility: OTHER | Age: 19
LOS: 2 days | Discharge: HOME | End: 2024-03-08
Attending: OBSTETRICS & GYNECOLOGY | Admitting: OBSTETRICS & GYNECOLOGY
Payer: MEDICAID

## 2024-03-06 ENCOUNTER — ROUTINE PRENATAL (OUTPATIENT)
Dept: OBSTETRICS AND GYNECOLOGY | Facility: CLINIC | Age: 19
End: 2024-03-06
Payer: MEDICAID

## 2024-03-06 ENCOUNTER — PROCEDURE VISIT (OUTPATIENT)
Dept: MATERNAL FETAL MEDICINE | Facility: CLINIC | Age: 19
End: 2024-03-06
Payer: MEDICAID

## 2024-03-06 VITALS
DIASTOLIC BLOOD PRESSURE: 66 MMHG | SYSTOLIC BLOOD PRESSURE: 100 MMHG | BODY MASS INDEX: 25.12 KG/M2 | WEIGHT: 132.94 LBS | HEART RATE: 78 BPM

## 2024-03-06 DIAGNOSIS — O26.893 RH NEGATIVE, ANTEPARTUM, THIRD TRIMESTER: ICD-10-CM

## 2024-03-06 DIAGNOSIS — O26.843 FUNDAL HEIGHT LOW FOR DATES IN THIRD TRIMESTER: ICD-10-CM

## 2024-03-06 DIAGNOSIS — Z3A.37 37 WEEKS GESTATION OF PREGNANCY: ICD-10-CM

## 2024-03-06 DIAGNOSIS — O47.9 UTERINE CONTRACTIONS: ICD-10-CM

## 2024-03-06 DIAGNOSIS — Z67.91 RH NEGATIVE, ANTEPARTUM, THIRD TRIMESTER: ICD-10-CM

## 2024-03-06 DIAGNOSIS — Z3A.37 37 WEEKS GESTATION OF PREGNANCY: Primary | ICD-10-CM

## 2024-03-06 DIAGNOSIS — Z34.93 PREGNANCY WITH ADOPTION PLANNED, CURRENTLY IN THIRD TRIMESTER: ICD-10-CM

## 2024-03-06 DIAGNOSIS — O26.813 FATIGUE DURING PREGNANCY IN THIRD TRIMESTER: ICD-10-CM

## 2024-03-06 DIAGNOSIS — O47.9 UTERINE CONTRACTIONS AT GREATER THAN 20 WEEKS OF GESTATION: ICD-10-CM

## 2024-03-06 DIAGNOSIS — Z37.9 NORMAL LABOR: ICD-10-CM

## 2024-03-06 DIAGNOSIS — Z86.59 HISTORY OF DEPRESSION: ICD-10-CM

## 2024-03-06 PROBLEM — O26.899 RH NEGATIVE, DELIVERED, CURRENT HOSPITALIZATION: Status: ACTIVE | Noted: 2023-11-06

## 2024-03-06 PROBLEM — O34.30 PREMATURE DILATION OF CERVIX DURING PREGNANCY, ANTEPARTUM: Status: RESOLVED | Noted: 2024-01-10 | Resolved: 2024-03-06

## 2024-03-06 LAB
ABO + RH BLD: ABNORMAL
BASOPHILS # BLD AUTO: 0.05 K/UL (ref 0–0.2)
BASOPHILS NFR BLD: 0.5 % (ref 0–1.9)
BILIRUB SERPL-MCNC: NORMAL MG/DL
BLD GP AB SCN CELLS X3 SERPL QL: ABNORMAL
BLOOD GROUP ANTIBODIES SERPL: NORMAL
BLOOD URINE, POC: NORMAL
COLOR, POC UA: YELLOW
DIFFERENTIAL METHOD BLD: NORMAL
EOSINOPHIL # BLD AUTO: 0.1 K/UL (ref 0–0.5)
EOSINOPHIL NFR BLD: 0.9 % (ref 0–8)
ERYTHROCYTE [DISTWIDTH] IN BLOOD BY AUTOMATED COUNT: 13.1 % (ref 11.5–14.5)
GLUCOSE UR QL STRIP: NORMAL
HCT VFR BLD AUTO: 37.4 % (ref 37–48.5)
HGB BLD-MCNC: 12.2 G/DL (ref 12–16)
IMM GRANULOCYTES # BLD AUTO: 0.04 K/UL (ref 0–0.04)
IMM GRANULOCYTES NFR BLD AUTO: 0.4 % (ref 0–0.5)
KETONES UR QL STRIP: NORMAL
LEUKOCYTE ESTERASE URINE, POC: NORMAL
LYMPHOCYTES # BLD AUTO: 2.7 K/UL (ref 1–4.8)
LYMPHOCYTES NFR BLD: 29 % (ref 18–48)
MCH RBC QN AUTO: 29.4 PG (ref 27–31)
MCHC RBC AUTO-ENTMCNC: 32.6 G/DL (ref 32–36)
MCV RBC AUTO: 90 FL (ref 82–98)
MONOCYTES # BLD AUTO: 0.7 K/UL (ref 0.3–1)
MONOCYTES NFR BLD: 7.7 % (ref 4–15)
NEUTROPHILS # BLD AUTO: 5.7 K/UL (ref 1.8–7.7)
NEUTROPHILS NFR BLD: 61.5 % (ref 38–73)
NITRITE, POC UA: NORMAL
NRBC BLD-RTO: 0 /100 WBC
PH, POC UA: 7
PLATELET # BLD AUTO: 230 K/UL (ref 150–450)
PMV BLD AUTO: 10.6 FL (ref 9.2–12.9)
PROTEIN, POC: NORMAL
RBC # BLD AUTO: 4.15 M/UL (ref 4–5.4)
SPECIFIC GRAVITY, POC UA: 1.01
SPECIMEN OUTDATE: ABNORMAL
UROBILINOGEN, POC UA: NORMAL
WBC # BLD AUTO: 9.33 K/UL (ref 3.9–12.7)

## 2024-03-06 PROCEDURE — 81002 URINALYSIS NONAUTO W/O SCOPE: CPT

## 2024-03-06 PROCEDURE — 59025 FETAL NON-STRESS TEST: CPT | Mod: 26,,, | Performed by: OBSTETRICS & GYNECOLOGY

## 2024-03-06 PROCEDURE — 87081 CULTURE SCREEN ONLY: CPT

## 2024-03-06 PROCEDURE — 85025 COMPLETE CBC W/AUTO DIFF WBC: CPT

## 2024-03-06 PROCEDURE — 99999 PR PBB SHADOW E&M-EST. PATIENT-LVL II: CPT | Mod: PBBFAC,,,

## 2024-03-06 PROCEDURE — 63600175 PHARM REV CODE 636 W HCPCS

## 2024-03-06 PROCEDURE — 11000001 HC ACUTE MED/SURG PRIVATE ROOM

## 2024-03-06 PROCEDURE — 4A1HXCZ MONITORING OF PRODUCTS OF CONCEPTION, CARDIAC RATE, EXTERNAL APPROACH: ICD-10-PCS | Performed by: OBSTETRICS & GYNECOLOGY

## 2024-03-06 PROCEDURE — 86870 RBC ANTIBODY IDENTIFICATION: CPT

## 2024-03-06 PROCEDURE — 25000003 PHARM REV CODE 250

## 2024-03-06 PROCEDURE — 76816 OB US FOLLOW-UP PER FETUS: CPT | Mod: PBBFAC | Performed by: OBSTETRICS & GYNECOLOGY

## 2024-03-06 PROCEDURE — 86850 RBC ANTIBODY SCREEN: CPT

## 2024-03-06 PROCEDURE — 99283 EMERGENCY DEPT VISIT LOW MDM: CPT | Mod: 25,,, | Performed by: OBSTETRICS & GYNECOLOGY

## 2024-03-06 PROCEDURE — 99285 EMERGENCY DEPT VISIT HI MDM: CPT | Mod: 25,27

## 2024-03-06 PROCEDURE — 72200004 HC VAGINAL DELIVERY LEVEL I

## 2024-03-06 PROCEDURE — 99213 OFFICE O/P EST LOW 20 MIN: CPT | Mod: TH,S$PBB,25,

## 2024-03-06 PROCEDURE — 99212 OFFICE O/P EST SF 10 MIN: CPT | Mod: PBBFAC,TH,25

## 2024-03-06 PROCEDURE — 59025 FETAL NON-STRESS TEST: CPT | Mod: 26,,,

## 2024-03-06 PROCEDURE — 59409 OBSTETRICAL CARE: CPT | Mod: AT,,,

## 2024-03-06 PROCEDURE — 59025 FETAL NON-STRESS TEST: CPT

## 2024-03-06 PROCEDURE — 72100002 HC LABOR CARE, 1ST 8 HOURS

## 2024-03-06 RX ORDER — OXYTOCIN/RINGER'S LACTATE 30/500 ML
95 PLASTIC BAG, INJECTION (ML) INTRAVENOUS ONCE AS NEEDED
Status: DISCONTINUED | OUTPATIENT
Start: 2024-03-06 | End: 2024-03-07

## 2024-03-06 RX ORDER — MISOPROSTOL 200 UG/1
800 TABLET ORAL ONCE AS NEEDED
Status: DISCONTINUED | OUTPATIENT
Start: 2024-03-06 | End: 2024-03-08 | Stop reason: HOSPADM

## 2024-03-06 RX ORDER — DIPHENOXYLATE HYDROCHLORIDE AND ATROPINE SULFATE 2.5; .025 MG/1; MG/1
2 TABLET ORAL EVERY 6 HOURS PRN
Status: DISCONTINUED | OUTPATIENT
Start: 2024-03-06 | End: 2024-03-07

## 2024-03-06 RX ORDER — ONDANSETRON 8 MG/1
8 TABLET, ORALLY DISINTEGRATING ORAL EVERY 8 HOURS PRN
Status: DISCONTINUED | OUTPATIENT
Start: 2024-03-06 | End: 2024-03-07

## 2024-03-06 RX ORDER — OXYTOCIN 10 [USP'U]/ML
10 INJECTION, SOLUTION INTRAMUSCULAR; INTRAVENOUS ONCE AS NEEDED
Status: DISCONTINUED | OUTPATIENT
Start: 2024-03-06 | End: 2024-03-07

## 2024-03-06 RX ORDER — CARBOPROST TROMETHAMINE 250 UG/ML
250 INJECTION, SOLUTION INTRAMUSCULAR
Status: DISCONTINUED | OUTPATIENT
Start: 2024-03-06 | End: 2024-03-08 | Stop reason: HOSPADM

## 2024-03-06 RX ORDER — LIDOCAINE HYDROCHLORIDE 10 MG/ML
10 INJECTION INFILTRATION; PERINEURAL ONCE AS NEEDED
Status: DISCONTINUED | OUTPATIENT
Start: 2024-03-06 | End: 2024-03-08 | Stop reason: HOSPADM

## 2024-03-06 RX ORDER — CARBOPROST TROMETHAMINE 250 UG/ML
250 INJECTION, SOLUTION INTRAMUSCULAR
Status: DISCONTINUED | OUTPATIENT
Start: 2024-03-06 | End: 2024-03-07

## 2024-03-06 RX ORDER — METHYLERGONOVINE MALEATE 0.2 MG/ML
200 INJECTION INTRAVENOUS ONCE AS NEEDED
Status: DISCONTINUED | OUTPATIENT
Start: 2024-03-06 | End: 2024-03-07

## 2024-03-06 RX ORDER — TRANEXAMIC ACID 10 MG/ML
1000 INJECTION, SOLUTION INTRAVENOUS EVERY 30 MIN PRN
Status: DISCONTINUED | OUTPATIENT
Start: 2024-03-06 | End: 2024-03-07

## 2024-03-06 RX ORDER — SODIUM CHLORIDE 0.9 % (FLUSH) 0.9 %
10 SYRINGE (ML) INJECTION
Status: DISCONTINUED | OUTPATIENT
Start: 2024-03-06 | End: 2024-03-07

## 2024-03-06 RX ORDER — SODIUM CHLORIDE, SODIUM LACTATE, POTASSIUM CHLORIDE, CALCIUM CHLORIDE 600; 310; 30; 20 MG/100ML; MG/100ML; MG/100ML; MG/100ML
INJECTION, SOLUTION INTRAVENOUS CONTINUOUS
Status: DISCONTINUED | OUTPATIENT
Start: 2024-03-06 | End: 2024-03-07

## 2024-03-06 RX ORDER — MISOPROSTOL 200 UG/1
800 TABLET ORAL ONCE AS NEEDED
Status: DISCONTINUED | OUTPATIENT
Start: 2024-03-06 | End: 2024-03-07

## 2024-03-06 RX ORDER — HYDROXYZINE PAMOATE 25 MG/1
50 CAPSULE ORAL EVERY 6 HOURS PRN
Qty: 40 CAPSULE | Refills: 0 | Status: SHIPPED | OUTPATIENT
Start: 2024-03-06 | End: 2024-03-06

## 2024-03-06 RX ORDER — OXYTOCIN/RINGER'S LACTATE 30/500 ML
334 PLASTIC BAG, INJECTION (ML) INTRAVENOUS ONCE
Status: COMPLETED | OUTPATIENT
Start: 2024-03-06 | End: 2024-03-07

## 2024-03-06 RX ORDER — SIMETHICONE 80 MG
1 TABLET,CHEWABLE ORAL 4 TIMES DAILY PRN
Status: DISCONTINUED | OUTPATIENT
Start: 2024-03-06 | End: 2024-03-07

## 2024-03-06 RX ORDER — CALCIUM CARBONATE 200(500)MG
500 TABLET,CHEWABLE ORAL 3 TIMES DAILY PRN
Status: DISCONTINUED | OUTPATIENT
Start: 2024-03-06 | End: 2024-03-08 | Stop reason: HOSPADM

## 2024-03-06 RX ORDER — METHYLERGONOVINE MALEATE 0.2 MG/ML
200 INJECTION INTRAVENOUS ONCE AS NEEDED
Status: DISCONTINUED | OUTPATIENT
Start: 2024-03-06 | End: 2024-03-08 | Stop reason: HOSPADM

## 2024-03-06 RX ORDER — DIPHENOXYLATE HYDROCHLORIDE AND ATROPINE SULFATE 2.5; .025 MG/1; MG/1
2 TABLET ORAL EVERY 6 HOURS PRN
Status: DISCONTINUED | OUTPATIENT
Start: 2024-03-06 | End: 2024-03-08 | Stop reason: HOSPADM

## 2024-03-06 RX ORDER — OXYTOCIN/RINGER'S LACTATE 30/500 ML
334 PLASTIC BAG, INJECTION (ML) INTRAVENOUS ONCE AS NEEDED
Status: DISCONTINUED | OUTPATIENT
Start: 2024-03-06 | End: 2024-03-07

## 2024-03-06 RX ORDER — ONDANSETRON 8 MG/1
8 TABLET, ORALLY DISINTEGRATING ORAL EVERY 8 HOURS PRN
Status: DISCONTINUED | OUTPATIENT
Start: 2024-03-06 | End: 2024-03-08 | Stop reason: HOSPADM

## 2024-03-06 RX ORDER — OXYTOCIN/RINGER'S LACTATE 30/500 ML
95 PLASTIC BAG, INJECTION (ML) INTRAVENOUS ONCE
Status: DISCONTINUED | OUTPATIENT
Start: 2024-03-06 | End: 2024-03-07

## 2024-03-06 RX ORDER — SODIUM CHLORIDE 9 MG/ML
INJECTION, SOLUTION INTRAVENOUS
Status: DISCONTINUED | OUTPATIENT
Start: 2024-03-06 | End: 2024-03-07

## 2024-03-06 RX ADMIN — SODIUM CHLORIDE, POTASSIUM CHLORIDE, SODIUM LACTATE AND CALCIUM CHLORIDE: 600; 310; 30; 20 INJECTION, SOLUTION INTRAVENOUS at 05:03

## 2024-03-06 RX ADMIN — Medication 334 MILLI-UNITS/MIN: at 08:03

## 2024-03-06 RX ADMIN — DEXTROSE MONOHYDRATE 5 MILLION UNITS: 5 INJECTION INTRAVENOUS at 05:03

## 2024-03-06 NOTE — ASSESSMENT & PLAN NOTE
Rhogam given after vaginal bleeding 17 + weeks. No previa.  Rhogam repeated 01/04/24 at 28w  Rhogam PP depending on baby's cord blood result

## 2024-03-06 NOTE — HOSPITAL COURSE
3/7/24  CBC, T&S on admit  Social work consult for planned adoption  Plans unmedicated birth  1 hr labor rule out- 6/80/-1 @ 1630  Admit for normal labor  PCN for GBS unknown  Plan: birth in ABC or L&D, baby will transition with patient per patient request and then join prospective adoptive parents on MBU  Dr. Sharp notified and aware    3/7/24 - Postpartum Day 1. Infant rooming in and breast feeding. Pt doing well.  3/8/24 - Postpartum Day 2. Infant room in and breastfeeding. Saw social work yesterday. Doing well without concerns. Discharge today per patient request.

## 2024-03-06 NOTE — PROGRESS NOTES
18 y.o.,  at 37w5d presents today for routine OB appt.  Denies LOF or VB. Reports good FM. Having painful contractions every 7-8 mins  TW lbs     PHYSICAL EXAM  GENERAL: No acute distress  HEENT: Normocephalic, atraumatic  NEURO: Alert and oriented x3  PULMONARY: Non-labored respiration; no tachypnea  ABD: Soft, gravid, nontender.    ASSESSMENT AND PLAN  37 weeks gestation of pregnancy  -     Strep B Screen, Vaginal / Rectal    History of depression    Pregnancy with adoption planned, currently in third trimester    Rh negative, antepartum, third trimester    Uterine contractions at greater than 20 weeks of gestation  -     hydrOXYzine pamoate (VISTARIL) 25 MG Cap; Take 2 capsules (50 mg total) by mouth every 6 (six) hours as needed (therapeutic rest).  Dispense: 40 capsule; Refill: 0    Fatigue during pregnancy in third trimester  -     hydrOXYzine pamoate (VISTARIL) 25 MG Cap; Take 2 capsules (50 mg total) by mouth every 6 (six) hours as needed (therapeutic rest).  Dispense: 40 capsule; Refill: 0        Delivery consents signed  GBS today  Reviewed repeat 3rd trimester HIV/RPR neg  Education regarding labor precautions.  Reviewed warning signs, normal FM, and how/when to call.    SVE today -/-1. To [facility] for labor rule-out    Follow-up: 1 week, call or present sooner PRN    Neha Cooper CNM/FERMIN

## 2024-03-06 NOTE — SUBJECTIVE & OBJECTIVE
Obstetric HPI:  Patient reports Frequency: Every 7 minutes contractions, active fetal movement, No vaginal bleeding , No loss of fluid     This pregnancy has been complicated by  labor at 29 weeks, anxiety, depression, Rh negative status, and GBS unknown    OB History    Para Term  AB Living   4 1 1 0 2 1   SAB IAB Ectopic Multiple Live Births   1 0 1 0 1      # Outcome Date GA Lbr Fran/2nd Weight Sex Delivery Anes PTL Lv   4 Current            3 Ectopic 2023     ECTOPIC      2 Term 22 40w0d  2.637 kg (5 lb 13 oz) F Vag-Spont  N HERMANN   1 SAB      SAB         Complications: Blighted ovum, History of D&C     Past Medical History:   Diagnosis Date    Behavior problem in childhood     COVID-19 2021    Ectopic pregnancy 2023    Suicidal ideation      Past Surgical History:   Procedure Laterality Date    BACK SURGERY      Rods in place, had issues with previous epidural    DIAGNOSTIC LAPAROSCOPY N/A 2023    Procedure: LAPAROSCOPY, DIAGNOSTIC;  Surgeon: Juana Fung MD;  Location: [facility];  Service: OB/GYN;  Laterality: N/A;    DILATION AND CURETTAGE OF UTERUS         PTA Medications   Medication Sig    prenatal vit no.124/iron/folic (PRENATAL VITAMIN ORAL) Take by mouth.       Review of patient's allergies indicates:  No Known Allergies     Family History       Problem Relation (Age of Onset)    No Known Problems Father, Mother    Throat cancer Paternal Grandfather          Tobacco Use    Smoking status: Never    Smokeless tobacco: Never   Substance and Sexual Activity    Alcohol use: No    Drug use: No    Sexual activity: Yes     Partners: Male     Birth control/protection: Condom     Review of Systems   Eyes:  Negative for visual disturbance.   Gastrointestinal:  Positive for abdominal pain. Negative for nausea and vomiting.   Genitourinary:  Positive for vaginal discharge. Negative for vaginal dryness and vaginal odor.        Bloody show   Neurological:  Negative  for headaches.      Objective:     Vital Signs (Most Recent):  Temp: 97.8 °F (36.6 °C) (03/06/24 1722)  Pulse: 83 (03/06/24 1720)  Resp: 18 (03/06/24 1720)  BP: 110/69 (03/06/24 1720)  SpO2: 98 % (03/06/24 1720) Vital Signs (24h Range):  Temp:  [97.8 °F (36.6 °C)] 97.8 °F (36.6 °C)  Pulse:  [78-83] 83  Resp:  [18] 18  SpO2:  [98 %] 98 %  BP: (100-110)/(66-69) 110/69     Weight: 59.9 kg (132 lb)  Body mass index is 24.94 kg/m².    FHT: 130 Cat 1 (reassuring)  TOCO:  Q 3-8 minutes     Physical Exam     Cervix:  Dilation:  6  Effacement:  80  Station: -1  Presentation: Vertex     Significant Labs:  Lab Results   Component Value Date    GROUPTRH O NEG 01/10/2024    HEPBSAG Non-reactive 11/06/2023    STREPBCULT No Group B Streptococcus isolated 01/10/2024       I have personallly reviewed all pertinent lab results from the last 24 hours.  Recent Lab Results         03/06/24  1652   03/06/24  1529        Color, UA   Yellow       Bilirubin, POC   neg       Spec Grav UA   1.015       pH, UA   7       Protein, POC   trace       Urobilinogen, UA   normal       Nitrite, UA   neg       Baso # 0.05         Basophil % 0.5         Differential Method Automated         Eos # 0.1         Eos % 0.9         Glucose, UA   normal       Gran # (ANC) 5.7         Gran % 61.5         Hematocrit 37.4         Hemoglobin 12.2         Immature Grans (Abs) 0.04  Comment: Mild elevation in immature granulocytes is non specific and   can be seen in a variety of conditions including stress response,   acute inflammation, trauma and pregnancy. Correlation with other   laboratory and clinical findings is essential.           Immature Granulocytes 0.4         Ketones, UA   neg       Lymph # 2.7         Lymph % 29.0         MCH 29.4         MCHC 32.6         MCV 90         Mono # 0.7         Mono % 7.7         MPV 10.6         nRBC 0         Platelet Count 230         RBC 4.15         Blood, UA   trace       RDW 13.1         WBC, UA   +       WBC  9.33

## 2024-03-06 NOTE — ED PROVIDER NOTES
Encounter Date: 3/6/2024       History     Chief Complaint   Patient presents with    Contractions     Yaquelin Kayleen Cochran is a 18 y.o. female  at 37w5d with Estimated Date of Delivery: 3/22/24 who presents to [facility]. Expecting a boy! She is planning adoption.    Reports regular uterine contractions since this morning. They are now 7-8 minutes apart and increasing in intensity and duration.  moderate contractions, active fetal movement, No vaginal bleeding , No loss of fluid.     This pregnancy has been complicated by:  Patient Active Problem List:     Anxiety     Rh negative, antepartum, third trimester     History of ectopic pregnancy     Pregnancy with adoption planned, currently in third trimester     History of depression     Abnormal chromosomal and genetic finding on  screening of mother      Abnormal quad screen     Premature dilation of cervix during pregnancy, antepartum       Presentation: Cephalic by BSUS  Estimated Fetal Weight: 6 lb 8 oz     Birth Center Risk Assessment: 0- Meets birth center guidelines    CNM management in ABC  CNM management on L&D  Consultation with OB to develop  plan of care  Collaborative CNM/OB management with delivery on L&D   4-   Permanent referral of care to MD        Review of patient's allergies indicates:  No Known Allergies  Past Medical History:   Diagnosis Date    Behavior problem in childhood     COVID-19 2021    Ectopic pregnancy 2023    Suicidal ideation      Past Surgical History:   Procedure Laterality Date    BACK SURGERY  2016    Rods in place, had issues with previous epidural    DIAGNOSTIC LAPAROSCOPY N/A 2023    Procedure: LAPAROSCOPY, DIAGNOSTIC;  Surgeon: Juana Fung MD;  Location: [facility];  Service: OB/GYN;  Laterality: N/A;    DILATION AND CURETTAGE OF UTERUS       Family History   Problem Relation Age of Onset    Throat cancer Paternal Grandfather     No Known Problems Father     No Known Problems Mother     Breast  cancer Neg Hx     Diabetes Neg Hx     Colon cancer Neg Hx     Ovarian cancer Neg Hx      Social History     Tobacco Use    Smoking status: Never    Smokeless tobacco: Never   Substance Use Topics    Alcohol use: No    Drug use: No     Review of Systems   Constitutional:  Negative for fever.   Eyes:  Negative for visual disturbance.   Gastrointestinal:  Positive for abdominal pain. Negative for nausea and vomiting.   Genitourinary:  Positive for vaginal discharge. Negative for vaginal bleeding and vaginal pain.       Physical Exam     Initial Vitals   BP Pulse Resp Temp SpO2   03/06/24 1720 03/06/24 1720 03/06/24 1720 03/06/24 1722 03/06/24 1720   110/69 83 18 97.8 °F (36.6 °C) 98 %      MAP       --              Temp:  [97.8 °F (36.6 °C)] 97.8 °F (36.6 °C)  Pulse:  [78-83] 83  Resp:  [18] 18  SpO2:  [98 %] 98 %  BP: (100-110)/(66-69) 110/69    Physical Exam    Vitals reviewed.  Constitutional: She appears well-developed.   HENT:   Head: Normocephalic.   Eyes: Conjunctivae are normal.   Neck:   Normal range of motion.  Pulmonary/Chest: No respiratory distress.   Abdominal: Abdomen is soft.   Gravid, moderate ctx   Musculoskeletal:         General: Normal range of motion.      Cervical back: Normal range of motion.     Neurological: She is alert.   Skin: Skin is warm and dry.   Psychiatric: She has a normal mood and affect. Her behavior is normal. Judgment and thought content normal.     OB LABOR EXAM:       Method: Sterile vaginal exam per MD.       Dilatation: 6.   Station: -1.   Effacement: 80%.             ED Course   Obtain Fetal nonstress test (NST)    Date/Time: 3/6/2024 5:48 PM    Performed by: Neha Cooper CNM  Authorized by: Neha Cooper CNM    Nonstress Test:     Variability:  6-25 BPM    Decelerations:  None    Accelerations:  15 bpm    Acoustic Stimulator: No      Baseline:  130    Uterine Irritability: No      Contractions:  Regular    Contraction Frequency:  3-8  Biophysical Profile:     Nonstress  Test Interpretation: reactive      Overall Impression:  Reassuring  Post-procedure:     Patient tolerance:  Patient tolerated the procedure well with no immediate complications    Labs Reviewed   POCT URINALYSIS, DIPSTICK OR TABLET REAGENT, AUTOMATED, WITH MICROSCOP          Imaging Results    None          Medications   lactated ringers bolus 1,000 mL (has no administration in time range)   lactated ringers bolus 500 mL (has no administration in time range)   lactated ringers infusion ( Intravenous New Bag 3/6/24 9017)   0.9%  NaCl infusion (has no administration in time range)   oxytocin 30 units in 500 mL lactated ringers infusion (non-titrating) (has no administration in time range)   oxytocin 30 units in 500 mL lactated ringers infusion (non-titrating) (has no administration in time range)   ondansetron disintegrating tablet 8 mg (has no administration in time range)   calcium carbonate 200 mg calcium (500 mg) chewable tablet 500 mg (has no administration in time range)   simethicone chewable tablet 80 mg (has no administration in time range)   LIDOcaine HCL 10 mg/ml (1%) injection 10 mL (has no administration in time range)   oxytocin 30 units in 500 mL lactated ringers infusion (non-titrating) (has no administration in time range)   oxytocin 30 units in 500 mL lactated ringers infusion (non-titrating) (has no administration in time range)   oxytocin injection 10 Units (has no administration in time range)   miSOPROStoL tablet 800 mcg (has no administration in time range)   miSOPROStoL tablet 800 mcg (has no administration in time range)   methylergonovine injection 200 mcg (has no administration in time range)   carboprost injection 250 mcg (has no administration in time range)   tranexamic acid in NaCl,iso-os IVPB 1,000 mg (has no administration in time range)   diphenoxylate-atropine 2.5-0.025 mg per tablet 2 tablet (has no administration in time range)   penicillin G potassium 3 Million Units in dextrose 5  % 100 mL IVPB (has no administration in time range)   penicillin G potassium 5 Million Units in dextrose 5 % in water (D5W) 100 mL IVPB (MB+) (5 Million Units Intravenous New Bag 3/6/24 8217)     Medical Decision Making  CBC, T&S on admit  Social work consult for planned adoption  Plans unmedicated birth  1 hr labor rule out-  @ 1630  Admit for normal labor  PCN for GBS unknown  Plan: birth in ABC or L&D, baby will transition with patient per patient request and then join prospective adoptive parents on MBU  Dr. Sharp notified and aware    Amount and/or Complexity of Data Reviewed  Labs: ordered.    Risk  OTC drugs.  Prescription drug management.              Attending Attestation:   Physician Attestation Statement for Resident:  As the supervising MD   Physician Attestation Statement: I have personally seen and examined this patient.   I agree with the above history.  -:   As the supervising MD I agree with the above PE.     As the supervising MD I agree with the above treatment, course, plan, and disposition.   -: I agree with the above edited resident note. Pt seen and examined, chart and labs reviewed.    Briefly, 17 yo  at 37w5d presenting in active labor. Afebrile, VSS. NST Cat I reactive. [place] with regular ctx. SVE , intact. GBS unknown. Planning delivery at [facility]. Pt is CNTP, adoptive parents to room in with baby on L&D for transition then MBU.     All questions answered. Admit to [facility] for further mgmt per CNM.     Arlyn Sharp MD  OB Hospitalist  3/6/2024     I was personally present during the critical portions of the procedure(s) performed by the resident and was immediately available in the ED to provide services and assistance as needed during the entire procedure.  I have reviewed and agree with the residents interpretation of the following: lab data.  I have reviewed the following: old records at this facility.                                       Clinical Impression:  Final  diagnoses:  [Z3A.37] 37 weeks gestation of pregnancy (Primary)  [O47.9] Uterine contractions  [O80, Z37.9] Normal labor          ED Disposition Condition    Send to [facility]&Neha Guajardo CNM  03/06/24 0555       Arlyn Sharp MD  03/06/24 1883

## 2024-03-06 NOTE — HPI
Yaquelin Cochran is a 18 y.o. female  at 37w5d with Estimated Date of Delivery: 3/22/24 who presents to [facility]. Expecting a boy! She is planning adoption.     Reports regular uterine contractions since this morning. They are now 7-8 minutes apart and increasing in intensity and duration.  moderate contractions, active fetal movement, No vaginal bleeding , No loss of fluid.      This pregnancy has been complicated by:  Patient Active Problem List:     Anxiety     Rh negative, antepartum, third trimester     History of ectopic pregnancy     Pregnancy with adoption planned, currently in third trimester     History of depression     Abnormal chromosomal and genetic finding on  screening of mother      Abnormal quad screen     Premature dilation of cervix during pregnancy, antepartum        Presentation:   Cephalic by BSUS  Estimated Fetal Weight: 6 lb 8 oz      Birth Center Risk Assessment: 0- Meets birth center guidelines     CNM management in ABC  CNM management on L&D  Consultation with OB to develop  plan of care  Collaborative CNM/OB management with delivery on L&D              4-   Permanent referral of care to MD

## 2024-03-06 NOTE — H&P
[facility]  Obstetrics  History & Physical    Patient Name: Yaquelin Cochran  MRN: [id number]  Admission Date: 3/6/2024  Primary Care Provider: Arin, Primary Doctor    Subjective:     Principal Problem:Normal labor    History of Present Illness:  Yaquelin Cochran is a 18 y.o. female  at 37w5d with Estimated Date of Delivery: 3/22/24 who presents to [facility]. Expecting a boy! She is planning adoption.     Reports regular uterine contractions since this morning. They are now 7-8 minutes apart and increasing in intensity and duration.  moderate contractions, active fetal movement, No vaginal bleeding , No loss of fluid.      This pregnancy has been complicated by:  Patient Active Problem List:     Anxiety     Rh negative, antepartum, third trimester     History of ectopic pregnancy     Pregnancy with adoption planned, currently in third trimester     History of depression     Abnormal chromosomal and genetic finding on  screening of mother      Abnormal quad screen     Premature dilation of cervix during pregnancy, antepartum        Presentation:   Cephalic by BSUS  Estimated Fetal Weight: 6 lb 8 oz      Birth Center Risk Assessment: 0- Meets birth center guidelines     CNM management in ABC  CNM management on L&D  Consultation with OB to develop  plan of care  Collaborative CNM/OB management with delivery on L&D              4-   Permanent referral of care to MD    Obstetric HPI:  Patient reports Frequency: Every 7 minutes contractions, active fetal movement, No vaginal bleeding , No loss of fluid     This pregnancy has been complicated by  labor at 29 weeks, anxiety, depression, Rh negative status, and GBS unknown    OB History    Para Term  AB Living   4 1 1 0 2 1   SAB IAB Ectopic Multiple Live Births   1 0 1 0 1      # Outcome Date GA Lbr Frna/2nd Weight Sex Delivery Anes PTL Lv   4 Current            3 Ectopic 2023     ECTOPIC      2 Term 22  40w0d  2.637 kg (5 lb 13 oz) F Vag-Spont  N HERMANN   1 SAB 2020     SAB         Complications: Blighted ovum, History of D&C     Past Medical History:   Diagnosis Date    Behavior problem in childhood     COVID-19 08/08/2021    Ectopic pregnancy 5/24/2023    Suicidal ideation      Past Surgical History:   Procedure Laterality Date    BACK SURGERY  2016    Rods in place, had issues with previous epidural    DIAGNOSTIC LAPAROSCOPY N/A 05/29/2023    Procedure: LAPAROSCOPY, DIAGNOSTIC;  Surgeon: Juana Fung MD;  [facility]: [facility];  Service: OB/GYN;  Laterality: N/A;    DILATION AND CURETTAGE OF UTERUS         PTA Medications   Medication Sig    prenatal vit no.124/iron/folic (PRENATAL VITAMIN ORAL) Take by mouth.       Review of patient's allergies indicates:  No Known Allergies     Family History       Problem Relation (Age of Onset)    No Known Problems Father, Mother    Throat cancer Paternal Grandfather          Tobacco Use    Smoking status: Never    Smokeless tobacco: Never   Substance and Sexual Activity    Alcohol use: No    Drug use: No    Sexual activity: Yes     Partners: Male     Birth control/protection: Condom     Review of Systems   Eyes:  Negative for visual disturbance.   Gastrointestinal:  Positive for abdominal pain. Negative for nausea and vomiting.   Genitourinary:  Positive for vaginal discharge. Negative for vaginal dryness and vaginal odor.        Bloody show   Neurological:  Negative for headaches.      Objective:     Vital Signs (Most Recent):  Temp: 97.8 °F (36.6 °C) (03/06/24 1722)  Pulse: 83 (03/06/24 1720)  Resp: 18 (03/06/24 1720)  BP: 110/69 (03/06/24 1720)  SpO2: 98 % (03/06/24 1720) Vital Signs (24h Range):  Temp:  [97.8 °F (36.6 °C)] 97.8 °F (36.6 °C)  Pulse:  [78-83] 83  Resp:  [18] 18  SpO2:  [98 %] 98 %  BP: (100-110)/(66-69) 110/69     Weight: 59.9 kg (132 lb)  Body mass index is 24.94 kg/m².    FHT: 130 Cat 1 (reassuring)  TOCO:  Q 3-8 minutes     Physical Exam      Cervix:  Dilation:  6  Effacement:  80  Station: -1  Presentation: Vertex     Significant Labs:  Lab Results   Component Value Date    GROUPTRH O NEG 01/10/2024    HEPBSAG Non-reactive 2023    STREPBCULT No Group B Streptococcus isolated 01/10/2024       I have personallly reviewed all pertinent lab results from the last 24 hours.  Recent Lab Results         24  1652   24  1529        Color, UA   Yellow       Bilirubin, POC   neg       Spec Grav UA   1.015       pH, UA   7       Protein, POC   trace       Urobilinogen, UA   normal       Nitrite, UA   neg       Baso # 0.05         Basophil % 0.5         Differential Method Automated         Eos # 0.1         Eos % 0.9         Glucose, UA   normal       Gran # (ANC) 5.7         Gran % 61.5         Hematocrit 37.4         Hemoglobin 12.2         Immature Grans (Abs) 0.04  Comment: Mild elevation in immature granulocytes is non specific and   can be seen in a variety of conditions including stress response,   acute inflammation, trauma and pregnancy. Correlation with other   laboratory and clinical findings is essential.           Immature Granulocytes 0.4         Ketones, UA   neg       Lymph # 2.7         Lymph % 29.0         MCH 29.4         MCHC 32.6         MCV 90         Mono # 0.7         Mono % 7.7         MPV 10.6         nRBC 0         Platelet Count 230         RBC 4.15         Blood, UA   trace       RDW 13.1         WBC, UA   +       WBC 9.33               Assessment/Plan:     18 y.o. female  at 37w5d for:    * Normal labor  /-1 @ 1630  Plan AROM at next SVE    History of depression  Plan 2 week mood check    Pregnancy with adoption planned, currently in third trimester  Social work consult    Rh negative, antepartum, third trimester  Rhogam given after vaginal bleeding 17 + weeks. No previa.  Rhogam repeated 24 at 28w  Rhogam PP depending on baby's cord blood result    Anxiety  Plan 2 wk mood check        Neha Cooper  CNM  Obstetrics  [facility]

## 2024-03-07 LAB
ABO + RH BLD: ABNORMAL
BASOPHILS # BLD AUTO: 0.04 K/UL (ref 0–0.2)
BASOPHILS NFR BLD: 0.3 % (ref 0–1.9)
BLD GP AB SCN CELLS X3 SERPL QL: ABNORMAL
DIFFERENTIAL METHOD BLD: ABNORMAL
EOSINOPHIL # BLD AUTO: 0 K/UL (ref 0–0.5)
EOSINOPHIL NFR BLD: 0.2 % (ref 0–8)
ERYTHROCYTE [DISTWIDTH] IN BLOOD BY AUTOMATED COUNT: 13 % (ref 11.5–14.5)
FETAL CELL SCN BLD QL ROSETTE: NORMAL
HCT VFR BLD AUTO: 33.8 % (ref 37–48.5)
HGB BLD-MCNC: 11 G/DL (ref 12–16)
IMM GRANULOCYTES # BLD AUTO: 0.06 K/UL (ref 0–0.04)
IMM GRANULOCYTES NFR BLD AUTO: 0.5 % (ref 0–0.5)
LYMPHOCYTES # BLD AUTO: 2.2 K/UL (ref 1–4.8)
LYMPHOCYTES NFR BLD: 17.5 % (ref 18–48)
MCH RBC QN AUTO: 29.7 PG (ref 27–31)
MCHC RBC AUTO-ENTMCNC: 32.5 G/DL (ref 32–36)
MCV RBC AUTO: 91 FL (ref 82–98)
MONOCYTES # BLD AUTO: 0.9 K/UL (ref 0.3–1)
MONOCYTES NFR BLD: 6.7 % (ref 4–15)
NEUTROPHILS # BLD AUTO: 9.4 K/UL (ref 1.8–7.7)
NEUTROPHILS NFR BLD: 74.8 % (ref 38–73)
NRBC BLD-RTO: 0 /100 WBC
PLATELET # BLD AUTO: 198 K/UL (ref 150–450)
PMV BLD AUTO: 10.6 FL (ref 9.2–12.9)
RBC # BLD AUTO: 3.7 M/UL (ref 4–5.4)
WBC # BLD AUTO: 12.63 K/UL (ref 3.9–12.7)

## 2024-03-07 PROCEDURE — 85025 COMPLETE CBC W/AUTO DIFF WBC: CPT

## 2024-03-07 PROCEDURE — 11000001 HC ACUTE MED/SURG PRIVATE ROOM

## 2024-03-07 PROCEDURE — 36415 COLL VENOUS BLD VENIPUNCTURE: CPT

## 2024-03-07 PROCEDURE — 86901 BLOOD TYPING SEROLOGIC RH(D): CPT

## 2024-03-07 PROCEDURE — 25000003 PHARM REV CODE 250

## 2024-03-07 PROCEDURE — 85461 HEMOGLOBIN FETAL: CPT

## 2024-03-07 PROCEDURE — 99232 SBSQ HOSP IP/OBS MODERATE 35: CPT | Mod: ,,, | Performed by: ADVANCED PRACTICE MIDWIFE

## 2024-03-07 RX ORDER — IBUPROFEN 600 MG/1
600 TABLET ORAL EVERY 6 HOURS
Status: DISCONTINUED | OUTPATIENT
Start: 2024-03-07 | End: 2024-03-08 | Stop reason: HOSPADM

## 2024-03-07 RX ORDER — DIPHENHYDRAMINE HCL 25 MG
25 CAPSULE ORAL EVERY 4 HOURS PRN
Status: DISCONTINUED | OUTPATIENT
Start: 2024-03-07 | End: 2024-03-08 | Stop reason: HOSPADM

## 2024-03-07 RX ORDER — OXYTOCIN/RINGER'S LACTATE 30/500 ML
95 PLASTIC BAG, INJECTION (ML) INTRAVENOUS ONCE
Status: DISCONTINUED | OUTPATIENT
Start: 2024-03-07 | End: 2024-03-07

## 2024-03-07 RX ORDER — HYDROCORTISONE 25 MG/G
CREAM TOPICAL 3 TIMES DAILY PRN
Status: DISCONTINUED | OUTPATIENT
Start: 2024-03-07 | End: 2024-03-08 | Stop reason: HOSPADM

## 2024-03-07 RX ORDER — ACETAMINOPHEN 325 MG/1
650 TABLET ORAL EVERY 6 HOURS SCHEDULED
Status: DISCONTINUED | OUTPATIENT
Start: 2024-03-07 | End: 2024-03-08 | Stop reason: HOSPADM

## 2024-03-07 RX ORDER — DIPHENHYDRAMINE HYDROCHLORIDE 50 MG/ML
25 INJECTION INTRAMUSCULAR; INTRAVENOUS EVERY 4 HOURS PRN
Status: DISCONTINUED | OUTPATIENT
Start: 2024-03-07 | End: 2024-03-08 | Stop reason: HOSPADM

## 2024-03-07 RX ORDER — PRENATAL WITH FERROUS FUM AND FOLIC ACID 3080; 920; 120; 400; 22; 1.84; 3; 20; 10; 1; 12; 200; 27; 25; 2 [IU]/1; [IU]/1; MG/1; [IU]/1; MG/1; MG/1; MG/1; MG/1; MG/1; MG/1; UG/1; MG/1; MG/1; MG/1; MG/1
1 TABLET ORAL DAILY
Status: DISCONTINUED | OUTPATIENT
Start: 2024-03-07 | End: 2024-03-08 | Stop reason: HOSPADM

## 2024-03-07 RX ORDER — SIMETHICONE 80 MG
1 TABLET,CHEWABLE ORAL EVERY 6 HOURS PRN
Status: DISCONTINUED | OUTPATIENT
Start: 2024-03-07 | End: 2024-03-08 | Stop reason: HOSPADM

## 2024-03-07 RX ORDER — DOCUSATE SODIUM 100 MG/1
200 CAPSULE, LIQUID FILLED ORAL 2 TIMES DAILY PRN
Status: DISCONTINUED | OUTPATIENT
Start: 2024-03-07 | End: 2024-03-08 | Stop reason: HOSPADM

## 2024-03-07 RX ADMIN — ACETAMINOPHEN 650 MG: 325 TABLET, FILM COATED ORAL at 05:03

## 2024-03-07 RX ADMIN — IBUPROFEN 600 MG: 600 TABLET, FILM COATED ORAL at 05:03

## 2024-03-07 RX ADMIN — IBUPROFEN 600 MG: 600 TABLET, FILM COATED ORAL at 06:03

## 2024-03-07 RX ADMIN — ACETAMINOPHEN 650 MG: 325 TABLET, FILM COATED ORAL at 06:03

## 2024-03-07 NOTE — PLAN OF CARE
Patient admitted from [facility]. POC reviewed with patient, verbalized understanding. Fundus firm and bleeding moderate. Pain well controlled with PO medications. Ambulating/voiding.

## 2024-03-07 NOTE — LACTATION NOTE
03/07/24 1240   Maternal Assessment   Breast Shape Bilateral:;round   Breast Density Bilateral:;soft   Areola Bilateral:;elastic   Nipples Bilateral:;everted   Maternal Infant Feeding   Maternal Preparation breast care;hand hygiene   Maternal Emotional State assist needed;tense   Pain with Feeding no   Latch Assistance no   Equipment Type   Breast Pump Type manual;other (see comments)  (gave pump rx)   Community Referrals   Community Referrals outpatient lactation program;support group;WIC (women, infants and children) program;pediatric care provider;public health department     LC to room: introduced self, extension on whiteboard. POC discussed, client stated that she plans to breastfeed infant while in-hospital, does not plan to BF or pump after discharge postpartum. Does state wishes to proceed with adoption process. MBU RN and charge RN notified.   Offered assist with latch, client accepted. Infant placed s2s and fell asleep, no cues noted. Feeding cues reviewed, client v/u to call LC once infant cueing to eat. All questions answered, client v/u to call LC when infant cueing to assist with latch.

## 2024-03-07 NOTE — PROGRESS NOTES
Upon admitting patient to mother baby, patient asked if she was able to hold infant and keep infant in the room with her. RN informed patient that infant can stay in the room with her for as long as she wishes. Patient states that her and FOB are now unsure if they would like to place the baby up for adoption. Patient states that adoptive parents are friends of hers. Patient states she has not spoken to adoptive parents yet. RN informed patient that she will be supported in whatever decision she and FOB make in regards to infant.     RN verified pediatrician that patient would like to use for infant. Patient states that the adoptive parents want to use [place] and that is why she gave Dr. Pace as pediatrician follow up. RN asked patient if that is the pediatrician that she would plan to use if she chooses to not place the baby for adoption and patient states that she will use the same MD that her daughter sees. Patient unsure of MD's [place] but [place] [facility] [place]

## 2024-03-07 NOTE — LACTATION NOTE
03/07/24 1610   Maternal Infant Feeding   Latch Assistance no   Community Referrals   Community Referrals outpatient lactation program;pediatric care provider;support group;public health department;WIC (women, infants and children) program     LC to room: discharge teaching completed, gave client manual pump and pump Rx for home. Handout on community resources and non-nursing engorgement given. Client plans to breastfeed in-hospital and decrease supply once discharged home. All questions answered, client v/u. LC asked if client wants to be seen by Lactation tomorrow, client said yes, MBU RN updated and lactation team.

## 2024-03-07 NOTE — PROGRESS NOTES
"LABOR NOTE    S:  Pt endorses 7/10 contractions every 7-10 mins, no complaints.     O: /84   Pulse 83   Temp 98.2 °F (36.8 °C) (Oral)   Resp 18   Ht 5' 1" (1.549 m)   Wt 59.9 kg (132 lb)   LMP 2023 (Approximate)   SpO2 99%   Breastfeeding No   BMI 24.94 kg/m²     GENERAL: Calm and appropriate affect  NEURO: Alert, oriented, normal speech  ABDOMEN: Nontender, Fundus palpates soft between UC's.  FHT: 132, no decreases  CTX: q 7-9 minutes  SVE: 780/-1, AROM copious clear fluid      ASSESSMENT:   18 y.o.  IUP at 37w5d, FHT reassuring per IA/ Cat 1    Patient Active Problem List   Diagnosis    Anxiety    Rh negative, antepartum, third trimester    History of ectopic pregnancy    Pregnancy with adoption planned, currently in third trimester    History of depression    Abnormal chromosomal and genetic finding on  screening of mother    Abnormal quad screen    Normal labor         PLAN:  Continue unmedicated labor  Recheck 2 hours or PRN  Anticipate entry to transition and         "

## 2024-03-07 NOTE — L&D DELIVERY NOTE
[facility]  Vaginal Delivery   Operative Note    SUMMARY   Patient describing strong urge to push. SVE 9/90/0 and reduced spontaneously to 10/100/+2 in one contraction.  Normal spontaneous vaginal delivery of live infant, was placed on mothers abdomen for skin to skin and bulb suctioning performed.  Infant delivered position SHANTELL over intact perineum.  Nuchal cord: No.    Spontaneous delivery of placenta and IV pitocin given noting good uterine tone.  Right labial laceration noted to be hemostatic, not repaired.  Patient tolerated delivery well. Sponge needle and lap counted correctly x2.    EBL:100    Indications:  (spontaneous vaginal delivery)  Pregnancy complicated by:   Patient Active Problem List   Diagnosis    Anxiety    Rh negative, delivered, current hospitalization    History of ectopic pregnancy    Pregnancy with adoption planned, currently in third trimester    History of depression    Abnormal chromosomal and genetic finding on  screening of mother    Abnormal quad screen     (spontaneous vaginal delivery)     Admitting GA: 37w5d    Delivery Information for [facility]    Birth information:  YOB: [birth year]24   Time of birth: 8:31 PM   Sex: male   Head Delivery Date/Time: 3/6/2024  8:30 PM   Delivery type: Vaginal, Spontaneous   Gestational Age: 37w5d        Delivery Providers    Delivering clinician: Neha Cooper CNM   Provider Role    Ashley Fernando, Patricia Benitez RN McWilliams, Catherine, RN               Measurements    Weight:   Length:          Apgars    Living status: Living  Apgar Component Scores:  1 min.:  5 min.:  10 min.:  15 min.:  20 min.:    Skin color:  0  1       Heart rate:  2  2       Reflex irritability:  2  2       Muscle tone:  2  2       Respiratory effort:  2  2       Total:  8  9       Apgars assigned by: NAVARRO CASTRO RN         Operative Delivery    Forceps attempted?: No  Vacuum extractor  attempted?: No         Shoulder Dystocia    Shoulder dystocia present?: No           Presentation    Presentation: Vertex  Position: Right Occiput Anterior           Interventions/Resuscitation    Method: Tactile Stimulation       Cord    Vessels: 3 vessels  Complications: None  Delayed Cord Clamping?: Yes  Cord Clamped Date/Time: 3/6/2024  8:33 PM  Cord Blood Disposition: Sent with Baby  Gases Sent?: No       Placenta    Placenta delivery date/time: 3/6/2024 2036  Placenta removal: Spontaneous  Placenta appearance: Intact  Placenta disposition: Discarded           Labor Events:       labor: No     Labor Onset Date/Time:         Dilation Complete Date/Time: 2024 20:30     Start Pushing Date/Time: 2024 20:30       Start Pushing Date/Time: 2024 20:30     Rupture Date/Time:            Rupture type:          Fluid Amount:       Fluid Color:                steroids: Full Course     Antibiotics given for GBS: Yes     Induction: none     Indications for induction:        Augmentation: amniotomy     Indications for augmentation: Ineffective Contraction Pattern     Labor complications: None     Additional complications:          Cervical ripening:                     Delivery:      Episiotomy: None     Indication for Episiotomy:       Perineal Lacerations: None Repaired:      Periurethral Laceration:   Repaired:     Labial Laceration: right Repaired: No   Sulcus Laceration:   Repaired:     Vaginal Laceration:   Repaired:     Cervical Laceration:   Repaired:     Repair suture: None     Repair # of packets:       Last Value - EBL - Nursing (mL):       Sum - EBL - Nursing (mL): 0     Last Value - EBL - Anesthesia (mL):      Calculated QBL (mL):       Running total QBL (mL):       Vaginal Sweep Performed: Yes     Surgicount Correct:       Vaginal Packing:   Quantity:       Other providers:       Anesthesia    Method: None          Details (if applicable):  Trial of Labor       Categorization:      Priority:     Indications for :     Incision Type:       Additional  information:  Forceps:    Vacuum:    Breech:    Observed anomalies    Other (Comments):

## 2024-03-07 NOTE — PLAN OF CARE
DISCHARGE ASSESSMENT    Consult: SDOH    SW spoke with pt and introduced herself to complete discharge assessment. Pt was easily engaged. SW explained her role in . Pt voiced understanding.     DIscharge planning assessment completed. Pt confirmed she has permanent housing and will be residing at current address listed in chart. Pt confirmed she has transportation home and does not have transportation concerns. Pt reports there were some concerns such as food insecurities, however, she is now receiving snap benefits.     Pt also reports she will be placing baby for adoption. SW confirmed pt will be brining baby home and adoption will be handled outside of hospital.        03/07/24 0839   OB Discharge Planning Assessment   Assessment Type Discharge Planning Assessment   Source of Information patient   Insurance Medicaid   Medicaid [employer]   Medicaid Insurance Primary   Number people in home 2 people no including baby.   Relationship Status None   Employed Full Time   Employer [employer]   Job Title    Currently Enrolled in School No   Highest Level of Education Some High School   Father's Involvement   (Placing baby for adoption.)   Received Prenatal Care Yes   Transportation Anticipated family or friend will provide   Infant Feeding Plan breastfeeding;formula feeding

## 2024-03-08 VITALS
DIASTOLIC BLOOD PRESSURE: 67 MMHG | RESPIRATION RATE: 18 BRPM | SYSTOLIC BLOOD PRESSURE: 121 MMHG | WEIGHT: 132 LBS | OXYGEN SATURATION: 98 % | BODY MASS INDEX: 24.92 KG/M2 | HEART RATE: 78 BPM | HEIGHT: 61 IN | TEMPERATURE: 99 F

## 2024-03-08 PROCEDURE — 99239 HOSP IP/OBS DSCHRG MGMT >30: CPT | Mod: ,,, | Performed by: ADVANCED PRACTICE MIDWIFE

## 2024-03-08 RX ORDER — IBUPROFEN 600 MG/1
600 TABLET ORAL EVERY 6 HOURS
Qty: 20 TABLET | Refills: 1 | Status: SHIPPED | OUTPATIENT
Start: 2024-03-08

## 2024-03-08 RX ORDER — DOCUSATE SODIUM 100 MG/1
200 CAPSULE, LIQUID FILLED ORAL 2 TIMES DAILY PRN
Qty: 56 CAPSULE | Refills: 0 | Status: SHIPPED | OUTPATIENT
Start: 2024-03-08 | End: 2024-03-22

## 2024-03-08 NOTE — PLAN OF CARE
"SW met with pt in room 616 and re-introduced self. Pt voiced that she remembered sw from previous admit. Pt was alert, oriented, and easily engaged. Pt was holding  and bonding noted.    Sw discussed plan for adoption. Pt shared with sw that she is "happy" with her decision to develop an adoption plan. She shared that she intends on discharging home with . Pt confirmed that she has an  representing her; has received one counseling session with the other scheduled for today; and understands the finality of the Act of Surrender. Sw provided emotional support and supportive counseling.     Sw inquired whether perspective adoptive parents are aware that  will be discharged with her. Pt replied that she has not shared with them, but plans to inform them. Sw offered to be present if pt would like. Jimmie provided pt with sw contact number. Will follow through discharge on today.       "

## 2024-03-08 NOTE — SUBJECTIVE & OBJECTIVE
Interval History:   Doing well, ambulating, voiding, and tolerating regular diet  Denies dizziness or light headed sensation. Denies SOB or difficulty breathing.   Denies headaches, visual disturbances or upper GI pain, nausea, or vomiting.  Reports passing flatus.   Lochia: steadily decreasing  Pain: well controlled requiring PO Ibuprofen and Acetaminophen medication  Breasts/nipples: breast feeding well without difficulty; has seen lactation  Pt still considering adoption for infant - has not yet notified potential adoptive parents of birth. Has counselor coming this afternoon to discuss further.   [place]: Infant son is doing well, rooming in with pt.     Objective:     Vital Signs (Most Recent):  Temp: 98.4 °F (36.9 °C) (24)  Pulse: 94 (24)  Resp: 18 (24)  BP: 97/65 (24)  SpO2: 98 % (24) Vital Signs (24h Range):  Temp:  [97.9 °F (36.6 °C)-98.4 °F (36.9 °C)] 98.4 °F (36.9 °C)  Pulse:  [67-94] 94  Resp:  [16-18] 18  SpO2:  [96 %-98 %] 98 %  BP: ()/(59-70) 97/65     Weight: 59.9 kg (132 lb)  Body mass index is 24.94 kg/m².      Intake/Output Summary (Last 24 hours) at 3/7/2024 1908  Last data filed at 3/7/2024 0050  Gross per 24 hour   Intake 503.14 ml   Output 600 ml   Net -96.86 ml         Significant Labs:  Lab Results   Component Value Date    GROUPTRH O NEG 2024    HEPBSAG Non-reactive 2023    STREPBCULT No Group B Streptococcus isolated 01/10/2024     CBC:   Recent Labs   Lab 24  0530   WBC 12.63   RBC 3.70*   HGB 11.0*   HCT 33.8*      MCV 91   MCH 29.7   MCHC 32.5     I have personallly reviewed all pertinent lab results from the last 24 hours.    Physical Exam  Gen: A&O x 4, NAD  CV: normal HR  Lungs: normal resp effort  Breasts: bilaterally soft, non-tender, nipples intact bilaterally  Abdomen: soft, non-tender, uterus firm at U - 1 fb  Perineum: approximated, no edema   Lochia: minimal rubra  Ext: bilaterally without  pedal edema, without signs of DVT

## 2024-03-08 NOTE — SUBJECTIVE & OBJECTIVE
Interval History: Doing well, ambulating, voiding, and tolerating regular diet  Lochia: steadily decreasing  Pain: well controlled; requiring PO NSAIDs  Breasts/nipples: breastfeeding well without difficulty; seeing lactation; does not plan to breastfeed or pump after discharge  Depression/anxiety: Hx, mood check postpartum   Support at home: yes  Contraception: considering; understands that progesterone only options are appropriate with breastfeeding  [place]: male is doing well, will f/u with pediatrician; patient plans adoption that will be handled outside of hospital after discharge. Patient has seen inpatient social work.     Gen: A&O x 4, NAD  CV: normal HR  Lungs: normal resp effort  Breasts: bilaterally soft, non-tender, nipples intact   Abdomen: soft, non-tender, uterus firm at U - 2 fb  Perineum: approximated, no edema   Lochia: minimal rubra  Ext: bilaterally without pedal edema, without signs of DVT      Objective:     Vital Signs (Most Recent):  Temp: 98.2 °F (36.8 °C) (24)  Pulse: 66 (24)  Resp: 18 (24)  BP: (!) 88/54 (24)  SpO2: 98 % (24) Vital Signs (24h Range):  Temp:  [97.8 °F (36.6 °C)-98.4 °F (36.9 °C)] 98.2 °F (36.8 °C)  Pulse:  [66-94] 66  Resp:  [18] 18  SpO2:  [97 %-98 %] 98 %  BP: ()/(54-69) 88/54     Weight: 59.9 kg (132 lb)  Body mass index is 24.94 kg/m².    No intake or output data in the 24 hours ending 24 0949      Significant Labs:  Lab Results   Component Value Date    GROUPTRH O NEG 2024    HEPBSAG Non-reactive 2023    STREPBCULT No Group B Streptococcus isolated 01/10/2024     Recent Labs   Lab 24  0530   HGB 11.0*   HCT 33.8*       CBC:   Recent Labs   Lab 24  0530   WBC 12.63   RBC 3.70*   HGB 11.0*   HCT 33.8*      MCV 91   MCH 29.7   MCHC 32.5     I have personallly reviewed all pertinent lab results from the last 24 hours.    Physical Exam:   Constitutional: She is oriented to  person, place, and time. She appears well-developed and well-nourished.    HENT:   Head: Normocephalic and atraumatic.    Eyes: Pupils are equal, round, and reactive to light. EOM are normal.     Cardiovascular:  Normal rate.             Pulmonary/Chest: Effort normal and breath sounds normal.        Abdominal: Soft.     Genitourinary:    Uterus normal.             Musculoskeletal: Normal range of motion and moves all extremeties.       Neurological: She is alert and oriented to person, place, and time.    Skin: Skin is warm and dry.    Psychiatric: She has a normal mood and affect.       Review of Systems   Constitutional: Negative.    HENT: Negative.     Eyes: Negative.    Respiratory: Negative.     Cardiovascular: Negative.    Gastrointestinal:  Positive for abdominal pain (cramping).   Genitourinary:  Positive for vaginal bleeding (lochia).   Musculoskeletal: Negative.    Integumentary:  Positive for nipple discharge (lactating). Negative.   Neurological: Negative.    Psychiatric/Behavioral: Negative.     Breast: Positive for nipple discharge (lactating).

## 2024-03-08 NOTE — DISCHARGE SUMMARY
[facility] ([place])  Obstetrics  Discharge Summary      Patient Name: Yaquelin Cochran  MRN: [id number]  Admission Date: 3/6/2024  Hospital Length of Stay: 2 days  Discharge Date and Time:  2024 9:57 AM  Attending Physician: Cesar Javier MD   Discharging Provider: Yuli Vang CNM   Primary Care Provider: Arin, Primary Doctor    HPI: Yaquelin Cochran is a 18 y.o. female  at 37w5d with Estimated Date of Delivery: 3/22/24 who presents to [facility]. Expecting a boy! She is planning adoption.     Reports regular uterine contractions since this morning. They are now 7-8 minutes apart and increasing in intensity and duration.  moderate contractions, active fetal movement, No vaginal bleeding , No loss of fluid.      This pregnancy has been complicated by:  Patient Active Problem List:     Anxiety     Rh negative, antepartum, third trimester     History of ectopic pregnancy     Pregnancy with adoption planned, currently in third trimester     History of depression     Abnormal chromosomal and genetic finding on  screening of mother      Abnormal quad screen     Premature dilation of cervix during pregnancy, antepartum        Presentation:   Cephalic by BSUS  Estimated Fetal Weight: 6 lb 8 oz      Birth Center Risk Assessment: 0- Meets birth center guidelines     CNM management in ABC  CNM management on L&D  Consultation with OB to develop  plan of care  Collaborative CNM/OB management with delivery on L&D              4-   Permanent referral of care to MD        * No surgery found *     Hospital Course:   3/7/24  CBC, T&S on admit  Social work consult for planned adoption  Plans unmedicated birth  1 hr labor rule out- /-1 @ 1630  Admit for normal labor  PCN for GBS unknown  Plan: birth in ABC or L&D, baby will transition with patient per patient request and then join prospective adoptive parents on MBU  Dr. Sharp notified and aware    3/7/24 - Postpartum Day 1. Infant  "rooming in and breast feeding. Pt doing well.  3/8/24 - Postpartum Day 2. Infant room in and breastfeeding. Saw social work yesterday. Doing well without concerns. Discharge today per patient request.      Consults (From admission, onward)          Status Ordering Provider     Inpatient consult to Social Work  Once        Provider:  (Not yet assigned)    Completed JAQUELIN PINTO            Final Active Diagnoses:    Diagnosis Date Noted POA    PRINCIPAL PROBLEM:   (spontaneous vaginal delivery) [O80] 2024 Not Applicable    Breast feeding status of mother [Z39.1] 2024 Not Applicable    Rh negative, delivered, current hospitalization [O26.899, Z67.91] 2023 Not Applicable    Pregnancy with adoption planned, currently in third trimester [Z34.93] 2023 Not Applicable    History of depression [Z86.59] 2023 Not Applicable    Anxiety [F41.9]  Yes      Problems Resolved During this Admission:        Significant Diagnostic Studies: Labs: CBC   Recent Labs   Lab 24  1652 24  0530   WBC 9.33 12.63   HGB 12.2 11.0*   HCT 37.4 33.8*    198         Feeding Method: breast    Immunizations       Date Immunization Status Dose Route/Site Given by    10/29/21 0000 COVID-19, MRNA, LN-S, PF (Pfizer) (Purple Cap) Given 0.3 mL Intramuscular/Right arm     21 0000 COVID-19, MRNA, LN-S, PF (Pfizer) (Purple Cap) Given 0.3 mL Intramuscular/Left arm     24 0718 Rho (D) Immune Globulin - IM Deleted 300 mcg Intramuscular/             Delivery:    Episiotomy: None   Lacerations: None   Repair suture: None   Repair # of packets:     Blood loss (ml):       Birth information:  YOB: 2026   Time of birth: 8:31 PM   Sex: male   Delivery type: Vaginal, Spontaneous   Gestational Age: 37w5d     Measurements    Weight: 2550 g  Weight (lbs): 5 lb 10 oz  Length: 47 cm  Length (in): 18.5"  Head circumference: 32.4 cm  Chest circumference: 30.5 cm         Delivery Clinician: Delivery " Providers    Delivering clinician: Neha Cooper CNM   Provider Role    Ashley Fernando, Patricia Benitez RN McWilliams, Catherine, RN              Additional  information:  Forceps:    Vacuum:    Breech:    Observed anomalies      Living?:     Apgars    Living status: Living  Apgar Component Scores:  1 min.:  5 min.:  10 min.:  15 min.:  20 min.:    Skin color:  0  1       Heart rate:  2  2       Reflex irritability:  2  2       Muscle tone:  2  2       Respiratory effort:  2  2       Total:  8  9       Apgars assigned by: NAVARRO CASTRO RN         Placenta: Delivered:       appearance  Pending Diagnostic Studies:       None            Discharged Condition: stable    Disposition: Home or Self Care    Follow Up:   Follow-up Information       Yuli Vang CNM Follow up in 2 week(s).    Specialty: Obstetrics and Gynecology  Why: Mood check  Contact information:  [address] [id number] [phone]               Yuli Vang CNM Follow up in 6 week(s).    Specialty: Obstetrics and Gynecology  Why: Routine postpartum  Contact information:  [address] [id number] [phone]                           Patient Instructions:      BREAST PUMP FOR HOME USE     Order Specific Question Answer Comments   Type of pump: Electric    Weight: 59.9 kg (132 lb)    Length of need (1-99 months): 99      Medications:  Current Discharge Medication List        START taking these medications    Details   docusate sodium (COLACE) 100 MG capsule Take 2 capsules (200 mg total) by mouth 2 (two) times daily as needed for Constipation.  Qty: 56 capsule, Refills: 0      ibuprofen (ADVIL,MOTRIN) 600 MG tablet Take 1 tablet (600 mg total) by mouth every 6 (six) hours.  Qty: 20 tablet, Refills: 1           CONTINUE these medications which have NOT CHANGED    Details   prenatal vit no.124/iron/folic (PRENATAL VITAMIN ORAL) Take by mouth.           Follow Up/Patient  Instructions:   1. Reviewed postpartum recommendations and precautions ~ encouraged to call for fever, severe or increased pain in head, chest, abdomen, perineum or legs; heavy bleeding, foul smelling lochia, signs of depression, or any other concern. Reviewed postpartum precautions r/t high blood pressure ~ encouraged to call for HA, vision changes, epigastric discomfort, or abnormal swelling.  2. Rx provided: see list  3. Contraception: considering; will f/u at postpartum visit. Encouraged abstinence and pelvic rest for healing until after postpartum check-up.   4. Encouraged to continue PNV while breastfeeding or at least for 6 weeks postpartum.  5. Car seat law will be reviewed with patient at discharge per protocol  6. RTO in 2 weeks for mood check, 4-6 weeks or sooner prn.  7. Discharge home today with written and verbal postpartum instructions and precautions.     JAYLA Alex, DAVE  Obstetrics  [facility] ([place])

## 2024-03-08 NOTE — PROGRESS NOTES
[facility] - [facility] ([place])  Obstetrics  Postpartum Progress Note    Patient Name: Yaquelin Cochran  MRN: [id number]  Admission Date: 3/6/2024  Hospital Length of Stay: 2 days  Attending Physician: Cesar Javier MD  Primary Care Provider: Arin, Primary Doctor    Subjective:     Principal Problem: (spontaneous vaginal delivery)    Hospital Course:  3/7/24  CBC, T&S on admit  Social work consult for planned adoption  Plans unmedicated birth  1 hr labor rule out- /-1 @ 1630  Admit for normal labor  PCN for GBS unknown  Plan: birth in ABC or L&D, baby will transition with patient per patient request and then join prospective adoptive parents on MBU  Dr. Sharp notified and aware    3/7/24 - Postpartum Day 1. Infant rooming in and breast feeding. Pt doing well.  3/8/24 - Postpartum Day 2. Infant room in and breastfeeding. Saw social work yesterday. Doing well without concerns. Discharge today per patient request.     Interval History: Doing well, ambulating, voiding, and tolerating regular diet  Lochia: steadily decreasing  Pain: well controlled; requiring PO NSAIDs  Breasts/nipples: breastfeeding well without difficulty; seeing lactation; does not plan to breastfeed or pump after discharge  Depression/anxiety: Hx, mood check postpartum   Support at home: yes  Contraception: considering; understands that progesterone only options are appropriate with breastfeeding  : male is doing well, will f/u with pediatrician; patient plans adoption that will be handled outside of hospital after discharge. Patient has seen inpatient social work.     Gen: A&O x 4, NAD  CV: normal HR  Lungs: normal resp effort  Breasts: bilaterally soft, non-tender, nipples intact   Abdomen: soft, non-tender, uterus firm at U - 2 fb  Perineum: approximated, no edema   Lochia: minimal rubra  Ext: bilaterally without pedal edema, without signs of DVT      Objective:     Vital Signs (Most Recent):  Temp: 98.2 °F (36.8 °C) (24  0815)  Pulse: 66 (03/08/24 0815)  Resp: 18 (03/08/24 0815)  BP: (!) 88/54 (03/08/24 0815)  SpO2: 98 % (03/08/24 0815) Vital Signs (24h Range):  Temp:  [97.8 °F (36.6 °C)-98.4 °F (36.9 °C)] 98.2 °F (36.8 °C)  Pulse:  [66-94] 66  Resp:  [18] 18  SpO2:  [97 %-98 %] 98 %  BP: ()/(54-69) 88/54     Weight: 59.9 kg (132 lb)  Body mass index is 24.94 kg/m².    No intake or output data in the 24 hours ending 03/08/24 0949      Significant Labs:  Lab Results   Component Value Date    GROUPTRH O NEG 03/07/2024    HEPBSAG Non-reactive 11/06/2023    STREPBCULT No Group B Streptococcus isolated 01/10/2024     Recent Labs   Lab 03/07/24  0530   HGB 11.0*   HCT 33.8*       CBC:   Recent Labs   Lab 03/07/24  0530   WBC 12.63   RBC 3.70*   HGB 11.0*   HCT 33.8*      MCV 91   MCH 29.7   MCHC 32.5     I have personallly reviewed all pertinent lab results from the last 24 hours.    Physical Exam:   Constitutional: She is oriented to person, place, and time. She appears well-developed and well-nourished.    HENT:   Head: Normocephalic and atraumatic.    Eyes: Pupils are equal, round, and reactive to light. EOM are normal.     Cardiovascular:  Normal rate.             Pulmonary/Chest: Effort normal and breath sounds normal.        Abdominal: Soft.     Genitourinary:    Uterus normal.             Musculoskeletal: Normal range of motion and moves all extremeties.       Neurological: She is alert and oriented to person, place, and time.    Skin: Skin is warm and dry.    Psychiatric: She has a normal mood and affect.       Review of Systems   Constitutional: Negative.    HENT: Negative.     Eyes: Negative.    Respiratory: Negative.     Cardiovascular: Negative.    Gastrointestinal:  Positive for abdominal pain (cramping).   Genitourinary:  Positive for vaginal bleeding (lochia).   Musculoskeletal: Negative.    Integumentary:  Positive for nipple discharge (lactating). Negative.   Neurological: Negative.    Psychiatric/Behavioral:  Negative.     Breast: Positive for nipple discharge (lactating).    Assessment/Plan:     18 y.o. female  for:    *  (spontaneous vaginal delivery)  PPD#1 - normal involution. Continue routine postpartum care and advances.   PPD#2 - normal uterine involution. Continue routine postpartum care and advances.     Breast feeding status of mother  Continue Lactation Consults as needed    History of depression  Plan 2 week mood check    Pregnancy with adoption planned, currently in third trimester  Social work consult completed  Outside community counselor coming this afternoon to discuss further with pt.    Rh negative, delivered, current hospitalization  Rhogam if indicated prior to discharge    Anxiety  Plan 2 wk mood check        Disposition: As patient meets milestones, will plan to discharge home today per patient request.    JAYLA Alex SNM  Obstetrics  [facility] ([place])

## 2024-03-08 NOTE — LACTATION NOTE
"   03/08/24 1240   Maternal Assessment   Breast Shape Bilateral:;round   Breast Density Bilateral:;soft   Areola Bilateral:;elastic   Nipples Bilateral:;everted   Left Nipple Symptoms tender   Right Nipple Symptoms tender   Maternal Infant Feeding   Maternal Emotional State relaxed   Pain with Feeding yes   Comfort Measures Before/During Feeding maternal position adjusted;infant position adjusted;suction broken using finger   Latch Assistance yes  (moderate assistance provided to get baby to open mouth wider and to acheive a deeper latch)   Community Referrals   Community Referrals outpatient lactation program     Notified by staff nurse that patient was currently breastfeeding the baby.  Visited patient in her room, walking from the restroom to the bed, baby latched onto L breast, cradle position.  Assisted patient to get positioned comfortably in the bed, noted a shallow latch on L breast.  Patient removed baby from breast and re-latched baby  c same results.  Maximum positioning and latch assistance provided, L breast, cross cradle position, no latch achieved due to baby not opening mouth wide.  Attempted to burp baby and check diaper.  Moderate positioning and latch assistance provided, R breast, football position, deep comfortable latch achieved, baby actively sucking.  Easily able to express colostrum onto the tip of her nipple to facilitate the latch.  Patient stated she can feel the difference in the deep latch and nutritive sucks.  Reviewed attachment technique and signs of a deep latch; importance of feeding on cue "8 or more in 24"; and monitor ing the 24 hr diaper counts.  Encouraged to call for additional assistance prn.   "

## 2024-03-08 NOTE — PROGRESS NOTES
[facility] - [facility] ([place])  Obstetrics  Postpartum Progress Note    Patient Name: Yaquelin Cochran  MRN: [id number]  Admission Date: 3/6/2024  Hospital Length of Stay: 1 days  Attending Physician: Cesar Javier MD  Primary Care Provider: Arin, Primary Doctor    Subjective:     Principal Problem: (spontaneous vaginal delivery)    Hospital Course:  3/7/24  CBC, T&S on admit  Social work consult for planned adoption  Plans unmedicated birth  1 hr labor rule out- /-1 @ 1630  Admit for normal labor  PCN for GBS unknown  Plan: birth in ABC or L&D, baby will transition with patient per patient request and then join prospective adoptive parents on MBU  Dr. Sharp notified and aware    3/8/24 - Postpartum Day 1. Infant rooming in and breast feeding. Pt doing well.    Interval History:   Doing well, ambulating, voiding, and tolerating regular diet  Denies dizziness or light headed sensation. Denies SOB or difficulty breathing.   Denies headaches, visual disturbances or upper GI pain, nausea, or vomiting.  Reports passing flatus.   Lochia: steadily decreasing  Pain: well controlled requiring PO Ibuprofen and Acetaminophen medication  Breasts/nipples: breast feeding well without difficulty; has seen lactation  Pt still considering adoption for infant - has not yet notified potential adoptive parents of birth. Has counselor coming this afternoon to discuss further.   [place] [facility]: Infant son is doing well, rooming in with pt.     Objective:     Vital Signs (Most Recent):  Temp: 98.4 °F (36.9 °C) (24 1645)  Pulse: 94 (24 1645)  Resp: 18 (24 1645)  BP: 97/65 (24 1645)  SpO2: 98 % (24 1645) Vital Signs (24h Range):  Temp:  [97.9 °F (36.6 °C)-98.4 °F (36.9 °C)] 98.4 °F (36.9 °C)  Pulse:  [67-94] 94  Resp:  [16-18] 18  SpO2:  [96 %-98 %] 98 %  BP: ()/(59-70) 97/65     Weight: 59.9 kg (132 lb)  Body mass index is 24.94 kg/m².      Intake/Output Summary (Last 24 hours) at 3/7/2024  1908  Last data filed at 3/7/2024 0050  Gross per 24 hour   Intake 503.14 ml   Output 600 ml   Net -96.86 ml         Significant Labs:  Lab Results   Component Value Date    GROUPTRH O NEG 2024    HEPBSAG Non-reactive 2023    STREPBCULT No Group B Streptococcus isolated 01/10/2024     CBC:   Recent Labs   Lab 24  0530   WBC 12.63   RBC 3.70*   HGB 11.0*   HCT 33.8*      MCV 91   MCH 29.7   MCHC 32.5     I have personallly reviewed all pertinent lab results from the last 24 hours.    Physical Exam  Gen: A&O x 4, NAD  CV: normal HR  Lungs: normal resp effort  Breasts: bilaterally soft, non-tender, nipples intact bilaterally  Abdomen: soft, non-tender, uterus firm at U - 1 fb  Perineum: approximated, no edema   Lochia: minimal rubra  Ext: bilaterally without pedal edema, without signs of DVT    Assessment/Plan:     18 y.o. female  for:    *  (spontaneous vaginal delivery)  PPD#1 - normal involution. Continue routine postpartum care and advances.     Breast feeding status of mother  Continue Lactation Consults as needed    History of depression  Plan 2 week mood check    Pregnancy with adoption planned, currently in third trimester  Social work consult completed  Outside community counselor coming this afternoon to discuss further with pt.    Rh negative, delivered, current hospitalization  Rhogam if indicated prior to discharge    Anxiety  Plan 2 wk mood check        Disposition: As patient meets milestones, will plan to discharge tomorrow.    Caro Cotton CNM  Obstetrics  [facility] ([place])
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
## 2024-03-08 NOTE — LACTATION NOTE
"   03/08/24 0910   Maternal Infant Feeding   Maternal Emotional State relaxed   Pain with Feeding yes   Pain Location nipples, bilateral   Pain Description soreness  (provided lanolin c instructions for use)   Community Referrals   Community Referrals outpatient lactation program     Visited patient in room, holding sleeping baby in arms while the staff nurse changed the crib linen.  Patient confirmed receiving lactation discharge education yesterday, denies questions at this time.  C/o slightly sore nipples, provided lanolin c instructions for use. Discussed importance of a deep latch and breaking the suction when removing the baby from the breasts to prevent and treat sore nipples.    Discussed the feeding plan:  patient stated she plans to nurse the baby while they are together and once the baby is released to the adoptive parents she will wean at that time.  Confirmed that she received the education for care of her breasts for non-nursing engorgement yesterday and also a manual breastpump c instructions for use.  Denies questions at this time.  Reviewed supply-demand principle, feed on cue "8 or more in 24"; signs of a deep latch; and tracking 24hr diaper counts.  Requested patient to call for assistance at the next feeding.  "

## 2024-03-08 NOTE — PLAN OF CARE
"Plan of care:  Patient will nurse baby on cue " 8 or more in 24" and lengthen feedings until content; will achieve a deep, comfortable latch and observe for signs of milk transfer; will monitor baby's 24hr diaper counts; will refer to the Guide and call the Warmline for information and assistance prn.   "

## 2024-03-08 NOTE — ASSESSMENT & PLAN NOTE
Social work consult completed  Outside community counselor coming this afternoon to discuss further with pt.

## 2024-03-09 LAB — BACTERIA SPEC AEROBE CULT: NORMAL

## 2024-03-09 NOTE — PLAN OF CARE
Pt ambulating, voiding, and passing flatus. Pt tolerating PO well and no SS of distress at this time. Pt's pain well controlled throughout shift by oral pain medication. Pts bleeding has been light throughout shift. Fundus is firm. Care guide reviewed. SW consult completed - plans for possible foster/adoption care. Will meet with [facility] tomorrow for further education/consultation. All questions answered. Pt verbalized understanding to follow up with OB X2 and X6 weeks. Medications delivered to bedside. Reviewed medication list, when to call provider, and SS of infection. Pt stable at this time. ID band verified.

## 2024-03-10 ENCOUNTER — HOSPITAL ENCOUNTER (EMERGENCY)
Facility: OTHER | Age: 19
Discharge: HOME | End: 2024-03-10
Attending: EMERGENCY MEDICINE
Payer: MEDICAID

## 2024-03-10 VITALS
SYSTOLIC BLOOD PRESSURE: 116 MMHG | WEIGHT: 109 LBS | HEIGHT: 62 IN | BODY MASS INDEX: 20.06 KG/M2 | HEART RATE: 75 BPM | OXYGEN SATURATION: 100 % | DIASTOLIC BLOOD PRESSURE: 78 MMHG | RESPIRATION RATE: 16 BRPM | TEMPERATURE: 98 F

## 2024-03-10 DIAGNOSIS — R58 ECCHYMOSIS OF FOREARM: ICD-10-CM

## 2024-03-10 PROCEDURE — 99283 EMERGENCY DEPT VISIT LOW MDM: CPT

## 2024-03-10 RX ORDER — BUPROPION HYDROCHLORIDE 150 MG/1
150 TABLET ORAL DAILY
Qty: 30 TABLET | Refills: 0 | Status: SHIPPED | OUTPATIENT
Start: 2024-03-10 | End: 2025-03-10
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
## 2024-03-11 ENCOUNTER — PATIENT MESSAGE (OUTPATIENT)
Dept: OBSTETRICS AND GYNECOLOGY | Facility: CLINIC | Age: 19
End: 2024-03-11
Payer: MEDICAID

## 2024-03-20 ENCOUNTER — PATIENT MESSAGE (OUTPATIENT)
Dept: OBSTETRICS AND GYNECOLOGY | Facility: CLINIC | Age: 19
End: 2024-03-20

## 2024-03-31 ENCOUNTER — HOSPITAL ENCOUNTER (EMERGENCY)
Facility: HOSPITAL | Age: 19
Discharge: HOME | End: 2024-03-31
Attending: EMERGENCY MEDICINE
Payer: MEDICAID

## 2024-03-31 VITALS
SYSTOLIC BLOOD PRESSURE: 114 MMHG | BODY MASS INDEX: 20.06 KG/M2 | RESPIRATION RATE: 16 BRPM | HEIGHT: 62 IN | DIASTOLIC BLOOD PRESSURE: 83 MMHG | OXYGEN SATURATION: 99 % | TEMPERATURE: 99 F | WEIGHT: 109 LBS | HEART RATE: 68 BPM

## 2024-03-31 DIAGNOSIS — N93.9 VAGINAL BLEEDING: Primary | ICD-10-CM

## 2024-03-31 LAB
B-HCG UR QL: NEGATIVE
BACTERIA GENITAL QL WET PREP: ABNORMAL
BILIRUB UR QL STRIP: NEGATIVE
CLARITY UR REFRACT.AUTO: CLEAR
CLUE CELLS VAG QL WET PREP: ABNORMAL
COLOR UR AUTO: COLORLESS
CTP QC/QA: YES
FILAMENT FUNGI VAG WET PREP-#/AREA: ABNORMAL
GLUCOSE UR QL STRIP: NEGATIVE
HGB UR QL STRIP: ABNORMAL
KETONES UR QL STRIP: NEGATIVE
LEUKOCYTE ESTERASE UR QL STRIP: ABNORMAL
MICROSCOPIC COMMENT: ABNORMAL
NITRITE UR QL STRIP: NEGATIVE
PH UR STRIP: 7 [PH] (ref 5–8)
PROT UR QL STRIP: NEGATIVE
RBC #/AREA URNS AUTO: 1 /HPF (ref 0–4)
SP GR UR STRIP: 1 (ref 1–1.03)
SPECIMEN SOURCE: ABNORMAL
SQUAMOUS #/AREA URNS AUTO: 0 /HPF
T VAGINALIS GENITAL QL WET PREP: ABNORMAL
URN SPEC COLLECT METH UR: ABNORMAL
WBC #/AREA URNS AUTO: 9 /HPF (ref 0–5)
WBC #/AREA VAG WET PREP: ABNORMAL
YEAST GENITAL QL WET PREP: ABNORMAL

## 2024-03-31 PROCEDURE — 81001 URINALYSIS AUTO W/SCOPE: CPT | Performed by: PHYSICIAN ASSISTANT

## 2024-03-31 PROCEDURE — 99282 EMERGENCY DEPT VISIT SF MDM: CPT

## 2024-03-31 PROCEDURE — 81025 URINE PREGNANCY TEST: CPT | Performed by: PHYSICIAN ASSISTANT

## 2024-03-31 PROCEDURE — 87491 CHLMYD TRACH DNA AMP PROBE: CPT | Performed by: PHYSICIAN ASSISTANT

## 2024-03-31 PROCEDURE — 87210 SMEAR WET MOUNT SALINE/INK: CPT | Performed by: PHYSICIAN ASSISTANT

## 2024-03-31 NOTE — ED TRIAGE NOTES
The patient reports that she is 4 weeks post partum and and has been reporting vaginal bleeding starting this morning that is bright red. Patient reports that it is not a large amount of blood. The patient endorses having intercourse one week before OB cleared her. Patient also endorses vaginal itching and odor, with painful urination.

## 2024-03-31 NOTE — ED PROVIDER NOTES
Encounter Date: 3/31/2024       History     Chief Complaint   Patient presents with    Vaginal Bleeding     Patient reports that she is 4-weeks postpartum. Today reports vaginal bleeding since this morning. Recent unprotected intercourse, not cleared per OB for intercourse at this time. Patient endorses concern for STI, reports vaginal itching and odor.      18-year-old female approximately 4 weeks postpartum presents to the ED with vaginal bleeding among other complaints.  Patient states that she had sexual intercourse about a week ago.  She has not been cleared by her OB for intercourse.  She is unsure when she would be cleared, but suspected that it would be after her 6 week appointment.  Patient also reports some right lower pelvic cramping.  She had some vaginal spotting throughout the week, but the bleeding became slightly heavier today.  She also has some vaginal irritation as well as dysuria.  She had intercourse with her child's father.  She has a low suspicion for STI exposure.  Her main concern is possible urinary tract or vaginal infection.  She has had some chills and has felt run down for the past few days.  She is not currently breastfeeding.      Review of patient's allergies indicates:  No Known Allergies  Past Medical History:   Diagnosis Date    Behavior problem in childhood     COVID-19 08/08/2021    Ectopic pregnancy 5/24/2023    Suicidal ideation      Past Surgical History:   Procedure Laterality Date    BACK SURGERY  2016    Rods in place, had issues with previous epidural    DIAGNOSTIC LAPAROSCOPY N/A 05/29/2023    Procedure: LAPAROSCOPY, DIAGNOSTIC;  Surgeon: Juana Fung MD;  Location: [facility];  Service: OB/GYN;  Laterality: N/A;    DILATION AND CURETTAGE OF UTERUS       Family History   Problem Relation Age of Onset    Throat cancer Paternal Grandfather     No Known Problems Father     No Known Problems Mother     Breast cancer Neg Hx     Diabetes Neg Hx     Colon cancer Neg Hx      Ovarian cancer Neg Hx      Social History     Tobacco Use    Smoking status: Never    Smokeless tobacco: Never   Substance Use Topics    Alcohol use: No    Drug use: No     Review of Systems   Constitutional:  Positive for chills.   Genitourinary:  Positive for pelvic pain and vaginal bleeding.       Physical Exam     Initial Vitals [03/31/24 1236]   BP Pulse Resp Temp SpO2   114/83 68 16 98.5 °F (36.9 °C) 99 %      MAP       --         Physical Exam    Nursing note and vitals reviewed.  Constitutional: She appears well-developed and well-nourished. She is not diaphoretic.  Non-toxic appearance. She does not appear ill. No distress.   HENT:   Head: Normocephalic and atraumatic.   Neck: Neck supple.   Cardiovascular:  Normal rate and regular rhythm.     Exam reveals no gallop and no friction rub.       No murmur heard.  Pulmonary/Chest: Effort normal and breath sounds normal. No accessory muscle usage. No tachypnea. No respiratory distress. She has no decreased breath sounds. She has no wheezes. She has no rhonchi. She has no rales.   Abdominal: Abdomen is soft. She exhibits no distension and no mass. There is abdominal tenderness in the right lower quadrant.   Genitourinary: Uterus is tender. Cervix exhibits no motion tenderness, no discharge and no friability. Right adnexum displays tenderness. Left adnexum displays tenderness.    Vaginal bleeding present.   There is bleeding in the vagina.    Genitourinary Comments: Scant to moderate blood in the vaginal vault.  No consistent oozing of blood from the cervical os.  Cervical os is closed.     Musculoskeletal:      Cervical back: Neck supple.     Neurological: She is alert.   Skin: No rash noted.   Psychiatric: She has a normal mood and affect. Her behavior is normal.         ED Course   Procedures  Labs Reviewed   VAGINAL SCREEN - Abnormal; Notable for the following components:       Result Value    WBC - Vaginal Screen Rare (*)     Bacteria - Vaginal Screen Rare  (*)     All other components within normal limits    Narrative:     Release to patient->Immediate   URINALYSIS, REFLEX TO URINE CULTURE - Abnormal; Notable for the following components:    Color, UA Colorless (*)     Occult Blood UA 3+ (*)     Leukocytes, UA 2+ (*)     All other components within normal limits    Narrative:     Specimen Source->Urine   URINALYSIS MICROSCOPIC - Abnormal; Notable for the following components:    WBC, UA 9 (*)     All other components within normal limits    Narrative:     Specimen Source->Urine   C. TRACHOMATIS/N. GONORRHOEAE BY AMP DNA   POCT URINE PREGNANCY          Imaging Results    None          Medications - No data to display  Medical Decision Making  18-year-old female 4 weeks postpartum presents with vaginal bleeding and pelvic cramping.  Vitals within normal limits.  Afebrile.  Vitals within normal limits.  Patient is well-appearing.   exam notable for diffuse pelvic tenderness on bimanual exam.  Mild to moderate blood in the vaginal vault.  No continuous oozing from the cervical os.  Os is closed.  UPT is negative.   Will obtain UA, vaginal swabs.   My differential diagnosis includes but is not limited to:  UTI, vaginitis, cervicitis, menstruation    Labs showed no evidence of vaginal infection or UTI.  GC/C cultures are pending.  Patient has a low suspicion for STI.  Will wait for results prior to treating.  Etiology of patient's bleeding is unclear, but may be her 1st menstruation following pregnancy or related to sexual activity.  I have advised patient to follow-up with her OB sooner than her 6 weeks scheduled appointment if she continues to have symptoms.  ED return precautions discussed.  Patient voiced understanding.     Amount and/or Complexity of Data Reviewed  Labs: ordered.               ED Course as of 03/31/24 1450   Sun Mar 31, 2024   1327 Offered analgesics for pain, patient declines at this time. [EH]      ED Course User Index  [EH] Nida Roy PA-C                            Clinical Impression:  Final diagnoses:  [N93.9] Vaginal bleeding (Primary)          ED Disposition Condition    Discharge Stable          ED Prescriptions    None       Follow-up Information    None          Nida Roy, PA-C  03/31/24 6251

## 2024-03-31 NOTE — DISCHARGE INSTRUCTIONS
Your vaginal swab and urinalysis did not reveal any significant infection.  You have some additional tests that are still pending.  We will contact you with any abnormal results.   Follow-up with your OB if you continue to have persistent bleeding and cramping.  You should avoid sexual intercourse until cleared by your OB.    Return to the ER if you develop fever, significant pain, significantly heavier bleeding, weakness or any other worrisome symptoms.

## 2024-04-02 LAB
C TRACH DNA SPEC QL NAA+PROBE: NOT DETECTED
N GONORRHOEA DNA SPEC QL NAA+PROBE: NOT DETECTED

## 2024-04-15 ENCOUNTER — HOSPITAL ENCOUNTER (EMERGENCY)
Facility: HOSPITAL | Age: 19
Discharge: HOME | End: 2024-04-15
Attending: STUDENT IN AN ORGANIZED HEALTH CARE EDUCATION/TRAINING PROGRAM
Payer: MEDICAID

## 2024-04-15 VITALS
WEIGHT: 109 LBS | RESPIRATION RATE: 17 BRPM | SYSTOLIC BLOOD PRESSURE: 99 MMHG | HEART RATE: 86 BPM | BODY MASS INDEX: 19.94 KG/M2 | DIASTOLIC BLOOD PRESSURE: 65 MMHG | OXYGEN SATURATION: 97 % | TEMPERATURE: 99 F

## 2024-04-15 DIAGNOSIS — J06.9 VIRAL URI WITH COUGH: Primary | ICD-10-CM

## 2024-04-15 DIAGNOSIS — R50.9 FEVER: ICD-10-CM

## 2024-04-15 LAB
B-HCG UR QL: NEGATIVE
BACTERIA #/AREA URNS AUTO: ABNORMAL /HPF
BILIRUB UR QL STRIP: NEGATIVE
CLARITY UR REFRACT.AUTO: CLEAR
COLOR UR AUTO: YELLOW
CTP QC/QA: YES
GLUCOSE UR QL STRIP: NEGATIVE
HGB UR QL STRIP: NEGATIVE
INFLUENZA A, MOLECULAR: NOT DETECTED
INFLUENZA B, MOLECULAR: NOT DETECTED
KETONES UR QL STRIP: ABNORMAL
LEUKOCYTE ESTERASE UR QL STRIP: ABNORMAL
MICROSCOPIC COMMENT: ABNORMAL
NITRITE UR QL STRIP: NEGATIVE
PH UR STRIP: 7 [PH] (ref 5–8)
PROT UR QL STRIP: ABNORMAL
RBC #/AREA URNS AUTO: 2 /HPF (ref 0–4)
RSV AG BY MOLECULAR METHOD: NOT DETECTED
SARS-COV-2 RNA RESP QL NAA+PROBE: NOT DETECTED
SP GR UR STRIP: 1.02 (ref 1–1.03)
SQUAMOUS #/AREA URNS AUTO: 9 /HPF
URN SPEC COLLECT METH UR: ABNORMAL
WBC #/AREA URNS AUTO: 7 /HPF (ref 0–5)

## 2024-04-15 PROCEDURE — 0241U SARS-COV2 (COVID) WITH FLU/RSV BY PCR: CPT | Performed by: PHYSICIAN ASSISTANT

## 2024-04-15 PROCEDURE — 99283 EMERGENCY DEPT VISIT LOW MDM: CPT | Mod: 25

## 2024-04-15 PROCEDURE — 81001 URINALYSIS AUTO W/SCOPE: CPT | Performed by: EMERGENCY MEDICINE

## 2024-04-15 PROCEDURE — 81025 URINE PREGNANCY TEST: CPT | Performed by: EMERGENCY MEDICINE

## 2024-04-15 PROCEDURE — 25000003 PHARM REV CODE 250: Performed by: STUDENT IN AN ORGANIZED HEALTH CARE EDUCATION/TRAINING PROGRAM

## 2024-04-15 RX ORDER — IBUPROFEN 600 MG/1
600 TABLET ORAL
Status: COMPLETED | OUTPATIENT
Start: 2024-04-15 | End: 2024-04-15

## 2024-04-15 RX ORDER — GUAIFENESIN 600 MG/1
600 TABLET, EXTENDED RELEASE ORAL
Status: COMPLETED | OUTPATIENT
Start: 2024-04-15 | End: 2024-04-15

## 2024-04-15 RX ORDER — ACETAMINOPHEN 325 MG/1
650 TABLET ORAL
Status: COMPLETED | OUTPATIENT
Start: 2024-04-15 | End: 2024-04-15

## 2024-04-15 RX ADMIN — GUAIFENESIN 600 MG: 600 TABLET, EXTENDED RELEASE ORAL at 03:04

## 2024-04-15 RX ADMIN — ACETAMINOPHEN 650 MG: 325 TABLET ORAL at 04:04

## 2024-04-15 RX ADMIN — IBUPROFEN 600 MG: 600 TABLET, FILM COATED ORAL at 03:04

## 2024-04-15 NOTE — DISCHARGE INSTRUCTIONS
You were evaluated in the emergency department today for upper respiratory infection which is most likely viral in nature.  Although there were no findings of concern to necessitate admission to the hospital or warrant immediate surgical intervention, disease exists on a spectrum and your disease process may progress.  If this is the case, please watch your symptoms and return to the emergency department if you feel worse and are unable to discuss care with your primary care doctor in follow up in the next several days.  Specific information regarding your complaint has been provided.  Thank you for choosing [facility]!    Make sure that you get plenty of rest, and lots of fluids to drink.  You may use Tylenol or ibuprofen to relieve fevers over 100.4F as well as aches and pains. You may alternate doses of ibuprofen and Tylenol to get relief. Be sure that you take medicine as directed, if you have any questions, please call your pharmacist or primary care physician. If you do not begin to feel better in 1-2 days, please return to the ED or see your primary care physician promptly. If you have high fevers over 104F and seem unusually drowsy or lethargic, return to the ED right away.       Rest, drink lots of fluids. Take Tylenol or motrin for body aches. If your cough continues to worsen or you have trouble breathing, or have a racing heart beat, dizziness, chest pains or feel as though you may pass out - please return to the ED. Please make an appointment to see your doctor for a check-up in several days. You may always return to the ED if you feel ill or have concerns.     Over the counter medications that can make you feel better:    Pain medications: acetaminophen, ibuprofen  Decongestant: pseudoephedrine, mucinex   Nasal sprays: saline sprays, flonase  Antihistamine: zyrtec

## 2024-04-15 NOTE — Clinical Note
"Yaquelin"Martha Cochran was seen and treated in our emergency department on 4/15/2024.  She may return to work on 04/18/2024.       If you have any questions or concerns, please don't hesitate to call.      Moody Jennings, DO"

## 2024-04-15 NOTE — FIRST PROVIDER EVALUATION
Medical screening examination initiated.  I have conducted a focused provider triage encounter, findings are as follows:    Brief history of present illness:  fever and cough, sick 1 yo at home, associated dysuria    Vitals:    04/15/24 1309   BP: 118/75   Pulse: (!) 113   Resp: 18   Temp: (!) 100.6 °F (38.1 °C)   TempSrc: Oral   SpO2: 95%   Weight: 49.4 kg (109 lb)       Pertinent physical exam:  No acute distress or altered mental status.     Brief workup plan:  ua, hcg, cxr    Preliminary workup initiated; this workup will be continued and followed by the physician or advanced practice provider that is assigned to the patient when roomed.

## 2024-04-15 NOTE — ED PROVIDER NOTES
Source of History  patient and EMR    Chief Complaint    Cough (Sore throat), Sore Throat, and Fever      History of Present Illness    Yaquelin Cochran is a 18 y.o. female presenting with concerns for URI symptoms.  Patient is recently postpartum, had baby boy early March.  Baby has been sick.  Patient notes febrile illness for the last couple of days, worse this morning with rhinorrhea, odynophagia.  Some right ear pain as well.  Reports not currently pregnant, on bupropion for depression.  Took acetaminophen early this morning.    Review of Systems    As per HPI and below:  Constitutional symptoms:  Positive fever, chills, sweats.  No generalized weakness or headaches  Skin symptoms:  No rash    Eye symptoms:  No blurred vision, no drainage  ENMT symptoms:  Positive sore throat, positive nasal congestion, positive rhinorrhea, positive sinus pain or pressure, positive right otalgia, no otorrhea, no tinnitus  Respiratory symptoms:  Some shortness of breath with cough without wheezing  Cardiovascular symptoms:  No chest pain, no leg swelling  Gastrointestinal symptoms:  No abdominal pain, no nausea, no vomiting or diarrhea  Musculoskeletal symptoms:  No back pain, No joint pains or aches    Neurologic symptoms:  No new neurologic symptoms      Past History    As per HPI and below:  Past Medical History:   Diagnosis Date    Behavior problem in childhood     COVID-19 08/08/2021    Ectopic pregnancy 5/24/2023    Suicidal ideation        Past Surgical History:   Procedure Laterality Date    BACK SURGERY  2016    Rods in place, had issues with previous epidural    DIAGNOSTIC LAPAROSCOPY N/A 05/29/2023    Procedure: LAPAROSCOPY, DIAGNOSTIC;  Surgeon: Juana Fung MD;  Location: [facility];  Service: OB/GYN;  Laterality: N/A;    DILATION AND CURETTAGE OF UTERUS         Social History     Socioeconomic History    Marital status: Single   Tobacco Use    Smoking status: Never    Smokeless tobacco: Never   Substance and  Sexual Activity    Alcohol use: No    Drug use: No    Sexual activity: Yes     Partners: Male     Birth control/protection: Condom     Social Determinants of Health     Financial Resource Strain: Medium Risk (3/6/2024)    Overall Financial Resource Strain (CARDIA)     Difficulty of Paying Living Expenses: Somewhat hard   Food Insecurity: Food Insecurity Present (3/6/2024)    Hunger Vital Sign     Worried About Running Out of Food in the Last Year: Sometimes true     Ran Out of Food in the Last Year: Sometimes true   Transportation Needs: Unmet Transportation Needs (3/6/2024)    PRAPARE - Transportation     Lack of Transportation (Medical): Yes     Lack of Transportation (Non-Medical): Yes   Stress: No Stress Concern Present (3/6/2024)    [employer] - Occupational Stress Questionnaire     Feeling of Stress : Only a little   Housing Stability: High Risk (3/6/2024)    Housing Stability Vital Sign     Unable to Pay for Housing in the Last Year: No     Number of Places Lived in the Last Year: 3     Unstable Housing in the Last Year: Yes       Family History   Problem Relation Name Age of Onset    Throat cancer Paternal Grandfather      No Known Problems Father      No Known Problems Mother      Breast cancer Neg Hx      Diabetes Neg Hx      Colon cancer Neg Hx      Ovarian cancer Neg Hx         Review of patient's allergies indicates:  No Known Allergies    No current facility-administered medications on file prior to encounter.     Current Outpatient Medications on File Prior to Encounter   Medication Sig Dispense Refill    buPROPion (WELLBUTRIN XL) 150 MG TB24 tablet Take 1 tablet (150 mg total) by mouth once daily. 30 tablet 0    ibuprofen (ADVIL,MOTRIN) 600 MG tablet Take 1 tablet (600 mg total) by mouth every 6 (six) hours. 20 tablet 1    prenatal vit no.124/iron/folic (PRENATAL VITAMIN ORAL) Take by mouth.         Physical Exam    Reviewed nursing notes.  Vitals:    04/15/24 1309  04/15/24 1523 04/15/24 1645   BP: 118/75 107/65 99/65   Pulse: (!) 113 105 86   Resp: 18 18 17   Temp: (!) 100.6 °F (38.1 °C) (!) 101.7 °F (38.7 °C) 98.6 °F (37 °C)   TempSrc: Oral Oral Oral   SpO2: 95% 100% 97%   Weight: 49.4 kg (109 lb)       General:  Alert, no acute distress.    Skin:  Warm, dry, intact.  No rash.  Head:  Normocephalic, atraumatic.    Neck:  Supple.   HEENT:  Pupils are equal and round, appropriate for room, extraocular movements are intact.  Normal phonation.  Moist mucous membranes.  Oropharynx with erythema posteriorly without cobblestoning or edema.  Uvula is midline.  No tonsillar exudates.  No trismus.  Normal phonation.  Tolerating secretions.  Right TM with erythema.  No bulging.  Clear rhinorrhea is present bilaterally.  Cardiovascular:  Tachycardic rate and rhythm, Normal peripheral perfusion, No edema.    Respiratory:  Lungs are clear to auscultation, respirations are non-labored, breath sounds are equal.    Gastrointestinal:  Soft, Nontender, Non distended.   Back:  Nontender. Normal gait.  Ambulatory.  Musculoskeletal:  Normal range of motion observed.  Neurological:  Alert and oriented to person, place, time, and situation.  No focal deficits observed.   Psychiatric:  Cooperative, appropriate mood & affect.       Initial MDM and Data Review    18 y.o. female presenting for evaluation of URI symptoms, found to be febrile and tachycardic to match current fever.  Generally well-appearing and not toxic although appears to feel unwell.  Tachycardic.    Differential includes but is not limited to:  URI, doubt something more sinister such as venous sinus thrombosis although she is high-risk given recent pregnancy, doubt preeclampsia as blood pressure is normal now.  Given recent sick contact with a child who had similar URI/fever, most likely this is viral in nature.    Work-up includes:  Viral swabs, chest x-ray    Interventions include:  Antipyretics    The patient has significant  medical comorbidities that influence decision making for this acute process, such as:  Depression, recent pregnancy    I decided to obtain the patient's medical records and review relevant documentation from hospital records and clinic records.  Pertinent information is noted.      Medications   ibuprofen tablet 600 mg (600 mg Oral Given 4/15/24 1523)   guaiFENesin 12 hr tablet 600 mg (600 mg Oral Given 4/15/24 1523)   acetaminophen tablet 650 mg (650 mg Oral Given 4/15/24 1611)       Results and ED Course    Labs Reviewed   URINALYSIS, REFLEX TO URINE CULTURE - Abnormal; Notable for the following components:       Result Value    Protein, UA Trace (*)     Ketones, UA 1+ (*)     Leukocytes, UA Trace (*)     All other components within normal limits    Narrative:     Specimen Source->Urine   URINALYSIS MICROSCOPIC - Abnormal; Notable for the following components:    WBC, UA 7 (*)     All other components within normal limits    Narrative:     Specimen Source->Urine   SARS-COV2 (COVID) WITH FLU/RSV BY PCR   POCT URINE PREGNANCY       Imaging Results              X-Ray Chest PA And Lateral (Final result)  Result time 04/15/24 16:08:51      Final result by Felipe Stiles MD (04/15/24 16:08:51)                   Impression:      No acute intrathoracic process.      Electronically signed by: Felipe Stiles MD  Date:    04/15/2024  Time:    16:08               Narrative:    EXAMINATION:  XR CHEST PA AND LATERAL    CLINICAL HISTORY:  Fever, unspecified    TECHNIQUE:  PA and lateral views of the chest were performed.    COMPARISON:  06/14/2023.    FINDINGS:  There are postop changes of thoracolumbar instrumented fusion.  The appearance of the hardware is unremarkable.  There is unchanged appearance of minimal residual rightward scoliosis.    The trachea is unremarkable.  The cardiomediastinal silhouette is within normal limits.  There is no evidence of free air beneath the hemidiaphragms.  There are no pleural effusions.  There  is no evidence of a pneumothorax.  There is no evidence of pneumomediastinum.  No airspace opacity is present.                                      ED Course as of 04/15/24 2310   Mon Apr 15, 2024   1510 hCG Qualitative, Urine: Negative [AC]   1642 SARS-Cov2 (COVID) with FLU/RSV by PCR  None detected [AC]   1642 X-Ray Chest PA And Lateral  No consolidation or opacities [AC]   1648 Vital signs have largely improved at this time.  Fever has defervesced.  Will provide patient with follow up instructions and return precautions. [AC]      ED Course User Index  [AC] Moody Jennings DO           Relevant imaging interpreted by myself  No opacity or consolidation noted    Impression and Plan    18 y.o. female with findings of viral URI based on the work up in the emergency department as above.    Important lab/imaging findings include:  As above    All tests, treatment options and disposition options were discussed with the patient.  The decision was made to discharge the patient to home.    The patient was discharged in stable condition and all further questions/concerns by patient and/or family were addressed.    The patient will follow up with their primary care physician and specialist (OBGYN) as discussed in the next several days or return if any further concerns or change in symptoms necessitating re-evaluation.           Final diagnoses:  [R50.9] Fever  [J06.9] Viral URI with cough (Primary)        ED Disposition Condition    Discharge Stable          ED Prescriptions    None       Follow-up Information       Follow up With Specialties Details Why Contact Info        Follow up with your primary care doctor in the next 1 week for reassessment               Moody Jennings DO  04/15/24 2311

## 2024-05-07 ENCOUNTER — HOSPITAL ENCOUNTER (EMERGENCY)
Facility: HOSPITAL | Age: 19
Discharge: HOME | End: 2024-05-07
Attending: EMERGENCY MEDICINE
Payer: MEDICAID

## 2024-05-07 VITALS
RESPIRATION RATE: 16 BRPM | DIASTOLIC BLOOD PRESSURE: 62 MMHG | BODY MASS INDEX: 19.94 KG/M2 | HEART RATE: 75 BPM | WEIGHT: 109 LBS | TEMPERATURE: 98 F | SYSTOLIC BLOOD PRESSURE: 100 MMHG | OXYGEN SATURATION: 100 %

## 2024-05-07 DIAGNOSIS — N76.0 BV (BACTERIAL VAGINOSIS): ICD-10-CM

## 2024-05-07 DIAGNOSIS — B96.89 BV (BACTERIAL VAGINOSIS): ICD-10-CM

## 2024-05-07 DIAGNOSIS — N30.00 ACUTE CYSTITIS WITHOUT HEMATURIA: Primary | ICD-10-CM

## 2024-05-07 LAB
ALBUMIN SERPL BCP-MCNC: 4.4 G/DL (ref 3.5–5.2)
ALP SERPL-CCNC: 66 U/L (ref 55–135)
ALT SERPL W/O P-5'-P-CCNC: 20 U/L (ref 10–44)
ANION GAP SERPL CALC-SCNC: 10 MMOL/L (ref 8–16)
AST SERPL-CCNC: 19 U/L (ref 10–40)
B-HCG UR QL: NEGATIVE
BACTERIA #/AREA URNS AUTO: ABNORMAL /HPF
BACTERIA GENITAL QL WET PREP: ABNORMAL
BASOPHILS # BLD AUTO: 0.03 K/UL (ref 0–0.2)
BASOPHILS NFR BLD: 0.5 % (ref 0–1.9)
BILIRUB SERPL-MCNC: 0.6 MG/DL (ref 0.1–1)
BILIRUB UR QL STRIP: NEGATIVE
BUN SERPL-MCNC: 9 MG/DL (ref 6–20)
CALCIUM SERPL-MCNC: 9.8 MG/DL (ref 8.7–10.5)
CHLORIDE SERPL-SCNC: 108 MMOL/L (ref 95–110)
CLARITY UR REFRACT.AUTO: ABNORMAL
CLUE CELLS VAG QL WET PREP: ABNORMAL
CO2 SERPL-SCNC: 24 MMOL/L (ref 23–29)
COLOR UR AUTO: YELLOW
CREAT SERPL-MCNC: 0.8 MG/DL (ref 0.5–1.4)
CTP QC/QA: YES
DIFFERENTIAL METHOD BLD: ABNORMAL
EOSINOPHIL # BLD AUTO: 0.1 K/UL (ref 0–0.5)
EOSINOPHIL NFR BLD: 2 % (ref 0–8)
ERYTHROCYTE [DISTWIDTH] IN BLOOD BY AUTOMATED COUNT: 13.7 % (ref 11.5–14.5)
EST. GFR  (NO RACE VARIABLE): >60 ML/MIN/1.73 M^2
FILAMENT FUNGI VAG WET PREP-#/AREA: ABNORMAL
GLUCOSE SERPL-MCNC: 116 MG/DL (ref 70–110)
GLUCOSE UR QL STRIP: NEGATIVE
HCT VFR BLD AUTO: 38.9 % (ref 37–48.5)
HGB BLD-MCNC: 12.1 G/DL (ref 12–16)
HGB UR QL STRIP: NEGATIVE
IMM GRANULOCYTES # BLD AUTO: 0.01 K/UL (ref 0–0.04)
IMM GRANULOCYTES NFR BLD AUTO: 0.2 % (ref 0–0.5)
KETONES UR QL STRIP: ABNORMAL
LEUKOCYTE ESTERASE UR QL STRIP: ABNORMAL
LYMPHOCYTES # BLD AUTO: 2.1 K/UL (ref 1–4.8)
LYMPHOCYTES NFR BLD: 33.6 % (ref 18–48)
MCH RBC QN AUTO: 28.5 PG (ref 27–31)
MCHC RBC AUTO-ENTMCNC: 31.1 G/DL (ref 32–36)
MCV RBC AUTO: 92 FL (ref 82–98)
MICROSCOPIC COMMENT: ABNORMAL
MONOCYTES # BLD AUTO: 0.4 K/UL (ref 0.3–1)
MONOCYTES NFR BLD: 6 % (ref 4–15)
NEUTROPHILS # BLD AUTO: 3.5 K/UL (ref 1.8–7.7)
NEUTROPHILS NFR BLD: 57.7 % (ref 38–73)
NITRITE UR QL STRIP: NEGATIVE
NRBC BLD-RTO: 0 /100 WBC
PH UR STRIP: 7 [PH] (ref 5–8)
PLATELET # BLD AUTO: 250 K/UL (ref 150–450)
PMV BLD AUTO: 9.8 FL (ref 9.2–12.9)
POTASSIUM SERPL-SCNC: 3.5 MMOL/L (ref 3.5–5.1)
PROT SERPL-MCNC: 7.3 G/DL (ref 6–8.4)
PROT UR QL STRIP: ABNORMAL
RBC # BLD AUTO: 4.24 M/UL (ref 4–5.4)
RBC #/AREA URNS AUTO: 4 /HPF (ref 0–4)
SODIUM SERPL-SCNC: 142 MMOL/L (ref 136–145)
SP GR UR STRIP: 1.02 (ref 1–1.03)
SPECIMEN SOURCE: ABNORMAL
SQUAMOUS #/AREA URNS AUTO: 16 /HPF
T VAGINALIS GENITAL QL WET PREP: ABNORMAL
URN SPEC COLLECT METH UR: ABNORMAL
WBC # BLD AUTO: 6.13 K/UL (ref 3.9–12.7)
WBC #/AREA URNS AUTO: 58 /HPF (ref 0–5)
WBC #/AREA VAG WET PREP: ABNORMAL
YEAST GENITAL QL WET PREP: ABNORMAL

## 2024-05-07 PROCEDURE — 96375 TX/PRO/DX INJ NEW DRUG ADDON: CPT

## 2024-05-07 PROCEDURE — 87077 CULTURE AEROBIC IDENTIFY: CPT | Performed by: EMERGENCY MEDICINE

## 2024-05-07 PROCEDURE — 80053 COMPREHEN METABOLIC PANEL: CPT | Performed by: EMERGENCY MEDICINE

## 2024-05-07 PROCEDURE — 87186 SC STD MICRODIL/AGAR DIL: CPT | Performed by: EMERGENCY MEDICINE

## 2024-05-07 PROCEDURE — 87088 URINE BACTERIA CULTURE: CPT | Performed by: EMERGENCY MEDICINE

## 2024-05-07 PROCEDURE — 87591 N.GONORRHOEAE DNA AMP PROB: CPT | Performed by: PHYSICIAN ASSISTANT

## 2024-05-07 PROCEDURE — 25500020 PHARM REV CODE 255: Performed by: EMERGENCY MEDICINE

## 2024-05-07 PROCEDURE — 63600175 PHARM REV CODE 636 W HCPCS: Performed by: PHYSICIAN ASSISTANT

## 2024-05-07 PROCEDURE — 87210 SMEAR WET MOUNT SALINE/INK: CPT | Performed by: PHYSICIAN ASSISTANT

## 2024-05-07 PROCEDURE — 99285 EMERGENCY DEPT VISIT HI MDM: CPT | Mod: 25

## 2024-05-07 PROCEDURE — 85025 COMPLETE CBC W/AUTO DIFF WBC: CPT | Performed by: EMERGENCY MEDICINE

## 2024-05-07 PROCEDURE — 81001 URINALYSIS AUTO W/SCOPE: CPT | Performed by: EMERGENCY MEDICINE

## 2024-05-07 PROCEDURE — 96365 THER/PROPH/DIAG IV INF INIT: CPT | Mod: 59

## 2024-05-07 PROCEDURE — 81025 URINE PREGNANCY TEST: CPT | Performed by: EMERGENCY MEDICINE

## 2024-05-07 PROCEDURE — 25000003 PHARM REV CODE 250: Performed by: PHYSICIAN ASSISTANT

## 2024-05-07 PROCEDURE — 87086 URINE CULTURE/COLONY COUNT: CPT | Performed by: EMERGENCY MEDICINE

## 2024-05-07 RX ORDER — MORPHINE SULFATE 4 MG/ML
4 INJECTION, SOLUTION INTRAMUSCULAR; INTRAVENOUS
Status: COMPLETED | OUTPATIENT
Start: 2024-05-07 | End: 2024-05-07

## 2024-05-07 RX ORDER — CEPHALEXIN 500 MG/1
500 CAPSULE ORAL EVERY 12 HOURS
Qty: 14 CAPSULE | Refills: 0 | Status: SHIPPED | OUTPATIENT
Start: 2024-05-07 | End: 2024-05-14

## 2024-05-07 RX ORDER — KETOROLAC TROMETHAMINE 10 MG/1
10 TABLET, FILM COATED ORAL EVERY 6 HOURS
Qty: 20 TABLET | Refills: 0 | Status: SHIPPED | OUTPATIENT
Start: 2024-05-07 | End: 2024-05-12

## 2024-05-07 RX ORDER — METRONIDAZOLE 500 MG/1
500 TABLET ORAL 3 TIMES DAILY
Qty: 21 TABLET | Refills: 0 | Status: SHIPPED | OUTPATIENT
Start: 2024-05-07 | End: 2024-05-14

## 2024-05-07 RX ORDER — ONDANSETRON 4 MG/1
4 TABLET, FILM COATED ORAL EVERY 6 HOURS
Qty: 12 TABLET | Refills: 0 | Status: SHIPPED | OUTPATIENT
Start: 2024-05-07 | End: 2024-05-10

## 2024-05-07 RX ORDER — ONDANSETRON HYDROCHLORIDE 2 MG/ML
4 INJECTION, SOLUTION INTRAVENOUS
Status: COMPLETED | OUTPATIENT
Start: 2024-05-07 | End: 2024-05-07

## 2024-05-07 RX ORDER — METRONIDAZOLE 500 MG/1
500 TABLET ORAL
Status: COMPLETED | OUTPATIENT
Start: 2024-05-07 | End: 2024-05-07

## 2024-05-07 RX ADMIN — MORPHINE SULFATE 4 MG: 4 INJECTION INTRAVENOUS at 07:05

## 2024-05-07 RX ADMIN — ONDANSETRON 4 MG: 2 INJECTION INTRAMUSCULAR; INTRAVENOUS at 07:05

## 2024-05-07 RX ADMIN — IOHEXOL 75 ML: 350 INJECTION, SOLUTION INTRAVENOUS at 07:05

## 2024-05-07 RX ADMIN — CEFTRIAXONE 1 G: 1 INJECTION, POWDER, FOR SOLUTION INTRAMUSCULAR; INTRAVENOUS at 08:05

## 2024-05-07 RX ADMIN — METRONIDAZOLE 500 MG: 500 TABLET ORAL at 09:05

## 2024-05-07 NOTE — ED NOTES
Patient identifiers verified and correct for  MS Cochran   C/C: ABd paiN SEE NN  APPEARANCE: awake and alert in NAD. PAIN  */10  SKIN: warm, dry and intact. No breakdown or bruising.  MUSCULOSKELETAL: Patient moving all extremities spontaneously, no obvious swelling or deformities noted. Ambulates independently.  RESPIRATORY: Denies shortness of breath.Respirations unlabored.   CARDIAC: Denies CP, 2+ distal pulses; no peripheral edema  ABDOMEN: ABdomen round , lower abd pain   : voids spontaneously, denies difficulty  Neurologic: AAO x 4; follows commands equal strength in all extremities; denies numbness/tingling. Denies dizziness Deneis new weakness

## 2024-05-07 NOTE — ED PROVIDER NOTES
"Encounter Date: 5/7/2024       History     Chief Complaint   Patient presents with    Abdominal Pain     Lower abdominal pain. Beginning approximately 30 minutes ago.      18-year-old female presents emergency department for lower abdominal pain.  States about an hour ago started having lower abdominal pain which she states it initially felt like menstrual cramps however became "as severe as labor pains close quote.  Describes it as sharp across her lower abdomen.  Does endorse some burning when she urinates.  Denies any fevers/chills, nausea vomiting, diarrhea, constipation, or vaginal bleeding.  States she has thin white discharge which she states is normal.        Review of patient's allergies indicates:  No Known Allergies  Past Medical History:   Diagnosis Date    Behavior problem in childhood     COVID-19 08/08/2021    Ectopic pregnancy 5/24/2023    Suicidal ideation      Past Surgical History:   Procedure Laterality Date    BACK SURGERY  2016    Rods in place, had issues with previous epidural    DIAGNOSTIC LAPAROSCOPY N/A 05/29/2023    Procedure: LAPAROSCOPY, DIAGNOSTIC;  Surgeon: Juana Fung MD;  Location: [facility];  Service: OB/GYN;  Laterality: N/A;    DILATION AND CURETTAGE OF UTERUS       Family History   Problem Relation Name Age of Onset    Throat cancer Paternal Grandfather      No Known Problems Father      No Known Problems Mother      Breast cancer Neg Hx      Diabetes Neg Hx      Colon cancer Neg Hx      Ovarian cancer Neg Hx       Social History     Tobacco Use    Smoking status: Never    Smokeless tobacco: Never   Substance Use Topics    Alcohol use: No    Drug use: No     Review of Systems   Constitutional:  Negative for chills and fever.   HENT:  Negative for sore throat.    Respiratory:  Negative for cough and shortness of breath.    Cardiovascular:  Negative for chest pain.   Gastrointestinal:  Positive for abdominal pain. Negative for constipation, diarrhea, nausea and vomiting. "   Genitourinary:  Negative for difficulty urinating and dysuria.   Musculoskeletal: Negative.    Skin: Negative.    Neurological:  Negative for weakness.   Psychiatric/Behavioral:  Negative for confusion.        Physical Exam     Initial Vitals [05/07/24 1800]   BP Pulse Resp Temp SpO2   (!) 105/59 77 16 98 °F (36.7 °C) 97 %      MAP       --         Physical Exam    Nursing note and vitals reviewed.  Constitutional: She appears well-developed and well-nourished.   Uncomfortable appearing   HENT:   Head: Normocephalic and atraumatic.   Eyes: Conjunctivae are normal.   Neck: Neck supple.   Normal range of motion.  Cardiovascular:  Normal rate.           Pulmonary/Chest: Breath sounds normal.   Abdominal: Abdomen is soft. She exhibits no distension and no mass. There is abdominal tenderness (Across the lower abdomen. No focal tenderness.). There is no rebound and no guarding.   Genitourinary:    Genitourinary Comments: Female chaperone present for pelvic exam.  Closed cervical os, thin clear/white discharge from the os.  There is small amount of friability around the os.  No CMT, no adnexal tenderness.     Musculoskeletal:         General: Normal range of motion.      Cervical back: Normal range of motion and neck supple.     Neurological: She is alert and oriented to person, place, and time. GCS score is 15. GCS eye subscore is 4. GCS verbal subscore is 5. GCS motor subscore is 6.   Skin: Skin is warm and dry. Capillary refill takes less than 2 seconds.         ED Course   Procedures  Labs Reviewed   VAGINAL SCREEN - Abnormal; Notable for the following components:       Result Value    Clue Cells Few (*)     WBC - Vaginal Screen Moderate (*)     Bacteria - Vaginal Screen Many (*)     All other components within normal limits    Narrative:     Release to patient->Immediate   URINALYSIS, REFLEX TO URINE CULTURE - Abnormal; Notable for the following components:    Appearance, UA Hazy (*)     Protein, UA Trace (*)      Ketones, UA Trace (*)     Leukocytes, UA 2+ (*)     All other components within normal limits    Narrative:     Specimen Source->Urine   CBC W/ AUTO DIFFERENTIAL - Abnormal; Notable for the following components:    MCHC 31.1 (*)     All other components within normal limits   COMPREHENSIVE METABOLIC PANEL - Abnormal; Notable for the following components:    Glucose 116 (*)     All other components within normal limits   URINALYSIS MICROSCOPIC - Abnormal; Notable for the following components:    WBC, UA 58 (*)     All other components within normal limits    Narrative:     Specimen Source->Urine   CULTURE, URINE   C. TRACHOMATIS/N. GONORRHOEAE BY AMP DNA   POCT URINE PREGNANCY          Imaging Results              CT Abdomen Pelvis With IV Contrast NO Oral Contrast (Final result)  Result time 05/07/24 20:06:43      Final result by Ramsey Larry MD (05/07/24 20:06:43)                   Impression:      1. Moderate stool in the right colon, may reflect developing constipation.  2. No findings to suggest obstructive uropathy noting bilateral nonobstructive nephrolithiasis.  3. Please see above for several additional findings.      Electronically signed by: Ramsey Larry MD  Date:    05/07/2024  Time:    20:06               Narrative:    EXAMINATION:  CT ABDOMEN PELVIS WITH IV CONTRAST    CLINICAL HISTORY:  LLQ abdominal pain;RLQ abdominal pain (Age >= 14y);    TECHNIQUE:  Low dose axial images, sagittal and coronal reformations were obtained from the lung bases to the pubic symphysis following the IV administration of 75 mL of Omnipaque 350 .  Oral contrast was not given.    COMPARISON:  CT 06/28/2023    FINDINGS:  Images of the lower thorax are remarkable for mild dependent atelectasis.    Artifact from spinal fusion hardware limits evaluation of the abdomen and pelvis.    Allowing for the above, the liver, spleen, pancreas, gallbladder and adrenal glands are grossly unremarkable.  There is no biliary dilation  or ascites.  The portal vein, splenic vein, SMV, celiac axis and SMA all are patent.  The stomach is mildly distended with ingested content without wall thickening.  No significant abdominal lymphadenopathy.    The kidneys enhance symmetrically without hydronephrosis.  There is left nonobstructive and right nonobstructive nephrolithiasis.  The bilateral ureters are unable to be followed in their entirety to the urinary bladder, no definite calculi seen or secondary findings to suggest obstructive uropathy.  The urinary bladder is nondistended, no significant wall thickening.  The uterus is unremarkable.  There is a follicle or cyst within the right ovary measuring 1.9 cm as well as within the left ovary measuring 2.5 cm.  There is a small amount of free fluid in the pelvis.    There are a few scattered colonic diverticula without inflammation.  The terminal ileum is unremarkable.  The appendix is unremarkable.  The small bowel is grossly unremarkable.  There are a few scattered shotty periaortic, pericaval, and mesenteric lymph nodes.    No acute osseous abnormalities.  Posterior spinal fusion hardware noted, partially visualized.  No significant inguinal lymphadenopathy.                                       Medications   cefTRIAXone (Rocephin) 1 g in dextrose 5 % in water (D5W) 100 mL IVPB (MB+) (1 g Intravenous New Bag 5/7/24 2055)   morphine injection 4 mg (4 mg Intravenous Given 5/7/24 1949)   ondansetron injection 4 mg (4 mg Intravenous Given 5/7/24 1949)   iohexoL (OMNIPAQUE 350) injection 75 mL (75 mLs Intravenous Given 5/7/24 1951)     Medical Decision Making  18-year-old female presents ED for suprapubic abdominal pain.      Differential includes but not limited to UTI, diverticulitis, PID, appendicitis, gastroenteritis     On exam she is uncomfortable appearing with tenderness across the lower abdomen.  No focal tenderness.  UA was concerning for UTI and she was started on Rocephin in the ED. no fever or  CVA tenderness to suggest pyelonephritis.  she initially states he has normal physiologic discharge but later states she is concerned about PID and pelvic exam was performed.  She does have thin white discharge but no CMT or adnexal tenderness.  Low suspicion for PID or TOA.  GC screening is pending.  Few clue cells on vaginal screen will treat with metronidazole.  Will discharge home with Keflex, Zofran as well as Toradol for pain.  Discussed return ED precautions.    Amount and/or Complexity of Data Reviewed  Labs: ordered. Decision-making details documented in ED Course.  Radiology: ordered.    Risk  Prescription drug management.               ED Course as of 05/07/24 2123   Tue May 07, 2024   1845 hCG Qualitative, Urine: Negative [HJ]      ED Course User Index  [HJ] Jesus Alberto Mandujano PA-C                           Clinical Impression:  Final diagnoses:  [N30.00] Acute cystitis without hematuria (Primary)          ED Disposition Condition    Discharge Stable          ED Prescriptions    None       Follow-up Information    None          Jesus Alberto Mandujano PA-C  05/07/24 2123       Jesus Alberto Mandujano PA-C  05/07/24 2126

## 2024-05-08 ENCOUNTER — ON-DEMAND VIRTUAL (OUTPATIENT)
Dept: URGENT CARE | Facility: CLINIC | Age: 19
End: 2024-05-08
Payer: MEDICAID

## 2024-05-08 ENCOUNTER — TELEPHONE (OUTPATIENT)
Dept: OBSTETRICS AND GYNECOLOGY | Facility: CLINIC | Age: 19
End: 2024-05-08
Payer: MEDICAID

## 2024-05-08 DIAGNOSIS — R10.30 LOWER ABDOMINAL PAIN: Primary | ICD-10-CM

## 2024-05-08 LAB
C TRACH DNA SPEC QL NAA+PROBE: NOT DETECTED
N GONORRHOEA DNA SPEC QL NAA+PROBE: NOT DETECTED

## 2024-05-08 PROCEDURE — 99203 OFFICE O/P NEW LOW 30 MIN: CPT | Mod: 95,,, | Performed by: FAMILY MEDICINE

## 2024-05-08 NOTE — PATIENT INSTRUCTIONS
WHILE IN THE EMERGENCY ROOM, YOU WERE TREATED FOR THE POSSIBILITY OF GONORRHEA (CEFTRIAXONE) AS WELL AS BV (METRONIDAZOLE WHICH YOU ARE TAKING NOW).  CHLAMYDIA TEST IS PENDING, AND CAN BE TREATED IF POSITIVE.  URINE CULTURE ALSO PENDING.    AS WE DISCUSSED, I ENCOURAGE YOU TO FOLLOW-UP WITH GYN LATER THIS WEEK, AND THESE TEST RESULTS SHOULD BE AVAILABLE AT THAT TIME.    WITH ANY FEVER, OR WORSENING ABDOMINAL PAIN, YOU SHOULD GO TO THE EMERGENCY ROOM FOR FURTHER EVALUATION AND TREATMENT

## 2024-05-08 NOTE — TELEPHONE ENCOUNTER
I called and schedule pt for Friday may 10, 2024 @ 10:40am for gyn visit since her insurance will not cover two visit in one day.

## 2024-05-08 NOTE — PROGRESS NOTES
Subjective:      Patient ID: Yaquelin Cochran is a 18 y.o. female.    Vitals:  vitals were not taken for this visit.     Chief Complaint: No chief complaint on file.      Visit Type: TELE AUDIOVISUAL    Present with the patient at the time of consultation: TELEMED PRESENT WITH PATIENT: child    Past Medical History:   Diagnosis Date    Behavior problem in childhood     COVID-19 08/08/2021    Ectopic pregnancy 5/24/2023    Suicidal ideation      Past Surgical History:   Procedure Laterality Date    BACK SURGERY  2016    Rods in place, had issues with previous epidural    DIAGNOSTIC LAPAROSCOPY N/A 05/29/2023    Procedure: LAPAROSCOPY, DIAGNOSTIC;  Surgeon: Juana Fung MD;  Location: [facility];  Service: OB/GYN;  Laterality: N/A;    DILATION AND CURETTAGE OF UTERUS       Review of patient's allergies indicates:  No Known Allergies  Current Outpatient Medications on File Prior to Visit   Medication Sig Dispense Refill    buPROPion (WELLBUTRIN XL) 150 MG TB24 tablet Take 1 tablet (150 mg total) by mouth once daily. 30 tablet 0    cephALEXin (KEFLEX) 500 MG capsule Take 1 capsule (500 mg total) by mouth every 12 (twelve) hours. for 7 days 14 capsule 0    ibuprofen (ADVIL,MOTRIN) 600 MG tablet Take 1 tablet (600 mg total) by mouth every 6 (six) hours. 20 tablet 1    ketorolac (TORADOL) 10 mg tablet Take 1 tablet (10 mg total) by mouth every 6 (six) hours. for 5 days 20 tablet 0    metroNIDAZOLE (FLAGYL) 500 MG tablet Take 1 tablet (500 mg total) by mouth 3 (three) times daily. for 7 days 21 tablet 0    ondansetron (ZOFRAN) 4 MG tablet Take 1 tablet (4 mg total) by mouth every 6 (six) hours. for 3 days 12 tablet 0    prenatal vit no.124/iron/folic (PRENATAL VITAMIN ORAL) Take by mouth.       Current Facility-Administered Medications on File Prior to Visit   Medication Dose Route Frequency Provider Last Rate Last Admin    [COMPLETED] cefTRIAXone (Rocephin) 1 g in dextrose 5 % in water (D5W) 100 mL IVPB (MB+)  1 g  Intravenous ED 1 Time Jesus Alberto Mandujano PA-C   Stopped at 05/07/24 2138    [COMPLETED] iohexoL (OMNIPAQUE 350) injection 75 mL  75 mL Intravenous ONCE PRN Poli Fairbanks MD   75 mL at 05/07/24 1951    [COMPLETED] metroNIDAZOLE tablet 500 mg  500 mg Oral ED 1 Time Jesus Alberto Mandujano PA-C   500 mg at 05/07/24 2134    [COMPLETED] morphine injection 4 mg  4 mg Intravenous ED 1 Time Jesus Alberto Mandujano PA-C   4 mg at 05/07/24 1949    [COMPLETED] ondansetron injection 4 mg  4 mg Intravenous ED 1 Time Jesus Alberto Mandujano PA-C   4 mg at 05/07/24 1949     Family History   Problem Relation Name Age of Onset    Throat cancer Paternal Grandfather      No Known Problems Father      No Known Problems Mother      Breast cancer Neg Hx      Diabetes Neg Hx      Colon cancer Neg Hx      Ovarian cancer Neg Hx             Ohs Peq Odvv Intake    5/8/2024  4:13 AM CDT - Filed by Patient   What is your current physical address in the event of a medical emergency? [address]   Are you able to take your vital signs? No   Please attach any relevant images or files          18-year-old female with concerns for PID.  Seen yesterday in the emergency room, and that visit, exam, and associated labs reviewed by me and with patient.  She was seen with lower abdominal pain.  Urinalysis was abnormal/equivocal for UTI.  Urine culture in progress.  Vaginal screen with clue cells.  While in the emergency room she received ceftriaxone and was sent home on metronidazole and cephalexin.  CT of abdomen/pelvis showed moderate stool, also 1.9 cm follicle or cyst in right ovary with small amount of free fluid.  Gonorrhea/chlamydia testing pending.  Last unprotected sex longer than 2 months ago.  No documented fever.  She can not tell a difference in her symptoms since in ER yesterday.        Constitution: Negative for fever.        Objective:   The physical exam was conducted virtually.  Physical Exam   Constitutional: She is oriented to person, place, and time. She appears  well-developed.  Non-toxic appearance. She does not appear ill. No distress.   HENT:   Head: Normocephalic and atraumatic.   Ears:   Right Ear: External ear normal.   Left Ear: External ear normal.   Pulmonary/Chest: Effort normal. No stridor. No respiratory distress.   Abdominal: Normal appearance.   Neurological: She is alert and oriented to person, place, and time.   Skin: Skin is not diaphoretic.   Psychiatric: Her behavior is normal. Thought content normal.   Nursing note and vitals reviewed.      Assessment:     1. Lower abdominal pain        Plan:       Lower abdominal pain      WHILE IN THE EMERGENCY ROOM, YOU WERE TREATED FOR THE POSSIBILITY OF GONORRHEA (CEFTRIAXONE) AS WELL AS BV (METRONIDAZOLE WHICH YOU ARE TAKING NOW).  CHLAMYDIA TEST IS PENDING, AND CAN BE TREATED IF POSITIVE.  URINE CULTURE ALSO PENDING.    AS WE DISCUSSED, I ENCOURAGE YOU TO FOLLOW-UP WITH GYN LATER THIS WEEK, AND THESE TEST RESULTS SHOULD BE AVAILABLE AT THAT TIME.    WITH ANY FEVER, OR WORSENING ABDOMINAL PAIN, YOU SHOULD GO TO THE EMERGENCY ROOM FOR FURTHER EVALUATION AND TREATMENT

## 2024-05-08 NOTE — DISCHARGE INSTRUCTIONS
Your urine test was positive for urinary tract infection I have prescribed you antibiotics for this.  We did screen you for gonorrhea and chlamydia this result woke him back in about 2-3 days.  If this is abnormal will contact you.  Your vaginal screen did show few clue cells which may suggest bacterial vaginosis and I have prescribed you an antibiotic for this.  I have prescribed you anti-inflammatories to help with pain you may use over-the-counter Tylenol for additional pain relief.  I have also prescribed you medications for nausea.  Follow-up with your primary care doctor if you continue to have symptoms.  You may return to ED sooner if you develop any new or worsening of her symptoms.

## 2024-05-10 LAB — BACTERIA UR CULT: ABNORMAL

## 2024-06-26 ENCOUNTER — PATIENT MESSAGE (OUTPATIENT)
Dept: OBSTETRICS AND GYNECOLOGY | Facility: CLINIC | Age: 19
End: 2024-06-26
Payer: MEDICAID

## 2024-07-08 PROCEDURE — 99285 EMERGENCY DEPT VISIT HI MDM: CPT | Mod: 25

## 2024-07-09 ENCOUNTER — HOSPITAL ENCOUNTER (EMERGENCY)
Facility: HOSPITAL | Age: 19
Discharge: HOME | End: 2024-07-09
Attending: EMERGENCY MEDICINE
Payer: MEDICAID

## 2024-07-09 VITALS
HEIGHT: 61 IN | SYSTOLIC BLOOD PRESSURE: 105 MMHG | BODY MASS INDEX: 20.77 KG/M2 | WEIGHT: 110 LBS | HEART RATE: 54 BPM | OXYGEN SATURATION: 99 % | DIASTOLIC BLOOD PRESSURE: 68 MMHG | RESPIRATION RATE: 18 BRPM | TEMPERATURE: 98 F

## 2024-07-09 DIAGNOSIS — R10.2 PELVIC PAIN: ICD-10-CM

## 2024-07-09 LAB
ALBUMIN SERPL BCP-MCNC: 4.2 G/DL (ref 3.5–5.2)
ALP SERPL-CCNC: 64 U/L (ref 55–135)
ALT SERPL W/O P-5'-P-CCNC: 21 U/L (ref 10–44)
ANION GAP SERPL CALC-SCNC: 7 MMOL/L (ref 8–16)
AST SERPL-CCNC: 17 U/L (ref 10–40)
B-HCG UR QL: NEGATIVE
BASOPHILS # BLD AUTO: 0.07 K/UL (ref 0–0.2)
BASOPHILS NFR BLD: 1 % (ref 0–1.9)
BILIRUB SERPL-MCNC: 0.5 MG/DL (ref 0.1–1)
BILIRUB UR QL STRIP: NEGATIVE
BUN SERPL-MCNC: 10 MG/DL (ref 6–20)
CALCIUM SERPL-MCNC: 9.4 MG/DL (ref 8.7–10.5)
CHLORIDE SERPL-SCNC: 106 MMOL/L (ref 95–110)
CLARITY UR REFRACT.AUTO: CLEAR
CO2 SERPL-SCNC: 26 MMOL/L (ref 23–29)
COLOR UR AUTO: YELLOW
CREAT SERPL-MCNC: 0.7 MG/DL (ref 0.5–1.4)
CTP QC/QA: YES
DIFFERENTIAL METHOD BLD: NORMAL
EOSINOPHIL # BLD AUTO: 0.2 K/UL (ref 0–0.5)
EOSINOPHIL NFR BLD: 3.2 % (ref 0–8)
ERYTHROCYTE [DISTWIDTH] IN BLOOD BY AUTOMATED COUNT: 14.2 % (ref 11.5–14.5)
EST. GFR  (NO RACE VARIABLE): >60 ML/MIN/1.73 M^2
GLUCOSE SERPL-MCNC: 83 MG/DL (ref 70–110)
GLUCOSE UR QL STRIP: NEGATIVE
HCT VFR BLD AUTO: 38.2 % (ref 37–48.5)
HGB BLD-MCNC: 12.3 G/DL (ref 12–16)
HGB UR QL STRIP: NEGATIVE
IMM GRANULOCYTES # BLD AUTO: 0.01 K/UL (ref 0–0.04)
IMM GRANULOCYTES NFR BLD AUTO: 0.1 % (ref 0–0.5)
KETONES UR QL STRIP: NEGATIVE
LEUKOCYTE ESTERASE UR QL STRIP: NEGATIVE
LIPASE SERPL-CCNC: 26 U/L (ref 4–60)
LYMPHOCYTES # BLD AUTO: 2.9 K/UL (ref 1–4.8)
LYMPHOCYTES NFR BLD: 42.7 % (ref 18–48)
MCH RBC QN AUTO: 28.8 PG (ref 27–31)
MCHC RBC AUTO-ENTMCNC: 32.2 G/DL (ref 32–36)
MCV RBC AUTO: 90 FL (ref 82–98)
MONOCYTES # BLD AUTO: 0.5 K/UL (ref 0.3–1)
MONOCYTES NFR BLD: 7.1 % (ref 4–15)
NEUTROPHILS # BLD AUTO: 3.1 K/UL (ref 1.8–7.7)
NEUTROPHILS NFR BLD: 45.9 % (ref 38–73)
NITRITE UR QL STRIP: NEGATIVE
NRBC BLD-RTO: 0 /100 WBC
PH UR STRIP: 7 [PH] (ref 5–8)
PLATELET # BLD AUTO: 227 K/UL (ref 150–450)
PMV BLD AUTO: 10 FL (ref 9.2–12.9)
POTASSIUM SERPL-SCNC: 3.7 MMOL/L (ref 3.5–5.1)
PROT SERPL-MCNC: 6.8 G/DL (ref 6–8.4)
PROT UR QL STRIP: NEGATIVE
RBC # BLD AUTO: 4.27 M/UL (ref 4–5.4)
SODIUM SERPL-SCNC: 139 MMOL/L (ref 136–145)
SP GR UR STRIP: 1.01 (ref 1–1.03)
URN SPEC COLLECT METH UR: NORMAL
WBC # BLD AUTO: 6.86 K/UL (ref 3.9–12.7)

## 2024-07-09 PROCEDURE — 96374 THER/PROPH/DIAG INJ IV PUSH: CPT

## 2024-07-09 PROCEDURE — 85025 COMPLETE CBC W/AUTO DIFF WBC: CPT | Performed by: EMERGENCY MEDICINE

## 2024-07-09 PROCEDURE — 83690 ASSAY OF LIPASE: CPT | Performed by: EMERGENCY MEDICINE

## 2024-07-09 PROCEDURE — 80053 COMPREHEN METABOLIC PANEL: CPT | Performed by: EMERGENCY MEDICINE

## 2024-07-09 PROCEDURE — 81003 URINALYSIS AUTO W/O SCOPE: CPT | Performed by: EMERGENCY MEDICINE

## 2024-07-09 PROCEDURE — 81025 URINE PREGNANCY TEST: CPT | Performed by: EMERGENCY MEDICINE

## 2024-07-09 PROCEDURE — 63600175 PHARM REV CODE 636 W HCPCS: Performed by: EMERGENCY MEDICINE

## 2024-07-09 PROCEDURE — 96375 TX/PRO/DX INJ NEW DRUG ADDON: CPT

## 2024-07-09 RX ORDER — ONDANSETRON HYDROCHLORIDE 2 MG/ML
4 INJECTION, SOLUTION INTRAVENOUS
Status: COMPLETED | OUTPATIENT
Start: 2024-07-09 | End: 2024-07-09

## 2024-07-09 RX ORDER — KETOROLAC TROMETHAMINE 30 MG/ML
10 INJECTION, SOLUTION INTRAMUSCULAR; INTRAVENOUS
Status: COMPLETED | OUTPATIENT
Start: 2024-07-09 | End: 2024-07-09

## 2024-07-09 RX ADMIN — KETOROLAC TROMETHAMINE 10 MG: 30 INJECTION, SOLUTION INTRAMUSCULAR; INTRAVENOUS at 03:07

## 2024-07-09 RX ADMIN — ONDANSETRON 4 MG: 2 INJECTION INTRAMUSCULAR; INTRAVENOUS at 03:07

## 2024-07-09 NOTE — ED NOTES
Pt reports to staff that she has to urgently leave and requests IV be taken out. Provider notified. Staff informed patient that the provider has been notified and advised to wait for a response from provider. IV taken out per patient request.

## 2024-07-09 NOTE — DISCHARGE INSTRUCTIONS
See your OB/GYN. Take tylenol/ibuprofen as needed for pain. Return to the ER with any new or worsening symptoms.

## 2024-07-09 NOTE — ED TRIAGE NOTES
Yaquelin Cochran, a 18 y.o. female presents to the ED w/ complaint of LLQ pain and vaginal bleeding. Pt states she is a few days late on her period, noticed clots earlier today with sharp shooting pain.     Triage note:  Chief Complaint   Patient presents with    Vaginal Bleeding     Pt complaining of left sided abdominal cramping and reports some vaginal bleeding with what looked like tissue or clots. Pt reports being several days late starting her normal menstrual cycle. Pt also endorses some nausea     Review of patient's allergies indicates:  No Known Allergies  Past Medical History:   Diagnosis Date    Behavior problem in childhood     COVID-19 08/08/2021    Ectopic pregnancy 5/24/2023    Suicidal ideation

## 2024-07-09 NOTE — ED PROVIDER NOTES
"History:  Yaquelin Cochran is a 18 y.o. female who presents to the ED with Vaginal Bleeding (Pt complaining of left sided abdominal cramping and reports some vaginal bleeding with what looked like tissue or clots. Pt reports being several days late starting her normal menstrual cycle. Pt also endorses some nausea)    Described as 18-year-old female with a history of an ectopic pregnancy status post right salpingectomy presenting to the emergency department with left-sided pelvic pain.  She reports she was due to start her period though only had a slight amount of spotting and a "weird tissue or clot discharge this morning.  For the past 5 hours, she has had intermittent sharp stabbing left-sided pelvic pain, severe for a few seconds before improving to a dull pressure-like pain.  She denies any vaginal discharge.  She denies any concern for STDs.  She denies any dysuria or hematuria.  She endorses mild nausea, no vomiting or diarrhea.  No fevers or chills.    Review of Systems: All systems reviewed and are negative except as per history of present illness.    Medications:   Previous Medications    BUPROPION (WELLBUTRIN XL) 150 MG TB24 TABLET    Take 1 tablet (150 mg total) by mouth once daily.    IBUPROFEN (ADVIL,MOTRIN) 600 MG TABLET    Take 1 tablet (600 mg total) by mouth every 6 (six) hours.       PMH:   Past Medical History:   Diagnosis Date    Behavior problem in childhood     COVID-19 08/08/2021    Ectopic pregnancy 5/24/2023    Suicidal ideation      PSH:   Past Surgical History:   Procedure Laterality Date    BACK SURGERY  2016    Rods in place, had issues with previous epidural    DIAGNOSTIC LAPAROSCOPY N/A 05/29/2023    Procedure: LAPAROSCOPY, DIAGNOSTIC;  Surgeon: Juana Fung MD;  Location: [facility];  Service: OB/GYN;  Laterality: N/A;    DILATION AND CURETTAGE OF UTERUS       Allergies: She has No Known Allergies.  Social History: Marital Status: single. She  reports that she has never smoked. " "She has never used smokeless tobacco.. She  reports no history of alcohol use..       Exam:  VITAL SIGNS:   Vitals:    07/08/24 2316 07/09/24 0522   BP: 103/64 105/68   Pulse: 73 (!) 54   Resp: 18    Temp: 98.2 °F (36.8 °C) 97.6 °F (36.4 °C)   TempSrc:  Oral   SpO2: 97% 99%   Weight: 49.9 kg (110 lb)    Height: 5' 1" (1.549 m)      Const: Awake and alert, NAD  Head: Atraumatic  Eyes: Normal Conjunctiva  ENT: Normal External Ears, Nose and Mouth.  Neck: Full range of motion. No meningismus.  Resp: Normal respiratory effort, No distress  Cardio: Equal and intact distal pulses  Abd: Soft, non tender, non distended.  No rebound or guarding, no masses   Skin: No petechiae or rashes  Ext: No cyanosis, or edema  Neur: Awake and alert  Psych: Normal Mood and Affect    Data:  Results for orders placed or performed during the hospital encounter of 07/09/24   CBC W/ AUTO DIFFERENTIAL   Result Value Ref Range    WBC 6.86 3.90 - 12.70 K/uL    RBC 4.27 4.00 - 5.40 M/uL    Hemoglobin 12.3 12.0 - 16.0 g/dL    Hematocrit 38.2 37.0 - 48.5 %    MCV 90 82 - 98 fL    MCH 28.8 27.0 - 31.0 pg    MCHC 32.2 32.0 - 36.0 g/dL    RDW 14.2 11.5 - 14.5 %    Platelets 227 150 - 450 K/uL    MPV 10.0 9.2 - 12.9 fL    Immature Granulocytes 0.1 0.0 - 0.5 %    Gran # (ANC) 3.1 1.8 - 7.7 K/uL    Immature Grans (Abs) 0.01 0.00 - 0.04 K/uL    Lymph # 2.9 1.0 - 4.8 K/uL    Mono # 0.5 0.3 - 1.0 K/uL    Eos # 0.2 0.0 - 0.5 K/uL    Baso # 0.07 0.00 - 0.20 K/uL    nRBC 0 0 /100 WBC    Gran % 45.9 38.0 - 73.0 %    Lymph % 42.7 18.0 - 48.0 %    Mono % 7.1 4.0 - 15.0 %    Eosinophil % 3.2 0.0 - 8.0 %    Basophil % 1.0 0.0 - 1.9 %    Differential Method Automated    Comp. Metabolic Panel   Result Value Ref Range    Sodium 139 136 - 145 mmol/L    Potassium 3.7 3.5 - 5.1 mmol/L    Chloride 106 95 - 110 mmol/L    CO2 26 23 - 29 mmol/L    Glucose 83 70 - 110 mg/dL    BUN 10 6 - 20 mg/dL    Creatinine 0.7 0.5 - 1.4 mg/dL    Calcium 9.4 8.7 - 10.5 mg/dL    Total " Protein 6.8 6.0 - 8.4 g/dL    Albumin 4.2 3.5 - 5.2 g/dL    Total Bilirubin 0.5 0.1 - 1.0 mg/dL    Alkaline Phosphatase 64 55 - 135 U/L    AST 17 10 - 40 U/L    ALT 21 10 - 44 U/L    eGFR >60.0 >60 mL/min/1.73 m^2    Anion Gap 7 (L) 8 - 16 mmol/L   Lipase   Result Value Ref Range    Lipase 26 4 - 60 U/L   Urinalysis, Reflex to Urine Culture Urine, Clean Catch    Specimen: Urine   Result Value Ref Range    Specimen UA Urine, Clean Catch     Color, UA Yellow Yellow, Straw, Corinne    Appearance, UA Clear Clear    pH, UA 7.0 5.0 - 8.0    Specific Gravity, UA 1.010 1.005 - 1.030    Protein, UA Negative Negative    Glucose, UA Negative Negative    Ketones, UA Negative Negative    Bilirubin (UA) Negative Negative    Occult Blood UA Negative Negative    Nitrite, UA Negative Negative    Leukocytes, UA Negative Negative   POCT urine pregnancy   Result Value Ref Range    POC Preg Test, Ur Negative Negative     Acceptable Yes      Imaging Results              US Pelvis Comp with Transvag NON-OB (xpd) (In process)  Result time 24 03:59:47   Procedure changed from US Transvaginal Non OB                 Labs & Imaging studies were reviewed independently by me.     Medical Decision Makin-year-old female presenting to the emergency department with left-sided pelvic pain.  She refused pelvic examination or swabs in the emergency room.  Laboratory studies show no acute abnormalities. She was given toradol for pain control.  Her abdomen is soft, nondistended, nontender to palpation. Doubt cholecystitis, appendicitis, pancreatitis, SBO, diverticulitis, or any other acute abdominal surgical emergency. Ultrasound pending at time of sign out.    Clinical Impression:  1. Pelvic pain                Maria De Jesus Weiner MD  24 [phone]

## 2024-07-09 NOTE — PROVIDER PROGRESS NOTES - EMERGENCY DEPT.
Encounter Date: 7/8/2024    ED Physician Progress Notes            Patient is signed out to me pending sonography, reassessment and disposition.  However, I was never able to assess/evaluate patient as she had abandon our care.  I was told by RN that she had to leave.  ____________________  Doug Jerry MD, [facility]  Emergency Medicine Staff  8:58 AM 7/9/2024

## 2024-07-11 ENCOUNTER — HOSPITAL ENCOUNTER (EMERGENCY)
Facility: HOSPITAL | Age: 19
Discharge: HOME | End: 2024-07-11
Attending: EMERGENCY MEDICINE
Payer: MEDICAID

## 2024-07-11 VITALS
SYSTOLIC BLOOD PRESSURE: 104 MMHG | DIASTOLIC BLOOD PRESSURE: 62 MMHG | TEMPERATURE: 98 F | OXYGEN SATURATION: 98 % | HEART RATE: 72 BPM | RESPIRATION RATE: 16 BRPM | WEIGHT: 110 LBS | BODY MASS INDEX: 20.24 KG/M2 | HEIGHT: 62 IN

## 2024-07-11 DIAGNOSIS — N83.201 CYSTS OF BOTH OVARIES: Primary | ICD-10-CM

## 2024-07-11 DIAGNOSIS — N83.202 CYSTS OF BOTH OVARIES: Primary | ICD-10-CM

## 2024-07-11 LAB
ALBUMIN SERPL BCP-MCNC: 4 G/DL (ref 3.5–5.2)
ALP SERPL-CCNC: 63 U/L (ref 55–135)
ALT SERPL W/O P-5'-P-CCNC: 19 U/L (ref 10–44)
ANION GAP SERPL CALC-SCNC: 4 MMOL/L (ref 8–16)
AST SERPL-CCNC: 18 U/L (ref 10–40)
B-HCG UR QL: NEGATIVE
BASOPHILS # BLD AUTO: 0.05 K/UL (ref 0–0.2)
BASOPHILS NFR BLD: 0.8 % (ref 0–1.9)
BILIRUB SERPL-MCNC: 0.6 MG/DL (ref 0.1–1)
BILIRUB UR QL STRIP: NEGATIVE
BUN SERPL-MCNC: 11 MG/DL (ref 6–20)
CALCIUM SERPL-MCNC: 9.3 MG/DL (ref 8.7–10.5)
CHLORIDE SERPL-SCNC: 107 MMOL/L (ref 95–110)
CLARITY UR REFRACT.AUTO: CLEAR
CO2 SERPL-SCNC: 28 MMOL/L (ref 23–29)
COLOR UR AUTO: COLORLESS
CREAT SERPL-MCNC: 0.7 MG/DL (ref 0.5–1.4)
CTP QC/QA: YES
DIFFERENTIAL METHOD BLD: NORMAL
EOSINOPHIL # BLD AUTO: 0.1 K/UL (ref 0–0.5)
EOSINOPHIL NFR BLD: 1.8 % (ref 0–8)
ERYTHROCYTE [DISTWIDTH] IN BLOOD BY AUTOMATED COUNT: 14.3 % (ref 11.5–14.5)
EST. GFR  (NO RACE VARIABLE): >60 ML/MIN/1.73 M^2
GLUCOSE SERPL-MCNC: 87 MG/DL (ref 70–110)
GLUCOSE UR QL STRIP: NEGATIVE
HCT VFR BLD AUTO: 38.9 % (ref 37–48.5)
HCV AB SERPL QL IA: NORMAL
HGB BLD-MCNC: 12.7 G/DL (ref 12–16)
HGB UR QL STRIP: NEGATIVE
IMM GRANULOCYTES # BLD AUTO: 0.01 K/UL (ref 0–0.04)
IMM GRANULOCYTES NFR BLD AUTO: 0.2 % (ref 0–0.5)
KETONES UR QL STRIP: NEGATIVE
LEUKOCYTE ESTERASE UR QL STRIP: NEGATIVE
LYMPHOCYTES # BLD AUTO: 1.7 K/UL (ref 1–4.8)
LYMPHOCYTES NFR BLD: 27.5 % (ref 18–48)
MCH RBC QN AUTO: 29.3 PG (ref 27–31)
MCHC RBC AUTO-ENTMCNC: 32.6 G/DL (ref 32–36)
MCV RBC AUTO: 90 FL (ref 82–98)
MONOCYTES # BLD AUTO: 0.4 K/UL (ref 0.3–1)
MONOCYTES NFR BLD: 7 % (ref 4–15)
NEUTROPHILS # BLD AUTO: 3.9 K/UL (ref 1.8–7.7)
NEUTROPHILS NFR BLD: 62.7 % (ref 38–73)
NITRITE UR QL STRIP: NEGATIVE
NRBC BLD-RTO: 0 /100 WBC
PH UR STRIP: 7 [PH] (ref 5–8)
PLATELET # BLD AUTO: 230 K/UL (ref 150–450)
PMV BLD AUTO: 10 FL (ref 9.2–12.9)
POTASSIUM SERPL-SCNC: 4.2 MMOL/L (ref 3.5–5.1)
PROT SERPL-MCNC: 6.7 G/DL (ref 6–8.4)
PROT UR QL STRIP: NEGATIVE
RBC # BLD AUTO: 4.34 M/UL (ref 4–5.4)
SODIUM SERPL-SCNC: 139 MMOL/L (ref 136–145)
SP GR UR STRIP: 1 (ref 1–1.03)
URN SPEC COLLECT METH UR: ABNORMAL
WBC # BLD AUTO: 6.18 K/UL (ref 3.9–12.7)

## 2024-07-11 PROCEDURE — 80053 COMPREHEN METABOLIC PANEL: CPT | Performed by: PHYSICIAN ASSISTANT

## 2024-07-11 PROCEDURE — 81003 URINALYSIS AUTO W/O SCOPE: CPT | Performed by: PHYSICIAN ASSISTANT

## 2024-07-11 PROCEDURE — 99284 EMERGENCY DEPT VISIT MOD MDM: CPT | Mod: 25

## 2024-07-11 PROCEDURE — 85025 COMPLETE CBC W/AUTO DIFF WBC: CPT | Performed by: PHYSICIAN ASSISTANT

## 2024-07-11 PROCEDURE — 86803 HEPATITIS C AB TEST: CPT | Performed by: PHYSICIAN ASSISTANT

## 2024-07-11 PROCEDURE — 81025 URINE PREGNANCY TEST: CPT | Performed by: EMERGENCY MEDICINE

## 2024-07-11 RX ORDER — KETOROLAC TROMETHAMINE 30 MG/ML
10 INJECTION, SOLUTION INTRAMUSCULAR; INTRAVENOUS
Status: DISCONTINUED | OUTPATIENT
Start: 2024-07-11 | End: 2024-07-11 | Stop reason: HOSPADM

## 2024-07-11 RX ORDER — METHOCARBAMOL 500 MG/1
1000 TABLET, FILM COATED ORAL 3 TIMES DAILY
Qty: 30 TABLET | Refills: 0 | Status: SHIPPED | OUTPATIENT
Start: 2024-07-11 | End: 2024-07-16

## 2024-07-11 RX ORDER — IBUPROFEN 800 MG/1
800 TABLET ORAL EVERY 6 HOURS PRN
Qty: 20 TABLET | Refills: 0 | Status: SHIPPED | OUTPATIENT
Start: 2024-07-11 | End: 2024-07-21

## 2024-07-11 NOTE — DISCHARGE INSTRUCTIONS
Your ultrasound continues to show of the ovarian cyst.  No signs of torsion.  You will need close follow-up with OBGYN I have prescribed you anti-inflammatories to help with pain.  You may use over-the-counter Tylenol for additional pain relief.  You may also use heating pads.  If you develop any new or worsening symptoms you may return to ED sooner.

## 2024-07-11 NOTE — ED NOTES
Patient states LLQ abd pain onset 3 days ago, currently on chris abdomen, reports pressure with BM and urination

## 2024-07-11 NOTE — ED PROVIDER NOTES
Encounter Date: 7/11/2024       History     Chief Complaint   Patient presents with    Abdominal Pain     Lower abd pain, recent ER visit showing cysts on ovaries, pain increasing making it hard to walk     18-year-old female with past medical history of ectopic pregnancy presents emergency department for lower abdominal pain.  Seen 2 days ago for left lower quadrant abdominal pain received a transvaginal ultrasound but eloped prior to receiving her results.  She has been taking Tylenol for pain but states that her pain is worsening in his now also present in the right lower quadrant.  Denies any dysuria, hematuria, vaginal bleeding or vaginal discharge.  Notes that she was able to review her ultrasound on her portal which noted ovarian cysts.    The history is provided by the patient.     Review of patient's allergies indicates:  No Known Allergies  Past Medical History:   Diagnosis Date    Behavior problem in childhood     COVID-19 08/08/2021    Ectopic pregnancy 5/24/2023    Suicidal ideation      Past Surgical History:   Procedure Laterality Date    BACK SURGERY  2016    Rods in place, had issues with previous epidural    DIAGNOSTIC LAPAROSCOPY N/A 05/29/2023    Procedure: LAPAROSCOPY, DIAGNOSTIC;  Surgeon: Juana Fung MD;  Location: [facility];  Service: OB/GYN;  Laterality: N/A;    DILATION AND CURETTAGE OF UTERUS       Family History   Problem Relation Name Age of Onset    Throat cancer Paternal Grandfather      No Known Problems Father      No Known Problems Mother      Breast cancer Neg Hx      Diabetes Neg Hx      Colon cancer Neg Hx      Ovarian cancer Neg Hx       Social History     Tobacco Use    Smoking status: Never    Smokeless tobacco: Never   Substance Use Topics    Alcohol use: No    Drug use: No     Review of Systems   Constitutional:  Negative for chills and fever.   HENT:  Negative for sore throat.    Respiratory:  Negative for cough and shortness of breath.    Cardiovascular:  Negative for  chest pain.   Gastrointestinal:  Positive for abdominal pain. Negative for nausea and vomiting.   Genitourinary:  Negative for difficulty urinating, vaginal bleeding and vaginal discharge.   Musculoskeletal: Negative.    Skin: Negative.    Neurological:  Negative for weakness.   Psychiatric/Behavioral:  Negative for confusion.        Physical Exam     Initial Vitals [07/11/24 1344]   BP Pulse Resp Temp SpO2   101/65 81 16 98.5 °F (36.9 °C) 98 %      MAP       --         Physical Exam    Nursing note and vitals reviewed.  Constitutional: She appears well-developed and well-nourished.   HENT:   Head: Normocephalic and atraumatic.   Eyes: Conjunctivae are normal.   Neck: Neck supple.   Normal range of motion.  Cardiovascular:  Normal rate.           Pulmonary/Chest: No respiratory distress.   Abdominal: Abdomen is soft. She exhibits no distension and no mass. There is abdominal tenderness (LLQ). There is no rebound and no guarding.   Musculoskeletal:         General: Normal range of motion.      Cervical back: Normal range of motion and neck supple.     Neurological: She is alert and oriented to person, place, and time. GCS score is 15. GCS eye subscore is 4. GCS verbal subscore is 5. GCS motor subscore is 6.   Skin: Skin is warm and dry. Capillary refill takes less than 2 seconds.         ED Course   Procedures  Labs Reviewed   COMPREHENSIVE METABOLIC PANEL - Abnormal; Notable for the following components:       Result Value    Anion Gap 4 (*)     All other components within normal limits    Narrative:     Release to patient->Immediate   HEPATITIS C ANTIBODY    Narrative:     Release to patient->Immediate   CBC W/ AUTO DIFFERENTIAL    Narrative:     Release to patient->Immediate   URINALYSIS, REFLEX TO URINE CULTURE   POCT URINE PREGNANCY          Imaging Results              US Pelvis Comp with Transvag NON-OB (xpd) (Final result)  Result time 07/11/24 15:57:54      Final result by Tye Richey MD (07/11/24  15:57:54)                   Impression:      Unremarkable examination.      Electronically signed by: Tye Richey MD  Date:    07/11/2024  Time:    15:57               Narrative:    EXAMINATION:  US PELVIS COMP WITH TRANSVAG NON-OB (XPD)    CLINICAL HISTORY:  Pelvic pain;    TECHNIQUE:  Transabdominal and transvaginal pelvic ultrasound.    COMPARISON:  07/09/2024    FINDINGS:  Uterus: Measures 6.8 x 4.8 x 4.3 cm.  Endometrial echo complex measures 11 mm, unremarkable.  Appearance is unremarkable.    Right ovary: Measures 3.4 x 3.1 x 3.5 cm.  Note made of 2.3 cm follicle.  Ovarian blood flow is present.    Left ovary: Measures 4.2 x 2.2 x 2.7 cm.  Note made of 1.6 cm follicle.  Ovarian blood flow is present.    Miscellaneous: Trace free fluid.                                       Medications   ketorolac injection 9.999 mg (9.999 mg Intravenous Not Given 7/11/24 1614)     Medical Decision Making  18-year-old female presents ED for lower abdominal pain.    Differential includes but not limited to ovarian torsion, ovarian cyst, appendicitis, diverticulitis    Patient recently seen in the ED for this 3 days ago had a transvaginal ultrasound obtained at the time which showed bilateral ovarian cyst.  Of note I did see her in May with a similar presentation and also diagnosed with a ovarian cyst at that time as well.  Appears this is have grown since the initial evaluation.  She has mild tenderness to the left lower quadrant.  No right lower quadrant tenderness and low suspicion for appendicitis.  Repeat transvaginal ultrasound was obtained as her cysts are greater than 4 mm and could potentially put her at risk for torsion.  Repeat transvaginal ultrasound did not show any evidence of torsion does redemonstrate the bilateral ovarian cysts which are similar in size.  Hemoglobin is within normal limits she has no vaginal bleeding and doubt rupture.  Will discharge with NSAIDs for pain and recommend close follow-up with  OBGYN.      Amount and/or Complexity of Data Reviewed  Labs: ordered. Decision-making details documented in ED Course.  Radiology: ordered.    Risk  Prescription drug management.               ED Course as of 07/11/24 1722   Thu Jul 11, 2024   1719 hCG Qualitative, Urine: Negative [HJ]      ED Course User Index  [HJ] Jesus Alberto Mandujano PA-C                           Clinical Impression:  Final diagnoses:  [N83.201, N83.202] Cysts of both ovaries (Primary)          ED Disposition Condition    Discharge Stable          ED Prescriptions       Medication Sig Dispense Start Date End Date Auth. Provider    ibuprofen (ADVIL,MOTRIN) 800 MG tablet Take 1 tablet (800 mg total) by mouth every 6 (six) hours as needed for Pain. 20 tablet 7/11/2024 7/21/2024 Jesus Alberto Mandujano PA-C    methocarbamoL (ROBAXIN) 500 MG Tab Take 2 tablets (1,000 mg total) by mouth 3 (three) times daily. for 5 days 30 tablet 7/11/2024 7/16/2024 Jesus Alberto Mandujano PA-C          Follow-up Information       Follow up With Specialties Details Why Contact Info Additional Information    [facility] - Ob/Gyn [facility] Obstetrics and Gynecology Schedule an appointment as soon as possible for a visit   [id number]  [facility] [address] [phone] Main Building, 5th Floor Please park in [facility] and take Clinic elevator             Jesus Alberto Mandujano PA-C  07/11/24 7046

## 2024-07-11 NOTE — ED TRIAGE NOTES
Yaquelin Cochran, a 18 y.o. female presents to the ED w/ complaint of abdominal pain RLQ and LLQ, started 2-3 days ago, worse with full bladder    Triage note:  Chief Complaint   Patient presents with    Abdominal Pain     Lower abd pain, recent ER visit showing cysts on ovaries, pain increasing making it hard to walk     Review of patient's allergies indicates:  No Known Allergies  Past Medical History:   Diagnosis Date    Behavior problem in childhood     COVID-19 08/08/2021    Ectopic pregnancy 5/24/2023    Suicidal ideation          APPEARANCE: awake and alert in NAD. PAIN  6/10  SKIN: warm, dry and intact. No breakdown or bruising.  MUSCULOSKELETAL: Patient moving all extremities spontaneously, no obvious swelling or deformities noted. Ambulates independently.  RESPIRATORY: Denies shortness of breath.Respirations unlabored.   CARDIAC: Denies CP, 2+ distal pulses; no peripheral edema  ABDOMEN: S/ND/NT, endorses nausea  : voids spontaneously, denies difficulty  Neurologic: AAO x 4; follows commands equal strength in all extremities; denies numbness/tingling. Denies dizziness

## 2024-07-25 ENCOUNTER — HOSPITAL ENCOUNTER (EMERGENCY)
Facility: HOSPITAL | Age: 19
Discharge: HOME | End: 2024-07-26
Attending: EMERGENCY MEDICINE
Payer: MEDICAID

## 2024-07-25 VITALS
DIASTOLIC BLOOD PRESSURE: 76 MMHG | HEART RATE: 77 BPM | RESPIRATION RATE: 16 BRPM | HEIGHT: 61 IN | OXYGEN SATURATION: 99 % | SYSTOLIC BLOOD PRESSURE: 129 MMHG | WEIGHT: 110 LBS | BODY MASS INDEX: 20.77 KG/M2 | TEMPERATURE: 98 F

## 2024-07-25 DIAGNOSIS — N76.0 BV (BACTERIAL VAGINOSIS): Primary | ICD-10-CM

## 2024-07-25 DIAGNOSIS — N83.209 CYST OF OVARY, UNSPECIFIED LATERALITY: ICD-10-CM

## 2024-07-25 DIAGNOSIS — B37.31 YEAST VAGINITIS: ICD-10-CM

## 2024-07-25 DIAGNOSIS — B96.89 BV (BACTERIAL VAGINOSIS): Primary | ICD-10-CM

## 2024-07-25 LAB
ALBUMIN SERPL BCP-MCNC: 4.4 G/DL (ref 3.5–5.2)
ALP SERPL-CCNC: 69 U/L (ref 55–135)
ALT SERPL W/O P-5'-P-CCNC: 20 U/L (ref 10–44)
ANION GAP SERPL CALC-SCNC: 7 MMOL/L (ref 8–16)
AST SERPL-CCNC: 20 U/L (ref 10–40)
B-HCG UR QL: NEGATIVE
BACTERIA #/AREA URNS AUTO: ABNORMAL /HPF
BACTERIA GENITAL QL WET PREP: ABNORMAL
BASOPHILS # BLD AUTO: 0.06 K/UL (ref 0–0.2)
BASOPHILS NFR BLD: 0.8 % (ref 0–1.9)
BILIRUB SERPL-MCNC: 0.6 MG/DL (ref 0.1–1)
BILIRUB UR QL STRIP: NEGATIVE
BUN SERPL-MCNC: 12 MG/DL (ref 6–20)
CALCIUM SERPL-MCNC: 10.4 MG/DL (ref 8.7–10.5)
CHLORIDE SERPL-SCNC: 106 MMOL/L (ref 95–110)
CLARITY UR REFRACT.AUTO: ABNORMAL
CLUE CELLS VAG QL WET PREP: ABNORMAL
CO2 SERPL-SCNC: 26 MMOL/L (ref 23–29)
COLOR UR AUTO: YELLOW
CREAT SERPL-MCNC: 0.9 MG/DL (ref 0.5–1.4)
CTP QC/QA: YES
DIFFERENTIAL METHOD BLD: NORMAL
EOSINOPHIL # BLD AUTO: 0.2 K/UL (ref 0–0.5)
EOSINOPHIL NFR BLD: 2.5 % (ref 0–8)
ERYTHROCYTE [DISTWIDTH] IN BLOOD BY AUTOMATED COUNT: 14.5 % (ref 11.5–14.5)
EST. GFR  (NO RACE VARIABLE): >60 ML/MIN/1.73 M^2
FILAMENT FUNGI VAG WET PREP-#/AREA: ABNORMAL
GLUCOSE SERPL-MCNC: 77 MG/DL (ref 70–110)
GLUCOSE UR QL STRIP: NEGATIVE
HCT VFR BLD AUTO: 41.7 % (ref 37–48.5)
HGB BLD-MCNC: 13.6 G/DL (ref 12–16)
HGB UR QL STRIP: NEGATIVE
IMM GRANULOCYTES # BLD AUTO: 0.01 K/UL (ref 0–0.04)
IMM GRANULOCYTES NFR BLD AUTO: 0.1 % (ref 0–0.5)
KETONES UR QL STRIP: NEGATIVE
LEUKOCYTE ESTERASE UR QL STRIP: ABNORMAL
LYMPHOCYTES # BLD AUTO: 2.9 K/UL (ref 1–4.8)
LYMPHOCYTES NFR BLD: 38.2 % (ref 18–48)
MCH RBC QN AUTO: 29.2 PG (ref 27–31)
MCHC RBC AUTO-ENTMCNC: 32.6 G/DL (ref 32–36)
MCV RBC AUTO: 90 FL (ref 82–98)
MICROSCOPIC COMMENT: ABNORMAL
MONOCYTES # BLD AUTO: 0.6 K/UL (ref 0.3–1)
MONOCYTES NFR BLD: 7.3 % (ref 4–15)
NEUTROPHILS # BLD AUTO: 3.9 K/UL (ref 1.8–7.7)
NEUTROPHILS NFR BLD: 51.1 % (ref 38–73)
NITRITE UR QL STRIP: NEGATIVE
NRBC BLD-RTO: 0 /100 WBC
PH UR STRIP: 7 [PH] (ref 5–8)
PLATELET # BLD AUTO: 246 K/UL (ref 150–450)
PMV BLD AUTO: 10.4 FL (ref 9.2–12.9)
POTASSIUM SERPL-SCNC: 4 MMOL/L (ref 3.5–5.1)
PROT SERPL-MCNC: 7.5 G/DL (ref 6–8.4)
PROT UR QL STRIP: NEGATIVE
RBC # BLD AUTO: 4.65 M/UL (ref 4–5.4)
RBC #/AREA URNS AUTO: 1 /HPF (ref 0–4)
SODIUM SERPL-SCNC: 139 MMOL/L (ref 136–145)
SP GR UR STRIP: 1.01 (ref 1–1.03)
SPECIMEN SOURCE: ABNORMAL
SQUAMOUS #/AREA URNS AUTO: 15 /HPF
T VAGINALIS GENITAL QL WET PREP: ABNORMAL
URN SPEC COLLECT METH UR: ABNORMAL
WBC # BLD AUTO: 7.69 K/UL (ref 3.9–12.7)
WBC #/AREA URNS AUTO: 28 /HPF (ref 0–5)
WBC #/AREA VAG WET PREP: ABNORMAL
YEAST GENITAL QL WET PREP: ABNORMAL

## 2024-07-25 PROCEDURE — 85025 COMPLETE CBC W/AUTO DIFF WBC: CPT | Performed by: EMERGENCY MEDICINE

## 2024-07-25 PROCEDURE — 99285 EMERGENCY DEPT VISIT HI MDM: CPT | Mod: 25

## 2024-07-25 PROCEDURE — 80053 COMPREHEN METABOLIC PANEL: CPT | Performed by: EMERGENCY MEDICINE

## 2024-07-25 PROCEDURE — 96374 THER/PROPH/DIAG INJ IV PUSH: CPT

## 2024-07-25 PROCEDURE — 25000003 PHARM REV CODE 250: Performed by: EMERGENCY MEDICINE

## 2024-07-25 PROCEDURE — 87086 URINE CULTURE/COLONY COUNT: CPT | Performed by: EMERGENCY MEDICINE

## 2024-07-25 PROCEDURE — 87210 SMEAR WET MOUNT SALINE/INK: CPT | Performed by: EMERGENCY MEDICINE

## 2024-07-25 PROCEDURE — 81025 URINE PREGNANCY TEST: CPT | Performed by: EMERGENCY MEDICINE

## 2024-07-25 PROCEDURE — 81001 URINALYSIS AUTO W/SCOPE: CPT | Performed by: EMERGENCY MEDICINE

## 2024-07-25 PROCEDURE — 87491 CHLMYD TRACH DNA AMP PROBE: CPT | Performed by: EMERGENCY MEDICINE

## 2024-07-25 PROCEDURE — 63600175 PHARM REV CODE 636 W HCPCS: Performed by: EMERGENCY MEDICINE

## 2024-07-25 RX ORDER — KETOROLAC TROMETHAMINE 30 MG/ML
10 INJECTION, SOLUTION INTRAMUSCULAR; INTRAVENOUS
Status: COMPLETED | OUTPATIENT
Start: 2024-07-25 | End: 2024-07-25

## 2024-07-25 RX ORDER — IBUPROFEN 600 MG/1
600 TABLET ORAL EVERY 6 HOURS PRN
Qty: 20 TABLET | Refills: 0 | Status: SHIPPED | OUTPATIENT
Start: 2024-07-25

## 2024-07-25 RX ORDER — METRONIDAZOLE 500 MG/1
500 TABLET ORAL
Status: COMPLETED | OUTPATIENT
Start: 2024-07-25 | End: 2024-07-25

## 2024-07-25 RX ORDER — FLUCONAZOLE 150 MG/1
150 TABLET ORAL
Status: COMPLETED | OUTPATIENT
Start: 2024-07-25 | End: 2024-07-25

## 2024-07-25 RX ORDER — METRONIDAZOLE 500 MG/1
500 TABLET ORAL EVERY 12 HOURS
Qty: 14 TABLET | Refills: 0 | Status: SHIPPED | OUTPATIENT
Start: 2024-07-25 | End: 2024-08-01

## 2024-07-25 RX ADMIN — METRONIDAZOLE 500 MG: 500 TABLET ORAL at 10:07

## 2024-07-25 RX ADMIN — KETOROLAC TROMETHAMINE 10 MG: 30 INJECTION, SOLUTION INTRAMUSCULAR; INTRAVENOUS at 07:07

## 2024-07-25 RX ADMIN — FLUCONAZOLE 150 MG: 150 TABLET ORAL at 10:07

## 2024-07-25 NOTE — ED PROVIDER NOTES
"Encounter Date: 7/25/2024       History     Chief Complaint   Patient presents with    Abdominal Pain     Pt c/o RLQ pain-hx ovarian cyst.  (+) nausea     17 yo W with pmhx ovarian cysts, ectopic pregnancy, depression presents with a chief complaint of R>L pelvic pain.  Patient notes pain began last night.  Her mom told her to come to the ER but she urinated which relieve some pressure and did not come.  However, she had pain throughout the day today and while at work she felt a "pop" and subsequent "burning" in the right lower quadrant and came to the ER.  She endorses nausea but no vomiting.  She was suffered from diarrhea and constipation over the last week but not necessarily worse today than usual.  No dysuria or frequency.  She had a recent sexual encounter that she was suspicious could have caused an STI.  She wishes to be tested for that.  She also notes she has been suffering from recurrent BV since pregnancy.  She notes that symptoms overall are consistent with previous ovarian cysts.  She was seen in the ER earlier this month underlying 9th and the 11th for similar complaints and had ultrasounds both times that revealed multiple cysts but no evidence of torsion.        Review of patient's allergies indicates:  No Known Allergies  Past Medical History:   Diagnosis Date    Behavior problem in childhood     COVID-19 08/08/2021    Ectopic pregnancy 5/24/2023    Suicidal ideation      Past Surgical History:   Procedure Laterality Date    BACK SURGERY  2016    Rods in place, had issues with previous epidural    DIAGNOSTIC LAPAROSCOPY N/A 05/29/2023    Procedure: LAPAROSCOPY, DIAGNOSTIC;  Surgeon: Juana Fung MD;  Location: [facility];  Service: OB/GYN;  Laterality: N/A;    DILATION AND CURETTAGE OF UTERUS       Family History   Problem Relation Name Age of Onset    Throat cancer Paternal Grandfather      No Known Problems Father      No Known Problems Mother      Breast cancer Neg Hx      Diabetes Neg Hx      " Colon cancer Neg Hx      Ovarian cancer Neg Hx       Social History     Tobacco Use    Smoking status: Never    Smokeless tobacco: Never   Substance Use Topics    Alcohol use: No    Drug use: No     Review of Systems    Physical Exam     Initial Vitals [07/25/24 1803]   BP Pulse Resp Temp SpO2   110/71 75 14 98.5 °F (36.9 °C) 100 %      MAP       --         Physical Exam    Nursing note and vitals reviewed.  Constitutional: She appears well-developed and well-nourished. She is not diaphoretic. No distress.   HENT:   Head: Normocephalic and atraumatic.   Eyes: No scleral icterus.   Neck: Neck supple.   Normal range of motion.  Cardiovascular:  Normal rate.           Pulmonary/Chest: No respiratory distress.   Abdominal: Abdomen is soft. She exhibits no distension and no mass. There is abdominal tenderness. There is no rebound and no guarding.   Genitourinary:    Pelvic exam was performed with patient supine.   Cervix exhibits discharge. Cervix exhibits no motion tenderness and no friability. Right adnexum displays tenderness. Right adnexum displays no mass and no fullness. Left adnexum displays tenderness. Left adnexum displays no mass and no fullness.    Vaginal discharge and tenderness present.   There is tenderness in the vagina.    No foreign body in the vagina.     Musculoskeletal:         General: No tenderness. Normal range of motion.      Cervical back: Normal range of motion and neck supple.     Neurological: She is alert and oriented to person, place, and time. She has normal strength. No sensory deficit.   Skin: Skin is warm and dry. Capillary refill takes less than 2 seconds.   Psychiatric: Thought content normal.   tearful         ED Course   Procedures  Labs Reviewed   VAGINAL SCREEN - Abnormal       Result Value    Trichomonas None      Clue Cells Occasional (*)     Budding Yeast Occasional (*)     Fungal Hyphae Rare (*)     WBC - Vaginal Screen Rare (*)     Bacteria - Vaginal Screen Few (*)     Wet  Prep Source Vagina      Narrative:     Release to patient->Immediate   COMPREHENSIVE METABOLIC PANEL - Abnormal    Sodium 139      Potassium 4.0      Chloride 106      CO2 26      Glucose 77      BUN 12      Creatinine 0.9      Calcium 10.4      Total Protein 7.5      Albumin 4.4      Total Bilirubin 0.6      Alkaline Phosphatase 69      AST 20      ALT 20      eGFR >60.0      Anion Gap 7 (*)    URINALYSIS, REFLEX TO URINE CULTURE - Abnormal    Specimen UA Urine, Clean Catch      Color, UA Yellow      Appearance, UA Hazy (*)     pH, UA 7.0      Specific Gravity, UA 1.015      Protein, UA Negative      Glucose, UA Negative      Ketones, UA Negative      Bilirubin (UA) Negative      Occult Blood UA Negative      Nitrite, UA Negative      Leukocytes, UA 3+ (*)     Narrative:     Specimen Source->Urine   URINALYSIS MICROSCOPIC - Abnormal    RBC, UA 1      WBC, UA 28 (*)     Bacteria Few (*)     Squam Epithel, UA 15      Microscopic Comment SEE COMMENT      Narrative:     Specimen Source->Urine   CULTURE, URINE   CBC W/ AUTO DIFFERENTIAL    WBC 7.69      RBC 4.65      Hemoglobin 13.6      Hematocrit 41.7      MCV 90      MCH 29.2      MCHC 32.6      RDW 14.5      Platelets 246      MPV 10.4      Immature Granulocytes 0.1      Gran # (ANC) 3.9      Immature Grans (Abs) 0.01      Lymph # 2.9      Mono # 0.6      Eos # 0.2      Baso # 0.06      nRBC 0      Gran % 51.1      Lymph % 38.2      Mono % 7.3      Eosinophil % 2.5      Basophil % 0.8      Differential Method Automated     C. TRACHOMATIS/N. GONORRHOEAE BY AMP DNA   POCT URINE PREGNANCY    POC Preg Test, Ur Negative       Acceptable Yes            Imaging Results               US Pelvis Comp with Transvag NON-OB (xpd) (Final result)  Result time 07/25/24 21:39:17      Final result by Fidencio Miner MD (07/25/24 21:39:17)                   Impression:      Highly complex 3.7 x 3.4 x 2.4 cm left ovarian cyst, possibly a hemorrhagic physiologic cyst.   "Given today's negative pregnancy test, a left ovarian ectopic pregnancy is less likely.  Other etiologies not fully excluded.    Small to moderate volume of simple-appearing free intraperitoneal fluid in the pelvis, increased from 07/11/2024.  Its etiology is uncertain.  It could be related to the complex left ovarian or simple right ovarian cyst.  In the appropriate clinical context, other etiologies, such as PID, might also be considerations.    Incidental findings compatible with, but alone not diagnostic of, right-sided pelvic congestion.    A conditions include clinical correlation.  Depending on clinical findings, follow-up imaging may be considered.    This report was flagged in Epic as abnormal.    Electronically signed by resident: Cely Hays  Date:    07/25/2024  Time:    21:08    Electronically signed by: Fidencio Miner  Date:    07/25/2024  Time:    21:39               Narrative:    EXAMINATION:  US PELVIS COMP WITH TRANSVAG NON-OB (XPD)    CLINICAL HISTORY:  R > L adnexal pain, evala for torsion    Additional history: Today's point of care urine pregnancy test, collected less than 1 hour prior to this scan, is reported "Negative" under the labs tab in the electronic health record.    TECHNIQUE:  Transabdominal sonography of the pelvis was performed, followed by transvaginal sonography to better evaluate the uterus and ovaries.    COMPARISON:  Pelvic sonogram 07/11/2025.    Abdominopelvic CT 05/07/2025.    FINDINGS:  Uterus:    Size: 7.6 x 4.6 x 5.3 cm    Masses: None    Endometrium: Normal in this pre menopausal patient, measuring 12 mm.    Right ovary:    Size: 3.8 x 2.3 x 4.0 cm    Appearance: Normal 2.3 x 1.8 x 1.6 cm simple cyst, likely physiologic.    Vascular flow: Normal.    Prominent right adnexal vasculature.    Left ovary:    Size: 4.8 x 2.8 x 5.3 cm    Appearance: Highly complex left ovarian 3.7 x 2.4 x 3.4 cm cyst, with posterior acoustic enhancement and a relatively hypervascular " rim.    Vascular flow: Normal.    Free Fluid:    Small to moderate volume of free intraperitoneal fluid in the visualized pelvis, mainly seen adjacent to the uterus and to the left adnexa.  The volume of fluid has increased from the 07/11/2024 pelvic ultrasound.                                       Medications   fluconazole tablet 150 mg (has no administration in time range)   metroNIDAZOLE tablet 500 mg (has no administration in time range)   ketorolac injection 9.999 mg (9.999 mg Intravenous Given 7/25/24 1934)     Medical Decision Making  17 yo W with pmhx ovarian cysts, ectopic pregnancy, depression presents with a chief complaint of R>L pelvic pain.      Differential diagnosis includes, but is not limited to: Ovarian cysts, cyst rupture, BV, STI, UTI    No cervical motion tenderness to suggest PID. She does have right lower quadrant tenderness to palpation but tenderness was more pronounced diffusely throughout the pelvis, which does not suggest appendicitis.  I explained that given the pain is not severe, and she does not have any discrete fullness or tenderness, I have a lower suspicion for ovarian torsion.  But she is uncomfortable proceeding without ultrasound and will order that as well.  Will obtain labs.  Will administer Toradol.    Reassessment:  UA contaminated by 15 squamous epithelial cells.  But otherwise unimpressive urinalysis and no urinary symptoms to suggest UTI.  CBC without leukocytosis or anemia.  CMP normal.  Vaginal screen reveals clue cells, budding yeast, fungal hyphae.  Ultrasound as above including left ovarian hemorrhagic cyst, small to moderate free intraperitoneal fluid, and incidental findings compatible with right-sided pelvic congestion.  Given lack of cervical motion tenderness, no cervical discharge, do not suspect PID as the etiology of the free intraperitoneal fluid.  On repeat assessment, she notes pain has improved but she remains tearful.  She denies any feelings of  wanting to harm herself but social work was consulted for assistance with outpatient resources.  She has a follow up with gyn in 4 days.  I encouraged her to follow up with that appointment given the ultrasound findings.  Ibuprofen prn pain.  She was treated with Diflucan in the ER for yeast vaginitis.  Course of metronidazole with 1st dose in ED for BV.  Extensive return precautions.    Amount and/or Complexity of Data Reviewed  Labs: ordered.  Radiology: ordered.    Risk  Prescription drug management.                                      Clinical Impression:  Final diagnoses:  [N76.0, B96.89] BV (bacterial vaginosis) (Primary)  [B37.31] Yeast vaginitis  [N83.209] Cyst of ovary, unspecified laterality          ED Disposition Condition    Discharge Stable          ED Prescriptions       Medication Sig Dispense Start Date End Date Auth. Provider    metroNIDAZOLE (FLAGYL) 500 MG tablet Take 1 tablet (500 mg total) by mouth every 12 (twelve) hours. for 7 days 14 tablet 7/25/2024 8/1/2024 Joseph Mattson MD    ibuprofen (ADVIL,MOTRIN) 600 MG tablet Take 1 tablet (600 mg total) by mouth every 6 (six) hours as needed for Pain. 20 tablet 7/25/2024 -- Joseph Mattson MD          Follow-up Information       Follow up With Specialties Details Why Contact Info    [facility] - Emergency Dept Emergency Medicine  As needed, If symptoms worsen [id number]  [facility] [address] [phone]             Joseph Mattson MD  07/25/24 8633

## 2024-07-25 NOTE — ED TRIAGE NOTES
"Pt presents to the ED complaining of a possible ovarian cyst. Pt states her LMP  was 2 months ago. Pt states for the past few days she has had RLQ/lower abdominal pain and she feels a "pressure" near her R ovary. Pt endorses nausea and diarrhea yesterday. Pt also would like to be evaluated for an STD because her discharge is thick and yellow. Pt reports she has also experienced vaginal itchiness.   "

## 2024-07-26 LAB
C TRACH DNA SPEC QL NAA+PROBE: NOT DETECTED
N GONORRHOEA DNA SPEC QL NAA+PROBE: NOT DETECTED

## 2024-07-26 NOTE — DISCHARGE INSTRUCTIONS
You have a vaginal yeast infection.  You received treatment in the ER with Diflucan.  You have bacterial vaginosis.  Received a dose of metronidazole here.  Continue taking metronidazole twice a day for the next week to treat bacterial vaginosis.  Take ibuprofen 600 mg every 6 hours as needed for pain.  Be sure to follow up with your OB/Gyn appointment as scheduled.  Return to the ER for any severe pain that does not go away, weakness, or other concerning symptoms.

## 2024-07-27 LAB
BACTERIA UR CULT: NORMAL
BACTERIA UR CULT: NORMAL

## 2024-07-29 ENCOUNTER — OFFICE VISIT (OUTPATIENT)
Dept: OBSTETRICS AND GYNECOLOGY | Facility: CLINIC | Age: 19
End: 2024-07-29
Payer: MEDICAID

## 2024-07-29 ENCOUNTER — LAB VISIT (OUTPATIENT)
Dept: LAB | Facility: OTHER | Age: 19
End: 2024-07-29
Attending: ADVANCED PRACTICE MIDWIFE
Payer: MEDICAID

## 2024-07-29 VITALS
HEART RATE: 77 BPM | BODY MASS INDEX: 21.14 KG/M2 | SYSTOLIC BLOOD PRESSURE: 108 MMHG | HEIGHT: 61 IN | WEIGHT: 112 LBS | DIASTOLIC BLOOD PRESSURE: 72 MMHG

## 2024-07-29 DIAGNOSIS — R10.2 PELVIC PAIN: Primary | ICD-10-CM

## 2024-07-29 DIAGNOSIS — R10.2 PELVIC PAIN: ICD-10-CM

## 2024-07-29 LAB — HCG INTACT+B SERPL-ACNC: <1.2 MIU/ML

## 2024-07-29 PROCEDURE — 3074F SYST BP LT 130 MM HG: CPT | Mod: CPTII,,, | Performed by: ADVANCED PRACTICE MIDWIFE

## 2024-07-29 PROCEDURE — 99214 OFFICE O/P EST MOD 30 MIN: CPT | Mod: S$PBB,UC,, | Performed by: ADVANCED PRACTICE MIDWIFE

## 2024-07-29 PROCEDURE — 84702 CHORIONIC GONADOTROPIN TEST: CPT | Performed by: ADVANCED PRACTICE MIDWIFE

## 2024-07-29 PROCEDURE — 88175 CYTOPATH C/V AUTO FLUID REDO: CPT | Performed by: ADVANCED PRACTICE MIDWIFE

## 2024-07-29 PROCEDURE — 99213 OFFICE O/P EST LOW 20 MIN: CPT | Mod: PBBFAC | Performed by: ADVANCED PRACTICE MIDWIFE

## 2024-07-29 PROCEDURE — 36415 COLL VENOUS BLD VENIPUNCTURE: CPT | Performed by: ADVANCED PRACTICE MIDWIFE

## 2024-07-29 PROCEDURE — 3008F BODY MASS INDEX DOCD: CPT | Mod: CPTII,,, | Performed by: ADVANCED PRACTICE MIDWIFE

## 2024-07-29 PROCEDURE — 99999 PR PBB SHADOW E&M-EST. PATIENT-LVL III: CPT | Mod: PBBFAC,,, | Performed by: ADVANCED PRACTICE MIDWIFE

## 2024-07-29 PROCEDURE — 3078F DIAST BP <80 MM HG: CPT | Mod: CPTII,,, | Performed by: ADVANCED PRACTICE MIDWIFE

## 2024-07-29 PROCEDURE — 1159F MED LIST DOCD IN RCRD: CPT | Mod: CPTII,,, | Performed by: ADVANCED PRACTICE MIDWIFE
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
## 2024-07-31 LAB
FINAL PATHOLOGIC DIAGNOSIS: NORMAL
Lab: NORMAL

## 2024-10-28 ENCOUNTER — HOSPITAL ENCOUNTER (EMERGENCY)
Facility: HOSPITAL | Age: 19
Discharge: HOME | End: 2024-10-29
Attending: EMERGENCY MEDICINE
Payer: MEDICAID

## 2024-10-28 VITALS
OXYGEN SATURATION: 98 % | TEMPERATURE: 98 F | WEIGHT: 110 LBS | RESPIRATION RATE: 16 BRPM | BODY MASS INDEX: 21.6 KG/M2 | SYSTOLIC BLOOD PRESSURE: 104 MMHG | HEIGHT: 60 IN | DIASTOLIC BLOOD PRESSURE: 66 MMHG | HEART RATE: 62 BPM

## 2024-10-28 DIAGNOSIS — N83.202 LEFT OVARIAN CYST: Primary | ICD-10-CM

## 2024-10-28 DIAGNOSIS — B96.89 BACTERIAL VAGINOSIS: ICD-10-CM

## 2024-10-28 DIAGNOSIS — N76.0 BACTERIAL VAGINOSIS: ICD-10-CM

## 2024-10-28 PROBLEM — O28.0 ABNORMAL QUAD SCREEN: Status: RESOLVED | Noted: 2023-11-16 | Resolved: 2024-10-28

## 2024-10-28 LAB
ALBUMIN SERPL BCP-MCNC: 4.2 G/DL (ref 3.5–5.2)
ALP SERPL-CCNC: 62 U/L (ref 40–150)
ALT SERPL W/O P-5'-P-CCNC: 18 U/L (ref 10–44)
ANION GAP SERPL CALC-SCNC: 6 MMOL/L (ref 8–16)
AST SERPL-CCNC: 20 U/L (ref 10–40)
B-HCG UR QL: NEGATIVE
BACTERIA #/AREA URNS AUTO: ABNORMAL /HPF
BACTERIA GENITAL QL WET PREP: ABNORMAL
BASOPHILS # BLD AUTO: 0.05 K/UL (ref 0–0.2)
BASOPHILS NFR BLD: 0.6 % (ref 0–1.9)
BILIRUB SERPL-MCNC: 0.5 MG/DL (ref 0.1–1)
BILIRUB UR QL STRIP: NEGATIVE
BUN SERPL-MCNC: 10 MG/DL (ref 6–20)
C TRACH DNA SPEC QL NAA+PROBE: NOT DETECTED
CALCIUM SERPL-MCNC: 9.4 MG/DL (ref 8.7–10.5)
CHLORIDE SERPL-SCNC: 109 MMOL/L (ref 95–110)
CLARITY UR REFRACT.AUTO: CLEAR
CLUE CELLS VAG QL WET PREP: ABNORMAL
CO2 SERPL-SCNC: 24 MMOL/L (ref 23–29)
COLOR UR AUTO: YELLOW
CREAT SERPL-MCNC: 0.7 MG/DL (ref 0.5–1.4)
CTP QC/QA: YES
DIFFERENTIAL METHOD BLD: NORMAL
EOSINOPHIL # BLD AUTO: 0.2 K/UL (ref 0–0.5)
EOSINOPHIL NFR BLD: 2.2 % (ref 0–8)
ERYTHROCYTE [DISTWIDTH] IN BLOOD BY AUTOMATED COUNT: 13.1 % (ref 11.5–14.5)
EST. GFR  (NO RACE VARIABLE): >60 ML/MIN/1.73 M^2
FILAMENT FUNGI VAG WET PREP-#/AREA: ABNORMAL
GLUCOSE SERPL-MCNC: 73 MG/DL (ref 70–110)
GLUCOSE UR QL STRIP: NEGATIVE
HCT VFR BLD AUTO: 38.3 % (ref 37–48.5)
HGB BLD-MCNC: 13.2 G/DL (ref 12–16)
HGB UR QL STRIP: NEGATIVE
IMM GRANULOCYTES # BLD AUTO: 0.02 K/UL (ref 0–0.04)
IMM GRANULOCYTES NFR BLD AUTO: 0.3 % (ref 0–0.5)
KETONES UR QL STRIP: NEGATIVE
LEUKOCYTE ESTERASE UR QL STRIP: ABNORMAL
LIPASE SERPL-CCNC: 32 U/L (ref 4–60)
LYMPHOCYTES # BLD AUTO: 2.7 K/UL (ref 1–4.8)
LYMPHOCYTES NFR BLD: 35 % (ref 18–48)
MCH RBC QN AUTO: 30.4 PG (ref 27–31)
MCHC RBC AUTO-ENTMCNC: 34.5 G/DL (ref 32–36)
MCV RBC AUTO: 88 FL (ref 82–98)
MICROSCOPIC COMMENT: ABNORMAL
MONOCYTES # BLD AUTO: 0.7 K/UL (ref 0.3–1)
MONOCYTES NFR BLD: 9.2 % (ref 4–15)
N GONORRHOEA DNA SPEC QL NAA+PROBE: NOT DETECTED
NEUTROPHILS # BLD AUTO: 4.1 K/UL (ref 1.8–7.7)
NEUTROPHILS NFR BLD: 52.7 % (ref 38–73)
NITRITE UR QL STRIP: NEGATIVE
NRBC BLD-RTO: 0 /100 WBC
PH UR STRIP: 8 [PH] (ref 5–8)
PLATELET # BLD AUTO: 222 K/UL (ref 150–450)
PMV BLD AUTO: 9.5 FL (ref 9.2–12.9)
POTASSIUM SERPL-SCNC: 3.8 MMOL/L (ref 3.5–5.1)
PROT SERPL-MCNC: 7.2 G/DL (ref 6–8.4)
PROT UR QL STRIP: NEGATIVE
RBC # BLD AUTO: 4.34 M/UL (ref 4–5.4)
RBC #/AREA URNS AUTO: 2 /HPF (ref 0–4)
SODIUM SERPL-SCNC: 139 MMOL/L (ref 136–145)
SP GR UR STRIP: 1.01 (ref 1–1.03)
SPECIMEN SOURCE: ABNORMAL
SQUAMOUS #/AREA URNS AUTO: 19 /HPF
T VAGINALIS GENITAL QL WET PREP: ABNORMAL
URN SPEC COLLECT METH UR: ABNORMAL
WBC # BLD AUTO: 7.74 K/UL (ref 3.9–12.7)
WBC #/AREA URNS AUTO: 5 /HPF (ref 0–5)
WBC #/AREA VAG WET PREP: ABNORMAL
YEAST GENITAL QL WET PREP: ABNORMAL

## 2024-10-28 PROCEDURE — 96375 TX/PRO/DX INJ NEW DRUG ADDON: CPT

## 2024-10-28 PROCEDURE — 96374 THER/PROPH/DIAG INJ IV PUSH: CPT

## 2024-10-28 PROCEDURE — 85025 COMPLETE CBC W/AUTO DIFF WBC: CPT | Performed by: EMERGENCY MEDICINE

## 2024-10-28 PROCEDURE — 80053 COMPREHEN METABOLIC PANEL: CPT | Performed by: EMERGENCY MEDICINE

## 2024-10-28 PROCEDURE — 87591 N.GONORRHOEAE DNA AMP PROB: CPT | Performed by: PHYSICIAN ASSISTANT

## 2024-10-28 PROCEDURE — 87491 CHLMYD TRACH DNA AMP PROBE: CPT | Performed by: PHYSICIAN ASSISTANT

## 2024-10-28 PROCEDURE — 81001 URINALYSIS AUTO W/SCOPE: CPT | Performed by: EMERGENCY MEDICINE

## 2024-10-28 PROCEDURE — 63600175 PHARM REV CODE 636 W HCPCS: Performed by: PHYSICIAN ASSISTANT

## 2024-10-28 PROCEDURE — 99285 EMERGENCY DEPT VISIT HI MDM: CPT | Mod: 25

## 2024-10-28 PROCEDURE — 87210 SMEAR WET MOUNT SALINE/INK: CPT | Performed by: PHYSICIAN ASSISTANT

## 2024-10-28 PROCEDURE — 81025 URINE PREGNANCY TEST: CPT | Performed by: EMERGENCY MEDICINE

## 2024-10-28 PROCEDURE — 83690 ASSAY OF LIPASE: CPT | Performed by: EMERGENCY MEDICINE

## 2024-10-28 RX ORDER — ONDANSETRON HYDROCHLORIDE 2 MG/ML
4 INJECTION, SOLUTION INTRAVENOUS
Status: COMPLETED | OUTPATIENT
Start: 2024-10-28 | End: 2024-10-28

## 2024-10-28 RX ORDER — KETOROLAC TROMETHAMINE 30 MG/ML
10 INJECTION, SOLUTION INTRAMUSCULAR; INTRAVENOUS
Status: COMPLETED | OUTPATIENT
Start: 2024-10-28 | End: 2024-10-28

## 2024-10-28 RX ADMIN — ONDANSETRON 4 MG: 2 INJECTION INTRAMUSCULAR; INTRAVENOUS at 07:10

## 2024-10-28 RX ADMIN — KETOROLAC TROMETHAMINE 10 MG: 30 INJECTION, SOLUTION INTRAMUSCULAR; INTRAVENOUS at 07:10

## 2024-10-29 LAB
AMORPH CRY UR QL COMP ASSIST: ABNORMAL
BILIRUB UR QL STRIP: NEGATIVE
CLARITY UR REFRACT.AUTO: ABNORMAL
COLOR UR AUTO: YELLOW
GLUCOSE UR QL STRIP: NEGATIVE
HGB UR QL STRIP: ABNORMAL
KETONES UR QL STRIP: NEGATIVE
LEUKOCYTE ESTERASE UR QL STRIP: NEGATIVE
MICROSCOPIC COMMENT: ABNORMAL
NITRITE UR QL STRIP: NEGATIVE
PH UR STRIP: 8 [PH] (ref 5–8)
PROT UR QL STRIP: ABNORMAL
RBC #/AREA URNS AUTO: >100 /HPF (ref 0–4)
SP GR UR STRIP: 1.02 (ref 1–1.03)
SQUAMOUS #/AREA URNS AUTO: 10 /HPF
URN SPEC COLLECT METH UR: ABNORMAL

## 2024-10-29 PROCEDURE — 81001 URINALYSIS AUTO W/SCOPE: CPT | Performed by: PHYSICIAN ASSISTANT

## 2024-10-29 RX ORDER — METRONIDAZOLE 500 MG/1
500 TABLET ORAL EVERY 12 HOURS
Qty: 14 TABLET | Refills: 0 | Status: SHIPPED | OUTPATIENT
Start: 2024-10-29 | End: 2024-11-05

## 2024-10-29 RX ORDER — NAPROXEN 500 MG/1
500 TABLET ORAL 2 TIMES DAILY PRN
Qty: 10 TABLET | Refills: 0 | Status: SHIPPED | OUTPATIENT
Start: 2024-10-29

## 2024-11-02 ENCOUNTER — HOSPITAL ENCOUNTER (EMERGENCY)
Facility: HOSPITAL | Age: 19
Discharge: HOME | End: 2024-11-02
Attending: STUDENT IN AN ORGANIZED HEALTH CARE EDUCATION/TRAINING PROGRAM
Payer: MEDICAID

## 2024-11-02 VITALS
HEIGHT: 61 IN | DIASTOLIC BLOOD PRESSURE: 82 MMHG | WEIGHT: 110 LBS | RESPIRATION RATE: 18 BRPM | SYSTOLIC BLOOD PRESSURE: 124 MMHG | OXYGEN SATURATION: 99 % | BODY MASS INDEX: 20.77 KG/M2 | HEART RATE: 78 BPM | TEMPERATURE: 99 F

## 2024-11-02 DIAGNOSIS — K62.89 RECTAL PAIN: Primary | ICD-10-CM

## 2024-11-02 DIAGNOSIS — K56.41 FECAL IMPACTION: ICD-10-CM

## 2024-11-02 DIAGNOSIS — K59.00 CONSTIPATION, UNSPECIFIED CONSTIPATION TYPE: ICD-10-CM

## 2024-11-02 PROCEDURE — 99283 EMERGENCY DEPT VISIT LOW MDM: CPT

## 2024-11-02 RX ORDER — POLYETHYLENE GLYCOL 3350 17 G/17G
17 POWDER, FOR SOLUTION ORAL DAILY
Qty: 116 G | Refills: 0 | Status: SHIPPED | OUTPATIENT
Start: 2024-11-02

## 2024-11-02 NOTE — ED TRIAGE NOTES
Yaquelin Cochran, a 19 y.o. female presents to the ED w/ complaint of rectal pain x 1 hour. Pt describes the pain as an internal stabbing pain 6/7. Pt denies any blood in the rectum or stool, trauma to the area, vaginal pain falls. Last BM x 1 day ago. Pt has no other complaints at this time.    Triage note:  Chief Complaint   Patient presents with    Rectal Pain     Seen here few days ago     Review of patient's allergies indicates:  No Known Allergies  Past Medical History:   Diagnosis Date    Behavior problem in childhood     COVID-19 08/08/2021    Ectopic pregnancy 5/24/2023    Suicidal ideation         LOC: The patient is awake, alert and aware of environment with an appropriate affect, oriented x 4 and speaking appropriately.   APPEARANCE: Patient appears comfortable and in no acute distress, patient is clean and well groomed.  NEURO: Pt opens eyes spontaneously, behavior appropriate to situation, follows commands, facial expression symmetrical, bilateral hand grasp equal and even, purposeful motor response noted, normal sensation in all extremities when touched with a finger, PERRLA noted. GCS 15.  HEENT: Head is normocephalic and sits midline on the shoulders, eyes are symmetrical with no blurry vision; ears are clean, dry, intact, patent with no cerumen blocking the pathway, no hearing aids present; nose is free from clean, dry, intact, without any drainage, both nostrils are patent.   SKIN: The skin is warm and dry, color consistent with ethnicity, patient has normal skin turgor and moist mucus membranes, skin intact, no breakdown or bruising noted.  MUSCULOSKELETAL: Patient moving all extremities spontaneously with AROM in all extremities, no swelling noted.  RESPIRATORY: Airway is open and patent, respirations are spontaneous, patient has a normal effort and rate, no accessory muscle use noted, breath sounds are clear and equal bilaterally. The pt denies SOB at this time.  CARDIAC: No edema noted,  capillary refill < 3 seconds. The pt denies chest pain at this time.  GASTRO: Soft and non tender to palpation, no distention noted, normoactive bowel sounds present in all four quadrants. The pt denies any nausea or vomiting at this time. Pt endorses stabbing internal rectal pain.  : Pt denies any pain or frequency with urination.  VASCULAR: All pulses 2+, all extremities warm, no numbness or tingling to any extremities, no edema noted.

## 2024-11-03 NOTE — ED PROVIDER NOTES
Encounter Date: 11/2/2024       History     Chief Complaint   Patient presents with    Rectal Pain     Seen here few days ago     19-year-old female presents to the ED for rectal pain.  Patient reports sharp rectal pain that started about 1 hour prior to ED arrival.  Patient states that she had to leave work because of the pain.  She took ibuprofen and reports some mild improvement.  Pain feels more internal than external. She denies any rectal bleeding, fevers.  Patient does endorse feeling that she has not been able to fully empty out when she has a bowel movement.  Additionally, she mentions that she went on a trip a couple of weeks ago and states that she did not have a bowel movement for 1 week because she was uncomfortable going to the bathroom where she was.  Once she returned, she was able to have a bowel movement, but does not feel that she has satisfactorily emptied or bowels.  She also mentions that she has had some rectal pressure for about a week.  No new or worsening abdominal pain, fevers or additional complaints.       Review of patient's allergies indicates:  No Known Allergies  Past Medical History:   Diagnosis Date    Behavior problem in childhood     COVID-19 08/08/2021    Ectopic pregnancy 5/24/2023    Suicidal ideation      Past Surgical History:   Procedure Laterality Date    BACK SURGERY  2016    Rods in place, had issues with previous epidural    DIAGNOSTIC LAPAROSCOPY N/A 05/29/2023    Procedure: LAPAROSCOPY, DIAGNOSTIC;  Surgeon: Juana Fung MD;  Location: [facility];  Service: OB/GYN;  Laterality: N/A;    DILATION AND CURETTAGE OF UTERUS       Family History   Problem Relation Name Age of Onset    Throat cancer Paternal Grandfather      No Known Problems Father      No Known Problems Mother      Breast cancer Neg Hx      Diabetes Neg Hx      Colon cancer Neg Hx      Ovarian cancer Neg Hx       Social History     Tobacco Use    Smoking status: Never    Smokeless tobacco: Never    Substance Use Topics    Alcohol use: No    Drug use: No     Review of Systems    Physical Exam     Initial Vitals [11/02/24 1846]   BP Pulse Resp Temp SpO2   117/71 80 20 98.6 °F (37 °C) 100 %      MAP       --         Physical Exam    Nursing note and vitals reviewed.  Constitutional: She appears well-developed and well-nourished. She is not diaphoretic.  Non-toxic appearance. She does not appear ill. No distress.   HENT:   Head: Normocephalic and atraumatic.   Neck: Neck supple.   Cardiovascular:  Normal rate and regular rhythm.     Exam reveals no gallop and no friction rub.       No murmur heard.  Pulmonary/Chest: Effort normal and breath sounds normal. No accessory muscle usage. No tachypnea. No respiratory distress. She has no decreased breath sounds. She has no wheezes. She has no rhonchi. She has no rales.   Abdominal: She exhibits no distension.   Genitourinary:    Genitourinary Comments: KHADRA performed with paramedic at bedside.  There is a stool ball in the upper rectum.  Was unable to manually remove.  Attempted to debulk the impaction digitally.      Musculoskeletal:      Cervical back: Neck supple.     Neurological: She is alert.   Skin: No rash noted.   Psychiatric: She has a normal mood and affect. Her behavior is normal.         ED Course   Procedures  Labs Reviewed - No data to display       Imaging Results    None          Medications - No data to display  Medical Decision Making  19-year-old female presents to the ED for evaluation of rectal pain that began 1 hour prior to arrival.  Vitals within normal limits.  Patient in no acute distress.  Afebrile.  My differential diagnosis includes but is not limited to:  Fecal impaction, anal fissure, hemorrhoid, proctitis  Patient does have a stool ball high in the rectum.  Was unable to move a significant amount of stool.  Attempted to debulk the stool ball digitally.  No other concerning findings on rectal exam.  No gross blood.  I feel that this is  likely the cause of patient's discomfort.  I will start her on a bowel regimen with MiraLax.  I have placed a referral to CRS if she continues to have rectal pain after achieving regular bowel movements.  Strict ED return precautions given.  Patient voiced understanding.    After careful consideration of the patient's history, physical exam findings, as well as empirical and objective data obtained throughout ED workup, no immediate, emergent, or life threatening condition/etiology has been identified. No further evaluation or admission was felt to be required and the patient is stable for discharge from the ED. The patient and any additional family/caregivers present were updated with test results, overall clinical impression, and recommended further plan of care, including discharge instructions as provided and outpatient follow-up for continued evaluation and management as needed. All questions were answered. The patient expressed understanding and agreed with current plan for discharge and follow-up plan of care. Strict ED return precautions were provided, including return/worsening of current symptoms, or any other concerns.        Risk  OTC drugs.                                      Clinical Impression:  Final diagnoses:  [K62.89] Rectal pain (Primary)  [K59.00] Constipation, unspecified constipation type  [K56.41] Fecal impaction          ED Disposition Condition    Discharge Stable          ED Prescriptions       Medication Sig Dispense Start Date End Date Auth. Provider    polyethylene glycol (GLYCOLAX) 17 gram/dose powder Take 17 g by mouth once daily. 116 g 11/2/2024 -- Nida Roy PA-C          Follow-up Information       Follow up With Specialties Details Why Contact Info    Rectal/Urology, [facility] - Nephrology/Colon & Nephrology, Colon and Rectal Surgery, Urology   [address] [facility] [address]  [phone]               Nida Roy PA-C  11/02/24 2022

## 2024-11-03 NOTE — PLAN OF CARE
[facility] faxed an Ambulatory Referral/Consult to Colorectal Surgery @[facility]. Fax# [phone].    NEPTALI Raphael, MSW-LMSW  Medical Social Worker/  ER Department

## 2024-11-03 NOTE — DISCHARGE INSTRUCTIONS
You have a large amount of stool in your rectum that is likely causing your pain.  Can take MiraLax once or twice a day as needed until you are having regular soft bowel movements.  If your rectal pain continues despite having regular bowel movements, follow-up in Colon and Rectal clinic for further evaluation.    Return to the ER immediately if you develop fever greater than 100.4°, large amount of bleeding from her anus or rectum, or any other worrisome symptoms.

## 2024-11-07 ENCOUNTER — HOSPITAL ENCOUNTER (EMERGENCY)
Facility: HOSPITAL | Age: 19
Discharge: HOME | End: 2024-11-08
Attending: STUDENT IN AN ORGANIZED HEALTH CARE EDUCATION/TRAINING PROGRAM
Payer: MEDICAID

## 2024-11-07 ENCOUNTER — OFFICE VISIT (OUTPATIENT)
Dept: OBSTETRICS AND GYNECOLOGY | Facility: CLINIC | Age: 19
End: 2024-11-07
Payer: MEDICAID

## 2024-11-07 VITALS
WEIGHT: 122.44 LBS | SYSTOLIC BLOOD PRESSURE: 104 MMHG | HEART RATE: 87 BPM | HEIGHT: 61 IN | BODY MASS INDEX: 23.12 KG/M2 | DIASTOLIC BLOOD PRESSURE: 61 MMHG

## 2024-11-07 DIAGNOSIS — N39.0 URINARY TRACT INFECTION WITHOUT HEMATURIA, SITE UNSPECIFIED: ICD-10-CM

## 2024-11-07 DIAGNOSIS — O36.80X0 PREGNANCY OF UNKNOWN ANATOMIC LOCATION: Primary | ICD-10-CM

## 2024-11-07 DIAGNOSIS — N92.6 IRREGULAR MENSES: ICD-10-CM

## 2024-11-07 DIAGNOSIS — Z87.59 HISTORY OF ECTOPIC PREGNANCY: ICD-10-CM

## 2024-11-07 DIAGNOSIS — N83.299 COMPLEX OVARIAN CYST: Primary | ICD-10-CM

## 2024-11-07 DIAGNOSIS — R10.9 ABDOMINAL PAIN, UNSPECIFIED ABDOMINAL LOCATION: ICD-10-CM

## 2024-11-07 DIAGNOSIS — L67.8 ABNORMAL FACIAL HAIR: ICD-10-CM

## 2024-11-07 PROBLEM — Z34.93 PREGNANCY WITH ADOPTION PLANNED, CURRENTLY IN THIRD TRIMESTER: Status: RESOLVED | Noted: 2023-11-06 | Resolved: 2024-11-07

## 2024-11-07 PROBLEM — O26.899 RH NEGATIVE, DELIVERED, CURRENT HOSPITALIZATION: Status: RESOLVED | Noted: 2023-11-06 | Resolved: 2024-11-07

## 2024-11-07 PROBLEM — O28.5 ABNORMAL CHROMOSOMAL AND GENETIC FINDING ON ANTENATAL SCREENING OF MOTHER: Status: RESOLVED | Noted: 2023-11-09 | Resolved: 2024-11-07

## 2024-11-07 PROBLEM — Z67.91 RH NEGATIVE, DELIVERED, CURRENT HOSPITALIZATION: Status: RESOLVED | Noted: 2023-11-06 | Resolved: 2024-11-07

## 2024-11-07 LAB
ABO + RH BLD: NORMAL
ALBUMIN SERPL BCP-MCNC: 4.4 G/DL (ref 3.5–5.2)
ALP SERPL-CCNC: 61 U/L (ref 40–150)
ALT SERPL W/O P-5'-P-CCNC: 15 U/L (ref 10–44)
ANION GAP SERPL CALC-SCNC: 11 MMOL/L (ref 8–16)
AST SERPL-CCNC: 20 U/L (ref 10–40)
B-HCG UR QL: POSITIVE
BACTERIA #/AREA URNS AUTO: ABNORMAL /HPF
BASOPHILS # BLD AUTO: 0.07 K/UL (ref 0–0.2)
BASOPHILS NFR BLD: 0.8 % (ref 0–1.9)
BILIRUB SERPL-MCNC: 0.6 MG/DL (ref 0.1–1)
BILIRUB UR QL STRIP: NEGATIVE
BLD GP AB SCN CELLS X3 SERPL QL: NORMAL
BUN SERPL-MCNC: 9 MG/DL (ref 6–20)
CALCIUM SERPL-MCNC: 9.8 MG/DL (ref 8.7–10.5)
CHLORIDE SERPL-SCNC: 106 MMOL/L (ref 95–110)
CLARITY UR REFRACT.AUTO: CLEAR
CO2 SERPL-SCNC: 22 MMOL/L (ref 23–29)
COLOR UR AUTO: YELLOW
CREAT SERPL-MCNC: 0.7 MG/DL (ref 0.5–1.4)
CTP QC/QA: YES
DIFFERENTIAL METHOD BLD: NORMAL
EOSINOPHIL # BLD AUTO: 0.1 K/UL (ref 0–0.5)
EOSINOPHIL NFR BLD: 1.2 % (ref 0–8)
ERYTHROCYTE [DISTWIDTH] IN BLOOD BY AUTOMATED COUNT: 13.2 % (ref 11.5–14.5)
EST. GFR  (NO RACE VARIABLE): >60 ML/MIN/1.73 M^2
GLUCOSE SERPL-MCNC: 88 MG/DL (ref 70–110)
GLUCOSE UR QL STRIP: NEGATIVE
HCG INTACT+B SERPL-ACNC: 798 MIU/ML
HCT VFR BLD AUTO: 39 % (ref 37–48.5)
HGB BLD-MCNC: 13 G/DL (ref 12–16)
HGB UR QL STRIP: NEGATIVE
HYALINE CASTS UR QL AUTO: 2 /LPF
IMM GRANULOCYTES # BLD AUTO: 0.02 K/UL (ref 0–0.04)
IMM GRANULOCYTES NFR BLD AUTO: 0.2 % (ref 0–0.5)
KETONES UR QL STRIP: NEGATIVE
LEUKOCYTE ESTERASE UR QL STRIP: ABNORMAL
LIPASE SERPL-CCNC: 34 U/L (ref 4–60)
LYMPHOCYTES # BLD AUTO: 3 K/UL (ref 1–4.8)
LYMPHOCYTES NFR BLD: 34.5 % (ref 18–48)
MAGNESIUM SERPL-MCNC: 2 MG/DL (ref 1.6–2.6)
MCH RBC QN AUTO: 30.3 PG (ref 27–31)
MCHC RBC AUTO-ENTMCNC: 33.3 G/DL (ref 32–36)
MCV RBC AUTO: 91 FL (ref 82–98)
MICROSCOPIC COMMENT: ABNORMAL
MONOCYTES # BLD AUTO: 0.7 K/UL (ref 0.3–1)
MONOCYTES NFR BLD: 8.3 % (ref 4–15)
NEUTROPHILS # BLD AUTO: 4.7 K/UL (ref 1.8–7.7)
NEUTROPHILS NFR BLD: 55 % (ref 38–73)
NITRITE UR QL STRIP: NEGATIVE
NRBC BLD-RTO: 0 /100 WBC
PH UR STRIP: 7 [PH] (ref 5–8)
PLATELET # BLD AUTO: 246 K/UL (ref 150–450)
PMV BLD AUTO: 10.3 FL (ref 9.2–12.9)
POTASSIUM SERPL-SCNC: 3.9 MMOL/L (ref 3.5–5.1)
PROT SERPL-MCNC: 7.3 G/DL (ref 6–8.4)
PROT UR QL STRIP: NEGATIVE
RBC # BLD AUTO: 4.29 M/UL (ref 4–5.4)
RBC #/AREA URNS AUTO: 4 /HPF (ref 0–4)
SODIUM SERPL-SCNC: 139 MMOL/L (ref 136–145)
SP GR UR STRIP: 1.01 (ref 1–1.03)
SPECIMEN OUTDATE: NORMAL
SQUAMOUS #/AREA URNS AUTO: 12 /HPF
URN SPEC COLLECT METH UR: ABNORMAL
WBC # BLD AUTO: 8.56 K/UL (ref 3.9–12.7)
WBC #/AREA URNS AUTO: 16 /HPF (ref 0–5)

## 2024-11-07 PROCEDURE — 84702 CHORIONIC GONADOTROPIN TEST: CPT | Performed by: STUDENT IN AN ORGANIZED HEALTH CARE EDUCATION/TRAINING PROGRAM

## 2024-11-07 PROCEDURE — 3074F SYST BP LT 130 MM HG: CPT | Mod: CPTII,,, | Performed by: ADVANCED PRACTICE MIDWIFE

## 2024-11-07 PROCEDURE — 86850 RBC ANTIBODY SCREEN: CPT | Performed by: STUDENT IN AN ORGANIZED HEALTH CARE EDUCATION/TRAINING PROGRAM

## 2024-11-07 PROCEDURE — 99999 PR PBB SHADOW E&M-EST. PATIENT-LVL III: CPT | Mod: PBBFAC,,, | Performed by: ADVANCED PRACTICE MIDWIFE

## 2024-11-07 PROCEDURE — 81025 URINE PREGNANCY TEST: CPT | Performed by: STUDENT IN AN ORGANIZED HEALTH CARE EDUCATION/TRAINING PROGRAM

## 2024-11-07 PROCEDURE — 85025 COMPLETE CBC W/AUTO DIFF WBC: CPT | Performed by: STUDENT IN AN ORGANIZED HEALTH CARE EDUCATION/TRAINING PROGRAM

## 2024-11-07 PROCEDURE — 99214 OFFICE O/P EST MOD 30 MIN: CPT | Mod: S$PBB,UC,, | Performed by: ADVANCED PRACTICE MIDWIFE

## 2024-11-07 PROCEDURE — 1159F MED LIST DOCD IN RCRD: CPT | Mod: CPTII,,, | Performed by: ADVANCED PRACTICE MIDWIFE

## 2024-11-07 PROCEDURE — 87086 URINE CULTURE/COLONY COUNT: CPT | Performed by: STUDENT IN AN ORGANIZED HEALTH CARE EDUCATION/TRAINING PROGRAM

## 2024-11-07 PROCEDURE — 83735 ASSAY OF MAGNESIUM: CPT | Performed by: STUDENT IN AN ORGANIZED HEALTH CARE EDUCATION/TRAINING PROGRAM

## 2024-11-07 PROCEDURE — 3008F BODY MASS INDEX DOCD: CPT | Mod: CPTII,,, | Performed by: ADVANCED PRACTICE MIDWIFE

## 2024-11-07 PROCEDURE — 81001 URINALYSIS AUTO W/SCOPE: CPT | Performed by: STUDENT IN AN ORGANIZED HEALTH CARE EDUCATION/TRAINING PROGRAM

## 2024-11-07 PROCEDURE — 99213 OFFICE O/P EST LOW 20 MIN: CPT | Mod: PBBFAC | Performed by: ADVANCED PRACTICE MIDWIFE

## 2024-11-07 PROCEDURE — 3078F DIAST BP <80 MM HG: CPT | Mod: CPTII,,, | Performed by: ADVANCED PRACTICE MIDWIFE

## 2024-11-07 PROCEDURE — 80053 COMPREHEN METABOLIC PANEL: CPT | Performed by: STUDENT IN AN ORGANIZED HEALTH CARE EDUCATION/TRAINING PROGRAM

## 2024-11-07 PROCEDURE — 99284 EMERGENCY DEPT VISIT MOD MDM: CPT | Mod: 25,27

## 2024-11-07 PROCEDURE — 83690 ASSAY OF LIPASE: CPT | Performed by: STUDENT IN AN ORGANIZED HEALTH CARE EDUCATION/TRAINING PROGRAM

## 2024-11-07 RX ORDER — IBUPROFEN 800 MG/1
800 TABLET ORAL EVERY 8 HOURS PRN
Qty: 30 TABLET | Refills: 1 | Status: SHIPPED | OUTPATIENT
Start: 2024-11-07 | End: 2025-11-07

## 2024-11-07 NOTE — Clinical Note
"Yaquelin "Martha Cochran was seen and treated in our emergency department on 11/7/2024.  She may return to work on 11/12/2024.       If you have any questions or concerns, please don't hesitate to call.      Yanna Shannon RN    "

## 2024-11-07 NOTE — PROGRESS NOTES
Yaquelin Cochran is a 19 y.o.  who presents today for GYN problem visit.     Here for f/u. Seen recently (10/28/24) in ER for pelvic pain. Complex left ovarian cystic lesion noted on US, most likely hemorrhagic cyst. Periods have been irregular since childbirth.LMP 24  briefly immediately following delivery. Pain feels dull then sharp/acute, then dull again for a few days. Affects ability to work. Has occurred ? Occurs very few months since childbirth in 2024.No abnormal discharge, fever/chills, constipation or diarrhea. Additionally reports hair growth on chin. No increase in acne. No recent weight loss or gain.     Labs and US from ER reviewed.  Neg GC CHL, neg urine UPT.   PAP -wnl    HPI: She is not currently sexually active/single. No longer together with former male partner. Denies hx painful intercourse.   SOCIAL HISTORY: Denies emotional/mental/physical/sexual violence or abuse. Feels safe at home.    US from ER:    FINDINGS:  Uterus:     Size: 8.4 x 4.2 x 5.4 cm     Masses: Few small nabothian cysts noted.     Endometrium: Normal in this pre menopausal patient, measuring 14 mm.     Right ovary:     Size: 4.6 x 4.0 x 2.3 cm     Appearance: 2.7 cm follicle noted.     Vascular flow: Normal.  Prominent right adnexal vasculature again seen which can be seen in the setting of pelvic congestion.     Left ovary:     Size: 4.6 x 3.4 x 4.2 cm     Appearance: Complex cystic lesion with thin internal reticular echogenicities measuring 4.3 x 3.5 x 2.8 cm, previously 3.7 x 3.4 x 2.4 cm with a more solid appearance.     Vascular Flow: Normal.     Free Fluid:     Small volume of free fluid noted, reduced from prior exam.     Impression:     Left ovarian complex cystic lesion with appearance most consistent with a hemorrhagic cyst.  TOA is considered much less likely.  Correlation needed.  Follow-up ultrasound can be obtained in 6-12 weeks to assess for improvement/resolution.     Small  volume of pelvic free fluid, reduced from the prior.     Prominent vessels within the right adnexa.  Correlate for pelvic venous congestion syndrome.       Review of patient's allergies indicates:  No Known Allergies  Past Medical History:   Diagnosis Date    Behavior problem in childhood     COVID-19 2021    Ectopic pregnancy 2023    Suicidal ideation      Past Surgical History:   Procedure Laterality Date    BACK SURGERY  2016    Rods in place, had issues with previous epidural    DIAGNOSTIC LAPAROSCOPY N/A 2023    Procedure: LAPAROSCOPY, DIAGNOSTIC;  Surgeon: Juana Fung MD;  Location: [facility];  Service: OB/GYN;  Laterality: N/A;    DILATION AND CURETTAGE OF UTERUS       Past Surgical History:   Procedure Laterality Date    BACK SURGERY      Rods in place, had issues with previous epidural    DIAGNOSTIC LAPAROSCOPY N/A 2023    Procedure: LAPAROSCOPY, DIAGNOSTIC;  Surgeon: Juana Fung MD;  Location: [facility] OR;  Service: OB/GYN;  Laterality: N/A;    DILATION AND CURETTAGE OF UTERUS       OB History    Para Term  AB Living   4 2 2 0 2 2   SAB IAB Ectopic Multiple Live Births   1 0 1 0 2      # Outcome Date GA Lbr Fran/2nd Weight Sex Type Anes PTL Lv   4 Term 24 37w5d / 00:01 2.55 kg (5 lb 10 oz) M Vag-Spont None N HERMANN   3 Ectopic 2023     ECTOPIC      2 Term 22 40w0d  2.637 kg (5 lb 13 oz) F Vag-Spont  N HERMANN   1 SAB      SAB         Complications: Blighted ovum, History of D&C     OB History          4    Para   2    Term   2       0    AB   2    Living   2         SAB   1    IAB   0    Ectopic   1    Multiple   0    Live Births   2               Social History     Socioeconomic History    Marital status: Single   Tobacco Use    Smoking status: Never    Smokeless tobacco: Never   Substance and Sexual Activity    Alcohol use: No    Drug use: No    Sexual activity: Yes     Partners: Male     Birth control/protection: Condom     Social  "Drivers of Health     Financial Resource Strain: Medium Risk (3/6/2024)    Overall Financial Resource Strain (CARDIA)     Difficulty of Paying Living Expenses: Somewhat hard   Food Insecurity: Food Insecurity Present (3/6/2024)    Hunger Vital Sign     Worried About Running Out of Food in the Last Year: Sometimes true     Ran Out of Food in the Last Year: Sometimes true   Transportation Needs: Unmet Transportation Needs (3/6/2024)    PRAPARE - Transportation     Lack of Transportation (Medical): Yes     Lack of Transportation (Non-Medical): Yes   Stress: No Stress Concern Present (3/6/2024)    [employer] [place] [employer] - Occupational Stress Questionnaire     Feeling of Stress : Only a little   Housing Stability: High Risk (3/6/2024)    Housing Stability Vital Sign     Unable to Pay for Housing in the Last Year: No     Number of Places Lived in the Last Year: 3     Unstable Housing in the Last Year: Yes     Family History   Problem Relation Name Age of Onset    Throat cancer Paternal Grandfather      No Known Problems Father      No Known Problems Mother      Breast cancer Neg Hx      Diabetes Neg Hx      Colon cancer Neg Hx      Ovarian cancer Neg Hx       Social History     Substance and Sexual Activity   Sexual Activity Yes    Partners: Male    Birth control/protection: Condom       No, Primary Doctor     ROS  Gen: Negative--fever, weight change, fatigue, appetite change  Skin:  Negative--Hirsutism, acne, rash  GI:  Negative--Nausea, vomiting, diarrhea, constipation, abdominal pain  :  Negative--Dysuria, vaginal discharge, genital sores, dyspareunia  Breast: Negative--Mass, lump, nipple discharge, tenderness    OBJECTIVE:  /61   Pulse 87   Ht 5' 1" (1.549 m)   Wt 55.6 kg (122 lb 7.5 oz)   LMP 09/29/2024 (Exact Date)   Breastfeeding No   BMI 23.14 kg/m²   Constitutional/Gen: Slim, Constitutional/Gen: NAD, appears stated age, well groomed  External genitalia: no lesions or discharge, " normal hair distribution  Urethral meatus: normal size and location, no lesions or prolapse  Uterus: nontender, mobile, normal size, contour, and position. No pathologic descent.  Adnexa: no masses or tenderness  Psych: affect appropriate and without signs of mood, thought or memory difficulty appreciated    A:    19 y.o. female  for GYN problem visit  Body mass index is 23.14 kg/m².  Patient Active Problem List   Diagnosis    Anxiety    History of ectopic pregnancy    History of depression    Complex ovarian cyst    Irregular menses    Abnormal facial hair       P:  Complex ovarian cyst  Comments:  10/24-Left ovarian complex cystic lesion with appearance most consistent with a hemorrhagic cyst. Repeat US 6-12 weeks  Orders:  -     US Pelvis Comp with Transvag NON-OB (xpd; Future; Expected date: 2024    Irregular menses  -     HCG, Quantitative; Future; Expected date: 2024    Abnormal facial hair  -     TSH; Future; Expected date: 2024  -     HEMOGLOBIN A1C; Future; Expected date: 2024  -     FOLLICLE STIMULATING HORMONE; Future; Expected date: 2024  -     LUTEINIZING HORMONE; Future; Expected date: 2024  -     DHEA-SULFATE; Future; Expected date: 2024  -     Testosterone, Free; Future; Expected date: 2024  -     TESTOSTERONE; Future; Expected date: 2024  -     PROLACTIN; Future; Expected date: 2024  -     ESTRADIOL; Future; Expected date: 2024  -     LIPID PANEL; Future; Expected date: 2024    History of ectopic pregnancy    Other orders  -     ibuprofen (ADVIL,MOTRIN) 800 MG tablet; Take 1 tablet (800 mg total) by mouth every 8 (eight) hours as needed for Pain.  Dispense: 30 tablet; Refill: 1      Updated Medication List:  Current Outpatient Medications   Medication Sig Dispense Refill    ibuprofen (ADVIL,MOTRIN) 800 MG tablet Take 1 tablet (800 mg total) by mouth every 8 (eight) hours as needed for Pain. 30 tablet 1    polyethylene  "glycol (GLYCOLAX) 17 gram/dose powder Take 17 g by mouth once daily. 116 g 0     No current facility-administered medications for this visit.      Plan:  We discussed here concern for PCOS. She does not "fit" the classical picture of PCOS though we will obtain labs today as precaution given hair growth to chin.  Additionally discussed her concern for endometriosis and my suspicion for ovulation pain versus frequent ovarian cyst.   She is NOT interested in hormonal management "don't want to take any type of hormones" due to fear of affecting ability to conceive in future/remote possible desire. Hx of ectopic pregnancy. Reinforced that ovarian cyst could result in torsion and complications from could impact future fertility. RX for Motrin to pharmacy/discussed. Start tracking period/diary.  We discussed healthy diet, regular exercise as part of lifestyle change.  Repeat US ordered for 7 weeks. Communication for lab/results via [facility] patient portal. Refer to GYN MD PRN.    "

## 2024-11-08 ENCOUNTER — PATIENT MESSAGE (OUTPATIENT)
Dept: OBSTETRICS AND GYNECOLOGY | Facility: CLINIC | Age: 19
End: 2024-11-08
Payer: MEDICAID

## 2024-11-08 ENCOUNTER — TELEPHONE (OUTPATIENT)
Dept: OBSTETRICS AND GYNECOLOGY | Facility: CLINIC | Age: 19
End: 2024-11-08
Payer: MEDICAID

## 2024-11-08 VITALS
HEART RATE: 82 BPM | RESPIRATION RATE: 15 BRPM | BODY MASS INDEX: 22.66 KG/M2 | HEIGHT: 61 IN | OXYGEN SATURATION: 98 % | TEMPERATURE: 99 F | WEIGHT: 120 LBS | SYSTOLIC BLOOD PRESSURE: 98 MMHG | DIASTOLIC BLOOD PRESSURE: 62 MMHG

## 2024-11-08 DIAGNOSIS — O36.80X0 PREGNANCY OF UNKNOWN ANATOMIC LOCATION: Primary | ICD-10-CM

## 2024-11-08 RX ORDER — CEPHALEXIN 500 MG/1
500 CAPSULE ORAL EVERY 12 HOURS
Qty: 14 CAPSULE | Refills: 0 | Status: SHIPPED | OUTPATIENT
Start: 2024-11-08 | End: 2024-11-15

## 2024-11-08 NOTE — DISCHARGE INSTRUCTIONS
You will need a repeat beta test in 48 hours. Follow-up with OB/GYN ASAP early next week, referral is placed. Return for any new or worsening bleeding, pain, weakness, vomiting, nausea, or other concerning symptom.

## 2024-11-08 NOTE — TELEPHONE ENCOUNTER
"Spoke to the patient and she had questions about her US report from yesterday and the hCG levels. She had two hCG levels drawn yesterday, one from clinic and one in the ER when she developed some "sharp pain". They were 663 and 798 about 4 hours apart. The US impression from the ER was:     1. Question possible small gestational sac within the endometrial canal measuring 0.6 cm.  However, no fetal pole visualized, possibly due to early gestational age.  Pseudo gestational sac versus ectopic pregnancy or failing/failed pregnancy not excluded in this patient with beta HCG level of 798.  Recommend follow-up pelvic ultrasound and potentially correlation with serial serum beta hCG level.  2. Decreased size of previously seen hemorrhagic left ovarian cyst.  Small to moderate volume pelvic free fluid, greater than expected for physiologic fluid.  This finding could be related to previously seen hemorrhagic ovarian cyst, though nonspecific.    She is no longer having the pain today. She has an order to get hCG drawn tomorrow and has an appt in the [facility] on Monday. She was given precautions that should she have pain and/or bleeding before Monday she is to call right away. Pt verbalized understanding and states that she will call w/ any further questions or issues.     Nan Moore CNM      "

## 2024-11-08 NOTE — ED PROVIDER NOTES
Henry Linares MD    [facility]  [address], [address]  [place] [address]  Phone: [phone]  Fax: [phone]     History of Present Illness     Subjective     Patient ID: Madelyn Ruiz is a 89 y.o. female.    HPI    Admission date: 12/19/2023     Discharge date: 12/22/2023      Admitting Physician: Ailyn Hector MD      Discharge Physician:  MICA Villa Ma; Lul Stern MD      Primary Discharge Diagnoses:   1.  L 5th-7th rib fractures  2.  L humerus fracture  3.  Trace L PTX    Hospital Course: 89 y.o. female who was brought to [facility] ED on 12/19/2023 s/p mech fall sustaining the above-listed injuries.     Ortho eval, non-op, NWB LUE in sling.     PT/OT/PMR eval & recommended home health.         Since discharge 5 weeks, was using sling for humeral fracture. Pain is improved. Has good range of motion. Not taking any pain medication for arm. Using chronic Tramadol per Dr. You. Has follow up with ortho next week and has xrays to do prior to visit.    Rib pain is improved. No shortness of breath. No coughing. No fever, chills, or sweats.              Past Medical/Surgical/Family/Social History       The following have been reviewed and updated as appropriate in this visit:   Allergies  Meds  Problems         Past Medical History:   Diagnosis Date   • A-fib (CMS/[facility]) 8/22/2023    Managed on Apixaban and Metoprolol.    • Allergic bronchopulmonary aspergillosis (CMS/[facility]) 04/02/2018    Allergist: Dr. Arriaza.  Stable FEV1 in 8/2015.  IgE levels and eosinophils stable in 8/2015.  Managed with Fasenra, Azithromycin and Flovent.   • Allergic rhinitis 04/02/2018    Pulmonologist: Dr. Walker. Managed with Flonase.   • Asthma    • Atrial flutter with rapid ventricular response (CMS/[facility]) 02/21/2023    Diagnosed in 02/2023 and hospitalized Managed with Apixaban and Diltiazem Echocardiogram 02/2023 •  Left Ventricle: Normal ventricle size. Mild concentric left  Encounter Date: 11/7/2024       History     Chief Complaint   Patient presents with    Abdominal Pain     Pt presents w/ c/o RLQ abdominal pain that woke her up from a nap today. Pt recently seen here 11/02 for similar problem. Denies any fevers or chills. +nausea.     HPI    18 y/o F PMH ectopic pregnancy who presents to the ED for evaluation of abdominal pain.  Patient reports x1 day of worsening right lower abdominal pain. Similar to pain from recent cyst here.  She was seen by GYN today outpatient.  She is upreg+ here. LMP 3rd October.  No CP, dyspnea, N/V, vaginal bleeding, discharge, or syncope.       Review of patient's allergies indicates:  No Known Allergies  Past Medical History:   Diagnosis Date    Behavior problem in childhood     COVID-19 08/08/2021    Ectopic pregnancy 5/24/2023    Suicidal ideation      Past Surgical History:   Procedure Laterality Date    BACK SURGERY  2016    Rods in place, had issues with previous epidural    DIAGNOSTIC LAPAROSCOPY N/A 05/29/2023    Procedure: LAPAROSCOPY, DIAGNOSTIC;  Surgeon: Juana Fung MD;  Location: [facility];  Service: OB/GYN;  Laterality: N/A;    DILATION AND CURETTAGE OF UTERUS       Family History   Problem Relation Name Age of Onset    Throat cancer Paternal Grandfather      No Known Problems Father      No Known Problems Mother      Breast cancer Neg Hx      Diabetes Neg Hx      Colon cancer Neg Hx      Ovarian cancer Neg Hx       Social History     Tobacco Use    Smoking status: Never    Smokeless tobacco: Never   Substance Use Topics    Alcohol use: No    Drug use: No     Review of Systems   Gastrointestinal:  Positive for abdominal pain.       Physical Exam     Initial Vitals [11/07/24 1902]   BP Pulse Resp Temp SpO2   111/65 85 18 98.3 °F (36.8 °C) 100 %      MAP       --         Physical Exam    Nursing note and vitals reviewed.  Constitutional: She appears well-nourished.   HENT:   Head: Atraumatic.   Eyes: EOM are normal.   Cardiovascular:   Normal rate and regular rhythm.           Pulmonary/Chest: Breath sounds normal. No respiratory distress.   Abdominal:   Minimal generalized abd TTP   Musculoskeletal:         General: No edema. Normal range of motion.     Neurological: She is alert and oriented to person, place, and time.   Skin: Skin is warm and dry.   Psychiatric: She has a normal mood and affect. Thought content normal.         ED Course   Procedures  Labs Reviewed   COMPREHENSIVE METABOLIC PANEL - Abnormal       Result Value    Sodium 139      Potassium 3.9      Chloride 106      CO2 22 (*)     Glucose 88      BUN 9      Creatinine 0.7      Calcium 9.8      Total Protein 7.3      Albumin 4.4      Total Bilirubin 0.6      Alkaline Phosphatase 61      AST 20      ALT 15      eGFR >60.0      Anion Gap 11     URINALYSIS, REFLEX TO URINE CULTURE - Abnormal    Specimen UA Urine, Clean Catch      Color, UA Yellow      Appearance, UA Clear      pH, UA 7.0      Specific Gravity, UA 1.010      Protein, UA Negative      Glucose, UA Negative      Ketones, UA Negative      Bilirubin (UA) Negative      Occult Blood UA Negative      Nitrite, UA Negative      Leukocytes, UA 3+ (*)     Narrative:     Specimen Source->Urine   URINALYSIS MICROSCOPIC - Abnormal    RBC, UA 4      WBC, UA 16 (*)     Bacteria Many (*)     Squam Epithel, UA 12      Hyaline Casts, UA 2 (*)     Microscopic Comment SEE COMMENT      Narrative:     Specimen Source->Urine   POCT URINE PREGNANCY - Abnormal    POC Preg Test, Ur Positive (*)      Acceptable Yes     CULTURE, URINE   CBC W/ AUTO DIFFERENTIAL    WBC 8.56      RBC 4.29      Hemoglobin 13.0      Hematocrit 39.0      MCV 91      MCH 30.3      MCHC 33.3      RDW 13.2      Platelets 246      MPV 10.3      Immature Granulocytes 0.2      Gran # (ANC) 4.7      Immature Grans (Abs) 0.02      Lymph # 3.0      Mono # 0.7      Eos # 0.1      Baso # 0.07      nRBC 0      Gran % 55.0      Lymph % 34.5      Mono % 8.3       ventricular hypertrophy. Preserved systolic function. Estimated EF 65-70%. No regional wall motion abnormalities. Grade I diastolic dysfunction. •  Right Ventricle: Normal ventricle size. Normal systolic function. •  Left Atrium: Moderately   • Atrial flutter with rapid ventricular response (CMS/[facility]) 2/21/2023    Diagnosed in 02/2023 and hospitalized Managed with Apixaban, Metoprolol, and Diltiazem Echocardiogram 02/2023 •  Left Ventricle: Normal ventricle size. Mild concentric left ventricular hypertrophy. Preserved systolic function. Estimated EF 65-70%. No regional wall motion abnormalities. Grade I diastolic dysfunction. •  Right Ventricle: Normal ventricle size. Normal systolic function. •  Left Atriu   • Calcification of aorta (CMS/[facility]) 06/29/2020    Seen incidentally on CT scan.   • Chronic obstructive pulmonary disease (CMS/[facility]) 04/02/2018    Pulmonologist: Dr. Walker.  Seen on PFTs in hospital in 2014.  Controlled with Spiriva and FLovent.   • COPD (chronic obstructive pulmonary disease) (CMS/[facility]) 10/19/2023    Managed with Symbicort and Spiriva   • GERD (gastroesophageal reflux disease) 06/08/2019    Managed with Esomeprazole.  Should take medication 30 minutes prior to meal.  Advised to avoid caffeine, carbonated beverages, and should not eat within 3 hours of going to sleep.  Advised to reduce BMI < 25.    • Insomnia 04/02/2018    Managed with Lorazepam as needed.   • Lumbar spinal stenosis 04/02/2018    Neurosurgeon: Dr. Miguel. MRI with Central and lateral recess spinal stenosis. Has Spondylolisthesis at L4/L5. S/P epidural injection by Dr. Martin with some relief in past. Had Lumbar fusion and Lumbar laminectomy by Dr. Miguel in 4/2015.   • Macular degeneration disease    • Obstructive sleep apnea syndrome 04/02/2018    Mild STACIA noted in sleep study 7/2015. Treated with nasal CPAP automatic with pressure range 5-10 CM H2Ox couple months. Off now   • Orthostatic hypotension 10/23/2023    Managed on  Eosinophil % 1.2      Basophil % 0.8      Differential Method Automated     MAGNESIUM    Magnesium 2.0     LIPASE    Lipase 34     HCG, QUANTITATIVE    HCG Quant 798      Narrative:     Release to patient->Immediate   TYPE & SCREEN    Group & Rh O NEG      Indirect Barbara NEG      Specimen Outdate 11/10/2024 23:59            Imaging Results              US OB <14 Wks TransAbd & TransVag, Single Gestation (XPD) (Final result)  Result time 11/07/24 23:44:52      Final result by Sudhakar Rockwell MD (11/07/24 23:44:52)                   Impression:      1. Question possible small gestational sac within the endometrial canal measuring 0.6 cm.  However, no fetal pole visualized, possibly due to early gestational age.  Pseudo gestational sac versus ectopic pregnancy or failing/failed pregnancy not excluded in this patient with beta HCG level of 798.  Recommend follow-up pelvic ultrasound and potentially correlation with serial serum beta hCG level.  2. Decreased size of previously seen hemorrhagic left ovarian cyst.  Small to moderate volume pelvic free fluid, greater than expected for physiologic fluid.  This finding could be related to previously seen hemorrhagic ovarian cyst, though nonspecific.    Electronically signed by resident: Juana Shahid  Date:    11/07/2024  Time:    23:16    Electronically signed by: Sudhakar Rockwell MD  Date:    11/07/2024  Time:    23:44               Narrative:    EXAMINATION:  US OB <14 WEEKS, TRANSABDOM & TRANSVAG, SINGLE GESTATION (XPD)    CLINICAL HISTORY:  abdominal pain;    TECHNIQUE:  Transabdominal sonography of the pelvis was performed, followed by transvaginal sonography to better evaluate the uterus and ovaries.    COMPARISON:  Pelvic ultrasound 10/28/2024.    FINDINGS:  Intrauterine gestation(s): Single    Mean gestational sac diameter: Small fluid density lesion within the endometrial canal measuring 0.6 cm and possibly representing gestational sac is visualized.  Mild  Midodrine   • Osteoporosis 04/02/2018    Rheumatologist: Dr. Bradley.  Dexa scan 7/2016 with osteoporosis of lumbar spine LS-2.5, LH-2.1, RH -2.2.  Has been on Fosamax in the past for 8 years.  Worsened by Prednisone in past.  Continue vitamin D, calcium and weight bearing exercise. DEXA in 7/2017 with LS -1.7, LH -2.2, and RH -2.0.  Next due in 7/2019.  Seen by Dr. Bradley in 8/2015 who agreed with initiating Prolia. Took for 2 years. N   • Primary hypertension 04/02/2018    Managed on Diltiazem and Losartan. Advised to follow a low sodium diet and keep BMI < 25.  Advised to exercise for 2.5 hours per week.  Instructed to take home blood pressure readings and call if consistently above 140/90.     • Primary hypertension 4/2/2018    Managed on Diltiazem  Advised to follow a low sodium diet and keep BMI < 25.  Advised to exercise for 2.5 hours per week.  Instructed to take home blood pressure readings and call if consistently above 140/90.     • Pulmonary embolism (CMS/HCC) 06/09/2019    Hematologist: Dr. Bhakta Diagnosed in 06/2019 in right upper lobe. Vascular ultrasound negative of bilateral lower extremities. Managed on Eliquis. Hypercoagulable workup was negative for beta-2 glycoprotein's, RIGO, Antithrombin III, lupus anticoagulant, protein C activity protein S activity, and prothrombin gene mutation, and factor V Leiden. Now off Apixaban as CT scan in 12/2019 was without pu   • Sacral fracture, closed (CMS/HCC) 8/21/2023   • SVT (supraventricular tachycardia)    • Vertebral compression fracture (CMS/HCC)     At T12 level       Past Surgical History:   Procedure Laterality Date   • APPENDECTOMY     • CARPAL TUNNEL RELEASE Bilateral    • CATARACT EXTRACTION W/  INTRAOCULAR LENS IMPLANT Bilateral    • HYSTERECTOMY  1972   • LUMBAR LAMINECTOMY  04/21/2015    S/P lumbar fusion   • TONSILLECTOMY         Family History   Problem Relation Age of Onset   • Glaucoma Biological Mother    • Macular degeneration Biological  Mother    • Hypertension Biological Mother    • Lung cancer Biological Father    • Heart disease Biological Brother    • Breast cancer Mother's Sister    • No Known Problems Biological Son    • No Known Problems Biological Son    • No Known Problems Biological Daughter    • No Known Problems Biological Daughter    • No Known Problems Biological Daughter        Social History     Tobacco Use   • Smoking status: Never   • Smokeless tobacco: Never   Vaping Use   • Vaping Use: Never used   Substance Use Topics   • Alcohol use: Yes     Comment: Rarely   • Drug use: No       Patient Care Team:  Henry Linares MD as PCP - General  Ray Arriaza MD as Referring Physician  Aye Bhakta MD as Consulting Physician (Hematology and Oncology)  Wesley Walker MD as Consulting Physician (Pulmonary)  Fausto Bradley MD as Consulting Physician (Rheumatology)  Kleiner, Robert C, MD as Referring Physician  Alfie Torres MD as Consulting Physician (Dermatology)  Fanny Pelaez MD as Consulting Physician (Orthopedic Surgery)  Keenan Krause DO as Consulting Physician (Family Medicine)  Keenan Krause DO as Consulting Physician (Family Medicine)  Wesley You DO as Consulting Physician (Physical Medicine and Rehabilitation)  Jessika Zelaya MD as Surgeon (Neurosurgery)  Henrietta Crespo MD as Cardiologist (Interventional Cardiology)  Dinesh Goel MD as Surgeon (Otolaryngology)  Valentino, Michael A, MD PhD as Cardiologist (Cardiology)  Alejo Saavedra MD as Consulting Physician (Orthopedic Surgery)  Karolina Hauser, RN as Care Manager (Care Management)     Allergies and Medications       Allergies   Allergen Reactions   • Mepolizumab Rash   • Morphine      Other reaction(s): Vomiting   • Oxycodone      Other reaction(s): Vomiting       Current Outpatient Medications   Medication Sig Dispense Refill   • acetaminophen (TYLENOL) 325 mg tablet Take 3 tablets (975 mg total) by mouth every 6 (six)  homogeneous thickening of the endometrial stripe, which measures up to 2.5 cm in thickness.    Yolk sac: Not visualized    Crown-rump length (CRL): Unable to be measured with no fetal pole visualized.    Cardiac activity: Unable to be visualized, possibly due to early gestational age.    Subchorionic hemorrhage: None.    Right ovary: Normal and measuring 4.5 x 4.0 x 2.4 cm with anechoic ovarian follicle measuring 2.3 x 2.4 x 1.8 cm.    Left ovary: Normal and measuring 4.0 x 1.9 x 3.6 cm.  Anechoic lesion with thin internal septation measuring 1.6 x 1.1 x 1.5 cm, likely representing minimally complex ovarian cyst versus follicle.  Complex mixed density lesion measuring 2.6 x 1.3 x 2.1 cm, likely a corpus luteum or involuting hemorrhagic ovarian cyst as seen on prior ultrasound.    Miscellaneous: Small to moderate volume of pelvic free fluid.                                       Medications - No data to display  Medical Decision Making  Amount and/or Complexity of Data Reviewed  Labs: ordered. Decision-making details documented in ED Course.  Radiology: ordered. Decision-making details documented in ED Course.    Risk  Prescription drug management.                                  20 y/o F here with abdominal pain. She is upreg positive here, did not know she was pregnant. Beta from clinic this morning is also positive.  She declined anything for pain while in the ED. Patient also declined a speculum exam in the ED.  Patient decisional and has capacity to make own decisions.  Normal WBC, normal lytes, Rh -  Beta 798, increased from earlier.  UA with many bacteria and WBC.  US showing no definite IUP yet, hemorrhagic cyst is decreasing in size.  Discussed with [facility], patient will need repeat beta in 48 hours. She also does not need rhogam for the hemorrhagic cyst.  Discussed with patient in detail that she needs repeat beta/reeval in 48 hours, she expressed understanding. If she cannot get labs drawn outpt she  hours. 360 tablet 0   • acetaminophen (TYLENOL) 500 mg tablet Take 2 tablets (1,000 mg total) by mouth every 6 (six) hours.     • apixaban (ELIQUIS) 2.5 mg tablet Take 1 tablet (2.5 mg total) by mouth 2 (two) times a day. 180 tablet 3   • [START ON 1/26/2024] azithromycin (ZITHROMAX) 250 mg tablet Take 1 tablet (250 mg total) by mouth 3 (three) times a week (Mon, Wed, Fri). 12 tablet 5   • calcium carbonate 600 mg calcium (1,500 mg) tablet Take 1 tablet by mouth daily.     • cholecalciferol, vitamin D3, 25 mcg (1,000 unit) capsule Take 1 tablet by mouth daily.     • diltiazem LA (CARDIZEM LA) 360 mg 24 hr tablet Take 360 mg by mouth daily.     • docusate sodium (COLACE) 100 mg capsule Take 100 mg by mouth 2 (two) times a day as needed for constipation.     • DULoxetine (CYMBALTA) 60 mg capsule Take 60 mg by mouth at bedtime.   0   • fexofenadine (ALLEGRA) 180 mg tablet Take 180 mg by mouth nightly.     • FOLIC ACID/MULTIVIT,IRON, (CENTRUM ORAL) Take 1 tablet by mouth daily.     • lidocaine (LIDODERM) 5 % patch Place 1 patch on the skin daily. Remove & discard patch within 12 hours or as directed by prescriber. 30 patch 3   • melatonin ODT Take 2 tablets (6 mg total) by mouth nightly.     • metoprolol tartrate (LOPRESSOR) 25 mg tablet Take 1 tablet (25 mg total) by mouth 2 (two) times a day. 180 tablet 3   • midodrine (PROAMATINE) 5 mg tablet Take 1 tablet (5 mg total) by mouth 3 (three) times a day before meals. 270 tablet 3   • pregabalin (LYRICA) 25 mg capsule Take 25 mg by mouth daily with lunch. PRN     • pregabalin (LYRICA) 50 mg capsule Take 50 mg by mouth 2 (two) times a day. Morning and Evening     • propylene glycol/peg 400/PF (SYSTANE, PF, OPHT) Administer 1 drop into affected eye(s) as needed (dry eyes).     • SPIRIVA RESPIMAT 2.5 mcg/actuation mist inhaler Inhale 2 puffs daily.     • SYMBICORT 80-4.5 mcg/actuation inhaler Inhale 2 puffs 2 (two) times a day.     • traMADoL (ULTRAM) 50 mg tablet Take  will come back to the ED.  Otherwise f/u with OB/GYN closely next week, referral placed, strict RTER precautions given in detail, all questions answered, patient expressed understanding, she is stable for DC home.   Keflex given for UTI as well.      Clinical Impression:  Final diagnoses:  [O36.80X0] Pregnancy of unknown anatomic location (Primary)  [R10.9] Abdominal pain, unspecified abdominal location  [N39.0] Urinary tract infection without hematuria, site unspecified          ED Disposition Condition    Discharge Stable          ED Prescriptions       Medication Sig Dispense Start Date End Date Auth. Provider    cephALEXin (KEFLEX) 500 MG capsule Take 1 capsule (500 mg total) by mouth every 12 (twelve) hours. for 7 days 14 capsule 11/8/2024 11/15/2024 Thiago Marquez MD          Follow-up Information       Follow up With Specialties Details Why Contact [facility]N Obstetrics and Gynecology   [address] [place] [id number]  [phone]             Thiago Marquez MD  11/08/24 6872     "1 tablet (50 mg total) by mouth every 8 (eight) hours as needed for pain. Pt also takes it as needed at lunch time.       No current facility-administered medications for this visit.          Review of Systems       Review of Systems   Constitutional: Negative for chills and fever.   Respiratory: Negative for cough, chest tightness, shortness of breath and wheezing.    Cardiovascular: Negative for chest pain and palpitations.   Gastrointestinal: Negative for abdominal pain, diarrhea, nausea and vomiting.   Genitourinary: Negative for dysuria, frequency and urgency.   Musculoskeletal:        See history of present illness         Physical Examination       Objective     Vitals:    01/25/24 0939   BP: (!) 144/88   Pulse: (!) 138   Resp: 16   Temp: 36.3 °C (97.3 °F)   SpO2: 94%       Pulse Readings from Last 5 Encounters:   01/25/24 (!) 138   12/22/23 65   12/19/23 (!) 58   12/08/23 63   11/22/23 87       Wt Readings from Last 5 Encounters:   01/25/24 49.5 kg (109 lb 3.2 oz)   12/19/23 51.7 kg (113 lb 15.7 oz)   12/19/23 52.2 kg (115 lb)   12/08/23 52.2 kg (115 lb)   11/22/23 52.9 kg (116 lb 9.6 oz)       Ht Readings from Last 5 Encounters:   01/25/24 1.549 m (5' 1\")   12/19/23 1.524 m (5')   12/19/23 1.524 m (5')   10/19/23 1.524 m (5')   10/11/23 1.537 m (5' 0.5\")       BP Readings from Last 5 Encounters:   01/25/24 (!) 144/88   12/22/23 (!) 179/86   12/19/23 (!) 139/3   12/08/23 132/62   11/22/23 (!) 144/80       BMI Readings from Last 4 Encounters:   01/25/24 20.63 kg/m²   12/19/23 22.26 kg/m²   12/19/23 22.46 kg/m²   12/08/23 22.46 kg/m²       Physical Exam  Constitutional:       General: She is not in acute distress.     Appearance: Normal appearance. She is not ill-appearing.   Cardiovascular:      Rate and Rhythm: Normal rate and regular rhythm.      Heart sounds: Normal heart sounds. No murmur heard.  Pulmonary:      Effort: Pulmonary effort is normal.      Breath sounds: Normal breath sounds. No wheezing, " rhonchi or rales.   Chest:      Comments: No rib tendernss  Abdominal:      General: Abdomen is flat. There is no distension.      Palpations: Abdomen is soft.      Tenderness: There is no abdominal tenderness. There is no guarding or rebound.   Musculoskeletal:      Left shoulder: Normal range of motion.      Left upper arm: No swelling, deformity, tenderness or bony tenderness.      Right lower leg: No edema.      Left lower leg: No edema.   Neurological:      Mental Status: She is alert.   Psychiatric:         Mood and Affect: Mood normal.          Laboratory Results     Lab Results   Component Value Date    WBC 5.81 12/21/2023    WBC 5.82 12/20/2023    WBC 7.37 12/19/2023    HGB 11.7 (L) 12/21/2023    HGB 10.4 (L) 12/20/2023    HGB 9.8 (L) 12/19/2023    HCT 37.5 12/21/2023    HCT 32.6 (L) 12/20/2023    HCT 31.9 (L) 12/19/2023    MCV 91.5 12/21/2023    MCV 90.1 12/20/2023    MCV 92.5 12/19/2023     12/21/2023     12/20/2023     12/19/2023        Lab Results   Component Value Date    GLUCOSE 88 12/21/2023    GLUCOSE 96 12/20/2023    GLUCOSE 101 (H) 12/19/2023    CALCIUM 8.8 12/21/2023    CALCIUM 8.8 12/20/2023    CALCIUM 9.1 12/19/2023     12/21/2023     12/20/2023     12/19/2023    K 4.1 12/21/2023    K 3.7 12/20/2023    K 4.0 12/19/2023    CO2 25 12/21/2023    CO2 24 12/20/2023    CO2 28 12/19/2023     (H) 12/21/2023     12/20/2023     12/19/2023    BUN 16 12/21/2023    BUN 17 12/20/2023    BUN 16 12/19/2023    CREATININE 0.7 12/21/2023    CREATININE 0.7 12/20/2023    CREATININE 0.7 12/19/2023    ALKPHOS 58 11/22/2023    ALKPHOS 56 10/19/2023    ALKPHOS 95 08/21/2023    AST 15 11/22/2023    AST 17 10/19/2023    AST 15 08/21/2023    ALT 15 11/22/2023    ALT 17 10/19/2023    ALT 14 08/21/2023    BILITOT 0.5 11/22/2023    BILITOT 0.5 10/19/2023    BILITOT 0.5 08/21/2023    ALBUMIN 4.1 11/22/2023    ALBUMIN 4.0 10/19/2023    ALBUMIN 3.7 08/21/2023       Lab  Results   Component Value Date    CHOL 227 (H) 01/27/2023    CHOL 248 (H) 11/18/2015     Lab Results   Component Value Date    HDL 62 01/27/2023    HDL 92 11/18/2015     Lab Results   Component Value Date    LDLCALC 141 (H) 01/27/2023    LDLCALC 133 (H) 11/18/2015     Lab Results   Component Value Date    TRIG 120 01/27/2023    TRIG 117 11/18/2015       Lab Results   Component Value Date    TSH 3.63 10/19/2023    TSH 2.87 01/26/2023    TSH 1.91 10/14/2020     Lab Results   Component Value Date    FREET4 0.79 11/18/2015     Lab Results   Component Value Date    HGBA1C 5.6 11/18/2015    HGBA1C 5.6 04/08/2015       Lab Results   Component Value Date    HEPCAB Nonreactive 07/12/2018       Lab Results   Component Value Date    MG 2.1 12/20/2023    MG 2.3 10/24/2023    MG 2.2 10/23/2023          Immunization History   Administered Date(s) Administered   • INFLUENZA VACCINE QUAD ADJUVANTED 65 and OLDER 10/06/2021, 10/19/2022, 10/11/2023   • Influenza Split High Dose Preservative Free IM 11/03/2011, 11/13/2012, 10/09/2013, 10/30/2014, 11/17/2015, 09/30/2016, 09/25/2017   • Influenza Vaccine 65 And Older Preservative Free 10/25/2019, 10/02/2020   • Influenza Vaccine Quadrivalent 3 Yr And Older 09/18/2017, 10/22/2018   • Influenza, Unspecified 10/30/2014, 11/17/2015, 09/30/2016, 09/25/2017   • MMRV 06/05/2014   • Pneumococcal Conjugate 08[birth year]/1996   • Pneumococcal Conjugate 13-Valent 11/17/2015   • Pneumococcal Polysaccharide 10/30/2014, 09/11/2017   • SARS-COV-2 (COVID-19) VACCINE PFIZER BIVALENT 12 years and older ([place]) 09/15/2022   • SARS-COV-2 (COVID-19) VACCINE, MODERNA MONOVALENT 02/05/2021, 03/05/2021   • SARS-COV-2 (COVID-19) VACCINE, MODERNA MONOVALENT BOOSTER 11/17/2021   • SARS-COV-2 (COVID19) VACCINE, PFIZER MONOVALENT 09/15/2022   • Tdap 03/22/2012, 08/08/2022   • Varicella 06/05/2014   • Zoster 01/02/2006, 01/02/2016   • Zoster Vaccine Recombinant Adjuvanted (Shingrix) 05/24/2019, 09/20/2019         Health  Maintenance Topics with due status: Overdue       Topic Date Due    RSV (60+ years old [shared decision making] or in pregnancy during 32 through 36 weeks) Never done    COVID-19 Vaccine 09/01/2023     Health Maintenance Topics with due status: Not Due       Topic Last Completion Date    DTaP, Tdap, and Td Vaccines 08/08/2022    DEXA Scan 09/20/2022    Falls Risk Screening 02/10/2023    Medicare Annual Wellness Visit 10/11/2023    Depression Screening 12/19/2023     Health Maintenance Topics with due status: Completed       Topic Last Completion Date    Pneumococcal (65 years and older) 09/11/2017    Zoster Vaccine 09/20/2019    Influenza Vaccine 10/11/2023     Health Maintenance Topics with due status: Aged Out       Topic Date Due    Meningococcal ACWY Aged Out    RSV <20 months Aged Out    HIB Vaccines Aged Out    Hepatitis B Vaccines Aged Out    IPV Vaccines Aged Out    HPV Vaccines Aged Out     Health Maintenance Topics with due status: Discontinued       Topic Date Due    Breast Cancer Screening Discontinued        Assessment and Plan       Assessment/Plan     Problem List Items Addressed This Visit     A-fib (CMS/[facility]) (Chronic)    Overview     · Managed on Apixaban and Metoprolol.          Current Assessment & Plan     Stable.         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 Arriaza.   · Stable FEV1 in 8/2015.   · IgE levels and eosinophils stable in 8/2015.   · Managed with Fasenra, Azithromycin and Flovent.         Current Assessment & Plan     Continue with current medications and follow up with pulmonology          Calcification of aorta (CMS/[facility]) (Chronic)    Overview     · Seen incidentally on CT scan.         COPD (chronic obstructive pulmonary disease) (CMS/[facility]) (Chronic)    Overview     Managed with Symbicort and Spiriva         Pulmonary emphysema (CMS/[facility]) (Chronic)    Overview     · Pulmonologist: Dr. Walker.   · Seen on PFTs in hospital in 2014.    · Controlled with Spiriva and FLovent.         Exudative age-related macular degeneration, right eye, with active choroidal neovascularization (CMS/HCC)    Humerus fracture - Primary    Current Assessment & Plan     Appears to be healing. Await new xrays and follow up with orthopedics.         Ribs, multiple fractures    Current Assessment & Plan     Appears to be healed and patient asymptomatic. Await follow up with orthopedics and new xray.         Complication of Admission: Admission (12/19/2023)       Return if symptoms worsen or fail to improve.    No orders of the defined types were placed in this encounter.             Henry Linares MD    1/25/2024

## 2024-11-08 NOTE — ED NOTES
Patient identifiers verified and correct for MS Cochran  C/C: ABd pain SEE NN  APPEARANCE: awake and alert in NAD. PAIN  10/10  SKIN: warm, dry and intact. No breakdown or bruising.  MUSCULOSKELETAL: Patient moving all extremities spontaneously, no obvious swelling or deformities noted. Ambulates independently.  RESPIRATORY: Denies shortness of breath.Respirations unlabored.   CARDIAC: Denies CP, 2+ distal pulses; no peripheral edema  ABDOMEN: ABdomen soft, reports nausea, pain to right abdomen   : voids spontaneously, denies difficulty  Neurologic: AAO x 4; follows commands equal strength in all extremities; denies numbness/tingling. Denies dizziness  Denies new weakness

## 2024-11-08 NOTE — TELEPHONE ENCOUNTER
Spoke with pt about recent er visit. Patient expressed she has to f/u with labs in two days. Explained to patient if she starts experiencing any pains to go to [facility] asap per midwife. Patient understood and stated she would follow all precautions.    Charley Mccabe MA

## 2024-11-09 LAB — BACTERIA UR CULT: NORMAL

## 2024-11-11 ENCOUNTER — LAB VISIT (OUTPATIENT)
Dept: LAB | Facility: HOSPITAL | Age: 19
End: 2024-11-11
Payer: MEDICAID

## 2024-11-11 ENCOUNTER — PATIENT MESSAGE (OUTPATIENT)
Dept: OBSTETRICS AND GYNECOLOGY | Facility: CLINIC | Age: 19
End: 2024-11-11

## 2024-11-11 DIAGNOSIS — Z34.90 PREGNANCY, UNSPECIFIED GESTATIONAL AGE: ICD-10-CM

## 2024-11-11 DIAGNOSIS — Z34.90 PREGNANCY, UNSPECIFIED GESTATIONAL AGE: Primary | ICD-10-CM

## 2024-11-11 PROCEDURE — 84702 CHORIONIC GONADOTROPIN TEST: CPT | Performed by: ADVANCED PRACTICE MIDWIFE

## 2024-11-11 PROCEDURE — 36415 COLL VENOUS BLD VENIPUNCTURE: CPT | Performed by: ADVANCED PRACTICE MIDWIFE

## 2024-11-12 ENCOUNTER — HOSPITAL ENCOUNTER (EMERGENCY)
Facility: HOSPITAL | Age: 19
Discharge: HOME | End: 2024-11-13
Attending: EMERGENCY MEDICINE
Payer: MEDICAID

## 2024-11-12 ENCOUNTER — PATIENT MESSAGE (OUTPATIENT)
Dept: OBSTETRICS AND GYNECOLOGY | Facility: CLINIC | Age: 19
End: 2024-11-12
Payer: MEDICAID

## 2024-11-12 ENCOUNTER — TELEPHONE (OUTPATIENT)
Dept: OBSTETRICS AND GYNECOLOGY | Facility: CLINIC | Age: 19
End: 2024-11-12
Payer: MEDICAID

## 2024-11-12 DIAGNOSIS — O20.9 BLEEDING IN EARLY PREGNANCY: ICD-10-CM

## 2024-11-12 DIAGNOSIS — N76.0 BACTERIAL VAGINOSIS: ICD-10-CM

## 2024-11-12 DIAGNOSIS — Z32.01 POSITIVE PREGNANCY TEST: Primary | ICD-10-CM

## 2024-11-12 DIAGNOSIS — B96.89 BACTERIAL VAGINOSIS: ICD-10-CM

## 2024-11-12 DIAGNOSIS — R10.9 ABDOMINAL PAIN, UNSPECIFIED ABDOMINAL LOCATION: Primary | ICD-10-CM

## 2024-11-12 LAB
ABO + RH BLD: NORMAL
ALBUMIN SERPL BCP-MCNC: 4.2 G/DL (ref 3.5–5.2)
ALP SERPL-CCNC: 59 U/L (ref 40–150)
ALT SERPL W/O P-5'-P-CCNC: 14 U/L (ref 10–44)
ANION GAP SERPL CALC-SCNC: 7 MMOL/L (ref 8–16)
AST SERPL-CCNC: 19 U/L (ref 10–40)
B-HCG UR QL: POSITIVE
BACTERIA #/AREA URNS AUTO: NORMAL /HPF
BACTERIA GENITAL QL WET PREP: ABNORMAL
BASOPHILS # BLD AUTO: 0.08 K/UL (ref 0–0.2)
BASOPHILS NFR BLD: 1.1 % (ref 0–1.9)
BILIRUB SERPL-MCNC: 0.8 MG/DL (ref 0.1–1)
BILIRUB UR QL STRIP: NEGATIVE
BUN SERPL-MCNC: 10 MG/DL (ref 6–20)
C TRACH DNA SPEC QL NAA+PROBE: NOT DETECTED
CALCIUM SERPL-MCNC: 9.4 MG/DL (ref 8.7–10.5)
CHLORIDE SERPL-SCNC: 108 MMOL/L (ref 95–110)
CLARITY UR REFRACT.AUTO: CLEAR
CLUE CELLS VAG QL WET PREP: ABNORMAL
CO2 SERPL-SCNC: 25 MMOL/L (ref 23–29)
COLOR UR AUTO: YELLOW
CREAT SERPL-MCNC: 0.7 MG/DL (ref 0.5–1.4)
CTP QC/QA: YES
DIFFERENTIAL METHOD BLD: NORMAL
EOSINOPHIL # BLD AUTO: 0.1 K/UL (ref 0–0.5)
EOSINOPHIL NFR BLD: 1.1 % (ref 0–8)
ERYTHROCYTE [DISTWIDTH] IN BLOOD BY AUTOMATED COUNT: 13 % (ref 11.5–14.5)
EST. GFR  (NO RACE VARIABLE): >60 ML/MIN/1.73 M^2
FILAMENT FUNGI VAG WET PREP-#/AREA: ABNORMAL
GLUCOSE SERPL-MCNC: 68 MG/DL (ref 70–110)
GLUCOSE UR QL STRIP: NEGATIVE
HCG INTACT+B SERPL-ACNC: 2107 MIU/ML
HCG INTACT+B SERPL-ACNC: 3194 MIU/ML
HCT VFR BLD AUTO: 39 % (ref 37–48.5)
HGB BLD-MCNC: 13 G/DL (ref 12–16)
HGB UR QL STRIP: NEGATIVE
IMM GRANULOCYTES # BLD AUTO: 0.01 K/UL (ref 0–0.04)
IMM GRANULOCYTES NFR BLD AUTO: 0.1 % (ref 0–0.5)
KETONES UR QL STRIP: ABNORMAL
LEUKOCYTE ESTERASE UR QL STRIP: ABNORMAL
LYMPHOCYTES # BLD AUTO: 2.1 K/UL (ref 1–4.8)
LYMPHOCYTES NFR BLD: 27.6 % (ref 18–48)
MCH RBC QN AUTO: 30.6 PG (ref 27–31)
MCHC RBC AUTO-ENTMCNC: 33.3 G/DL (ref 32–36)
MCV RBC AUTO: 92 FL (ref 82–98)
MICROSCOPIC COMMENT: NORMAL
MONOCYTES # BLD AUTO: 0.5 K/UL (ref 0.3–1)
MONOCYTES NFR BLD: 6 % (ref 4–15)
N GONORRHOEA DNA SPEC QL NAA+PROBE: NOT DETECTED
NEUTROPHILS # BLD AUTO: 4.9 K/UL (ref 1.8–7.7)
NEUTROPHILS NFR BLD: 64.1 % (ref 38–73)
NITRITE UR QL STRIP: NEGATIVE
NRBC BLD-RTO: 0 /100 WBC
PH UR STRIP: 6 [PH] (ref 5–8)
PLATELET # BLD AUTO: 291 K/UL (ref 150–450)
PMV BLD AUTO: 9.6 FL (ref 9.2–12.9)
POCT GLUCOSE: 100 MG/DL (ref 70–110)
POTASSIUM SERPL-SCNC: 3.8 MMOL/L (ref 3.5–5.1)
PROT SERPL-MCNC: 7.1 G/DL (ref 6–8.4)
PROT UR QL STRIP: ABNORMAL
RBC # BLD AUTO: 4.25 M/UL (ref 4–5.4)
RBC #/AREA URNS AUTO: 2 /HPF (ref 0–4)
SODIUM SERPL-SCNC: 140 MMOL/L (ref 136–145)
SP GR UR STRIP: 1.03 (ref 1–1.03)
SPECIMEN SOURCE: ABNORMAL
SQUAMOUS #/AREA URNS AUTO: 1 /HPF
T VAGINALIS GENITAL QL WET PREP: ABNORMAL
URN SPEC COLLECT METH UR: ABNORMAL
WBC # BLD AUTO: 7.56 K/UL (ref 3.9–12.7)
WBC #/AREA URNS AUTO: 3 /HPF (ref 0–5)
WBC #/AREA VAG WET PREP: ABNORMAL
YEAST GENITAL QL WET PREP: ABNORMAL

## 2024-11-12 PROCEDURE — 99284 EMERGENCY DEPT VISIT MOD MDM: CPT | Mod: 25

## 2024-11-12 PROCEDURE — 80053 COMPREHEN METABOLIC PANEL: CPT | Performed by: EMERGENCY MEDICINE

## 2024-11-12 PROCEDURE — 87210 SMEAR WET MOUNT SALINE/INK: CPT | Performed by: EMERGENCY MEDICINE

## 2024-11-12 PROCEDURE — 82962 GLUCOSE BLOOD TEST: CPT

## 2024-11-12 PROCEDURE — 86900 BLOOD TYPING SEROLOGIC ABO: CPT | Performed by: EMERGENCY MEDICINE

## 2024-11-12 PROCEDURE — 81001 URINALYSIS AUTO W/SCOPE: CPT | Performed by: EMERGENCY MEDICINE

## 2024-11-12 PROCEDURE — 81025 URINE PREGNANCY TEST: CPT | Performed by: EMERGENCY MEDICINE

## 2024-11-12 PROCEDURE — 84702 CHORIONIC GONADOTROPIN TEST: CPT | Performed by: EMERGENCY MEDICINE

## 2024-11-12 PROCEDURE — 87591 N.GONORRHOEAE DNA AMP PROB: CPT | Performed by: EMERGENCY MEDICINE

## 2024-11-12 PROCEDURE — 85025 COMPLETE CBC W/AUTO DIFF WBC: CPT | Performed by: EMERGENCY MEDICINE

## 2024-11-12 NOTE — Clinical Note
"Yaquelin"Martha Cochran was seen and treated in our emergency department on 11/12/2024.  She may return to work on 11/15/2024.       If you have any questions or concerns, please don't hesitate to call.      Yann Stein RN    "

## 2024-11-12 NOTE — TELEPHONE ENCOUNTER
Called patient to discuss current pain she is experiencing and to recommend that she be seen in an emergency room to rule out ectopic pregnancy. Call went straight to voicemail. Message left earlier in the day for patient to follow up. [employer] message also sent to patient to recommend being seen in emergency room.

## 2024-11-13 ENCOUNTER — PATIENT MESSAGE (OUTPATIENT)
Dept: OBSTETRICS AND GYNECOLOGY | Facility: CLINIC | Age: 19
End: 2024-11-13
Payer: MEDICAID

## 2024-11-13 ENCOUNTER — PROCEDURE VISIT (OUTPATIENT)
Dept: MATERNAL FETAL MEDICINE | Facility: CLINIC | Age: 19
End: 2024-11-13
Payer: MEDICAID

## 2024-11-13 ENCOUNTER — TELEPHONE (OUTPATIENT)
Dept: MATERNAL FETAL MEDICINE | Facility: CLINIC | Age: 19
End: 2024-11-13
Payer: MEDICAID

## 2024-11-13 VITALS
WEIGHT: 120 LBS | SYSTOLIC BLOOD PRESSURE: 101 MMHG | HEART RATE: 74 BPM | HEIGHT: 61 IN | DIASTOLIC BLOOD PRESSURE: 63 MMHG | OXYGEN SATURATION: 98 % | TEMPERATURE: 98 F | BODY MASS INDEX: 22.66 KG/M2 | RESPIRATION RATE: 18 BRPM

## 2024-11-13 DIAGNOSIS — Z32.01 POSITIVE PREGNANCY TEST: ICD-10-CM

## 2024-11-13 PROCEDURE — 76801 OB US < 14 WKS SINGLE FETUS: CPT | Mod: PBBFAC | Performed by: OBSTETRICS & GYNECOLOGY

## 2024-11-13 RX ORDER — METRONIDAZOLE 500 MG/1
500 TABLET ORAL EVERY 12 HOURS
Qty: 20 TABLET | Refills: 0 | Status: SHIPPED | OUTPATIENT
Start: 2024-11-13 | End: 2024-11-23

## 2024-11-13 NOTE — ED PROVIDER NOTES
"Source of History:  Patient  Chart    Chief complaint:  Abdominal Pain (For "a while" - recently told that she was pregnant. Reports RLQ pain. States she had outpatient US but they were unable to visualize anything. Reports a little spotting 2 days ago. )      HPI:  Yaquelin Cochran is a 19 y.o. female with history of prior ectopic pregnancy treated laparoscopically, complex ovarian cysts, anxiety, presenting to emergency department with complaint of right lower quadrant abdominal pain in the setting of early pregnancy.    Patient was seen in this emergency department on 11/12/2024 with complaint of abdominal pain.  She was referred from a gynecology visit earlier that day.  In the emergency department she was found to have a positive urine pregnancy test.  Last menstrual period was October 3rd.  Beta-hCG was 798.    Ultrasound during that visit showed question small gestational sac within the endometrial canal measuring 0.6 cm.  However, no fetal pole visualized, possibly due to early gestational age.     Plan was for repeat beta-hCG in 48 hours.  Repeat beta-hCG on 11/11/2024 was 2100. [place] 663->798->808->2107.     Patient states that she has been experiencing persistent right lower quadrant pain for approximately 2 weeks.  Yesterday and today she had some spotting of blood from the vagina.  She denies fevers, nausea, vomiting.  She denies trauma.  No vaginal discharge.  No concern for STD exposure.  No dysuria or hematuria.    Patient was unsure how many times that she has been pregnant previously, she estimates approximately 6 times.  She states that she had one & C, one ectopic pregnancy, and 2 live births.  This was not a desired pregnancy.    Review of patient's allergies indicates:  No Known Allergies    No current facility-administered medications on file prior to encounter.     Current Outpatient Medications on File Prior to Encounter   Medication Sig Dispense Refill    cephALEXin (KEFLEX) 500 MG " capsule Take 1 capsule (500 mg total) by mouth every 12 (twelve) hours. for 7 days 14 capsule 0    ibuprofen (ADVIL,MOTRIN) 800 MG tablet Take 1 tablet (800 mg total) by mouth every 8 (eight) hours as needed for Pain. 30 tablet 1    polyethylene glycol (GLYCOLAX) 17 gram/dose powder Take 17 g by mouth once daily. 116 g 0       PMH:  As per HPI and below:  Past Medical History:   Diagnosis Date    Behavior problem in childhood     COVID-19 08/08/2021    Ectopic pregnancy 5/24/2023    Suicidal ideation      Past Surgical History:   Procedure Laterality Date    BACK SURGERY  2016    Rods in place, had issues with previous epidural    DIAGNOSTIC LAPAROSCOPY N/A 05/29/2023    Procedure: LAPAROSCOPY, DIAGNOSTIC;  Surgeon: Juana Fung MD;  Location: [facility];  Service: OB/GYN;  Laterality: N/A;    DILATION AND CURETTAGE OF UTERUS         Social History     Socioeconomic History    Marital status: Single   Tobacco Use    Smoking status: Never    Smokeless tobacco: Never   Substance and Sexual Activity    Alcohol use: No    Drug use: No    Sexual activity: Yes     Partners: Male     Birth control/protection: Condom     Social Drivers of Health     Financial Resource Strain: Medium Risk (3/6/2024)    Overall Financial Resource Strain (CARDIA)     Difficulty of Paying Living Expenses: Somewhat hard   Food Insecurity: Food Insecurity Present (3/6/2024)    Hunger Vital Sign     Worried About Running Out of Food in the Last Year: Sometimes true     Ran Out of Food in the Last Year: Sometimes true   Transportation Needs: Unmet Transportation Needs (3/6/2024)    PRAPARE - Transportation     Lack of Transportation (Medical): Yes     Lack of Transportation (Non-Medical): Yes   Stress: No Stress Concern Present (3/6/2024)    [employer] - Occupational Stress Questionnaire     Feeling of Stress : Only a little   Housing Stability: High Risk (3/6/2024)    Housing Stability Vital Sign     Unable to Pay  for Housing in the Last Year: No     Number of Places Lived in the Last Year: 3     Unstable Housing in the Last Year: Yes       Family History   Problem Relation Name Age of Onset    Throat cancer Paternal Grandfather      No Known Problems Father      No Known Problems Mother      Breast cancer Neg Hx      Diabetes Neg Hx      Colon cancer Neg Hx      Ovarian cancer Neg Hx         Physical Exam:      Vitals:    11/13/24 0200   BP: 101/63   Pulse: 74   Resp: 18   Temp: 98.2 °F (36.8 °C)     Gen: No acute distress.  Nontoxic.  Well appearing.  Mental Status:  Alert and oriented .  Appropriate, conversant.  Skin: Warm, dry. No rashes seen.  Eyes: No conjunctival injection.  Pulm: CTAB. No increased work of breathing.  No significant tachypnea.  No audible stridor or wheezing.  No conversational dyspnea.    CV: Regular rate. Regular rhythm.   Abd: Soft.  Not distended.  Nontender.   :  Normal external genitalia.  Scant white discharge present in the vagina.  No cervicitis.  No cervical motion tenderness or adnexal tenderness bilaterally.  No blood or clots present in the vagina.  MSK: Good range of motion all joints.  No deformities.    Neuro: Awake. Speech normal. No focal neuro deficit observed.    Laboratory Studies:  Labs Reviewed   VAGINAL SCREEN - Abnormal       Result Value    Trichomonas None      Clue Cells Rare (*)     Budding Yeast None      Fungal Hyphae None      WBC - Vaginal Screen Occasional (*)     Bacteria - Vaginal Screen Many (*)     Wet Prep Source Vagina      Narrative:     Release to patient->Immediate   COMPREHENSIVE METABOLIC PANEL - Abnormal    Sodium 140      Potassium 3.8      Chloride 108      CO2 25      Glucose 68 (*)     BUN 10      Creatinine 0.7      Calcium 9.4      Total Protein 7.1      Albumin 4.2      Total Bilirubin 0.8      Alkaline Phosphatase 59      AST 19      ALT 14      eGFR >60.0      Anion Gap 7 (*)     Narrative:     Release to patient->Immediate   URINALYSIS, REFLEX  TO URINE CULTURE - Abnormal    Specimen UA Urine, Clean Catch      Color, UA Yellow      Appearance, UA Clear      pH, UA 6.0      Specific Gravity, UA 1.030      Protein, UA Trace (*)     Glucose, UA Negative      Ketones, UA Trace (*)     Bilirubin (UA) Negative      Occult Blood UA Negative      Nitrite, UA Negative      Leukocytes, UA Trace (*)     Narrative:     Specimen Source->Urine   POCT URINE PREGNANCY - Abnormal    POC Preg Test, Ur Positive (*)      Acceptable Yes     CBC W/ AUTO DIFFERENTIAL    WBC 7.56      RBC 4.25      Hemoglobin 13.0      Hematocrit 39.0      MCV 92      MCH 30.6      MCHC 33.3      RDW 13.0      Platelets 291      MPV 9.6      Immature Granulocytes 0.1      Gran # (ANC) 4.9      Immature Grans (Abs) 0.01      Lymph # 2.1      Mono # 0.5      Eos # 0.1      Baso # 0.08      nRBC 0      Gran % 64.1      Lymph % 27.6      Mono % 6.0      Eosinophil % 1.1      Basophil % 1.1      Differential Method Automated      Narrative:     Release to patient->Immediate   HCG, QUANTITATIVE    HCG Quant 3194      Narrative:     Release to patient->Immediate   C. TRACHOMATIS/N. GONORRHOEAE BY AMP DNA    Chlamydia, Amplified DNA Not Detected      N gonorrhoeae, amplified DNA Not Detected      Narrative:     Sources by Resulting Lab:->[facility]                  Release to patient->Immediate   URINALYSIS MICROSCOPIC    RBC, UA 2      WBC, UA 3      Bacteria Rare      Squam Epithel, UA 1      Microscopic Comment SEE COMMENT      Narrative:     Specimen Source->Urine   GROUP & RH    Group & Rh O NEG     POCT GLUCOSE    POCT Glucose 100       Chart reviewed.     Imaging Results               US OB <14 Wks TransAbd & TransVag, Single Gestation (XPD) (Final result)  Result time 11/13/24 02:21:53      Final result by Fidencio Miner MD (11/13/24 02:21:53)                   Impression:      1. Circumscribed sac-like structure within the fundal endometrium may represent an intrauterine  "gestational sac, measuring 0.6 cm in diameter, but demonstrating no visible yolk sac or fetal pole.  Noting previously visualized likely gestational sac within the endometrial canal measuring similar in size on ultrasound from 11/07/2024.  Findings suspicious for possible failed early pregnancy although could also be due to early gestational age.  Recommendations therefore include continued close clinical correlation, serial quantitative serum HCG measurements, and short-term follow-up pelvic sonography.  2. Multiple bilateral ovarian cysts.  3. Small volume pelvic free intraperitoneal fluid.  This report was flagged in Epic as abnormal.    Electronically signed by resident: Juana Shahid  Date:    11/13/2024  Time:    01:08    Electronically signed by: Fidencio Miner  Date:    11/13/2024  Time:    02:21               Narrative:    EXAMINATION:  US OB <14 WEEKS, TRANSABDOM & TRANSVAG, SINGLE GESTATION (XPD)    CLINICAL HISTORY:  Vag Bleeding;    The point of care urine pregnancy test collected approximately 1 hour prior to this scan is reported "Positive" under the labs tab in the electronic health record.    TECHNIQUE:  Transabdominal sonography of the pelvis was performed, followed by transvaginal sonography to better evaluate the uterus and ovaries.    COMPARISON:  Obstetric ultrasound 11/07/2024.    FINDINGS:  Intrauterine gestation(s): There is ext circumscribed fundal endometrial fluid collection that may represent a very early intrauterine gestational sac.    Mean gestational sac diameter: 0.6 cm    Yolk sac: Not visualized.    Crown-rump length (CRL): Fetal pole not visualized.    Cardiac activity: Unable to be assessed due to no fetal pole visualized.    Subchorionic hemorrhage: Present measuring 0.8 x 0.2 x 1.1 cm.    Right ovary: 3.8 x 2.9 x 2.4 cm.  Simple right ovarian cyst measuring 2.5 x 1.7 x 2.1 cm.    Left ovary: 5.3 x 2.4 x 4.6 cm.  There are 3 simple appearing ovarian cysts approximately 1.8 cm " each.    Miscellaneous: Small volume pelvic free fluid.                                      Medications Given:  Medications - No data to display      MDM:    19 y.o. female with complaint of low abdominal pain and scant vaginal bleeding in the setting of early pregnancy.  Pregnancy is currently unknown location, with up trending beta-hCG an outpatient evaluation.      Will obtain labs, urinalysis, pelvic ultrasound.  Swabs obtained.    Labs reviewed. Uptrending beta HCG. No UTI. +BV, will send rx for metronidazole.     Signed out to oncoming physician pending US results for final disposition.         Diagnostic Impression:    1. Abdominal pain, unspecified abdominal location    2. Bacterial vaginosis    3. Bleeding in early pregnancy         ED Disposition Condition    Discharge Stable          ED Prescriptions       Medication Sig Dispense Start Date End Date Auth. Provider    metroNIDAZOLE (FLAGYL) 500 MG tablet Take 1 tablet (500 mg total) by mouth every 12 (twelve) hours. Do not drink alcohol taking this medicine, since it can make you violently ill. for 10 days 20 tablet 11/13/2024 11/23/2024 Yolanda Neumann MD          Follow-up Information       Follow up With Specialties Details Why Contact Info    Your primary care doctor  Schedule an appointment as soon as possible for a visit       Your obstetrician  Schedule an appointment as soon as possible for a visit               Patient and/or family understands the plan and is in agreement, verbalized understanding, questions answered    Yolanda Neumann MD  Emergency Medicine         Yolanda Neumann MD  11/13/24 [id number]

## 2024-11-13 NOTE — DISCHARGE INSTRUCTIONS
Diagnosis: First trimester vaginal bleeding, bacteria vaginosis    Take the prescribed antibiotic as directed.    Tests today showed:   Labs Reviewed   VAGINAL SCREEN - Abnormal       Result Value    Trichomonas None      Clue Cells Rare (*)     Budding Yeast None      Fungal Hyphae None      WBC - Vaginal Screen Occasional (*)     Bacteria - Vaginal Screen Many (*)     Wet Prep Source Vagina      Narrative:     Release to patient->Immediate   COMPREHENSIVE METABOLIC PANEL - Abnormal    Sodium 140      Potassium 3.8      Chloride 108      CO2 25      Glucose 68 (*)     BUN 10      Creatinine 0.7      Calcium 9.4      Total Protein 7.1      Albumin 4.2      Total Bilirubin 0.8      Alkaline Phosphatase 59      AST 19      ALT 14      eGFR >60.0      Anion Gap 7 (*)     Narrative:     Release to patient->Immediate   URINALYSIS, REFLEX TO URINE CULTURE - Abnormal    Specimen UA Urine, Clean Catch      Color, UA Yellow      Appearance, UA Clear      pH, UA 6.0      Specific Gravity, UA 1.030      Protein, UA Trace (*)     Glucose, UA Negative      Ketones, UA Trace (*)     Bilirubin (UA) Negative      Occult Blood UA Negative      Nitrite, UA Negative      Leukocytes, UA Trace (*)     Narrative:     Specimen Source->Urine   POCT URINE PREGNANCY - Abnormal    POC Preg Test, Ur Positive (*)      Acceptable Yes     CBC W/ AUTO DIFFERENTIAL    WBC 7.56      RBC 4.25      Hemoglobin 13.0      Hematocrit 39.0      MCV 92      MCH 30.6      MCHC 33.3      RDW 13.0      Platelets 291      MPV 9.6      Immature Granulocytes 0.1      Gran # (ANC) 4.9      Immature Grans (Abs) 0.01      Lymph # 2.1      Mono # 0.5      Eos # 0.1      Baso # 0.08      nRBC 0      Gran % 64.1      Lymph % 27.6      Mono % 6.0      Eosinophil % 1.1      Basophil % 1.1      Differential Method Automated      Narrative:     Release to patient->Immediate   HCG, QUANTITATIVE    HCG Quant 3194      Narrative:     Release to  "patient->Immediate   C. TRACHOMATIS/N. GONORRHOEAE BY AMP DNA    Chlamydia, Amplified DNA Not Detected      N gonorrhoeae, amplified DNA Not Detected      Narrative:     Sources by Resulting Lab:->[facility]  Release to patient->Immediate   URINALYSIS MICROSCOPIC    RBC, UA 2      WBC, UA 3      Bacteria Rare      Squam Epithel, UA 1      Microscopic Comment SEE COMMENT      Narrative:     Specimen Source->Urine   GROUP & RH    Group & Rh O NEG     POCT GLUCOSE    POCT Glucose 100     POCT GLUCOSE MONITORING CONTINUOUS     Imaging Results               US OB <14 Wks TransAbd & TransVag, Single Gestation (XPD) (Final result)  Result time 11/13/24 02:21:53      Final result by Fidencio Miner MD (11/13/24 02:21:53)                   Impression:      1. Circumscribed sac-like structure within the fundal endometrium may represent an intrauterine gestational sac, measuring 0.6 cm in diameter, but demonstrating no visible yolk sac or fetal pole.  Noting previously visualized likely gestational sac within the endometrial canal measuring similar in size on ultrasound from 11/07/2024.  Findings suspicious for possible failed early pregnancy although could also be due to early gestational age.  Recommendations therefore include continued close clinical correlation, serial quantitative serum HCG measurements, and short-term follow-up pelvic sonography.  2. Multiple bilateral ovarian cysts.  3. Small volume pelvic free intraperitoneal fluid.  This report was flagged in Epic as abnormal.    Electronically signed by resident: Juana Shahid  Date:    11/13/2024  Time:    01:08    Electronically signed by: Fidencio Miner  Date:    11/13/2024  Time:    02:21               Narrative:    EXAMINATION:  US OB <14 WEEKS, TRANSABDOM & TRANSVAG, SINGLE GESTATION (XPD)    CLINICAL HISTORY:  Vag Bleeding;    The point of care urine pregnancy test collected approximately 1 hour prior to this scan is reported "Positive" under the labs tab in the " electronic health record.    TECHNIQUE:  Transabdominal sonography of the pelvis was performed, followed by transvaginal sonography to better evaluate the uterus and ovaries.    COMPARISON:  Obstetric ultrasound 11/07/2024.    FINDINGS:  Intrauterine gestation(s): There is ext circumscribed fundal endometrial fluid collection that may represent a very early intrauterine gestational sac.    Mean gestational sac diameter: 0.6 cm    Yolk sac: Not visualized.    Crown-rump length (CRL): Fetal pole not visualized.    Cardiac activity: Unable to be assessed due to no fetal pole visualized.    Subchorionic hemorrhage: Present measuring 0.8 x 0.2 x 1.1 cm.    Right ovary: 3.8 x 2.9 x 2.4 cm.  Simple right ovarian cyst measuring 2.5 x 1.7 x 2.1 cm.    Left ovary: 5.3 x 2.4 x 4.6 cm.  There are 3 simple appearing ovarian cysts approximately 1.8 cm each.    Miscellaneous: Small volume pelvic free fluid.                                     Follow-Up Plan:  - Follow-up with OB doctor within 5 days  - Additional testing and/or evaluation as directed by your OB    Return to the Emergency Department for symptoms including but not limited to: worsening symptoms, shortness of breath or chest pain, vomiting with inability to hold down fluids, fevers greater than 100.4°F, passing out/fainting/unconsciousness, or other concerning symptoms.

## 2024-11-13 NOTE — ED TRIAGE NOTES
"Yaquelin Cochran, a 19 y.o. female presents to the ED w/ complaint of abdominal pain    Patient c/o RLQ abdominal pain 7/10. Endorses nausea, denies vomiting. Patient states, "I was recently told I was pregnant and my doctor sent me to rule out ectopic pregnancy. Endorses intermittent spotting.  Denies chest pain and SOB.     Triage note:  Chief Complaint   Patient presents with    Abdominal Pain     For "a while" - recently told that she was pregnant. Reports RLQ pain. States she had outpatient US but they were unable to visualize anything. Reports a little spotting 2 days ago.      Review of patient's allergies indicates:  No Known Allergies  Past Medical History:   Diagnosis Date    Behavior problem in childhood     COVID-19 08/08/2021    Ectopic pregnancy 5/24/2023    Suicidal ideation        "

## 2024-11-13 NOTE — PROVIDER PROGRESS NOTES - EMERGENCY DEPT.
"Imaging Results               US OB <14 Wks TransAbd & TransVag, Single Gestation (XPD) (Final result)  Result time 11/13/24 02:21:53      Final result by Fidencio Miner MD (11/13/24 02:21:53)                   Impression:      1. Circumscribed sac-like structure within the fundal endometrium may represent an intrauterine gestational sac, measuring 0.6 cm in diameter, but demonstrating no visible yolk sac or fetal pole.  Noting previously visualized likely gestational sac within the endometrial canal measuring similar in size on ultrasound from 11/07/2024.  Findings suspicious for possible failed early pregnancy although could also be due to early gestational age.  Recommendations therefore include continued close clinical correlation, serial quantitative serum HCG measurements, and short-term follow-up pelvic sonography.  2. Multiple bilateral ovarian cysts.  3. Small volume pelvic free intraperitoneal fluid.  This report was flagged in Epic as abnormal.    Electronically signed by resident: Juana Shahid  Date:    11/13/2024  Time:    01:08    Electronically signed by: Fidencio Miner  Date:    11/13/2024  Time:    02:21               Narrative:    EXAMINATION:  US OB <14 WEEKS, TRANSABDOM & TRANSVAG, SINGLE GESTATION (XPD)    CLINICAL HISTORY:  Vag Bleeding;    The point of care urine pregnancy test collected approximately 1 hour prior to this scan is reported "Positive" under the labs tab in the electronic health record.    TECHNIQUE:  Transabdominal sonography of the pelvis was performed, followed by transvaginal sonography to better evaluate the uterus and ovaries.    COMPARISON:  Obstetric ultrasound 11/07/2024.    FINDINGS:  Intrauterine gestation(s): There is ext circumscribed fundal endometrial fluid collection that may represent a very early intrauterine gestational sac.    Mean gestational sac diameter: 0.6 cm    Yolk sac: Not visualized.    Crown-rump length (CRL): Fetal pole not visualized.    Cardiac " activity: Unable to be assessed due to no fetal pole visualized.    Subchorionic hemorrhage: Present measuring 0.8 x 0.2 x 1.1 cm.    Right ovary: 3.8 x 2.9 x 2.4 cm.  Simple right ovarian cyst measuring 2.5 x 1.7 x 2.1 cm.    Left ovary: 5.3 x 2.4 x 4.6 cm.  There are 3 simple appearing ovarian cysts approximately 1.8 cm each.    Miscellaneous: Small volume pelvic free fluid.                                       Patient was signed out pending pelvic ultrasound.  This shows an intrauterine sac-like structure likely gestational sac, though no visible yolk sac, no signs of ectopic pregnancy.  May just be early pregnancy.  HCG continues to increase.  Encouraged to follow up for repeat HCG with her OBGYN in 2 days and to return to the ER with any worsening symptoms.

## 2024-11-15 ENCOUNTER — PATIENT MESSAGE (OUTPATIENT)
Dept: UROGYNECOLOGY | Facility: CLINIC | Age: 19
End: 2024-11-15
Payer: MEDICAID

## 2024-11-17 ENCOUNTER — HOSPITAL ENCOUNTER (EMERGENCY)
Facility: HOSPITAL | Age: 19
Discharge: HOME | End: 2024-11-18
Attending: STUDENT IN AN ORGANIZED HEALTH CARE EDUCATION/TRAINING PROGRAM
Payer: MEDICAID

## 2024-11-17 DIAGNOSIS — R10.31 RIGHT LOWER QUADRANT PAIN: ICD-10-CM

## 2024-11-17 DIAGNOSIS — R10.9 ABDOMINAL CRAMPING: Primary | ICD-10-CM

## 2024-11-17 LAB
ALBUMIN SERPL BCP-MCNC: 4.3 G/DL (ref 3.5–5.2)
ALP SERPL-CCNC: 59 U/L (ref 40–150)
ALT SERPL W/O P-5'-P-CCNC: 17 U/L (ref 10–44)
ANION GAP SERPL CALC-SCNC: 8 MMOL/L (ref 8–16)
AST SERPL-CCNC: 18 U/L (ref 10–40)
B-HCG UR QL: POSITIVE
BACTERIA #/AREA URNS AUTO: ABNORMAL /HPF
BASOPHILS # BLD AUTO: 0.05 K/UL (ref 0–0.2)
BASOPHILS NFR BLD: 0.6 % (ref 0–1.9)
BILIRUB SERPL-MCNC: 0.5 MG/DL (ref 0.1–1)
BILIRUB UR QL STRIP: NEGATIVE
BUN SERPL-MCNC: 9 MG/DL (ref 6–20)
CALCIUM SERPL-MCNC: 9.8 MG/DL (ref 8.7–10.5)
CHLORIDE SERPL-SCNC: 109 MMOL/L (ref 95–110)
CLARITY UR REFRACT.AUTO: CLEAR
CO2 SERPL-SCNC: 24 MMOL/L (ref 23–29)
COLOR UR AUTO: YELLOW
CREAT SERPL-MCNC: 0.7 MG/DL (ref 0.5–1.4)
CTP QC/QA: YES
DIFFERENTIAL METHOD BLD: NORMAL
EOSINOPHIL # BLD AUTO: 0.1 K/UL (ref 0–0.5)
EOSINOPHIL NFR BLD: 1.8 % (ref 0–8)
ERYTHROCYTE [DISTWIDTH] IN BLOOD BY AUTOMATED COUNT: 12.8 % (ref 11.5–14.5)
EST. GFR  (NO RACE VARIABLE): >60 ML/MIN/1.73 M^2
GLUCOSE SERPL-MCNC: 79 MG/DL (ref 70–110)
GLUCOSE UR QL STRIP: NEGATIVE
HCG INTACT+B SERPL-ACNC: NORMAL MIU/ML
HCT VFR BLD AUTO: 39.5 % (ref 37–48.5)
HGB BLD-MCNC: 13 G/DL (ref 12–16)
HGB UR QL STRIP: NEGATIVE
HYALINE CASTS UR QL AUTO: 1 /LPF
IMM GRANULOCYTES # BLD AUTO: 0.02 K/UL (ref 0–0.04)
IMM GRANULOCYTES NFR BLD AUTO: 0.3 % (ref 0–0.5)
KETONES UR QL STRIP: NEGATIVE
LEUKOCYTE ESTERASE UR QL STRIP: ABNORMAL
LYMPHOCYTES # BLD AUTO: 2.4 K/UL (ref 1–4.8)
LYMPHOCYTES NFR BLD: 31.4 % (ref 18–48)
MCH RBC QN AUTO: 30.2 PG (ref 27–31)
MCHC RBC AUTO-ENTMCNC: 32.9 G/DL (ref 32–36)
MCV RBC AUTO: 92 FL (ref 82–98)
MICROSCOPIC COMMENT: ABNORMAL
MONOCYTES # BLD AUTO: 0.6 K/UL (ref 0.3–1)
MONOCYTES NFR BLD: 8.1 % (ref 4–15)
NEUTROPHILS # BLD AUTO: 4.5 K/UL (ref 1.8–7.7)
NEUTROPHILS NFR BLD: 57.8 % (ref 38–73)
NITRITE UR QL STRIP: NEGATIVE
NRBC BLD-RTO: 0 /100 WBC
PH UR STRIP: 7 [PH] (ref 5–8)
PLATELET # BLD AUTO: 161 K/UL (ref 150–450)
PMV BLD AUTO: 10.8 FL (ref 9.2–12.9)
POTASSIUM SERPL-SCNC: 4.1 MMOL/L (ref 3.5–5.1)
PROT SERPL-MCNC: 7.2 G/DL (ref 6–8.4)
PROT UR QL STRIP: NEGATIVE
RBC # BLD AUTO: 4.31 M/UL (ref 4–5.4)
RBC #/AREA URNS AUTO: 1 /HPF (ref 0–4)
SODIUM SERPL-SCNC: 141 MMOL/L (ref 136–145)
SP GR UR STRIP: 1.02 (ref 1–1.03)
SQUAMOUS #/AREA URNS AUTO: 4 /HPF
URN SPEC COLLECT METH UR: ABNORMAL
WBC # BLD AUTO: 7.75 K/UL (ref 3.9–12.7)
WBC #/AREA URNS AUTO: 5 /HPF (ref 0–5)

## 2024-11-17 PROCEDURE — 25000003 PHARM REV CODE 250: Performed by: STUDENT IN AN ORGANIZED HEALTH CARE EDUCATION/TRAINING PROGRAM

## 2024-11-17 PROCEDURE — 84702 CHORIONIC GONADOTROPIN TEST: CPT | Performed by: STUDENT IN AN ORGANIZED HEALTH CARE EDUCATION/TRAINING PROGRAM

## 2024-11-17 PROCEDURE — 80053 COMPREHEN METABOLIC PANEL: CPT | Performed by: STUDENT IN AN ORGANIZED HEALTH CARE EDUCATION/TRAINING PROGRAM

## 2024-11-17 PROCEDURE — 85025 COMPLETE CBC W/AUTO DIFF WBC: CPT | Performed by: STUDENT IN AN ORGANIZED HEALTH CARE EDUCATION/TRAINING PROGRAM

## 2024-11-17 PROCEDURE — 81001 URINALYSIS AUTO W/SCOPE: CPT | Performed by: STUDENT IN AN ORGANIZED HEALTH CARE EDUCATION/TRAINING PROGRAM

## 2024-11-17 PROCEDURE — 81025 URINE PREGNANCY TEST: CPT | Performed by: STUDENT IN AN ORGANIZED HEALTH CARE EDUCATION/TRAINING PROGRAM

## 2024-11-17 PROCEDURE — 99284 EMERGENCY DEPT VISIT MOD MDM: CPT | Mod: 25

## 2024-11-17 RX ORDER — FAMOTIDINE 20 MG/1
20 TABLET, FILM COATED ORAL
Status: COMPLETED | OUTPATIENT
Start: 2024-11-17 | End: 2024-11-17

## 2024-11-17 RX ORDER — ACETAMINOPHEN 500 MG
1000 TABLET ORAL
Status: COMPLETED | OUTPATIENT
Start: 2024-11-17 | End: 2024-11-17

## 2024-11-17 RX ADMIN — ACETAMINOPHEN 1000 MG: 500 TABLET ORAL at 08:11

## 2024-11-17 RX ADMIN — FAMOTIDINE 20 MG: 20 TABLET ORAL at 08:11

## 2024-11-17 NOTE — Clinical Note
"Yaquelin "Martha Cochran was seen and treated in our emergency department on 11/17/2024.  She may return to work on 11/21/2024.       If you have any questions or concerns, please don't hesitate to call.      Joseph Kelsey RN    "

## 2024-11-18 ENCOUNTER — TELEPHONE (OUTPATIENT)
Dept: OBSTETRICS AND GYNECOLOGY | Facility: HOSPITAL | Age: 19
End: 2024-11-18
Payer: MEDICAID

## 2024-11-18 VITALS
HEART RATE: 90 BPM | TEMPERATURE: 98 F | SYSTOLIC BLOOD PRESSURE: 112 MMHG | RESPIRATION RATE: 15 BRPM | OXYGEN SATURATION: 99 % | DIASTOLIC BLOOD PRESSURE: 73 MMHG | BODY MASS INDEX: 22.67 KG/M2 | WEIGHT: 120 LBS

## 2024-11-18 NOTE — TELEPHONE ENCOUNTER
Called patient to discuss beta-hcg levels, patient did not answer. I left a message asking patient to present to the lab to have her beta-hcg drawn. Additionally, I informed the patient she could return this call to the [facility]  and ask for the GYN on-call team if she had any questions. ED precautions given.     Beta hcg trend:   11/7; 798  11/8 808  11/11 2107  11/12 3197  11/17 1785    11/17: TVUS mean sac 10.2, CRL 4mm     -Check on TVUS results in 11 days for conformation of IUP     Nasrin Pardo MD (Mary)   Obstetrics and Gynecology, PGY1

## 2024-11-18 NOTE — ED PROVIDER NOTES
Encounter Date: 2024       History     Chief Complaint   Patient presents with    Abdominal Cramping     Pt is approximately 6 weeks pregnant and is having lower abdominal cramps. Pt denies vaginal bleeding or discharge. Pt denies difficulty urinating.     19-year-old female  presents with right lower quadrant pain.  Patient states she has pain in her right lower quadrant/pelvis that is acute on chronic.  She was aware that she has a cyst on the left but is not sure why she has worsening pain on the right.  Currently a /10.  She takes Tylenol occasionally without relief.  She also states that she was pregnant and has a follow up appointment with OBGYN in a few weeks.  Subjective fever but no measured fever.  Denies chills, chest pain, shortness of breath, dysuria, hematuria, diarrhea, constipation, vaginal bleeding, vaginal discharge.  She has had some intermittent nausea and vomiting but none today.    The history is provided by the patient and medical records.     Review of patient's allergies indicates:  No Known Allergies  Past Medical History:   Diagnosis Date    Behavior problem in childhood     COVID-19 2021    Ectopic pregnancy 2023    Suicidal ideation      Past Surgical History:   Procedure Laterality Date    BACK SURGERY  2016    Rods in place, had issues with previous epidural    DIAGNOSTIC LAPAROSCOPY N/A 2023    Procedure: LAPAROSCOPY, DIAGNOSTIC;  Surgeon: Juana Fung MD;  Location: [facility];  Service: OB/GYN;  Laterality: N/A;    DILATION AND CURETTAGE OF UTERUS       Family History   Problem Relation Name Age of Onset    Throat cancer Paternal Grandfather      No Known Problems Father      No Known Problems Mother      Breast cancer Neg Hx      Diabetes Neg Hx      Colon cancer Neg Hx      Ovarian cancer Neg Hx       Social History     Tobacco Use    Smoking status: Never    Smokeless tobacco: Never   Substance Use Topics    Alcohol use: No    Drug use: No      Review of Systems    Physical Exam     Initial Vitals [11/17/24 1806]   BP Pulse Resp Temp SpO2   120/66 85 19 98.5 °F (36.9 °C) 100 %      MAP       --         Physical Exam    Nursing note and vitals reviewed.  Constitutional: She appears well-developed. She is not diaphoretic. No distress.   HENT:   Head: Normocephalic and atraumatic.   Eyes: Conjunctivae and EOM are normal. Pupils are equal, round, and reactive to light.   Neck: Neck supple.   Normal range of motion.  Cardiovascular:  Normal rate, regular rhythm, normal heart sounds and intact distal pulses.           No murmur heard.  Pulmonary/Chest: Breath sounds normal. No respiratory distress. She has no wheezes. She has no rhonchi. She has no rales.   Abdominal: Abdomen is soft. She exhibits no distension.   Mild tenderness to palpation in the right lower quadrant and suprapubic areas without rebound or guarding   Musculoskeletal:         General: No tenderness or edema.      Cervical back: Normal range of motion and neck supple.     Neurological: She is alert and oriented to person, place, and time.   Skin: Skin is warm and dry. Capillary refill takes less than 2 seconds.         ED Course   Procedures  Labs Reviewed   URINALYSIS, REFLEX TO URINE CULTURE - Abnormal       Result Value    Specimen UA Urine, Clean Catch      Color, UA Yellow      Appearance, UA Clear      pH, UA 7.0      Specific Gravity, UA 1.025      Protein, UA Negative      Glucose, UA Negative      Ketones, UA Negative      Bilirubin (UA) Negative      Occult Blood UA Negative      Nitrite, UA Negative      Leukocytes, UA 2+ (*)     Narrative:     Specimen Source->Urine   URINALYSIS MICROSCOPIC - Abnormal    RBC, UA 1      WBC, UA 5      Bacteria Moderate (*)     Squam Epithel, UA 4      Hyaline Casts, UA 1      Microscopic Comment SEE COMMENT      Narrative:     Specimen Source->Urine   POCT URINE PREGNANCY - Abnormal    POC Preg Test, Ur Positive (*)      Acceptable  Yes     CBC W/ AUTO DIFFERENTIAL    WBC 7.75      RBC 4.31      Hemoglobin 13.0      Hematocrit 39.5      MCV 92      MCH 30.2      MCHC 32.9      RDW 12.8      Platelets 161      MPV 10.8      Immature Granulocytes 0.3      Gran # (ANC) 4.5      Immature Grans (Abs) 0.02      Lymph # 2.4      Mono # 0.6      Eos # 0.1      Baso # 0.05      nRBC 0      Gran % 57.8      Lymph % 31.4      Mono % 8.1      Eosinophil % 1.8      Basophil % 0.6      Differential Method Automated      Narrative:     Release to patient->Immediate   COMPREHENSIVE METABOLIC PANEL    Sodium 141      Potassium 4.1      Chloride 109      CO2 24      Glucose 79      BUN 9      Creatinine 0.7      Calcium 9.8      Total Protein 7.2      Albumin 4.3      Total Bilirubin 0.5      Alkaline Phosphatase 59      AST 18      ALT 17      eGFR >60.0      Anion Gap 8      Narrative:     Release to patient->Immediate   HCG, QUANTITATIVE    HCG Quant [id number]      Narrative:     Release to patient->Immediate          Imaging Results              US OB <14 Wks TransAbd & TransVag, Single Gestation (XPD) (Final result)  Result time 11/17/24 23:54:37      Final result by Solomon Wang MD (11/17/24 23:54:37)                   Impression:      Previously identified suspected intrauterine gestational sac is again identified, a yolk sac is now identified and a suspected fetal pole noted, crown-rump length measurement corresponding to approximate gestational age of 6 weeks 1 day, fetal heart rate not yet identified, continued follow-up is recommended.    Complex appearance of the left ovary appears stable, can be re-evaluated on follow-up.    2.6 cm follicle of the right ovary.    Mild free fluid of the pelvis.      Electronically signed by: Solomon Wang  Date:    11/17/2024  Time:    23:54               Narrative:    EXAMINATION:  US OB <14 WEEKS, TRANSABDOM & TRANSVAG, SINGLE GESTATION (XPD)    CLINICAL HISTORY:  progressive RLQ pain, LMP 10/3/24, no confirmed  IUP on previous US;    TECHNIQUE:  Transabdominal sonography of the pelvis was performed, followed by transvaginal sonography to better evaluate the uterus and ovaries.    COMPARISON:  Ultrasound obstetrical November 12, 2024    FINDINGS:  The uterus measures approximately 8.2 cm in length on transvaginal imaging.  Intrauterine hypoechoic structure again noted, consistent with an intrauterine gestational sac, yolk sac now visualized.  Mean sac diameter measures approximately 10.2 mm which would correspond to an approximate gestational age of 5 weeks 5 days.  There is a small structure that may relate to a fetal pole with a crown-rump length measurement of 4 mm which corresponds to an approximate gestational age of 6 weeks 1 day.  There is no fetal heart rate sonographically detectable at this time.    The right ovary measures approximately 4 x 2.5 x 2.9 cm in size, there is a 2.6 x 1.6 x 2.2 cm prominent follicle of the right ovary.  Additional small follicles are noted.  Doppler imaging demonstrates vascular flow to the right ovary.    The left ovary measures approximately 6.3 x 3.2 x 4.4 cm in size.  Complex appearance of the left ovary with cystic appearing areas again noted appearing similar to the prior examination.  Doppler imaging demonstrates evidence for vascular flow to the left ovary.    There is mild free fluid of the pelvis noted with mild free fluid at the cul-de-sac and adnexa bilaterally.  The aforementioned free fluid appears hypoechoic, and does not appear complex sonographically.                                       Medications   famotidine tablet 20 mg (20 mg Oral Given 11/17/24 2008)   acetaminophen tablet 1,000 mg (1,000 mg Oral Given 11/17/24 2008)     Medical Decision Making  Differential diagnoses considered includes ovarian cyst, hemorrhagic cyst, ruptured cyst, ectopic pregnancy, IUP, doubt appendicitis    Patient is still has not had a confirmed IUP, so will obtain repeat ultrasound  given persistent concern for potentially an ectopic pregnancy. See ED course for additional attending MDM.     Amount and/or Complexity of Data Reviewed  External Data Reviewed: radiology.     Details: US 11/13/24:  An intrauterine gestational sac is present. A yolk sac is visible within the gestational sac. The presence of an embryo is uncertain, and no cardiac activity is noted within   the possible early embryo.   The left ovary contains a unilocular cyst with probable organized intracystic hemorrhage. The right ovary is normal in appearance.   A small amount of free fluid is seen in the posterior cul-de-sac.       Labs: ordered. Decision-making details documented in ED Course.  Radiology: ordered. Decision-making details documented in ED Course.    Risk  OTC drugs.  Diagnosis or treatment significantly limited by social determinants of health.               ED Course as of 11/18/24 0001   Sun Nov 17, 2024 2209 hCG Qualitative, Urine(!): Positive [BD]   2209 Urinalysis, Reflex to Urine Culture Urine, Clean Catch(!)  Moderate bacteria but 4 squamous cells so poor sample. Will not treat as asymptomatic bacteruria  [BD]   2209 Beta HCG Quant: [id number] [BD]   2209 CBC auto differential  CBC unremarkable without leukocytosis, significant anemia, or decreased platelets   [BD]   2209 Comprehensive metabolic panel  CMP unremarkable without significant electrolyte derangement, impaired renal function, or elevated LFTs   [BD]   2357 US OB <14 Wks TransAbd & TransVag, Single Gestation (XPD)  Yolk sac and suspected fetal pole now seen. Pt feeling better. Patient will be discharged at this time. Patient has been given home care instructions, follow up instructions, and strict return precautions. They agree with and are comfortable with the plan. Instructed close follow up with OBGYN. [BD]      ED Course User Index  [BD] Ede Dumont MD                           Clinical Impression:  Final diagnoses:  [R10.9] Abdominal  cramping (Primary)  [R10.31] Right lower quadrant pain          ED Disposition Condition    Discharge Stable          ED Prescriptions    None       Follow-up Information       Follow up With Specialties Details Why Contact Info    Primary Care Physician  Schedule an appointment as soon as possible for a visit  As needed and to discuss recent ER visit     Jimmy Macias [facility] Obstetrics and Gynecology Schedule an appointment as soon as possible for a visit   [id number]  [facility] [address]  [facility] [address]  [phone]             Ede Dumont MD  11/18/24 0001

## 2024-11-18 NOTE — ED NOTES
Pt reports she is a couple weeks pregnant and she's experience abdominal cramping since noon today. Pt reports taking tylenol with no relief. Pt denies vaginal bleeding, discharge, dysuria.

## 2024-11-23 ENCOUNTER — HOSPITAL ENCOUNTER (EMERGENCY)
Facility: HOSPITAL | Age: 19
Discharge: HOME | End: 2024-11-24
Attending: EMERGENCY MEDICINE
Payer: MEDICAID

## 2024-11-23 DIAGNOSIS — N30.01 ACUTE CYSTITIS WITH HEMATURIA: ICD-10-CM

## 2024-11-23 DIAGNOSIS — B96.89 BACTERIAL VAGINOSIS IN PREGNANCY: ICD-10-CM

## 2024-11-23 DIAGNOSIS — R10.9 ABDOMINAL PAIN DURING PREGNANCY IN FIRST TRIMESTER: Primary | ICD-10-CM

## 2024-11-23 DIAGNOSIS — K59.00 CONSTIPATION, UNSPECIFIED CONSTIPATION TYPE: ICD-10-CM

## 2024-11-23 DIAGNOSIS — O26.891 ABDOMINAL PAIN DURING PREGNANCY IN FIRST TRIMESTER: Primary | ICD-10-CM

## 2024-11-23 DIAGNOSIS — O23.599 BACTERIAL VAGINOSIS IN PREGNANCY: ICD-10-CM

## 2024-11-23 PROBLEM — R59.0 OCCIPITAL LYMPHADENOPATHY: Status: ACTIVE | Noted: 2022-06-01

## 2024-11-23 LAB
ALBUMIN SERPL BCP-MCNC: 3.9 G/DL (ref 3.5–5.2)
ALP SERPL-CCNC: 57 U/L (ref 40–150)
ALT SERPL W/O P-5'-P-CCNC: 14 U/L (ref 10–44)
ANION GAP SERPL CALC-SCNC: 9 MMOL/L (ref 8–16)
AST SERPL-CCNC: 16 U/L (ref 10–40)
BACTERIA #/AREA URNS AUTO: ABNORMAL /HPF
BACTERIA GENITAL QL WET PREP: ABNORMAL
BASOPHILS # BLD AUTO: 0.04 K/UL (ref 0–0.2)
BASOPHILS NFR BLD: 0.5 % (ref 0–1.9)
BILIRUB SERPL-MCNC: 0.3 MG/DL (ref 0.1–1)
BILIRUB UR QL STRIP: NEGATIVE
BUN SERPL-MCNC: 11 MG/DL (ref 6–20)
CALCIUM SERPL-MCNC: 9.3 MG/DL (ref 8.7–10.5)
CHLORIDE SERPL-SCNC: 107 MMOL/L (ref 95–110)
CLARITY UR REFRACT.AUTO: CLEAR
CLUE CELLS VAG QL WET PREP: ABNORMAL
CO2 SERPL-SCNC: 22 MMOL/L (ref 23–29)
COLOR UR AUTO: YELLOW
CREAT SERPL-MCNC: 0.7 MG/DL (ref 0.5–1.4)
DIFFERENTIAL METHOD BLD: ABNORMAL
EOSINOPHIL # BLD AUTO: 0.1 K/UL (ref 0–0.5)
EOSINOPHIL NFR BLD: 1.6 % (ref 0–8)
ERYTHROCYTE [DISTWIDTH] IN BLOOD BY AUTOMATED COUNT: 12.8 % (ref 11.5–14.5)
EST. GFR  (NO RACE VARIABLE): >60 ML/MIN/1.73 M^2
FILAMENT FUNGI VAG WET PREP-#/AREA: ABNORMAL
GLUCOSE SERPL-MCNC: 77 MG/DL (ref 70–110)
GLUCOSE UR QL STRIP: NEGATIVE
HCG INTACT+B SERPL-ACNC: NORMAL MIU/ML
HCT VFR BLD AUTO: 35 % (ref 37–48.5)
HGB BLD-MCNC: 11.5 G/DL (ref 12–16)
HGB UR QL STRIP: NEGATIVE
IMM GRANULOCYTES # BLD AUTO: 0.02 K/UL (ref 0–0.04)
IMM GRANULOCYTES NFR BLD AUTO: 0.3 % (ref 0–0.5)
KETONES UR QL STRIP: NEGATIVE
LEUKOCYTE ESTERASE UR QL STRIP: ABNORMAL
LYMPHOCYTES # BLD AUTO: 1.9 K/UL (ref 1–4.8)
LYMPHOCYTES NFR BLD: 25.1 % (ref 18–48)
MCH RBC QN AUTO: 30.3 PG (ref 27–31)
MCHC RBC AUTO-ENTMCNC: 32.9 G/DL (ref 32–36)
MCV RBC AUTO: 92 FL (ref 82–98)
MICROSCOPIC COMMENT: ABNORMAL
MONOCYTES # BLD AUTO: 0.5 K/UL (ref 0.3–1)
MONOCYTES NFR BLD: 7.2 % (ref 4–15)
NEUTROPHILS # BLD AUTO: 4.9 K/UL (ref 1.8–7.7)
NEUTROPHILS NFR BLD: 65.3 % (ref 38–73)
NITRITE UR QL STRIP: NEGATIVE
NRBC BLD-RTO: 0 /100 WBC
PH UR STRIP: 6 [PH] (ref 5–8)
PLATELET # BLD AUTO: 192 K/UL (ref 150–450)
PMV BLD AUTO: 10.1 FL (ref 9.2–12.9)
POTASSIUM SERPL-SCNC: 3.9 MMOL/L (ref 3.5–5.1)
PROT SERPL-MCNC: 6.4 G/DL (ref 6–8.4)
PROT UR QL STRIP: ABNORMAL
RBC # BLD AUTO: 3.8 M/UL (ref 4–5.4)
RBC #/AREA URNS AUTO: 8 /HPF (ref 0–4)
SODIUM SERPL-SCNC: 138 MMOL/L (ref 136–145)
SP GR UR STRIP: >1.03 (ref 1–1.03)
SPECIMEN SOURCE: ABNORMAL
SQUAMOUS #/AREA URNS AUTO: 6 /HPF
T VAGINALIS GENITAL QL WET PREP: ABNORMAL
URN SPEC COLLECT METH UR: ABNORMAL
WBC # BLD AUTO: 7.54 K/UL (ref 3.9–12.7)
WBC #/AREA URNS AUTO: 29 /HPF (ref 0–5)
WBC #/AREA VAG WET PREP: ABNORMAL
YEAST GENITAL QL WET PREP: ABNORMAL

## 2024-11-23 PROCEDURE — 80053 COMPREHEN METABOLIC PANEL: CPT | Performed by: PHYSICIAN ASSISTANT

## 2024-11-23 PROCEDURE — 84702 CHORIONIC GONADOTROPIN TEST: CPT | Performed by: PHYSICIAN ASSISTANT

## 2024-11-23 PROCEDURE — 87186 SC STD MICRODIL/AGAR DIL: CPT | Performed by: PHYSICIAN ASSISTANT

## 2024-11-23 PROCEDURE — 99285 EMERGENCY DEPT VISIT HI MDM: CPT | Mod: 25

## 2024-11-23 PROCEDURE — 87088 URINE BACTERIA CULTURE: CPT | Performed by: PHYSICIAN ASSISTANT

## 2024-11-23 PROCEDURE — 87086 URINE CULTURE/COLONY COUNT: CPT | Performed by: PHYSICIAN ASSISTANT

## 2024-11-23 PROCEDURE — 25000003 PHARM REV CODE 250: Performed by: PHYSICIAN ASSISTANT

## 2024-11-23 PROCEDURE — 87210 SMEAR WET MOUNT SALINE/INK: CPT | Performed by: PHYSICIAN ASSISTANT

## 2024-11-23 PROCEDURE — 81001 URINALYSIS AUTO W/SCOPE: CPT | Performed by: PHYSICIAN ASSISTANT

## 2024-11-23 PROCEDURE — 87077 CULTURE AEROBIC IDENTIFY: CPT | Performed by: PHYSICIAN ASSISTANT

## 2024-11-23 PROCEDURE — 85025 COMPLETE CBC W/AUTO DIFF WBC: CPT | Performed by: PHYSICIAN ASSISTANT

## 2024-11-23 PROCEDURE — 63600175 PHARM REV CODE 636 W HCPCS: Performed by: PHYSICIAN ASSISTANT

## 2024-11-23 RX ORDER — CEPHALEXIN 500 MG/1
500 CAPSULE ORAL EVERY 12 HOURS
Qty: 14 CAPSULE | Refills: 0 | Status: ON HOLD | OUTPATIENT
Start: 2024-11-23 | End: 2024-11-30 | Stop reason: HOSPADM

## 2024-11-23 RX ORDER — B-COMPLEX WITH VITAMIN C
1 TABLET ORAL DAILY
Qty: 300 TABLET | Refills: 0 | Status: SHIPPED | OUTPATIENT
Start: 2024-11-23 | End: 2025-09-19

## 2024-11-23 RX ORDER — CEPHALEXIN 500 MG/1
500 CAPSULE ORAL
Status: COMPLETED | OUTPATIENT
Start: 2024-11-23 | End: 2024-11-23

## 2024-11-23 RX ORDER — ACETAMINOPHEN 500 MG
1000 TABLET ORAL
Status: COMPLETED | OUTPATIENT
Start: 2024-11-23 | End: 2024-11-23

## 2024-11-23 RX ORDER — METRONIDAZOLE 7.5 MG/G
1 GEL VAGINAL NIGHTLY
Qty: 70 G | Refills: 0 | Status: ON HOLD | OUTPATIENT
Start: 2024-11-23 | End: 2024-11-30 | Stop reason: HOSPADM

## 2024-11-23 RX ORDER — ONDANSETRON 4 MG/1
4 TABLET, ORALLY DISINTEGRATING ORAL EVERY 6 HOURS PRN
Qty: 15 TABLET | Refills: 0 | Status: SHIPPED | OUTPATIENT
Start: 2024-11-23

## 2024-11-23 RX ORDER — ONDANSETRON HYDROCHLORIDE 2 MG/ML
4 INJECTION, SOLUTION INTRAVENOUS
Status: COMPLETED | OUTPATIENT
Start: 2024-11-23 | End: 2024-11-23

## 2024-11-23 RX ADMIN — CEPHALEXIN 500 MG: 500 CAPSULE ORAL at 06:11

## 2024-11-23 RX ADMIN — ONDANSETRON 4 MG: 2 INJECTION INTRAMUSCULAR; INTRAVENOUS at 05:11

## 2024-11-23 RX ADMIN — ACETAMINOPHEN 1000 MG: 500 TABLET ORAL at 05:11

## 2024-11-23 NOTE — ED PROVIDER NOTES
Encounter Date: 2024       History     Chief Complaint   Patient presents with    Abdominal Pain    Dysuria     Reports to be about 6 weeks pregnant     19-year-old  at approximately 6 weeks gestation with history of ectopic pregnancy, ovarian cyst presents for persistent right lower quadrant abdominal pain for several weeks.  Pain feels worse with movement and ambulation.  She reports associated dysuria, increased urinary frequency, hematuria, yellowish vaginal discharge with some itching, nausea and vomiting.  She denies fever or vaginal bleeding or changes in bowel movements.  She has been seen in the ED multiple times for similar complaint without clear etiology.  History of ex lap with right salpingectomy for ectopic pregnancy, no other abdominal surgeries.      Review of patient's allergies indicates:  No Known Allergies  Past Medical History:   Diagnosis Date    Behavior problem in childhood     COVID-19 2021    Ectopic pregnancy 2023    Suicidal ideation      Past Surgical History:   Procedure Laterality Date    BACK SURGERY  2016    Rods in place, had issues with previous epidural    DIAGNOSTIC LAPAROSCOPY N/A 2023    Procedure: LAPAROSCOPY, DIAGNOSTIC;  Surgeon: Juana Fung MD;  Location: [facility];  Service: OB/GYN;  Laterality: N/A;    DILATION AND CURETTAGE OF UTERUS       Family History   Problem Relation Name Age of Onset    Throat cancer Paternal Grandfather      No Known Problems Father      No Known Problems Mother      Breast cancer Neg Hx      Diabetes Neg Hx      Colon cancer Neg Hx      Ovarian cancer Neg Hx       Social History     Tobacco Use    Smoking status: Never    Smokeless tobacco: Never   Substance Use Topics    Alcohol use: No    Drug use: No     Review of Systems    Physical Exam     Initial Vitals [24 1602]   BP Pulse Resp Temp SpO2   111/66 69 18 97 °F (36.1 °C) 100 %      MAP       --         Physical Exam    Nursing note and vitals  reviewed.  Constitutional: She appears well-developed and well-nourished. She is not diaphoretic. No distress.   HENT:   Head: Normocephalic and atraumatic.   Cardiovascular:  Normal rate, regular rhythm, normal heart sounds and intact distal pulses.     Exam reveals no gallop and no friction rub.       No murmur heard.  Pulmonary/Chest: Breath sounds normal. No respiratory distress. She has no wheezes. She has no rhonchi. She has no rales. She exhibits no tenderness.   Abdominal: Abdomen is soft. Bowel sounds are normal. She exhibits no distension and no mass. There is abdominal tenderness in the right lower quadrant.   No right CVA tenderness.  No left CVA tenderness. There is no rebound, no guarding, no tenderness at McBurney's point and negative Fung's sign. negative obturator sign and negative psoas sign  Genitourinary:    Vaginal discharge present.      Genitourinary Comments: RN present as chaperone for pelvic exam.  There is some whitish discharge in the vaginal vault.  No CMT, there is adnexal tenderness.  No uterine or left adnexal tenderness.  No bleeding, cervical os is closed.     Musculoskeletal:         General: Normal range of motion.     Neurological: She is alert and oriented to person, place, and time.   Skin: Skin is warm and dry.   Psychiatric: She has a normal mood and affect.         ED Course   Procedures  Labs Reviewed   VAGINAL SCREEN - Abnormal       Result Value    Trichomonas None      Clue Cells Rare (*)     Budding Yeast None      Fungal Hyphae None      WBC - Vaginal Screen Rare (*)     Bacteria - Vaginal Screen Moderate (*)     Wet Prep Source Vagina      Narrative:     Release to patient->Immediate   URINALYSIS, REFLEX TO URINE CULTURE - Abnormal    Specimen UA Urine, Clean Catch      Color, UA Yellow      Appearance, UA Clear      pH, UA 6.0      Specific Gravity, UA >1.030 (*)     Protein, UA Trace (*)     Glucose, UA Negative      Ketones, UA Negative      Bilirubin (UA)  Negative      Occult Blood UA Negative      Nitrite, UA Negative      Leukocytes, UA 2+ (*)     Narrative:     Specimen Source->Urine   CBC W/ AUTO DIFFERENTIAL - Abnormal    WBC 7.54      RBC 3.80 (*)     Hemoglobin 11.5 (*)     Hematocrit 35.0 (*)     MCV 92      MCH 30.3      MCHC 32.9      RDW 12.8      Platelets 192      MPV 10.1      Immature Granulocytes 0.3      Gran # (ANC) 4.9      Immature Grans (Abs) 0.02      Lymph # 1.9      Mono # 0.5      Eos # 0.1      Baso # 0.04      nRBC 0      Gran % 65.3      Lymph % 25.1      Mono % 7.2      Eosinophil % 1.6      Basophil % 0.5      Differential Method Automated      Narrative:     Release to patient->Immediate   COMPREHENSIVE METABOLIC PANEL - Abnormal    Sodium 138      Potassium 3.9      Chloride 107      CO2 22 (*)     Glucose 77      BUN 11      Creatinine 0.7      Calcium 9.3      Total Protein 6.4      Albumin 3.9      Total Bilirubin 0.3      Alkaline Phosphatase 57      AST 16      ALT 14      eGFR >60.0      Anion Gap 9      Narrative:     Release to patient->Immediate   URINALYSIS MICROSCOPIC - Abnormal    RBC, UA 8 (*)     WBC, UA 29 (*)     Bacteria Rare      Squam Epithel, UA 6      Microscopic Comment SEE COMMENT      Narrative:     Specimen Source->Urine   CULTURE, URINE   HCG, QUANTITATIVE    HCG Quant [id number]      Narrative:     Release to patient->Immediate          Imaging Results              US Abdomen Limited (Final result)  Result time 11/23/24 20:10:39      Final result by Ramsey Larry MD (11/23/24 20:10:39)                   Impression:      The appendix is not identified, acute appendicitis cannot be excluded although no secondary findings to suggest the same.      Electronically signed by: Ramsey Larry MD  Date:    11/23/2024  Time:    20:10               Narrative:    EXAMINATION:  US ABDOMEN LIMITED    CLINICAL HISTORY:  RLQ pain;    TECHNIQUE:  Real-time sonography was performed of the right lower quadrant for evaluation  of appendicitis.    COMPARISON:  None.    FINDINGS:  There is trace fluid in the pelvis.  The appendix is not identified.  There is bowel peristalsis.  No visualized lymphadenopathy.  No pain within the right lower quadrant upon transducer compression.                                       US OB <14 Wks TransAbd & TransVag, Single Gestation (XPD) (Final result)  Result time 11/23/24 20:08:23      Final result by Ramsey Larry MD (11/23/24 20:08:23)                   Impression:      Single living intrauterine gestation, crown-rump length correlates with an estimated gestational age of 6 weeks 4 days, cardiac activity documented at 128 beats per minute.    Bilateral ovarian follicles or small cysts.      Electronically signed by: Ramsey Larry MD  Date:    11/23/2024  Time:    20:08               Narrative:    EXAMINATION:  US OB <14 WEEKS, TRANSABDOM & TRANSVAG, SINGLE GESTATION (XPD)    CLINICAL HISTORY:  RLQ pain;    TECHNIQUE:  Transabdominal sonography of the pelvis was performed, followed by transvaginal sonography to better evaluate the uterus and ovaries.    COMPARISON:  11/17/2024    FINDINGS:  There is a single living intrauterine gestation, crown-rump length correlates with an estimated gestational age of 6 weeks 4 days, cardiac activity documented at 128 beats per minute.  The cervix is unremarkable.    The right ovary measures approximately 3.6 x 2.4 x 3.1 cm and contains a cyst measuring up to 2.3 cm.  The left ovary measures approximately 3.6 x 3.2 x 5.2 cm and contains cysts or dominant follicles, measuring up to 2.6 cm.  Arterial and venous flow is documented to the ovaries bilaterally.  No significant free fluid in the pelvis.                                       Medications   ondansetron injection 4 mg (4 mg Intravenous Given 11/23/24 1742)   acetaminophen tablet 1,000 mg (1,000 mg Oral Given 11/23/24 1742)   cephALEXin capsule 500 mg (500 mg Oral Given 11/23/24 0137)     Medical Decision  Making  19-year-old female presenting for persistent right lower quadrant pain in the setting of early pregnancy.  This is the patient's 4th ED visit for similar complaint.  Her vitals are normal, she appears uncomfortable but nontoxic.    Differential diagnosis:  She has not established IUP, I doubt ectopic pregnancy.  Her beta-hCG him seems to be up trending appropriately  Low clinical suspicion for appendicitis, however she does have focal RLQ tenderness   UTI   Your vaginosis   Vulvovaginal candidiasis   She had gonorrhea and chlamydia testing recently which was negative    Will check labs, give analgesics, ultrasound reassess.    Labs are reassuring, ultrasound shows IUP without any ovarian abnormalities.  Appendix not identified on ultrasound but no secondary findings.  I discussed with the patient that I have a low suspicion for appendicitis but can not rule out based on this information.  This is the 4th ED visit for the same complaint, will obtain MRI to rule out.    I have no idea dispo pending results of MRI.  I am signing out to Divina Estrada PA-C.    9:52 PM      Amount and/or Complexity of Data Reviewed  Labs: ordered. Decision-making details documented in ED Course.  Radiology: ordered and independent interpretation performed. Decision-making details documented in ED Course.    Risk  OTC drugs.  Prescription drug management.               ED Course as of 11/23/24 2152   Sat Nov 23, 2024   1814 WBC, UA(!): 29  Contaminated sample but given classic symptoms will treat [CC]   1835 Beta HCG Quant: [id number]  Up trending [CC]   2013 US Abdomen Limited  Appendix is not identified but no secondary findings to suggest this [CC]   2013 US OB <14 Wks TransAbd & TransVag, Single Gestation (XPD)  Single live IUP [CC]   2023 On reassessment, patient was feeling better but does still have focal RLQ tenderness.  I engaged in shared decision-making with her to either perform MRI or discharge with plans for follow up  for worsening symptoms.  She would prefer to have MRI performed today.  Will order. [CC]      ED Course User Index  [CC] Maribel Baxter PA-C                           Clinical Impression:  Final diagnoses:  [N30.01] Acute cystitis with hematuria  [O23.599, B96.89] Bacterial vaginosis in pregnancy  [O26.891, R10.9] Abdominal pain during pregnancy in first trimester (Primary)                 Maribel Baxter PA-C  11/23/24 6218

## 2024-11-23 NOTE — ED NOTES
Patient states continued right lower abd pain, reports pain and burning, also reports possible bleeding, first OB 11/29

## 2024-11-23 NOTE — ED NOTES
Patient identifiers verified and correct for  MS Cochran  C/C:  Burning with urination SEE NN  APPEARANCE: awake and alert in NAD. PAIN  10/10  SKIN: warm, dry and intact. No breakdown or bruising.  MUSCULOSKELETAL: Patient moving all extremities spontaneously, no obvious swelling or deformities noted. Ambulates independently.  RESPIRATORY: Denies shortness of breath.Respirations unlabored.   CARDIAC: Denies CP, 2+ distal pulses; no peripheral edema  ABDOMEN: ABdomen soft, reports pain to right abdomen   : voids spontaneously, states burning and frequency   Neurologic: AAO x 4; follows commands equal strength in all extremities; denies numbness/tingling. Denies dizziness  Denies new wekaness

## 2024-11-23 NOTE — Clinical Note
"Yaquelin "Martha Cochran was seen and treated in our emergency department on 11/23/2024.  She may return to work on 11/25/2024.       If you have any questions or concerns, please don't hesitate to call.       LPN    "

## 2024-11-24 VITALS
RESPIRATION RATE: 16 BRPM | TEMPERATURE: 98 F | DIASTOLIC BLOOD PRESSURE: 60 MMHG | HEIGHT: 61 IN | BODY MASS INDEX: 22.66 KG/M2 | OXYGEN SATURATION: 100 % | WEIGHT: 120 LBS | SYSTOLIC BLOOD PRESSURE: 102 MMHG | HEART RATE: 70 BPM

## 2024-11-24 NOTE — ED NOTES
Per US staff request, patient given pitcher of water to drink and instructed to hold her bladder in preparation for US imaging.

## 2024-11-24 NOTE — DISCHARGE INSTRUCTIONS
Urinalysis concerning for infection.  I will treat this with an antibiotic known as Keflex.  Take the entire course as directed    Your MRI today does not show signs of acute appendicitis    Constipation noted on your MRI.  Drink plenty of fluids.  May take MiraLax over-the-counter as needed for constipation    Follow up with your OBGYN at your next scheduled appointment    Strict ED precautions given to return immediately for new, worsening, or concerning symptoms

## 2024-11-24 NOTE — PROVIDER PROGRESS NOTES - EMERGENCY DEPT.
Encounter Date: 2024    ED Physician Progress Notes        Physician Note:   I received sign out of this patient due to shift change from Maribel Urena PA-C    19-year-old  at approximately 6 weeks gestation with history of ectopic pregnancy, ovarian cyst presents for persistent right lower quadrant abdominal pain for several weeks.  She was multiple visits to ED for this complaint in the past few weeks    Pending MRI of her pelvis to rule out acute appendicitis    Workup so far in the ED includes no leukocytosis.  Mild new anemia noted.  2+ leukocytes noted on urinalysis.  Signs of contamination present though she is having urinary symptoms.  Will treat for UTI with Keflex.  Pelvic ultrasound negative.  Right lower quadrant ultrasound unable to identify appendix      MRI negative for signs of acute appendicitis.  Constipation noted.  On my assessment she is resting comfortably.  She is in no acute distress.  At this time she was stable for discharge.  I recommended follow up with her OBGYN.  She is agreeable to this plan. Strict ED precautions given to return immediately for new, worsening, or concerning symptoms

## 2024-11-28 ENCOUNTER — PATIENT MESSAGE (OUTPATIENT)
Dept: OBSTETRICS AND GYNECOLOGY | Facility: CLINIC | Age: 19
End: 2024-11-28
Payer: MEDICAID

## 2024-11-28 ENCOUNTER — HOSPITAL ENCOUNTER (OUTPATIENT)
Facility: HOSPITAL | Age: 19
Discharge: HOME | End: 2024-11-30
Attending: EMERGENCY MEDICINE | Admitting: INTERNAL MEDICINE
Payer: MEDICAID

## 2024-11-28 ENCOUNTER — PATIENT MESSAGE (OUTPATIENT)
Dept: EMERGENCY MEDICINE | Facility: HOSPITAL | Age: 19
End: 2024-11-28
Payer: MEDICAID

## 2024-11-28 ENCOUNTER — DOCUMENTATION ONLY (OUTPATIENT)
Dept: EMERGENCY MEDICINE | Facility: HOSPITAL | Age: 19
End: 2024-11-28
Payer: MEDICAID

## 2024-11-28 DIAGNOSIS — N39.0 URINARY TRACT INFECTION WITHOUT HEMATURIA, SITE UNSPECIFIED: Primary | ICD-10-CM

## 2024-11-28 DIAGNOSIS — R07.9 CHEST PAIN: ICD-10-CM

## 2024-11-28 DIAGNOSIS — Z34.91 FIRST TRIMESTER PREGNANCY: ICD-10-CM

## 2024-11-28 DIAGNOSIS — A49.02 MRSA INFECTION: ICD-10-CM

## 2024-11-28 PROBLEM — N30.01 ACUTE CYSTITIS WITH HEMATURIA: Status: ACTIVE | Noted: 2024-11-28

## 2024-11-28 LAB
ALBUMIN SERPL BCP-MCNC: 4.1 G/DL (ref 3.5–5.2)
ALLENS TEST: NORMAL
ALP SERPL-CCNC: 49 U/L (ref 40–150)
ALT SERPL W/O P-5'-P-CCNC: 16 U/L (ref 10–44)
ANION GAP SERPL CALC-SCNC: 9 MMOL/L (ref 8–16)
AST SERPL-CCNC: 16 U/L (ref 10–40)
BACTERIA #/AREA URNS AUTO: ABNORMAL /HPF
BASOPHILS # BLD AUTO: 0.05 K/UL (ref 0–0.2)
BASOPHILS NFR BLD: 0.7 % (ref 0–1.9)
BILIRUB SERPL-MCNC: 0.9 MG/DL (ref 0.1–1)
BILIRUB UR QL STRIP: NEGATIVE
BUN SERPL-MCNC: 6 MG/DL (ref 6–20)
CALCIUM SERPL-MCNC: 9.3 MG/DL (ref 8.7–10.5)
CHLORIDE SERPL-SCNC: 108 MMOL/L (ref 95–110)
CLARITY UR REFRACT.AUTO: ABNORMAL
CO2 SERPL-SCNC: 21 MMOL/L (ref 23–29)
COLOR UR AUTO: YELLOW
CREAT SERPL-MCNC: 0.6 MG/DL (ref 0.5–1.4)
DIFFERENTIAL METHOD BLD: NORMAL
EOSINOPHIL # BLD AUTO: 0.1 K/UL (ref 0–0.5)
EOSINOPHIL NFR BLD: 1.1 % (ref 0–8)
ERYTHROCYTE [DISTWIDTH] IN BLOOD BY AUTOMATED COUNT: 12.5 % (ref 11.5–14.5)
EST. GFR  (NO RACE VARIABLE): >60 ML/MIN/1.73 M^2
GLUCOSE SERPL-MCNC: 79 MG/DL (ref 70–110)
GLUCOSE UR QL STRIP: NEGATIVE
HCT VFR BLD AUTO: 38.9 % (ref 37–48.5)
HGB BLD-MCNC: 12.9 G/DL (ref 12–16)
HGB UR QL STRIP: NEGATIVE
IMM GRANULOCYTES # BLD AUTO: 0.03 K/UL (ref 0–0.04)
IMM GRANULOCYTES NFR BLD AUTO: 0.4 % (ref 0–0.5)
KETONES UR QL STRIP: ABNORMAL
LDH SERPL L TO P-CCNC: 0.85 MMOL/L (ref 0.5–2.2)
LEUKOCYTE ESTERASE UR QL STRIP: ABNORMAL
LYMPHOCYTES # BLD AUTO: 1.8 K/UL (ref 1–4.8)
LYMPHOCYTES NFR BLD: 24 % (ref 18–48)
MCH RBC QN AUTO: 30 PG (ref 27–31)
MCHC RBC AUTO-ENTMCNC: 33.2 G/DL (ref 32–36)
MCV RBC AUTO: 91 FL (ref 82–98)
MICROSCOPIC COMMENT: ABNORMAL
MONOCYTES # BLD AUTO: 0.5 K/UL (ref 0.3–1)
MONOCYTES NFR BLD: 6.4 % (ref 4–15)
NEUTROPHILS # BLD AUTO: 4.9 K/UL (ref 1.8–7.7)
NEUTROPHILS NFR BLD: 67.4 % (ref 38–73)
NITRITE UR QL STRIP: NEGATIVE
NRBC BLD-RTO: 0 /100 WBC
PH UR STRIP: 7 [PH] (ref 5–8)
PLATELET # BLD AUTO: 210 K/UL (ref 150–450)
PMV BLD AUTO: 9.8 FL (ref 9.2–12.9)
POTASSIUM SERPL-SCNC: 4 MMOL/L (ref 3.5–5.1)
PROT SERPL-MCNC: 7.2 G/DL (ref 6–8.4)
PROT UR QL STRIP: ABNORMAL
RBC # BLD AUTO: 4.3 M/UL (ref 4–5.4)
RBC #/AREA URNS AUTO: 2 /HPF (ref 0–4)
SAMPLE: NORMAL
SITE: NORMAL
SODIUM SERPL-SCNC: 138 MMOL/L (ref 136–145)
SP GR UR STRIP: 1.03 (ref 1–1.03)
SQUAMOUS #/AREA URNS AUTO: 19 /HPF
URN SPEC COLLECT METH UR: ABNORMAL
WBC # BLD AUTO: 7.3 K/UL (ref 3.9–12.7)
WBC #/AREA URNS AUTO: 20 /HPF (ref 0–5)
YEAST UR QL AUTO: ABNORMAL

## 2024-11-28 PROCEDURE — 87040 BLOOD CULTURE FOR BACTERIA: CPT | Mod: 59 | Performed by: PHYSICIAN ASSISTANT

## 2024-11-28 PROCEDURE — 25000003 PHARM REV CODE 250: Performed by: PHYSICIAN ASSISTANT

## 2024-11-28 PROCEDURE — 80053 COMPREHEN METABOLIC PANEL: CPT | Performed by: PHYSICIAN ASSISTANT

## 2024-11-28 PROCEDURE — 87086 URINE CULTURE/COLONY COUNT: CPT | Performed by: PHYSICIAN ASSISTANT

## 2024-11-28 PROCEDURE — G0378 HOSPITAL OBSERVATION PER HR: HCPCS

## 2024-11-28 PROCEDURE — 96366 THER/PROPH/DIAG IV INF ADDON: CPT

## 2024-11-28 PROCEDURE — 99285 EMERGENCY DEPT VISIT HI MDM: CPT | Mod: 25

## 2024-11-28 PROCEDURE — 85025 COMPLETE CBC W/AUTO DIFF WBC: CPT | Performed by: PHYSICIAN ASSISTANT

## 2024-11-28 PROCEDURE — 99900035 HC TECH TIME PER 15 MIN (STAT)

## 2024-11-28 PROCEDURE — 63600175 PHARM REV CODE 636 W HCPCS: Performed by: INTERNAL MEDICINE

## 2024-11-28 PROCEDURE — 81001 URINALYSIS AUTO W/SCOPE: CPT | Performed by: PHYSICIAN ASSISTANT

## 2024-11-28 PROCEDURE — 25000003 PHARM REV CODE 250: Performed by: INTERNAL MEDICINE

## 2024-11-28 PROCEDURE — 63600175 PHARM REV CODE 636 W HCPCS: Performed by: PHYSICIAN ASSISTANT

## 2024-11-28 PROCEDURE — 83605 ASSAY OF LACTIC ACID: CPT

## 2024-11-28 PROCEDURE — 96365 THER/PROPH/DIAG IV INF INIT: CPT

## 2024-11-28 RX ORDER — SODIUM CHLORIDE 0.9 % (FLUSH) 0.9 %
10 SYRINGE (ML) INJECTION EVERY 12 HOURS PRN
Status: DISCONTINUED | OUTPATIENT
Start: 2024-11-28 | End: 2024-11-30 | Stop reason: HOSPADM

## 2024-11-28 RX ORDER — ACETAMINOPHEN 325 MG/1
650 TABLET ORAL EVERY 8 HOURS PRN
Status: DISCONTINUED | OUTPATIENT
Start: 2024-11-28 | End: 2024-11-30 | Stop reason: HOSPADM

## 2024-11-28 RX ORDER — GLUCAGON 1 MG
1 KIT INJECTION
Status: DISCONTINUED | OUTPATIENT
Start: 2024-11-28 | End: 2024-11-30 | Stop reason: HOSPADM

## 2024-11-28 RX ORDER — IBUPROFEN 200 MG
16 TABLET ORAL
Status: DISCONTINUED | OUTPATIENT
Start: 2024-11-28 | End: 2024-11-30 | Stop reason: HOSPADM

## 2024-11-28 RX ORDER — ONDANSETRON HYDROCHLORIDE 2 MG/ML
4 INJECTION, SOLUTION INTRAVENOUS EVERY 8 HOURS PRN
Status: DISCONTINUED | OUTPATIENT
Start: 2024-11-28 | End: 2024-11-30 | Stop reason: HOSPADM

## 2024-11-28 RX ORDER — POLYETHYLENE GLYCOL 3350 17 G/17G
17 POWDER, FOR SOLUTION ORAL DAILY PRN
Status: DISCONTINUED | OUTPATIENT
Start: 2024-11-28 | End: 2024-11-30 | Stop reason: HOSPADM

## 2024-11-28 RX ORDER — PRENATAL WITH FERROUS FUM AND FOLIC ACID 3080; 920; 120; 400; 22; 1.84; 3; 20; 10; 1; 12; 200; 27; 25; 2 [IU]/1; [IU]/1; MG/1; [IU]/1; MG/1; MG/1; MG/1; MG/1; MG/1; MG/1; UG/1; MG/1; MG/1; MG/1; MG/1
1 TABLET ORAL DAILY
Status: DISCONTINUED | OUTPATIENT
Start: 2024-11-28 | End: 2024-11-30 | Stop reason: HOSPADM

## 2024-11-28 RX ORDER — ALUMINUM HYDROXIDE, MAGNESIUM HYDROXIDE, AND SIMETHICONE 1200; 120; 1200 MG/30ML; MG/30ML; MG/30ML
30 SUSPENSION ORAL 4 TIMES DAILY PRN
Status: DISCONTINUED | OUTPATIENT
Start: 2024-11-28 | End: 2024-11-30 | Stop reason: HOSPADM

## 2024-11-28 RX ORDER — IBUPROFEN 200 MG
24 TABLET ORAL
Status: DISCONTINUED | OUTPATIENT
Start: 2024-11-28 | End: 2024-11-30 | Stop reason: HOSPADM

## 2024-11-28 RX ORDER — NALOXONE HCL 0.4 MG/ML
0.02 VIAL (ML) INJECTION
Status: DISCONTINUED | OUTPATIENT
Start: 2024-11-28 | End: 2024-11-30 | Stop reason: HOSPADM

## 2024-11-28 RX ORDER — PROCHLORPERAZINE EDISYLATE 5 MG/ML
5 INJECTION INTRAMUSCULAR; INTRAVENOUS EVERY 6 HOURS PRN
Status: DISCONTINUED | OUTPATIENT
Start: 2024-11-28 | End: 2024-11-30 | Stop reason: HOSPADM

## 2024-11-28 RX ADMIN — VANCOMYCIN HYDROCHLORIDE 500 MG: 500 INJECTION, POWDER, LYOPHILIZED, FOR SOLUTION INTRAVENOUS at 05:11

## 2024-11-28 RX ADMIN — ACETAMINOPHEN 650 MG: 325 TABLET ORAL at 11:11

## 2024-11-28 RX ADMIN — PRENATAL VIT W/ FE FUMARATE-FA TAB 27-0.8 MG 1 TABLET: 27-0.8 TAB at 05:11

## 2024-11-28 RX ADMIN — VANCOMYCIN HYDROCHLORIDE 750 MG: 750 INJECTION, POWDER, LYOPHILIZED, FOR SOLUTION INTRAVENOUS at 02:11

## 2024-11-28 NOTE — Clinical Note
Diagnosis: Urinary tract infection without hematuria, site unspecified [[id number]]   Future Attending Provider: JACKY PLAZA [[id number]]

## 2024-11-28 NOTE — ED TRIAGE NOTES
Patient identifiers for [facility] 19 y.o. female checked and correct. Pt arrives to ED via POV c/o abnormal labs. Pt states she was seen in the ED a few days ago for dysuria and had urine cultured. Received a call today stating urine was positive for MRSA. Pt endorses dysuria and bladder spasms after emptying bladder. Denies fever, chills, diarrhea. Intermittent nausea which patient relates more to 7 week gestation.      Chief Complaint   Patient presents with    Abnormal Lab     Called back today, mrsa in urine     Past Medical History:   Diagnosis Date    Behavior problem in childhood     COVID-19 2021    Ectopic pregnancy 2023    Suicidal ideation      Allergies reported: Review of patient's allergies indicates:  No Known Allergies

## 2024-11-28 NOTE — PLAN OF CARE
Problem:  Fall Injury Risk  Goal: Absence of Fall, Infant Drop and Related Injury  Outcome: Progressing     Problem: Adult Inpatient Plan of Care  Goal: Plan of Care Review  Outcome: Progressing  Goal: Patient-Specific Goal (Individualized)  Outcome: Progressing  Goal: Absence of Hospital-Acquired Illness or Injury  Outcome: Progressing  Goal: Optimal Comfort and Wellbeing  Outcome: Progressing  Goal: Readiness for Transition of Care  Outcome: Progressing     Problem: Infection  Goal: Absence of Infection Signs and Symptoms  Outcome: Progressing     Pt progressing towards goals. No distress notice. No falls or injuries during shift. Bed in lowest position, call light within reach, belonging at bedside. Safety precaution maintain.Plan of Care reviewed. Pt verbalized understanding.

## 2024-11-28 NOTE — NURSING
Pt arrived to floor stable no complaints  is AAOx4. No distress noted.Skin intact.    Call light in reach. Bed in low locked position.   Side rails x2. Belongings at bedside.   Pt free of fall and injuries Questions and concerns voiced and answered.    Plan of care continues.

## 2024-11-28 NOTE — ASSESSMENT & PLAN NOTE
Patient is 7 weeks pregnant. Had OBGYN appt 11/29 for US; will refer her at discharge for rescheduling.  - Continue prenatal vitamin daily

## 2024-11-28 NOTE — H&P
[facility] - Emergency Dept  Hospital Medicine  History & Physical    Patient Name: Yaquelin Cochran  MRN: [id number]  Patient Class: OP- Observation  Admission Date: 11/28/2024  Attending Physician: Robin Paulino MD   Primary Care Provider: Arin, Primary Doctor         Patient information was obtained from patient and ER records.     Subjective:     Principal Problem:<principal problem not specified>    Chief Complaint:   Chief Complaint   Patient presents with    Abnormal Lab     Called back today, mrsa in urine        HPI: Yaquelin Cochran is a 19 year old female who is currently 7 weeks pregnant that presents from home after being notified to come back to the ED for MRSA UTI. Patient was seen here on 11/23 presenting with lower abdominal pain and dysuria. Abdominal US and MRI unremarkable. UA was infectious and she was discharged from ED with Keflex. Since taking Keflex, she reports feeling better but still has some dysuria. She now presents after urine culture revealed to be growing MRSA. She denies SOB, CP, palpitations, fevers, chills, vomiting. Does report nausea but believes this is from her pregnancy.     Upon my evaluation, patient pleasantly conversant, afebrile, HDS, saturating well on room air. I spoke with ID (Dr. South) and they are recommending 3 days of IV vanc as other antibiotics are teratogenic. Blood cultures x2 drawn in ED prior to Vanc administration. Patient admitted to observation with hospital medicine for further evaluation and treatment.    Past Medical History:   Diagnosis Date    Behavior problem in childhood     COVID-19 08/08/2021    Ectopic pregnancy 5/24/2023    Suicidal ideation        Past Surgical History:   Procedure Laterality Date    BACK SURGERY  2016    Rods in place, had issues with previous epidural    DIAGNOSTIC LAPAROSCOPY N/A 05/29/2023    Procedure: LAPAROSCOPY, DIAGNOSTIC;  Surgeon: Juana Fung MD;  Location: [facility] OR;  Service: OB/GYN;  Laterality: N/A;     DILATION AND CURETTAGE OF UTERUS         Review of patient's allergies indicates:  No Known Allergies    No current facility-administered medications on file prior to encounter.     Current Outpatient Medications on File Prior to Encounter   Medication Sig    cephALEXin (KEFLEX) 500 MG capsule Take 1 capsule (500 mg total) by mouth every 12 (twelve) hours. for 7 days    metroNIDAZOLE (METROGEL) 0.75 % (37.5mg/5 gram) vaginal gel Place 1 applicator vaginally every evening. for 5 days    ondansetron (ZOFRAN-ODT) 4 MG TbDL Take 1 tablet (4 mg total) by mouth every 6 (six) hours as needed (nausea).    polyethylene glycol (GLYCOLAX) 17 gram/dose powder Take 17 g by mouth once daily.    prenatal vit no.130-iron-folic (PRENATAL VITAMIN) 27 mg iron- 800 mcg Tab Take 1 tablet by mouth Daily.     Family History       Problem Relation (Age of Onset)    No Known Problems Father, Mother    Throat cancer Paternal Grandfather          Tobacco Use    Smoking status: Never    Smokeless tobacco: Never   Substance and Sexual Activity    Alcohol use: No    Drug use: No    Sexual activity: Yes     Partners: Male     Birth control/protection: Condom     ROS  General ROS: negative for weight loss, weight gain, fevers, chills, night sweats  ENT ROS: negative for epistaxis, headaches or sinus pain  Ophthalmic ROS: negative for blurry vision, decreased vision, double vision or itchy eyes  Cardiovascular ROS: negative for chest pain or dyspnea on exertion  Respiratory ROS: negative for cough, shortness of breath, or wheezing  Gastrointestinal ROS: negative for abdominal pain, change in bowel habits, black or bloody stools, nausea, emesis, or bloating  Genito-Urinary ROS: See HPI  Neurological ROS: negative for TIA or stroke symptoms  Endocrine ROS: negative for hair pattern changes or unexpected weight changes  Musculoskeletal ROS: negative for muscle pain, weakness, joint pain or joint swelling  Skin: negative for rash, wounds, skin  breakdown, or issues otherwise  Hematological and Lymphatic ROS: negative for bleeding, bruising, swollen lymph nodes  Psychological ROS: negative for anxiety or depression      Objective:     Vital Signs (Most Recent):  Temp: 98.9 °F (37.2 °C) (11/28/24 1308)  Pulse: 73 (11/28/24 1439)  Resp: 20 (11/28/24 1308)  BP: (!) 112/58 (11/28/24 1432)  SpO2: 100 % (11/28/24 1439) Vital Signs (24h Range):  Temp:  [98.9 °F (37.2 °C)] 98.9 °F (37.2 °C)  Pulse:  [73-84] 73  Resp:  [20] 20  SpO2:  [99 %-100 %] 100 %  BP: (108-113)/(58-68) 112/58     Weight: 54.4 kg (120 lb)  Body mass index is 22.67 kg/m².     Physical Exam  Gen: in NAD, appears stated age  Neuro: AAOx4, CN2-12 grossly intact BL; motor, sensory, and strength grossly intact BL  HEENT: NTNC, EOMI  CVS: RRR, no m/r/g; S1/S2 auscultated with no S3 or S4  Resp: lungs CTAB, no w/r/r; no belabored breathing or accessory muscle use appreciated   Abd: NTND  Extrem: no UE or LE edema BL             Significant Labs: All pertinent labs within the past 24 hours have been reviewed.    Significant Imaging: I have reviewed all pertinent imaging results/findings within the past 24 hours.  Assessment/Plan:     Acute cystitis with hematuria  Patient recently seen on 11/23 for dysuria and lower abdominal pain. No intra-abdominal evidence of infection on abdominal US/MRI. UA was infectious and was discharged on Keflex. Urine culture now growing MRSA and patient returns for IV vanc. No longer having abdominal pain; has some dysuria. No evidence of hematogenous spread to bladder given lack of constitutional/infectious symptoms  - Spoke with ID; will need 3 days of IV Vanc  - Repeat UA  - Will follow up Blood cultures  - Continue Vancomycin    Pregnancy  Patient is 7 weeks pregnant. Had OBGYN appt 11/29 for US; will refer her at discharge for rescheduling.  - Continue prenatal vitamin daily      VTE Risk Mitigation (From admission, onward)           Ordered     IP VTE LOW RISK PATIENT  " Once         11/28/24 1439                       On 11/28/2024, patient should be placed in hospital observation services under my care.        Pharmacokinetic Initial Assessment: IV Vancomycin    Assessment/Plan:    Vancomycin 750 mg IVPB x 1 given in ED.  Administer additional vancomycin 500 mg IVPB x 1 for 1250 mg total load dose.  Continue with vancomycin 750 mg IVPB every 12 hours.  Desired empiric serum trough concentration is 10 to 20 mcg/mL.  Draw vancomycin trough level 60 min prior to fourth dose on 11/30/2024 at 0130.  Pharmacy will continue to follow and monitor vancomycin.      Please contact pharmacy at extension [phone] with any questions regarding this assessment.     Thank you for the consult,   Enio Kidd       Patient brief summary:Paz Cochran is a 19 y.o. female initiated on antimicrobial therapy with IV Vancomycin for treatment of suspected urinary tract infection.    Drug Allergies:   Review of patient's allergies indicates:  No Known Allergies    Actual Body Weight:   54.4 kg    Renal Function:   Estimated Creatinine Clearance: 111.9 mL/min (based on SCr of 0.6 mg/dL).    CBC (last 72 hours):  Recent Labs   Lab Result Units 11/28/24  1348   WBC K/uL 7.30   Hemoglobin g/dL 12.9   Hematocrit % 38.9   Platelets K/uL 210   Gran % % 67.4   Lymph % % 24.0   Mono % % 6.4   Eosinophil % % 1.1   Basophil % % 0.7   Differential Method  Automated       Metabolic Panel (last 72 hours):  Recent Labs   Lab Result Units 11/28/24  1348   Sodium mmol/L 138   Potassium mmol/L 4.0   Chloride mmol/L 108   CO2 mmol/L 21*   Glucose mg/dL 79   BUN mg/dL 6   Creatinine mg/dL 0.6   Albumin g/dL 4.1   Total Bilirubin mg/dL 0.9   Alkaline Phosphatase U/L 49   AST U/L 16   ALT U/L 16       Drug levels (last 3 results):  No results for input(s): "VANCOMYCINRA", "VANCORANDOM", "VANCOMYCINPE", "VANCOPEAK", "VANCOMYCINTR", "VANCOTROUGH" in the last 72 hours.    Microbiologic Results:  Microbiology Results " (last 7 days)       Procedure Component Value Units Date/Time    Blood culture #2 **CANNOT BE ORDERED STAT** [[id number]] Collected: 11/28/24 1348    Order Status: Sent Specimen: Blood from Peripheral, Antecubital, Right Updated: 11/28/24 1359    Blood culture #1 **CANNOT BE ORDERED STAT** [[id number]] Collected: 11/28/24 1348    Order Status: Sent Specimen: Blood from Peripheral, Antecubital, Left Updated: 11/28/24 2139              Robin Paulino MD  Department of Hospital Medicine  [facility] - Emergency Dept

## 2024-11-28 NOTE — SUBJECTIVE & OBJECTIVE
Past Medical History:   Diagnosis Date    Behavior problem in childhood     COVID-19 08/08/2021    Ectopic pregnancy 5/24/2023    Suicidal ideation        Past Surgical History:   Procedure Laterality Date    BACK SURGERY  2016    Rods in place, had issues with previous epidural    DIAGNOSTIC LAPAROSCOPY N/A 05/29/2023    Procedure: LAPAROSCOPY, DIAGNOSTIC;  Surgeon: Juana Fung MD;  Location: [facility];  Service: OB/GYN;  Laterality: N/A;    DILATION AND CURETTAGE OF UTERUS         Review of patient's allergies indicates:  No Known Allergies    No current facility-administered medications on file prior to encounter.     Current Outpatient Medications on File Prior to Encounter   Medication Sig    cephALEXin (KEFLEX) 500 MG capsule Take 1 capsule (500 mg total) by mouth every 12 (twelve) hours. for 7 days    metroNIDAZOLE (METROGEL) 0.75 % (37.5mg/5 gram) vaginal gel Place 1 applicator vaginally every evening. for 5 days    ondansetron (ZOFRAN-ODT) 4 MG TbDL Take 1 tablet (4 mg total) by mouth every 6 (six) hours as needed (nausea).    polyethylene glycol (GLYCOLAX) 17 gram/dose powder Take 17 g by mouth once daily.    prenatal vit no.130-iron-folic (PRENATAL VITAMIN) 27 mg iron- 800 mcg Tab Take 1 tablet by mouth Daily.     Family History       Problem Relation (Age of Onset)    No Known Problems Father, Mother    Throat cancer Paternal Grandfather          Tobacco Use    Smoking status: Never    Smokeless tobacco: Never   Substance and Sexual Activity    Alcohol use: No    Drug use: No    Sexual activity: Yes     Partners: Male     Birth control/protection: Condom     ROS  General ROS: negative for weight loss, weight gain, fevers, chills, night sweats  ENT ROS: negative for epistaxis, headaches or sinus pain  Ophthalmic ROS: negative for blurry vision, decreased vision, double vision or itchy eyes  Cardiovascular ROS: negative for chest pain or dyspnea on exertion  Respiratory ROS: negative for cough,  shortness of breath, or wheezing  Gastrointestinal ROS: negative for abdominal pain, change in bowel habits, black or bloody stools, nausea, emesis, or bloating  Genito-Urinary ROS: See HPI  Neurological ROS: negative for TIA or stroke symptoms  Endocrine ROS: negative for hair pattern changes or unexpected weight changes  Musculoskeletal ROS: negative for muscle pain, weakness, joint pain or joint swelling  Skin: negative for rash, wounds, skin breakdown, or issues otherwise  Hematological and Lymphatic ROS: negative for bleeding, bruising, swollen lymph nodes  Psychological ROS: negative for anxiety or depression      Objective:     Vital Signs (Most Recent):  Temp: 98.9 °F (37.2 °C) (11/28/24 1308)  Pulse: 73 (11/28/24 1439)  Resp: 20 (11/28/24 1308)  BP: (!) 112/58 (11/28/24 1432)  SpO2: 100 % (11/28/24 1439) Vital Signs (24h Range):  Temp:  [98.9 °F (37.2 °C)] 98.9 °F (37.2 °C)  Pulse:  [73-84] 73  Resp:  [20] 20  SpO2:  [99 %-100 %] 100 %  BP: (108-113)/(58-68) 112/58     Weight: 54.4 kg (120 lb)  Body mass index is 22.67 kg/m².     Physical Exam  Gen: in NAD, appears stated age  Neuro: AAOx4, CN2-12 grossly intact BL; motor, sensory, and strength grossly intact BL  HEENT: NTNC, EOMI  CVS: RRR, no m/r/g; S1/S2 auscultated with no S3 or S4  Resp: lungs CTAB, no w/r/r; no belabored breathing or accessory muscle use appreciated   Abd: NTND  Extrem: no UE or LE edema BL             Significant Labs: All pertinent labs within the past 24 hours have been reviewed.    Significant Imaging: I have reviewed all pertinent imaging results/findings within the past 24 hours.

## 2024-11-28 NOTE — PROGRESS NOTES
"Pharmacokinetic Initial Assessment: IV Vancomycin    Assessment/Plan:    Vancomycin 750 mg IVPB x 1 given in ED.  Administer additional vancomycin 500 mg IVPB x 1 for 1250 mg total load dose.  Continue with vancomycin 750 mg IVPB every 12 hours.  Desired empiric serum trough concentration is 10 to 20 mcg/mL.  Draw vancomycin trough level 60 min prior to fourth dose on 11/30/2024 at 0130.  Pharmacy will continue to follow and monitor vancomycin.      Please contact pharmacy at extension [phone] with any questions regarding this assessment.     Thank you for the consult,   Enio Kidd       Patient brief summary:Paz Cochran is a 19 y.o. female initiated on antimicrobial therapy with IV Vancomycin for treatment of suspected urinary tract infection.    Drug Allergies:   Review of patient's allergies indicates:  No Known Allergies    Actual Body Weight:   54.4 kg    Renal Function:   Estimated Creatinine Clearance: 111.9 mL/min (based on SCr of 0.6 mg/dL).    CBC (last 72 hours):  Recent Labs   Lab Result Units 11/28/24  1348   WBC K/uL 7.30   Hemoglobin g/dL 12.9   Hematocrit % 38.9   Platelets K/uL 210   Gran % % 67.4   Lymph % % 24.0   Mono % % 6.4   Eosinophil % % 1.1   Basophil % % 0.7   Differential Method  Automated       Metabolic Panel (last 72 hours):  Recent Labs   Lab Result Units 11/28/24  1348   Sodium mmol/L 138   Potassium mmol/L 4.0   Chloride mmol/L 108   CO2 mmol/L 21*   Glucose mg/dL 79   BUN mg/dL 6   Creatinine mg/dL 0.6   Albumin g/dL 4.1   Total Bilirubin mg/dL 0.9   Alkaline Phosphatase U/L 49   AST U/L 16   ALT U/L 16       Drug levels (last 3 results):  No results for input(s): "VANCOMYCINRA", "VANCORANDOM", "VANCOMYCINPE", "VANCOPEAK", "VANCOMYCINTR", "VANCOTROUGH" in the last 72 hours.    Microbiologic Results:  Microbiology Results (last 7 days)       Procedure Component Value Units Date/Time    Blood culture #2 **CANNOT BE ORDERED STAT** [[id number]] Collected: 11/28/24 " 1348    Order Status: Sent Specimen: Blood from Peripheral, Antecubital, Right Updated: 11/28/24 0512    Blood culture #1 **CANNOT BE ORDERED STAT** [[id number]] Collected: 11/28/24 1348    Order Status: Sent Specimen: Blood from Peripheral, Antecubital, Left Updated: 11/28/24 2403

## 2024-11-28 NOTE — PROGRESS NOTES
ED Culture Update     Urine culture  Order: [id number]  Status: Final result       Visible to patient: Yes (not seen)       Next appt: 11/29/2024 at 10:00 AM in Obstetrics and Gynecology (Kimberly Kemp CNM)    Specimen Information: Urine   1 Result Note       1 Patient Communication      Component 5 d ago   Urine Culture, Routine  Abnormal   METHICILLIN RESISTANT STAPHYLOCOCCUS AUREUS  10,000 - 49,999 cfu/ml    Resulting Agency [facility]        Susceptibility     Methicillin resistant Staphylococcus aureus     CULTURE, URINE     Oxacillin >2 mcg/mL Resistant     Penicillin >8 mcg/mL Resistant     Trimeth/Sulfa <=0.5/9.5 m... Sensitive     Vancomycin 2 mcg/mL Sensitive               Linear View         Narrative  Performed by: [facility]  Specimen Source->Urine   Specimen Collected: 11/23/24 17:21 CST Last Resulted: 11/28/24 09:58 CST       MRSA UTI. Discussed with ED attending and Dr. South with ID- recommend IV abx therapy. I called the patient twice on the number listed in chart. Straight to voicemail with instruction to call the emergency room back for orders. Also, [facility] message sent. Will attempt to call patient more times today.     Lissa Beard PA-C

## 2024-11-28 NOTE — ASSESSMENT & PLAN NOTE
Patient recently seen on 11/23 for dysuria and lower abdominal pain. No intra-abdominal evidence of infection on abdominal US/MRI. UA was infectious and was discharged on Keflex. Urine culture now growing MRSA and patient returns for IV vanc. No longer having abdominal pain; has some dysuria. No evidence of hematogenous spread to bladder given lack of constitutional/infectious symptoms  - Spoke with ID; will need 3 days of IV Vanc  - Repeat UA  - Will follow up Blood cultures  - Continue Vancomycin

## 2024-11-28 NOTE — ED PROVIDER NOTES
Encounter Date: 2024       History     Chief Complaint   Patient presents with    Abnormal Lab     Called back today, mrsa in urine     The history is provided by the patient and medical records. No  was used.       Yaquelin Cochran is a 19 y.o. female with medical history of  EGA 7 weeks, Hx of ectopic pregnancy, ovarian cyst presenting to the ED with the chief complaint of abnormal lab.     Patient seen in the ED 5 days ago for abdominal pain. UA showed UTI and given RX for Keflex. OB US showed single intrauterine gestation with EGA 6 weeks 4 days with fetal . MRI Abdomen with unremarkable appendix. UCx showed MRSA growth and she was called back to the ED. ID recommended starting Vancomycin per call back DEANGELO. Reports she has been doing well since discharge. NO abdominal pain. No vaginal bleeding, fever, back pain.    Review of patient's allergies indicates:  No Known Allergies  Past Medical History:   Diagnosis Date    Behavior problem in childhood     COVID-19 2021    Ectopic pregnancy 2023    Suicidal ideation      Past Surgical History:   Procedure Laterality Date    BACK SURGERY  2016    Rods in place, had issues with previous epidural    DIAGNOSTIC LAPAROSCOPY N/A 2023    Procedure: LAPAROSCOPY, DIAGNOSTIC;  Surgeon: Juana Fung MD;  Location: [facility];  Service: OB/GYN;  Laterality: N/A;    DILATION AND CURETTAGE OF UTERUS       Family History   Problem Relation Name Age of Onset    Throat cancer Paternal Grandfather      No Known Problems Father      No Known Problems Mother      Breast cancer Neg Hx      Diabetes Neg Hx      Colon cancer Neg Hx      Ovarian cancer Neg Hx       Social History     Tobacco Use    Smoking status: Never    Smokeless tobacco: Never   Substance Use Topics    Alcohol use: No    Drug use: No     Review of Systems   Constitutional:  Negative for fever.       Physical Exam     Initial Vitals [24 1308]   BP Pulse Resp  Temp SpO2   113/68 84 20 98.9 °F (37.2 °C) 99 %      MAP       --         Physical Exam    Constitutional: She appears well-developed and well-nourished. She is not diaphoretic. No distress.   HENT:   Head: Normocephalic and atraumatic. Mouth/Throat: Oropharynx is clear and moist. No oropharyngeal exudate.   Eyes: Conjunctivae and EOM are normal. Pupils are equal, round, and reactive to light. No scleral icterus.   Neck: Neck supple.   Normal range of motion.  Cardiovascular:  Normal rate and regular rhythm.           Pulmonary/Chest: Breath sounds normal. No respiratory distress. She has no wheezes.   Abdominal: Abdomen is soft. She exhibits no distension. There is no abdominal tenderness. There is no rebound.   Musculoskeletal:         General: No tenderness or edema. Normal range of motion.      Cervical back: Normal range of motion and neck supple.     Neurological: She is alert and oriented to person, place, and time. She has normal strength. No sensory deficit.   Skin: Skin is warm and dry. No rash noted. No erythema.   Psychiatric: She has a normal mood and affect.         ED Course   Procedures  Labs Reviewed   COMPREHENSIVE METABOLIC PANEL - Abnormal       Result Value    Sodium 138      Potassium 4.0      Chloride 108      CO2 21 (*)     Glucose 79      BUN 6      Creatinine 0.6      Calcium 9.3      Total Protein 7.2      Albumin 4.1      Total Bilirubin 0.9      Alkaline Phosphatase 49      AST 16      ALT 16      eGFR >60.0      Anion Gap 9     CULTURE, BLOOD   CULTURE, BLOOD   CBC W/ AUTO DIFFERENTIAL    WBC 7.30      RBC 4.30      Hemoglobin 12.9      Hematocrit 38.9      MCV 91      MCH 30.0      MCHC 33.2      RDW 12.5      Platelets 210      MPV 9.8      Immature Granulocytes 0.4      Gran # (ANC) 4.9      Immature Grans (Abs) 0.03      Lymph # 1.8      Mono # 0.5      Eos # 0.1      Baso # 0.05      nRBC 0      Gran % 67.4      Lymph % 24.0      Mono % 6.4      Eosinophil % 1.1      Basophil % 0.7       Differential Method Automated     URINALYSIS, REFLEX TO URINE CULTURE   ISTAT LACTATE    POC Lactate 0.85      Sample VENOUS      Site Other      Allens Test N/A            Imaging Results    None          Medications   sodium chloride 0.9% flush 10 mL (has no administration in time range)   ondansetron injection 4 mg (has no administration in time range)   prochlorperazine injection Soln 5 mg (has no administration in time range)   polyethylene glycol packet 17 g (has no administration in time range)   acetaminophen tablet 650 mg (has no administration in time range)   aluminum-magnesium hydroxide-simethicone 200-200-20 mg/5 mL suspension 30 mL (has no administration in time range)   naloxone 0.4 mg/mL injection 0.02 mg (has no administration in time range)   glucose chewable tablet 16 g (has no administration in time range)   glucose chewable tablet 24 g (has no administration in time range)   glucagon (human recombinant) injection 1 mg (has no administration in time range)   vancomycin - pharmacy to dose (has no administration in time range)   prenatal vitamin oral tablet (has no administration in time range)   dextrose 10% bolus 125 mL 125 mL (has no administration in time range)   dextrose 10% bolus 250 mL 250 mL (has no administration in time range)   vancomycin (VANCOCIN) 500 mg in D5W 100 mL IVPB (MB+) (has no administration in time range)   vancomycin 750 mg in 0.9% NaCl 250 mL IVPB (admixture device) (750 mg Intravenous Trough Due As Scheduled Before Dose 24 0130)   vancomycin 750 mg in 0.9% NaCl 250 mL IVPB (admixture device) (0 mg Intravenous Stopped 24 1613)     Medical Decision Making  19 y.o. female with medical history of  EGA 7 weeks, Hx of ectopic pregnancy, ovarian cyst presenting to the ED referred to the ED after UCx showed MRSA growth. Call back DEANGELO discussed with ID who recommended starting Vancomycin. She has been doing well since her ED visit. No abdominal pain,  vomiting, fever, vaginal bleeding.    Will start Vancomycin for UTI treatment. No vaginal bleeding and do not suspect threatened miscarriage. NO fever or back pain and do not suspect pyelonephritis.     Will discuss with HM for hospitalization.     Amount and/or Complexity of Data Reviewed  Labs: ordered. Decision-making details documented in ED Course.                                      Clinical Impression:  Final diagnoses:  [A49.02] MRSA infection  [N39.0] Urinary tract infection without hematuria, site unspecified (Primary)  [Z34.91] First trimester pregnancy          ED Disposition Condition    Observation Stable                Tadeo Tipton PA-C  11/28/24 6194

## 2024-11-28 NOTE — HPI
Yaquelin Cochran is a 19 year old female who is currently 7 weeks pregnant that presents from home after being notified to come back to the ED for MRSA UTI. Patient was seen here on 11/23 presenting with lower abdominal pain and dysuria. Abdominal US and MRI unremarkable. UA was infectious and she was discharged from ED with Keflex. Since taking Keflex, she reports feeling better but still has some dysuria. She now presents after urine culture revealed to be growing MRSA. She denies SOB, CP, palpitations, fevers, chills, vomiting. Does report nausea but believes this is from her pregnancy.     Upon my evaluation, patient pleasantly conversant, afebrile, HDS, saturating well on room air. I spoke with ID (Dr. South) and they are recommending 3 days of IV vanc as other antibiotics are teratogenic. Blood cultures x2 drawn in ED prior to Vanc administration. Patient admitted to observation with hospital medicine for further evaluation and treatment.

## 2024-11-29 LAB
ALBUMIN SERPL BCP-MCNC: 3.8 G/DL (ref 3.5–5.2)
ALP SERPL-CCNC: 46 U/L (ref 40–150)
ALT SERPL W/O P-5'-P-CCNC: 13 U/L (ref 10–44)
ANION GAP SERPL CALC-SCNC: 7 MMOL/L (ref 8–16)
AST SERPL-CCNC: 13 U/L (ref 10–40)
BACTERIA UR CULT: NO GROWTH
BASOPHILS # BLD AUTO: 0.04 K/UL (ref 0–0.2)
BASOPHILS NFR BLD: 0.5 % (ref 0–1.9)
BILIRUB SERPL-MCNC: 0.6 MG/DL (ref 0.1–1)
BUN SERPL-MCNC: 12 MG/DL (ref 6–20)
CALCIUM SERPL-MCNC: 9 MG/DL (ref 8.7–10.5)
CHLORIDE SERPL-SCNC: 107 MMOL/L (ref 95–110)
CO2 SERPL-SCNC: 22 MMOL/L (ref 23–29)
CREAT SERPL-MCNC: 0.7 MG/DL (ref 0.5–1.4)
DIFFERENTIAL METHOD BLD: ABNORMAL
EOSINOPHIL # BLD AUTO: 0.1 K/UL (ref 0–0.5)
EOSINOPHIL NFR BLD: 1.5 % (ref 0–8)
ERYTHROCYTE [DISTWIDTH] IN BLOOD BY AUTOMATED COUNT: 12.3 % (ref 11.5–14.5)
EST. GFR  (NO RACE VARIABLE): >60 ML/MIN/1.73 M^2
GLUCOSE SERPL-MCNC: 79 MG/DL (ref 70–110)
HCT VFR BLD AUTO: 35.6 % (ref 37–48.5)
HGB BLD-MCNC: 12.3 G/DL (ref 12–16)
IMM GRANULOCYTES # BLD AUTO: 0.03 K/UL (ref 0–0.04)
IMM GRANULOCYTES NFR BLD AUTO: 0.4 % (ref 0–0.5)
LYMPHOCYTES # BLD AUTO: 2.6 K/UL (ref 1–4.8)
LYMPHOCYTES NFR BLD: 30.9 % (ref 18–48)
MCH RBC QN AUTO: 30.4 PG (ref 27–31)
MCHC RBC AUTO-ENTMCNC: 34.6 G/DL (ref 32–36)
MCV RBC AUTO: 88 FL (ref 82–98)
MONOCYTES # BLD AUTO: 0.7 K/UL (ref 0.3–1)
MONOCYTES NFR BLD: 8.2 % (ref 4–15)
NEUTROPHILS # BLD AUTO: 4.8 K/UL (ref 1.8–7.7)
NEUTROPHILS NFR BLD: 58.5 % (ref 38–73)
NRBC BLD-RTO: 0 /100 WBC
PLATELET # BLD AUTO: 185 K/UL (ref 150–450)
PMV BLD AUTO: 9.9 FL (ref 9.2–12.9)
POTASSIUM SERPL-SCNC: 4 MMOL/L (ref 3.5–5.1)
PROT SERPL-MCNC: 6.4 G/DL (ref 6–8.4)
RBC # BLD AUTO: 4.04 M/UL (ref 4–5.4)
SODIUM SERPL-SCNC: 136 MMOL/L (ref 136–145)
WBC # BLD AUTO: 8.25 K/UL (ref 3.9–12.7)

## 2024-11-29 PROCEDURE — 36415 COLL VENOUS BLD VENIPUNCTURE: CPT | Performed by: INTERNAL MEDICINE

## 2024-11-29 PROCEDURE — 85025 COMPLETE CBC W/AUTO DIFF WBC: CPT | Performed by: INTERNAL MEDICINE

## 2024-11-29 PROCEDURE — G0378 HOSPITAL OBSERVATION PER HR: HCPCS

## 2024-11-29 PROCEDURE — 96366 THER/PROPH/DIAG IV INF ADDON: CPT

## 2024-11-29 PROCEDURE — 80053 COMPREHEN METABOLIC PANEL: CPT | Performed by: INTERNAL MEDICINE

## 2024-11-29 PROCEDURE — 25000003 PHARM REV CODE 250: Performed by: INTERNAL MEDICINE

## 2024-11-29 PROCEDURE — 63600175 PHARM REV CODE 636 W HCPCS: Performed by: INTERNAL MEDICINE

## 2024-11-29 RX ADMIN — VANCOMYCIN HYDROCHLORIDE 750 MG: 750 INJECTION, POWDER, LYOPHILIZED, FOR SOLUTION INTRAVENOUS at 02:11

## 2024-11-29 RX ADMIN — PRENATAL VIT W/ FE FUMARATE-FA TAB 27-0.8 MG 1 TABLET: 27-0.8 TAB at 08:11

## 2024-11-29 NOTE — SUBJECTIVE & OBJECTIVE
Interval History: Pt seen and examined this morning on cy SERRANO. Remains afebrile, HDS. Repeat UA infectious. Continues on Vancomycin. Blood cultures NGTD. Denies F/C, abdominal pain. Believes her dysuria is improving. Has some nausea this morning in setting of pregnancy. Care plan reviewed. Otherwise, doing well and with no further complaints at this time.      Objective:     Vital Signs (Most Recent):  Temp: 98.2 °F (36.8 °C) (11/29/24 0835)  Pulse: 70 (11/29/24 0835)  Resp: 17 (11/29/24 0835)  BP: 101/70 (11/29/24 0835)  SpO2: 99 % (11/29/24 0835) Vital Signs (24h Range):  Temp:  [98 °F (36.7 °C)-99.4 °F (37.4 °C)] 98.2 °F (36.8 °C)  Pulse:  [63-84] 70  Resp:  [16-20] 17  SpO2:  [97 %-100 %] 99 %  BP: (100-113)/(57-70) 101/70     Weight: 54.4 kg (119 lb 15.9 oz)  Body mass index is 22.67 kg/m².    Intake/Output Summary (Last 24 hours) at 11/29/2024 0845  Last data filed at 11/28/2024 1613  Gross per 24 hour   Intake 261.26 ml   Output --   Net 261.26 ml         Physical Exam  Gen: in NAD, appears stated age  Neuro: AAOx4, CN2-12 grossly intact BL; motor, sensory, and strength grossly intact BL  HEENT: NTNC, EOMI, PERRLA, MMM; no thyromegaly or lymphadenopathy; no JVD appreciated  CVS: RRR, no m/r/g; S1/S2 auscultated with no S3 or S4; capillary refill < 2 sec  Resp: lungs CTAB, no w/r/r; no belabored breathing or accessory muscle use appreciated   Abd: BS+ in all 4 quadrants; NTND, soft to palpation; no organomegaly appreciated   Extrem: pulses full, equal, and regular over all 4 extremities; no UE or LE edema BL          Significant Labs: All pertinent labs within the past 24 hours have been reviewed.    Significant Imaging: I have reviewed all pertinent imaging results/findings within the past 24 hours.

## 2024-11-29 NOTE — PLAN OF CARE
Problem:  Fall Injury Risk  Goal: Absence of Fall, Infant Drop and Related Injury  Outcome: Progressing     Problem: Infection  Goal: Absence of Infection Signs and Symptoms  Outcome: Progressing     Problem: Adult Inpatient Plan of Care  Goal: Plan of Care Review  Outcome: Progressing  Goal: Patient-Specific Goal (Individualized)  Outcome: Progressing  Goal: Absence of Hospital-Acquired Illness or Injury  Outcome: Progressing  Goal: Optimal Comfort and Wellbeing  Outcome: Progressing  Goal: Readiness for Transition of Care  Outcome: Progressing     Pt progressing towards goals. No distress notice. No falls or injuries during shift. Bed in lowest position, call light within reach, belonging at bedside. Safety precaution maintain.Plan of Care reviewed. Pt verbalized understanding.

## 2024-11-29 NOTE — HOSPITAL COURSE
Patient admitted for MRSA UTI in setting of early pregnancy. Started on Vancomycin. Blood cultures NGTD for >48 hours. Repeat urine culture with no growth. Completed 3 days of Vancomycin and symptoms resolved. Remained afebrile during admission without concern for bacteremic source from MRSA. Patient seen and evaluated on day of discharge. Pt deemed appropriate for discharge. Plan discussed with pt, who was agreeable and amenable; medications were discussed and reviewed, outpatient follow-up scheduled, ER precautions were given, all questions were answered to the pt's satisfaction, and patient was subsequently discharged.

## 2024-11-29 NOTE — NURSING
Pt is AAOx4.  Pt stable no complaints of any No distress noted.   Call light in reach. Bed in low locked position.   Side rails x2. Belongings at bedside.   Pt free of fall and injuries Questions and concerns voiced and answered.    Plan of care continues.

## 2024-11-29 NOTE — ASSESSMENT & PLAN NOTE
Patient recently seen on 11/23 for dysuria and lower abdominal pain. No intra-abdominal evidence of infection on abdominal US/MRI. UA was infectious and was discharged on Keflex. Urine culture now growing MRSA and patient returns for IV vanc. No longer having abdominal pain; has some dysuria. No evidence of hematogenous spread to bladder given lack of constitutional/infectious symptoms  - Spoke with ID; will need 3 days of IV Vanc  - Repeat UA infectious; will f/u Ur Cx  - Blood cultures NGTD  - Continue Vancomycin  - Likely plan to discharge tomorrow after 3rd Vancomycin dose.

## 2024-11-29 NOTE — PROGRESS NOTES
[facility] - Observation [address] [place] [address] Medicine  Progress Note    Patient Name: Yaquelin Cochran  MRN: [id number]  Patient Class: OP- Observation   Admission Date: 11/28/2024  Length of Stay: 0 days  Attending Physician: Robin Paulino MD  Primary Care Provider: Arin, Primary Doctor        Subjective:     Principal Problem:Acute cystitis with hematuria        HPI:  Yaquelin Cochran is a 19 year old female who is currently 7 weeks pregnant that presents from home after being notified to come back to the ED for MRSA UTI. Patient was seen here on 11/23 presenting with lower abdominal pain and dysuria. Abdominal US and MRI unremarkable. UA was infectious and she was discharged from ED with Keflex. Since taking Keflex, she reports feeling better but still has some dysuria. She now presents after urine culture revealed to be growing MRSA. She denies SOB, CP, palpitations, fevers, chills, vomiting. Does report nausea but believes this is from her pregnancy.     Upon my evaluation, patient pleasantly conversant, afebrile, HDS, saturating well on room air. I spoke with ID (Dr. South) and they are recommending 3 days of IV vanc as other antibiotics are teratogenic. Blood cultures x2 drawn in ED prior to Vanc administration. Patient admitted to observation with hospital medicine for further evaluation and treatment.    Overview/Hospital Course:  Patient admitted for MRSA UTI in setting of early pregnancy. Started on Vancomycin. Blood cultures NGTD.     Interval History: Pt seen and examined this morning on rounds. NAEON. Remains afebrile, HDS. Repeat UA infectious. Continues on Vancomycin. Blood cultures NGTD. Denies F/C, abdominal pain. Believes her dysuria is improving. Has some nausea this morning in setting of pregnancy. Care plan reviewed. Otherwise, doing well and with no further complaints at this time.      Objective:     Vital Signs (Most Recent):  Temp: 98.2 °F (36.8 °C) (11/29/24 0835)  Pulse: 70 (11/29/24  0835)  Resp: 17 (11/29/24 0835)  BP: 101/70 (11/29/24 0835)  SpO2: 99 % (11/29/24 0835) Vital Signs (24h Range):  Temp:  [98 °F (36.7 °C)-99.4 °F (37.4 °C)] 98.2 °F (36.8 °C)  Pulse:  [63-84] 70  Resp:  [16-20] 17  SpO2:  [97 %-100 %] 99 %  BP: (100-113)/(57-70) 101/70     Weight: 54.4 kg (119 lb 15.9 oz)  Body mass index is 22.67 kg/m².    Intake/Output Summary (Last 24 hours) at 11/29/2024 0845  Last data filed at 11/28/2024 1613  Gross per 24 hour   Intake 261.26 ml   Output --   Net 261.26 ml         Physical Exam  Gen: in NAD, appears stated age  Neuro: AAOx4, CN2-12 grossly intact BL; motor, sensory, and strength grossly intact BL  HEENT: NTNC, EOMI  CVS: RRR, no m/r/g; S1/S2 auscultated with no S3 or S4  Resp: lungs CTAB, no w/r/r; no belabored breathing or accessory muscle use appreciated   Abd: NTND  Extrem: no UE or LE edema BL          Significant Labs: All pertinent labs within the past 24 hours have been reviewed.    Significant Imaging: I have reviewed all pertinent imaging results/findings within the past 24 hours.    Assessment/Plan:      * Acute cystitis with hematuria  Patient recently seen on 11/23 for dysuria and lower abdominal pain. No intra-abdominal evidence of infection on abdominal US/MRI. UA was infectious and was discharged on Keflex. Urine culture now growing MRSA and patient returns for IV vanc. No longer having abdominal pain; has some dysuria. No evidence of hematogenous spread to bladder given lack of constitutional/infectious symptoms  - Spoke with ID; will need 3 days of IV Vanc  - Repeat UA infectious; will f/u Ur Cx  - Blood cultures NGTD  - Continue Vancomycin  - Likely plan to discharge tomorrow after 3rd Vancomycin dose.    Pregnancy  Patient is 7 weeks pregnant. Had OBGYN appt 11/29 for US; will refer her at discharge for rescheduling.  - Continue prenatal vitamin daily      VTE Risk Mitigation (From admission, onward)           Ordered     IP VTE LOW RISK PATIENT  Once          11/28/24 1439                    Discharge Planning   ANGELY: 11/30/2024     Code Status: Full Code   Is the patient medically ready for discharge?:     Reason for patient still in hospital (select all that apply): Treatment                     Robin Paulino MD  Department of Hospital Medicine   [facility] - Observation 11H

## 2024-11-30 VITALS
RESPIRATION RATE: 14 BRPM | HEART RATE: 69 BPM | TEMPERATURE: 99 F | BODY MASS INDEX: 22.74 KG/M2 | DIASTOLIC BLOOD PRESSURE: 55 MMHG | SYSTOLIC BLOOD PRESSURE: 98 MMHG | OXYGEN SATURATION: 99 % | WEIGHT: 120.44 LBS | HEIGHT: 61 IN

## 2024-11-30 LAB
ALBUMIN SERPL BCP-MCNC: 3.6 G/DL (ref 3.5–5.2)
ALP SERPL-CCNC: 49 U/L (ref 40–150)
ALT SERPL W/O P-5'-P-CCNC: 13 U/L (ref 10–44)
ANION GAP SERPL CALC-SCNC: 9 MMOL/L (ref 8–16)
AST SERPL-CCNC: 14 U/L (ref 10–40)
BACTERIA UR CULT: ABNORMAL
BASOPHILS # BLD AUTO: 0.05 K/UL (ref 0–0.2)
BASOPHILS NFR BLD: 0.6 % (ref 0–1.9)
BILIRUB SERPL-MCNC: 0.4 MG/DL (ref 0.1–1)
BUN SERPL-MCNC: 10 MG/DL (ref 6–20)
CALCIUM SERPL-MCNC: 9.6 MG/DL (ref 8.7–10.5)
CHLORIDE SERPL-SCNC: 108 MMOL/L (ref 95–110)
CO2 SERPL-SCNC: 22 MMOL/L (ref 23–29)
CREAT SERPL-MCNC: 0.8 MG/DL (ref 0.5–1.4)
DIFFERENTIAL METHOD BLD: ABNORMAL
EOSINOPHIL # BLD AUTO: 0.1 K/UL (ref 0–0.5)
EOSINOPHIL NFR BLD: 1.5 % (ref 0–8)
ERYTHROCYTE [DISTWIDTH] IN BLOOD BY AUTOMATED COUNT: 12.4 % (ref 11.5–14.5)
EST. GFR  (NO RACE VARIABLE): >60 ML/MIN/1.73 M^2
GLUCOSE SERPL-MCNC: 85 MG/DL (ref 70–110)
HCT VFR BLD AUTO: 35.4 % (ref 37–48.5)
HGB BLD-MCNC: 12.2 G/DL (ref 12–16)
IMM GRANULOCYTES # BLD AUTO: 0.02 K/UL (ref 0–0.04)
IMM GRANULOCYTES NFR BLD AUTO: 0.2 % (ref 0–0.5)
LYMPHOCYTES # BLD AUTO: 2.4 K/UL (ref 1–4.8)
LYMPHOCYTES NFR BLD: 27.1 % (ref 18–48)
MCH RBC QN AUTO: 30.6 PG (ref 27–31)
MCHC RBC AUTO-ENTMCNC: 34.5 G/DL (ref 32–36)
MCV RBC AUTO: 89 FL (ref 82–98)
MONOCYTES # BLD AUTO: 0.7 K/UL (ref 0.3–1)
MONOCYTES NFR BLD: 7.5 % (ref 4–15)
NEUTROPHILS # BLD AUTO: 5.6 K/UL (ref 1.8–7.7)
NEUTROPHILS NFR BLD: 63.1 % (ref 38–73)
NRBC BLD-RTO: 0 /100 WBC
PLATELET # BLD AUTO: 231 K/UL (ref 150–450)
PMV BLD AUTO: 9.6 FL (ref 9.2–12.9)
POTASSIUM SERPL-SCNC: 4.1 MMOL/L (ref 3.5–5.1)
PROT SERPL-MCNC: 6.8 G/DL (ref 6–8.4)
RBC # BLD AUTO: 3.99 M/UL (ref 4–5.4)
SODIUM SERPL-SCNC: 139 MMOL/L (ref 136–145)
VANCOMYCIN TROUGH SERPL-MCNC: 3.3 UG/ML (ref 10–22)
WBC # BLD AUTO: 8.79 K/UL (ref 3.9–12.7)

## 2024-11-30 PROCEDURE — 63600175 PHARM REV CODE 636 W HCPCS: Performed by: INTERNAL MEDICINE

## 2024-11-30 PROCEDURE — 85025 COMPLETE CBC W/AUTO DIFF WBC: CPT | Performed by: INTERNAL MEDICINE

## 2024-11-30 PROCEDURE — 25000003 PHARM REV CODE 250: Performed by: INTERNAL MEDICINE

## 2024-11-30 PROCEDURE — 80202 ASSAY OF VANCOMYCIN: CPT | Performed by: INTERNAL MEDICINE

## 2024-11-30 PROCEDURE — 96366 THER/PROPH/DIAG IV INF ADDON: CPT

## 2024-11-30 PROCEDURE — 80053 COMPREHEN METABOLIC PANEL: CPT | Performed by: INTERNAL MEDICINE

## 2024-11-30 PROCEDURE — G0378 HOSPITAL OBSERVATION PER HR: HCPCS

## 2024-11-30 PROCEDURE — 36415 COLL VENOUS BLD VENIPUNCTURE: CPT | Performed by: INTERNAL MEDICINE

## 2024-11-30 RX ADMIN — SODIUM CHLORIDE 1000 MG: 9 INJECTION, SOLUTION INTRAVENOUS at 10:11

## 2024-11-30 RX ADMIN — PRENATAL VIT W/ FE FUMARATE-FA TAB 27-0.8 MG 1 TABLET: 27-0.8 TAB at 10:11

## 2024-11-30 RX ADMIN — VANCOMYCIN HYDROCHLORIDE 750 MG: 750 INJECTION, POWDER, LYOPHILIZED, FOR SOLUTION INTRAVENOUS at 02:11

## 2024-11-30 NOTE — DISCHARGE SUMMARY
[facility] - Observation [address] [place] [address] Medicine  Discharge Summary      Patient Name: Yaquelin Cochran  MRN: [id number]  BEHZAD: [id number]  Patient Class: OP- Observation  Admission Date: 11/28/2024  Hospital Length of Stay: 0 days  Discharge Date and Time:  11/30/2024 10:06 AM  Attending Physician: Robin Paulino MD   Discharging Provider: Robin Paulino MD  Primary Care Provider: Arin Primary Doctor  [facility] Medicine Team: [facility] MED G Robin Paulino MD  Primary Care Team: [facility] MED G    HPI:   Yaquelin Cochran is a 19 year old female who is currently 7 weeks pregnant that presents from home after being notified to come back to the ED for MRSA UTI. Patient was seen here on 11/23 presenting with lower abdominal pain and dysuria. Abdominal US and MRI unremarkable. UA was infectious and she was discharged from ED with Keflex. Since taking Keflex, she reports feeling better but still has some dysuria. She now presents after urine culture revealed to be growing MRSA. She denies SOB, CP, palpitations, fevers, chills, vomiting. Does report nausea but believes this is from her pregnancy.     Upon my evaluation, patient pleasantly conversant, afebrile, HDS, saturating well on room air. I spoke with ID (Dr. South) and they are recommending 3 days of IV vanc as other antibiotics are teratogenic. Blood cultures x2 drawn in ED prior to Vanc administration. Patient admitted to observation with hospital medicine for further evaluation and treatment.    * No surgery found *      Hospital Course:   Patient admitted for MRSA UTI in setting of early pregnancy. Started on Vancomycin. Blood cultures NGTD for >48 hours. Repeat urine culture with no growth. Completed 3 days of Vancomycin and symptoms resolved. Remained afebrile during admission without concern for bacteremic source from MRSA. Patient seen and evaluated on day of discharge. Pt deemed appropriate for discharge. Plan discussed with pt, who was agreeable and  "amenable; medications were discussed and reviewed, outpatient follow-up scheduled, ER precautions were given, all questions were answered to the pt's satisfaction, and patient was subsequently discharged.       Goals of Care Treatment Preferences:  Code Status: Full Code          Social Drivers of Health with Concerns     Food Insecurity: Food Insecurity Present (11/28/2024)        Consults:   Consults (From admission, onward)          Status Ordering Provider     Pharmacy to dose Vancomycin consult  Once        Provider:  (Not yet assigned)   Placed in "And" Linked Group    Acknowledged JACKY PLAZA            No new Assessment & Plan notes have been filed under this hospital service since the last note was generated.  Service: Hospital Medicine    Final Active Diagnoses:    Diagnosis Date Noted POA    PRINCIPAL PROBLEM:  Acute cystitis with hematuria [N30.01] 11/28/2024 Yes    Pregnancy [Z34.90] 11/06/2023 Not Applicable      Problems Resolved During this Admission:       Discharged Condition: good    Disposition: Home or Self Care    Follow Up:    Patient Instructions:   No discharge procedures on file.    Significant Diagnostic Studies: N/A    Pending Diagnostic Studies:       None           Medications:  Reconciled Home Medications:      Medication List        CONTINUE taking these medications      ondansetron 4 MG Tbdl  Commonly known as: ZOFRAN-ODT  Take 1 tablet (4 mg total) by mouth every 6 (six) hours as needed (nausea).     polyethylene glycol 17 gram/dose powder  Commonly known as: GLYCOLAX  Take 17 g by mouth once daily.     PRENATAL VITAMIN 27 mg iron- 0.8 mg Tab  Generic drug: prenatal vit no.130-iron-folic  Take 1 tablet by mouth Daily.            STOP taking these medications      cephALEXin 500 MG capsule  Commonly known as: KEFLEX     metroNIDAZOLE 0.75 % (37.5mg/5 gram) vaginal gel  Commonly known as: METROGEL              Indwelling Lines/Drains at time of discharge:   Lines/Drains/Airways  "      None                   Time spent on the discharge of patient: 35 minutes         Robin Paulino MD  Department of Hospital Medicine  [facility] - Observ[facility]

## 2024-11-30 NOTE — PLAN OF CARE
CM noted discharge order. Patient to discharge after receiving last dose of Vancomycin at 10 AM.      Susana Ochoa RN  Weekend  - [facility]  Phone: [phone]

## 2024-11-30 NOTE — PROGRESS NOTES
Pharmacokinetic Assessment Follow Up: IV Vancomycin    Vancomycin Assessment/Plan:    The random level was drawn incorrectly and cannot be used to guide therapy at this time (~1 hr late)  Renal function stable, Scr trending up slightly  Patient is pregnant   Plan to discharge today, will give vancomycin 1000 mg x1 for last dose of treatment    Drug levels (last 3 results):  Recent Labs   Lab Result Units 11/30/24  0221   Vancomycin-Trough ug/mL 3.3*       Pharmacy will continue to follow and monitor vancomycin.    Please contact pharmacy at extension [phone] for questions regarding this assessment.    Thank you for the consult,   Camila Cazares PharmD       Patient brief summary:  Yaquelin Cochran is a 19 y.o. female initiated on antimicrobial therapy with IV Vancomycin for treatment of urinary tract infection    The patient's current regimen is 750 mg q12h    Drug Allergies:   Review of patient's allergies indicates:  No Known Allergies    Actual Body Weight:   54.6 kg    Renal Function:   Estimated Creatinine Clearance: 83.9 mL/min (based on SCr of 0.8 mg/dL).,     Dialysis Method (if applicable):  N/A    CBC (last 72 hours):  Recent Labs   Lab Result Units 11/28/24  1348 11/29/24  0323 11/30/24  0221   WBC K/uL 7.30 8.25 8.79   Hemoglobin g/dL 12.9 12.3 12.2   Hematocrit % 38.9 35.6* 35.4*   Platelets K/uL 210 185 231   Gran % % 67.4 58.5 63.1   Lymph % % 24.0 30.9 27.1   Mono % % 6.4 8.2 7.5   Eosinophil % % 1.1 1.5 1.5   Basophil % % 0.7 0.5 0.6   Differential Method  Automated Automated Automated       Metabolic Panel (last 72 hours):  Recent Labs   Lab Result Units 11/28/24  1348 11/28/24  1725 11/29/24  0323 11/30/24  0221   Sodium mmol/L 138  --  136 139   Potassium mmol/L 4.0  --  4.0 4.1   Chloride mmol/L 108  --  107 108   CO2 mmol/L 21*  --  22* 22*   Glucose mg/dL 79  --  79 85   Glucose, UA   --  Negative  --   --    BUN mg/dL 6  --  12 10   Creatinine mg/dL 0.6  --  0.7 0.8   Albumin g/dL 4.1  --   3.8 3.6   Total Bilirubin mg/dL 0.9  --  0.6 0.4   Alkaline Phosphatase U/L 49  --  46 49   AST U/L 16  --  13 14   ALT U/L 16  --  13 13       Vancomycin Administrations:  vancomycin given in the last 96 hours                     vancomycin 750 mg in 0.9% NaCl 250 mL IVPB (admixture device) (mg) 750 mg New Bag 11/30/24 0230     750 mg New Bag 11/29/24 1430     750 mg New Bag  0201    vancomycin (VANCOCIN) 500 mg in D5W 100 mL IVPB (MB+) (mg) 500 mg New Bag 11/28/24 1710    vancomycin 750 mg in 0.9% NaCl 250 mL IVPB (admixture device) (mg) 750 mg New Bag 11/28/24 1437                    Microbiologic Results:  Microbiology Results (last 7 days)       Procedure Component Value Units Date/Time    Urine culture [[id number]] Collected: 11/28/24 1725    Order Status: Completed Specimen: Urine Updated: 11/29/24 2150     Urine Culture, Routine No growth    Narrative:      Specimen Source->Urine    Blood culture #1 **CANNOT BE ORDERED STAT** [[id number]] Collected: 11/28/24 1348    Order Status: Completed Specimen: Blood from Peripheral, Antecubital, Left Updated: 11/29/24 1612     Blood Culture, Routine No Growth to date      No Growth to date    Blood culture #2 **CANNOT BE ORDERED STAT** [[id number]] Collected: 11/28/24 1348    Order Status: Completed Specimen: Blood from Peripheral, Antecubital, Right Updated: 11/29/24 1612     Blood Culture, Routine No Growth to date      No Growth to date

## 2024-11-30 NOTE — PLAN OF CARE
Problem:  Fall Injury Risk  Goal: Absence of Fall, Infant Drop and Related Injury  Outcome: Met     Problem: Adult Inpatient Plan of Care  Goal: Plan of Care Review  Outcome: Met  Goal: Patient-Specific Goal (Individualized)  Outcome: Met  Goal: Absence of Hospital-Acquired Illness or Injury  Outcome: Met  Goal: Optimal Comfort and Wellbeing  Outcome: Met  Goal: Readiness for Transition of Care  Outcome: Met     Problem: Infection  Goal: Absence of Infection Signs and Symptoms  Outcome: Met  Patient ready for discharge. Discharge documentation provided. Questions answered. Patient would like to walk out.

## 2024-11-30 NOTE — PLAN OF CARE
[facility] - Observation 11H  Discharge Assessment    Primary Care Provider: No, Primary Doctor     Discharge Assessment (most recent)       BRIEF DISCHARGE ASSESSMENT - 11/29/24 1700          Discharge Planning    Assessment Type Discharge Planning Brief Assessment     Resource/Environmental Concerns none     Equipment Currently Used at Home none     Current Living Arrangements home     Patient/Family Anticipates Transition to home     Patient/Family Anticipated Services at Transition none     DME Needed Upon Discharge  none     Discharge Plan A Home     Discharge Plan B Home                 Patient admitted for MRSA UTI in setting of early pregnancy. Started on IV Vancomycin. Plans to discharge home with no needs.    Discharge Plan A and Plan B have been determined by review of patient's clinical status, future medical and therapeutic needs, and coverage/benefits for post-acute care in coordination with multidisciplinary team members.     Susana Ochoa RN  Weekend  - [facility]  Phone: [phone]

## 2024-12-01 NOTE — PLAN OF CARE
[facility] - Observation 11H  Discharge Final Note    Primary Care Provider: No, Primary Doctor    Expected Discharge Date: 11/30/2024    Patient cleared for discharge from case management standpoint.    Final Discharge Note (most recent)       Final Note - 11/30/24 1630          Final Note    Assessment Type Final Discharge Note     Anticipated Discharge Disposition Home or Self Care     What phone number can be called within the next 1-3 days to see how you are doing after discharge? [phone]     Hospital Resources/Appts/Education Provided Provided patient/caregiver with written discharge plan information        Post-Acute Status    Discharge Delays None known at this time                 Discharge Plan A and Plan B have been determined by review of patient's clinical status, future medical and therapeutic needs, and coverage/benefits for post-acute care in coordination with multidisciplinary team members.     Susana Ochoa RN  Weekend  - [place] [facility]  Phone: [phone]

## 2024-12-03 LAB
BACTERIA BLD CULT: NORMAL
BACTERIA BLD CULT: NORMAL

## 2024-12-04 ENCOUNTER — HOSPITAL ENCOUNTER (OUTPATIENT)
Facility: OTHER | Age: 19
Discharge: HOME | End: 2024-12-05
Attending: EMERGENCY MEDICINE | Admitting: EMERGENCY MEDICINE
Payer: MEDICAID

## 2024-12-04 DIAGNOSIS — R10.30 LOWER ABDOMINAL PAIN: ICD-10-CM

## 2024-12-04 DIAGNOSIS — R11.2 NAUSEA AND VOMITING, UNSPECIFIED VOMITING TYPE: Primary | ICD-10-CM

## 2024-12-04 DIAGNOSIS — O20.9 VAGINAL BLEEDING IN PREGNANCY, FIRST TRIMESTER: ICD-10-CM

## 2024-12-04 DIAGNOSIS — R42 DIZZINESS: ICD-10-CM

## 2024-12-04 DIAGNOSIS — O21.0 HYPEREMESIS GRAVIDARUM: ICD-10-CM

## 2024-12-04 LAB
ALBUMIN SERPL BCP-MCNC: 4.4 G/DL (ref 3.5–5.2)
ALP SERPL-CCNC: 48 U/L (ref 40–150)
ALT SERPL W/O P-5'-P-CCNC: 13 U/L (ref 10–44)
ANION GAP SERPL CALC-SCNC: 10 MMOL/L (ref 8–16)
AST SERPL-CCNC: 16 U/L (ref 10–40)
BACTERIA #/AREA URNS HPF: ABNORMAL /HPF
BACTERIA GENITAL QL WET PREP: ABNORMAL
BASOPHILS # BLD AUTO: 0.05 K/UL (ref 0–0.2)
BASOPHILS NFR BLD: 0.6 % (ref 0–1.9)
BILIRUB SERPL-MCNC: 0.7 MG/DL (ref 0.1–1)
BILIRUB UR QL STRIP: NEGATIVE
BUN SERPL-MCNC: 12 MG/DL (ref 6–20)
CALCIUM SERPL-MCNC: 10.1 MG/DL (ref 8.7–10.5)
CHLORIDE SERPL-SCNC: 105 MMOL/L (ref 95–110)
CK SERPL-CCNC: 54 U/L (ref 20–180)
CLARITY UR: ABNORMAL
CLUE CELLS VAG QL WET PREP: ABNORMAL
CO2 SERPL-SCNC: 23 MMOL/L (ref 23–29)
COLOR UR: YELLOW
CREAT SERPL-MCNC: 0.7 MG/DL (ref 0.5–1.4)
CTP QC/QA: YES
CTP QC/QA: YES
DIFFERENTIAL METHOD BLD: ABNORMAL
EOSINOPHIL # BLD AUTO: 0.1 K/UL (ref 0–0.5)
EOSINOPHIL NFR BLD: 0.6 % (ref 0–8)
ERYTHROCYTE [DISTWIDTH] IN BLOOD BY AUTOMATED COUNT: 12.2 % (ref 11.5–14.5)
EST. GFR  (NO RACE VARIABLE): >60 ML/MIN/1.73 M^2
FILAMENT FUNGI VAG WET PREP-#/AREA: ABNORMAL
GLUCOSE SERPL-MCNC: 99 MG/DL (ref 70–110)
GLUCOSE UR QL STRIP: NEGATIVE
HCG INTACT+B SERPL-ACNC: NORMAL MIU/ML
HCT VFR BLD AUTO: 39.2 % (ref 37–48.5)
HGB BLD-MCNC: 13.7 G/DL (ref 12–16)
HGB UR QL STRIP: NEGATIVE
IMM GRANULOCYTES # BLD AUTO: 0.03 K/UL (ref 0–0.04)
IMM GRANULOCYTES NFR BLD AUTO: 0.4 % (ref 0–0.5)
KETONES UR QL STRIP: ABNORMAL
LDH SERPL L TO P-CCNC: 0.69 MMOL/L (ref 0.5–2.2)
LEUKOCYTE ESTERASE UR QL STRIP: ABNORMAL
LYMPHOCYTES # BLD AUTO: 1.5 K/UL (ref 1–4.8)
LYMPHOCYTES NFR BLD: 18.5 % (ref 18–48)
MAGNESIUM SERPL-MCNC: 1.8 MG/DL (ref 1.6–2.6)
MCH RBC QN AUTO: 30.9 PG (ref 27–31)
MCHC RBC AUTO-ENTMCNC: 34.9 G/DL (ref 32–36)
MCV RBC AUTO: 89 FL (ref 82–98)
MICROSCOPIC COMMENT: ABNORMAL
MONOCYTES # BLD AUTO: 0.4 K/UL (ref 0.3–1)
MONOCYTES NFR BLD: 5.3 % (ref 4–15)
NEUTROPHILS # BLD AUTO: 6 K/UL (ref 1.8–7.7)
NEUTROPHILS NFR BLD: 74.6 % (ref 38–73)
NITRITE UR QL STRIP: NEGATIVE
NRBC BLD-RTO: 0 /100 WBC
PH UR STRIP: 6 [PH] (ref 5–8)
PLATELET # BLD AUTO: 249 K/UL (ref 150–450)
PMV BLD AUTO: 9.4 FL (ref 9.2–12.9)
POC IONIZED CALCIUM: 1.27 MMOL/L (ref 1.06–1.42)
POC MOLECULAR INFLUENZA A AGN: NEGATIVE
POC MOLECULAR INFLUENZA B AGN: NEGATIVE
POTASSIUM BLD-SCNC: 3.8 MMOL/L (ref 3.5–5.1)
POTASSIUM SERPL-SCNC: 4 MMOL/L (ref 3.5–5.1)
PROT SERPL-MCNC: 7.5 G/DL (ref 6–8.4)
PROT UR QL STRIP: ABNORMAL
RBC # BLD AUTO: 4.43 M/UL (ref 4–5.4)
RBC #/AREA URNS HPF: 4 /HPF (ref 0–4)
SAMPLE: NORMAL
SAMPLE: NORMAL
SARS-COV-2 RDRP RESP QL NAA+PROBE: NEGATIVE
SODIUM BLD-SCNC: 138 MMOL/L (ref 136–145)
SODIUM SERPL-SCNC: 138 MMOL/L (ref 136–145)
SP GR UR STRIP: 1.03 (ref 1–1.03)
SPECIMEN SOURCE: ABNORMAL
SQUAMOUS #/AREA URNS HPF: 30 /HPF
T VAGINALIS GENITAL QL WET PREP: ABNORMAL
URN SPEC COLLECT METH UR: ABNORMAL
UROBILINOGEN UR STRIP-ACNC: NEGATIVE EU/DL
WBC # BLD AUTO: 8.06 K/UL (ref 3.9–12.7)
WBC #/AREA URNS HPF: 40 /HPF (ref 0–5)
WBC #/AREA VAG WET PREP: ABNORMAL
YEAST GENITAL QL WET PREP: ABNORMAL

## 2024-12-04 PROCEDURE — 96361 HYDRATE IV INFUSION ADD-ON: CPT

## 2024-12-04 PROCEDURE — 83735 ASSAY OF MAGNESIUM: CPT | Performed by: NURSE PRACTITIONER

## 2024-12-04 PROCEDURE — 85025 COMPLETE CBC W/AUTO DIFF WBC: CPT | Performed by: NURSE PRACTITIONER

## 2024-12-04 PROCEDURE — 84702 CHORIONIC GONADOTROPIN TEST: CPT | Performed by: EMERGENCY MEDICINE

## 2024-12-04 PROCEDURE — 82550 ASSAY OF CK (CPK): CPT | Performed by: NURSE PRACTITIONER

## 2024-12-04 PROCEDURE — 96374 THER/PROPH/DIAG INJ IV PUSH: CPT | Mod: 59

## 2024-12-04 PROCEDURE — 81000 URINALYSIS NONAUTO W/SCOPE: CPT | Performed by: NURSE PRACTITIONER

## 2024-12-04 PROCEDURE — 87210 SMEAR WET MOUNT SALINE/INK: CPT | Performed by: EMERGENCY MEDICINE

## 2024-12-04 PROCEDURE — 83605 ASSAY OF LACTIC ACID: CPT

## 2024-12-04 PROCEDURE — 87086 URINE CULTURE/COLONY COUNT: CPT | Performed by: NURSE PRACTITIONER

## 2024-12-04 PROCEDURE — 87635 SARS-COV-2 COVID-19 AMP PRB: CPT | Performed by: EMERGENCY MEDICINE

## 2024-12-04 PROCEDURE — 96375 TX/PRO/DX INJ NEW DRUG ADDON: CPT

## 2024-12-04 PROCEDURE — 80053 COMPREHEN METABOLIC PANEL: CPT | Performed by: NURSE PRACTITIONER

## 2024-12-04 PROCEDURE — 99900035 HC TECH TIME PER 15 MIN (STAT)

## 2024-12-04 PROCEDURE — 84132 ASSAY OF SERUM POTASSIUM: CPT

## 2024-12-04 PROCEDURE — 93010 ELECTROCARDIOGRAM REPORT: CPT | Mod: ,,, | Performed by: INTERNAL MEDICINE

## 2024-12-04 PROCEDURE — 87040 BLOOD CULTURE FOR BACTERIA: CPT | Mod: 59 | Performed by: EMERGENCY MEDICINE

## 2024-12-04 PROCEDURE — 25000003 PHARM REV CODE 250: Performed by: EMERGENCY MEDICINE

## 2024-12-04 PROCEDURE — 93005 ELECTROCARDIOGRAM TRACING: CPT

## 2024-12-04 PROCEDURE — 84295 ASSAY OF SERUM SODIUM: CPT

## 2024-12-04 PROCEDURE — 63600175 PHARM REV CODE 636 W HCPCS: Performed by: EMERGENCY MEDICINE

## 2024-12-04 PROCEDURE — 96365 THER/PROPH/DIAG IV INF INIT: CPT

## 2024-12-04 PROCEDURE — 82330 ASSAY OF CALCIUM: CPT

## 2024-12-04 RX ORDER — ONDANSETRON HYDROCHLORIDE 2 MG/ML
8 INJECTION, SOLUTION INTRAVENOUS ONCE AS NEEDED
Status: COMPLETED | OUTPATIENT
Start: 2024-12-04 | End: 2024-12-04

## 2024-12-04 RX ORDER — DOCUSATE SODIUM 100 MG
600 CAPSULE ORAL
Status: DISCONTINUED | OUTPATIENT
Start: 2024-12-04 | End: 2024-12-05 | Stop reason: HOSPADM

## 2024-12-04 RX ORDER — METOCLOPRAMIDE HYDROCHLORIDE 5 MG/ML
10 INJECTION INTRAMUSCULAR; INTRAVENOUS ONCE AS NEEDED
Status: COMPLETED | OUTPATIENT
Start: 2024-12-04 | End: 2024-12-04

## 2024-12-04 RX ADMIN — SODIUM CHLORIDE 500 ML: 9 INJECTION, SOLUTION INTRAVENOUS at 07:12

## 2024-12-04 RX ADMIN — PROMETHAZINE HYDROCHLORIDE 12.5 MG: 25 INJECTION INTRAMUSCULAR; INTRAVENOUS at 11:12

## 2024-12-04 RX ADMIN — METOCLOPRAMIDE 10 MG: 5 INJECTION, SOLUTION INTRAMUSCULAR; INTRAVENOUS at 05:12

## 2024-12-04 RX ADMIN — Medication 600 ML: at 05:12

## 2024-12-04 RX ADMIN — ONDANSETRON 8 MG: 2 INJECTION INTRAMUSCULAR; INTRAVENOUS at 05:12

## 2024-12-04 RX ADMIN — SODIUM CHLORIDE, POTASSIUM CHLORIDE, SODIUM LACTATE AND CALCIUM CHLORIDE 1000 ML: 600; 310; 30; 20 INJECTION, SOLUTION INTRAVENOUS at 10:12

## 2024-12-04 RX ADMIN — SODIUM CHLORIDE 500 ML: 0.9 INJECTION, SOLUTION INTRAVENOUS at 07:12

## 2024-12-04 NOTE — ED PROVIDER NOTES
"  Source of History:  Medical record, patient      Chief complaint:  Per triage note: "Dizziness (G5 ~9 wk GA intermittent dizziness since dc from  11/29; treated for UTI with IV ABX. Also c/o fatigue, n/v, blurred vision, spotting, vaginal discharge and vaginal pain. )  "    HPI:    Patient presents dizziness, generalized weakness has been more prominent, and persistent since discharge on November 29 for MRSA UTI treated with 3 days IV vanc.  Patient states that when she got home after discharge, she felt shaky, had trouble focusing when she got in the shower, and had some difficulty operating that controls.  She has since developed gradually worsening nausea, vomiting.  She states that she has not been able to tolerate any foods, and most liquids for about a week.  She denies any fevers.  She denies any urinary symptoms.  She notes recurrence of intermittent vaginal spotting, has vulvar discomfort and irritation, as well as a new weird white discharge.  She also describes bilateral pelvic pain worse on the right.  She denies any associated back pain.      ROS:   See HPI for pertinent review of systems        Review of patient's allergies indicates:  No Known Allergies    PMH:  As per HPI and below:  Past Medical History:   Diagnosis Date    Behavior problem in childhood     COVID-19 08/08/2021    Ectopic pregnancy 5/24/2023    Suicidal ideation        Past Surgical History:   Procedure Laterality Date    BACK SURGERY  2016    Rods in place, had issues with previous epidural    DIAGNOSTIC LAPAROSCOPY N/A 05/29/2023    Procedure: LAPAROSCOPY, DIAGNOSTIC;  Surgeon: Juana Fung MD;  Location: [facility];  Service: OB/GYN;  Laterality: N/A;    DILATION AND CURETTAGE OF UTERUS         Social History     Tobacco Use    Smoking status: Never    Smokeless tobacco: Never   Substance Use Topics    Alcohol use: No    Drug use: No       Physical Exam:      Nursing note and vitals reviewed.  /62   Pulse 98   Temp " "98 °F (36.7 °C) (Oral)   Resp 18   Ht 5' 1" (1.549 m)   Wt 54.4 kg (120 lb)   LMP 09/29/2024 (Exact Date)   SpO2 98%   Breastfeeding No   BMI 22.67 kg/m²     Constitutional: No distress.  Eyes: EOMI. No discharge. Anicteric.  HENT:   Neck: Normal range of motion. Neck supple.  Cardiovascular: Normal rate. No murmur, no gallop and no friction rub heard.   Pulmonary/Chest: No respiratory distress. Effort normal. No wheezes, no rales, no rhonchi.   Abdominal: Bowel sounds normal. Soft. No distension and no mass. There is no tenderness. There is no rebound, no guarding, no tenderness at McBurney's point.  Neurological: GCS 15. Alert and oriented to person, place, and time. No gross cranial nerve, light touch or strength deficit. Coordination normal.   Skin: Skin is warm and dry.   MSK: 2+ radial pulses.  No CVA tenderness.    Genitourinary:   External exam with white discharge and vulvar irritation, mild no labial tenderness, no external lesions. Speculum exam with no bleeding, white thick discharge, no friability.   No cervical motion tenderness, no adnexal tenderness, no adnexal masses or fullness.  Chaperoned by tech or RN.           Medical Decision Making / Independent Interpretations / External Records Reviewed:        --  EKG Interpretation: normal sinus rhythm at 65 beats per minute, no STEMI, no significant acute ST/T abnormalities, normal intervals.  No acute change compared to prior tracing.  --    ED Course as of 12/05/24 0137   Wed Dec 04, 2024   1624 Pt is a 19 F at approx 8 wks gestation, recent admission for MRSA UTI in early pregnancy treated with Vanc x3 days who presents with dizziness, generalized weakness, nausea, vomiting, vaginal spotting since she was discharged on November 30.  She denies any fevers, back pain.  She states that she was not tolerate any oral intake in about a week.   On exam, patient has dry mucous membranes, weak appearing.  The initial differential included dehydration, " gross metabolic abnormality, rhabdomyolysis, hyperemesis gravidarum.  Bacteremia was considered but appears unlikely.   --  EKG Interpretation: normal sinus rhythm at 65 beats per minute, no STEMI, no significant acute ST/T abnormalities, normal intervals.  No acute change compared to prior tracing.  --   [RC]   1856 Patient's fluids have been infusing very slowly.  She has only completed about 100 25 mL of normal saline.  On my reassessment with the patient supine, she states that her nausea is improved, however repeat blood pressure is 87/51.   I independently interpreted labs which are notable for unremarkable CBC and CMP, beta hCG 180k.    [RC]   2042 Patient states that she was hungry.  We will retry oral fluid challenge - she states that she does not like pedialyte but willing to try water.  [RC]   2146 On reassessment, patient reports minimal improvement.  She was only drank few 100 cc of water.  She does not feel comfortable with discharge.  I offered her admission.  Her blood pressure is normalized. [RC]   2206 GYN paged.  [RC]   2208 Repeat BP 90/50.  [RC]   2225 Patient history, findings, results, patient management discussed with OBGYN resident Dr. Givens.  She recommends trial of a third antiemetic agent, additional IV fluids.  We will reassess patient after these.     [RC]   2330 Patient voided.  I saw her walk from the bathroom without any difficulty.  She clinically appeared much better.  She states that she still has a fair amount of dizziness.  She also notes some dysuria when she voided, and vulvar irritation. [RC]   Thu Dec 05, 2024   0043 Patient history, findings, results, patient management discussed with resident Dr. Givens.  After review of the patient's recent urine cultures, she does not feel antibiotics are indicated.  She notes the patient has pyuria and leukocytes in her urine.  She feels that this is likely due to her vaginal discharge.  She also notes that the patient recently received  vancomycin, with recent negative cultures.  On reassessment, patient has mild improvement, tolerated some oral intake, but repeat blood pressure is 91/60 after 2 L IV fluids.  [RC]   0100 Patient history, findings, results, patient management discussed with hospitalist CHARLOTTE Kasper for possible admission to the hospitalist service.  On her review of the patient's chart, she notes that the patient's baseline blood pressure is appeared to be 90s/50s has almost all readings during her recent admission or in this range.  She had a single blood pressure in the 110s during that admission.  She also notes that the patient's blood pressures have been trending upwards.  She was not feel that patient meets hospital admission criteria.  We will place patient on EDOU.   [RC]   0136   =====================================  Critical Care:  40 minutes total critical care time was personally spent by me, exclusive of procedures and separately billable time.   Critical care was necessary to treat or prevent imminent or life-threatening deterioration of the following conditions:  Hyperemesis gravidarum, dehydration, gross metabolic abnormality, hypotension, acute kidney injury.    =====================================     [RC]      ED Course User Index  [RC] Lencho Ca MD              Procedures    --  I decided to obtain the patient's medical records. I reviewed patient's prior external notes / results: inpatient admission documentation.   --  Additional Medical Decision Making: Hospitalization or escalation of care considered and Parenteral controlled substance ordered or considered    Pt seen at a time of critical national shortage of IV fluids, affecting decision making and treatment considerations.       Medications   electrolytes-dextrose (Pedialyte) oral solution 600 mL (600 mLs Oral Not Given 12/4/24 2215)   lactated ringers bolus 1,000 mL (1,000 mLs Intravenous New Bag 12/5/24 0103)   sodium chloride 0.9% bolus 500 mL  500 mL (0 mLs Intravenous Stopped 12/4/24 2047)   metoclopramide injection 10 mg (10 mg Intravenous Given 12/4/24 1722)   ondansetron injection 8 mg (8 mg Intravenous Given 12/4/24 1719)   sodium chloride 0.9% bolus 500 mL 500 mL (0 mLs Intravenous Stopped 12/4/24 2047)   lactated ringers bolus 1,000 mL (0 mLs Intravenous Stopped 12/4/24 2329)   promethazine (PHENERGAN) 12.5 mg in 0.9% NaCl 50 mL IVPB (0 mg Intravenous Stopped 12/4/24 2349)              Future Appointments   Date Time Provider Department Center   12/30/2024  7:30 AM [facility]          Diagnostic Impression:    1. Nausea and vomiting, unspecified vomiting type    2. Dizziness    3. Lower abdominal pain    4. Vaginal bleeding in pregnancy, first trimester    5. Lower abdominal pain    6. Hyperemesis gravidarum                      Lencho Ca MD  12/05/24 0133

## 2024-12-04 NOTE — Clinical Note
"Yaquelin"Martha Cochran was seen and treated in our emergency department on 12/4/2024.  She may return to work on 12/09/2024.       If you have any questions or concerns, please don't hesitate to call.      Lissa Dawkins, BENNYP"

## 2024-12-04 NOTE — Clinical Note
Diagnosis: Nausea and vomiting, unspecified vomiting type [[id number]]   Future Attending Provider: CHRISTY MELGOZA [[id number]]   Is the patient being sent to ED Observation?: Yes

## 2024-12-04 NOTE — FIRST PROVIDER EVALUATION
Emergency Department TeleTriage Encounter Note      CHIEF COMPLAINT    Chief Complaint   Patient presents with    Dizziness     G5 ~9 wk GA intermittent dizziness since dc from IP 11/29; treated for UTI with IV ABX. Also c/o fatigue, n/v, blurred vision, spotting, vaginal discharge and vaginal pain.        VITAL SIGNS   Initial Vitals [12/04/24 1426]   BP Pulse Resp Temp SpO2   104/70 89 14 98.2 °F (36.8 °C) 98 %      MAP       --            ALLERGIES    Review of patient's allergies indicates:  No Known Allergies    PROVIDER TRIAGE NOTE  This is a teletriage evaluation of a 19 y.o. female presenting to the ED with c/o weakness, dizziness, nausea/vomiting, spotting, vaginal discharge, blurred vision and vaginal pain. Patinet is pregnant. Recent admission. Limited physical exam via telehealth: The patient is awake, alert, answering questions appropriately and is not in respiratory distress. Initial orders will be placed and care will be transferred to an alternate provider when patient is roomed for a full evaluation. Any additional orders and the final disposition will be determined by that provider.         ORDERS  Labs Reviewed   CULTURE, URINE   URINALYSIS   CBC W/ AUTO DIFFERENTIAL   COMPREHENSIVE METABOLIC PANEL   MAGNESIUM   POCT GLUCOSE MONITORING CONTINUOUS       ED Orders (720h ago, onward)      Start Ordered     Status Ordering Provider    12/04/24 1510 12/04/24 1510  POCT glucose  Once         Ordered ANNY ROSAS    12/04/24 1510 12/04/24 1510  EKG 12-lead  Once         Ordered ANNY ROSAS    12/04/24 1510 12/04/24 1510  Urinalysis  Once         Ordered ANNY ROSAS    12/04/24 1510 12/04/24 1510  Urine Culture High Risk  Once         Ordered ANNY ROSAS PPaz    12/04/24 1510 12/04/24 1510  CBC auto differential  STAT         Ordered ANNY ROSAS PPaz    12/04/24 1510 12/04/24 1510  Comprehensive metabolic panel  STAT         Ordered ANNY ROSAS    12/04/24 1510 12/04/24 1510  Magnesium   STAT         Ordered ANNY ROSAS    12/04/24 1510 12/04/24 1510  Saline lock IV  Once         Ordered ANNY ROSAS              Virtual Visit Note: The provider triage portion of this emergency department evaluation and documentation was performed via [employer], a HIPAA-compliant telemedicine application, in concert with a tele-presenter in the room. A face to face patient evaluation with one of my colleagues will occur once the patient is placed in an emergency department room.      DISCLAIMER: This note was prepared with [employer] voice recognition transcription software. Garbled syntax, mangled pronouns, and other bizarre constructions may be attributed to that software system.

## 2024-12-04 NOTE — ED TRIAGE NOTES
Pt is  c/o intermittent dizziness, blurred vision, vaginal spotting, N/V, and vaginal irritation since . Pt recently treated IP for UTI with IV abx. Denies passing clots, CP, SOB. NADN.

## 2024-12-05 VITALS
BODY MASS INDEX: 22.66 KG/M2 | OXYGEN SATURATION: 99 % | TEMPERATURE: 98 F | WEIGHT: 120 LBS | HEART RATE: 65 BPM | DIASTOLIC BLOOD PRESSURE: 54 MMHG | HEIGHT: 61 IN | SYSTOLIC BLOOD PRESSURE: 90 MMHG | RESPIRATION RATE: 18 BRPM

## 2024-12-05 LAB
ANION GAP SERPL CALC-SCNC: 7 MMOL/L (ref 8–16)
BUN SERPL-MCNC: 3 MG/DL (ref 6–20)
CALCIUM SERPL-MCNC: 8.3 MG/DL (ref 8.7–10.5)
CHLORIDE SERPL-SCNC: 111 MMOL/L (ref 95–110)
CO2 SERPL-SCNC: 18 MMOL/L (ref 23–29)
CREAT SERPL-MCNC: 0.6 MG/DL (ref 0.5–1.4)
EST. GFR  (NO RACE VARIABLE): >60 ML/MIN/1.73 M^2
GLUCOSE SERPL-MCNC: 76 MG/DL (ref 70–110)
OHS QRS DURATION: 90 MS
OHS QTC CALCULATION: 403 MS
POTASSIUM SERPL-SCNC: 4.5 MMOL/L (ref 3.5–5.1)
SODIUM SERPL-SCNC: 136 MMOL/L (ref 136–145)

## 2024-12-05 PROCEDURE — 99285 EMERGENCY DEPT VISIT HI MDM: CPT

## 2024-12-05 PROCEDURE — 94761 N-INVAS EAR/PLS OXIMETRY MLT: CPT

## 2024-12-05 PROCEDURE — 96361 HYDRATE IV INFUSION ADD-ON: CPT

## 2024-12-05 PROCEDURE — G0378 HOSPITAL OBSERVATION PER HR: HCPCS

## 2024-12-05 PROCEDURE — 80048 BASIC METABOLIC PNL TOTAL CA: CPT | Performed by: NURSE PRACTITIONER

## 2024-12-05 PROCEDURE — 63600175 PHARM REV CODE 636 W HCPCS: Performed by: EMERGENCY MEDICINE

## 2024-12-05 PROCEDURE — 63600175 PHARM REV CODE 636 W HCPCS: Performed by: NURSE PRACTITIONER

## 2024-12-05 RX ORDER — ONDANSETRON HYDROCHLORIDE 2 MG/ML
4 INJECTION, SOLUTION INTRAVENOUS EVERY 8 HOURS PRN
Status: DISCONTINUED | OUTPATIENT
Start: 2024-12-05 | End: 2024-12-05 | Stop reason: HOSPADM

## 2024-12-05 RX ORDER — PYRIDOXINE HCL (VITAMIN B6) 25 MG
25 TABLET ORAL 2 TIMES DAILY
Qty: 20 TABLET | Refills: 0 | Status: SHIPPED | OUTPATIENT
Start: 2024-12-05 | End: 2024-12-05

## 2024-12-05 RX ORDER — PROMETHAZINE HYDROCHLORIDE 25 MG/1
25 SUPPOSITORY RECTAL EVERY 6 HOURS PRN
Qty: 10 SUPPOSITORY | Refills: 0 | Status: SHIPPED | OUTPATIENT
Start: 2024-12-05

## 2024-12-05 RX ORDER — SODIUM CHLORIDE 0.9 % (FLUSH) 0.9 %
10 SYRINGE (ML) INJECTION
Status: DISCONTINUED | OUTPATIENT
Start: 2024-12-05 | End: 2024-12-05 | Stop reason: HOSPADM

## 2024-12-05 RX ORDER — METOCLOPRAMIDE 10 MG/1
10 TABLET ORAL EVERY 6 HOURS
Qty: 30 TABLET | Refills: 0 | Status: SHIPPED | OUTPATIENT
Start: 2024-12-05 | End: 2024-12-05

## 2024-12-05 RX ORDER — PYRIDOXINE HCL (VITAMIN B6) 25 MG
25 TABLET ORAL 2 TIMES DAILY
Qty: 20 TABLET | Refills: 0 | Status: SHIPPED | OUTPATIENT
Start: 2024-12-05

## 2024-12-05 RX ORDER — PROMETHAZINE HYDROCHLORIDE 25 MG/1
25 SUPPOSITORY RECTAL EVERY 6 HOURS PRN
Qty: 10 SUPPOSITORY | Refills: 0 | Status: SHIPPED | OUTPATIENT
Start: 2024-12-05 | End: 2024-12-05

## 2024-12-05 RX ORDER — TALC
6 POWDER (GRAM) TOPICAL NIGHTLY PRN
Status: DISCONTINUED | OUTPATIENT
Start: 2024-12-05 | End: 2024-12-05 | Stop reason: HOSPADM

## 2024-12-05 RX ORDER — METOCLOPRAMIDE 10 MG/1
10 TABLET ORAL EVERY 6 HOURS
Qty: 30 TABLET | Refills: 0 | Status: SHIPPED | OUTPATIENT
Start: 2024-12-05

## 2024-12-05 RX ADMIN — SODIUM CHLORIDE, POTASSIUM CHLORIDE, SODIUM LACTATE AND CALCIUM CHLORIDE 1000 ML: 600; 310; 30; 20 INJECTION, SOLUTION INTRAVENOUS at 10:12

## 2024-12-05 RX ADMIN — SODIUM CHLORIDE, POTASSIUM CHLORIDE, SODIUM LACTATE AND CALCIUM CHLORIDE 1000 ML: 600; 310; 30; 20 INJECTION, SOLUTION INTRAVENOUS at 01:12

## 2024-12-05 NOTE — H&P
[facility] ED Observation Unit  History and Physical      I assumed care of this patient from the Emergency Department at onset of observation of my shift at 11:00 a.m. 12/05/2024.     History of Present Illness:  19-year-old female Patient presents dizziness, generalized weakness has been more prominent, and persistent since discharge on November 29 for MRSA UTI treated with 3 days IV vanc.  Patient states that when she got home after discharge, she felt shaky, had trouble focusing when she got in the shower, and had some difficulty operating that controls.  She has since developed gradually worsening nausea, vomiting.  She states that she has not been able to tolerate any foods, and most liquids for about a week.  She denies any fevers.  She denies any urinary symptoms.  She notes recurrence of intermittent vaginal spotting, has vulvar discomfort and irritation, as well as a new weird white discharge.  She also describes bilateral pelvic pain worse on the right.  She denies any associated back pain.      ED Course:  Have dry mucous membranes an ill-appearing, treated with fluids with a mild improvement in her symptoms.  Persistent hypotension however this appears to be baseline for the patient per chart review.  I have a reassuring CBC and CMP in the emergency department.  Patient did not feel comfortable with discharge home due to her symptoms plan for admission to the EDOU for IV fluids antiemetics and monitoring.  Ob was contacted and reviewed part, they do not believe she needs additional antibiotics as the sent urine cultures were negative.    I reviewed the ED Provider Note dated 12/05/2024  prior to my evaluation of this patient.  I reviewed all labs and imaging performed in the Main ED, prior to patient being placed in Observation. Patient was placed in the ED Observation Unit for dehydration, dizziness.     PMHx   Past Medical History:   Diagnosis Date    Behavior problem in childhood     COVID-19  08/08/2021    Ectopic pregnancy 5/24/2023    Suicidal ideation       Past Surgical History:   Procedure Laterality Date    BACK SURGERY  2016    Rods in place, had issues with previous epidural    DIAGNOSTIC LAPAROSCOPY N/A 05/29/2023    Procedure: LAPAROSCOPY, DIAGNOSTIC;  Surgeon: Juana Fung MD;  Location: [facility];  Service: OB/GYN;  Laterality: N/A;    DILATION AND CURETTAGE OF UTERUS          Family Hx   Family History   Problem Relation Name Age of Onset    Throat cancer Paternal Grandfather      No Known Problems Father      No Known Problems Mother      Breast cancer Neg Hx      Diabetes Neg Hx      Colon cancer Neg Hx      Ovarian cancer Neg Hx          Social Hx   Social History     Socioeconomic History    Marital status: Single   Tobacco Use    Smoking status: Never    Smokeless tobacco: Never   Substance and Sexual Activity    Alcohol use: No    Drug use: No    Sexual activity: Yes     Partners: Male     Birth control/protection: Condom     Social Drivers of Health     Financial Resource Strain: Medium Risk (11/28/2024)    Overall Financial Resource Strain (CARDIA)     Difficulty of Paying Living Expenses: Somewhat hard   Food Insecurity: Food Insecurity Present (11/28/2024)    Hunger Vital Sign     Worried About Running Out of Food in the Last Year: Sometimes true     Ran Out of Food in the Last Year: Sometimes true   Transportation Needs: No Transportation Needs (11/28/2024)    TRANSPORTATION NEEDS     Transportation : No   Stress: No Stress Concern Present (11/28/2024)    [employer] [place] [employer] - Occupational Stress Questionnaire     Feeling of Stress : Only a little   Housing Stability: Low Risk  (11/28/2024)    Housing Stability Vital Sign     Unable to Pay for Housing in the Last Year: No     Homeless in the Last Year: No        Vital Signs   Vitals:    12/05/24 0240 12/05/24 0257 12/05/24 0352 12/05/24 0825   BP:  (!) 95/54 (!) 90/53 (!) 90/54   Pulse: (!) 58 61 (!) 59 62    Resp:  18 18 18   Temp:   98 °F (36.7 °C) 98.5 °F (36.9 °C)   TempSrc:   Oral Oral   SpO2: 99% 100% 98% 98%   Weight:       Height:        12/05/24 1227   BP: (!) 90/54   Pulse: 65   Resp: 18   Temp: 98 °F (36.7 °C)   TempSrc: Oral   SpO2: 99%   Weight:    Height:        Review of Systems  Review of Systems   Constitutional:  Negative for chills and fever.   Respiratory:  Negative for shortness of breath.    Cardiovascular:  Negative for chest pain.   Gastrointestinal:  Positive for nausea. Negative for abdominal pain and vomiting.   Neurological:  Negative for weakness.   All other systems reviewed and are negative.      Brief Physical Exam/Reassessment   Physical Exam    Nursing note and vitals reviewed.  Constitutional: She appears well-developed and well-nourished. She does not appear ill. No distress.   Neck: Neck supple.   Cardiovascular:  Normal rate, regular rhythm, S1 normal, S2 normal and normal heart sounds.           Pulmonary/Chest: Effort normal and breath sounds normal. No tachypnea. She has no decreased breath sounds. She has no wheezes.   Abdominal: Abdomen is soft and flat. There is no abdominal tenderness.   Musculoskeletal:      Cervical back: Neck supple.     Neurological: She is alert and oriented to person, place, and time. GCS eye subscore is 4. GCS verbal subscore is 5. GCS motor subscore is 6.   Skin: Skin is warm, dry and intact.   Psychiatric: She has a normal mood and affect. Her behavior is normal.         Abnormal Labs Reviewed   VAGINAL SCREEN - Abnormal; Notable for the following components:       Result Value    WBC - Vaginal Screen Rare (*)     Bacteria - Vaginal Screen Occasional (*)     All other components within normal limits    Narrative:     Release to patient->Immediate   URINALYSIS - Abnormal; Notable for the following components:    Appearance, UA Hazy (*)     Protein, UA Trace (*)     Ketones, UA 3+ (*)     Leukocytes, UA 3+ (*)     All other components within normal  limits   CBC W/ AUTO DIFFERENTIAL - Abnormal; Notable for the following components:    Gran % 74.6 (*)     All other components within normal limits   URINALYSIS MICROSCOPIC - Abnormal; Notable for the following components:    WBC, UA 40 (*)     All other components within normal limits   BASIC METABOLIC PANEL - Abnormal; Notable for the following components:    Chloride 111 (*)     CO2 18 (*)     BUN 3 (*)     Calcium 8.3 (*)     Anion Gap 7 (*)     All other components within normal limits    Narrative:     After 3rd Liter of IV fluids       US OB <14 Wks TransAbd & TransVag, Single Gestation (XPD)   Final Result      Single living intrauterine gestation, crown-rump length correlates with an estimated gestational age of 8 weeks 2 days, cardiac activity documented at 172 beats per minute.         Electronically signed by: Ramsey Larry MD   Date:    12/04/2024   Time:    19:27            Medications:   Scheduled Meds:      Continuous Infusions:  PRN Meds:.      Assessment/Plan:    1. Nausea and vomiting, unspecified vomiting type     - fluids, antiemetics, bland diet   2. Dizziness    3. Lower abdominal pain    4. Vaginal bleeding in pregnancy, first trimester    5. Lower abdominal pain    6. Hyperemesis gravidarum        Case was discussed with the ED provider, Meagan Castaneda MD

## 2024-12-05 NOTE — ED NOTES
Dietary called and notified of new diet orders. Requested new diet tray for patient due to new diet orders

## 2024-12-05 NOTE — DISCHARGE SUMMARY
[facility] ED Observation Unit  Discharge Summary        History of Present Illness:    ***     Observation Course:    ***    Brief Physical Exam/Reassessment   ROS    Physical Exam    Consultants:    ***    ED/OBS Workup:  Vitals:    12/05/24 0240 12/05/24 0257 12/05/24 0352 12/05/24 0825   BP:  (!) 95/54 (!) 90/53 (!) 90/54   Pulse: (!) 58 61 (!) 59 62   Resp:  18 18 18   Temp:   98 °F (36.7 °C) 98.5 °F (36.9 °C)   TempSrc:   Oral Oral   SpO2: 99% 100% 98% 98%   Weight:       Height:        12/05/24 1227   BP: (!) 90/54   Pulse: 65   Resp: 18   Temp: 98 °F (36.7 °C)   TempSrc: Oral   SpO2: 99%   Weight:    Height:        Abnormal Labs Reviewed   VAGINAL SCREEN - Abnormal; Notable for the following components:       Result Value    WBC - Vaginal Screen Rare (*)     Bacteria - Vaginal Screen Occasional (*)     All other components within normal limits    Narrative:     Release to patient->Immediate   URINALYSIS - Abnormal; Notable for the following components:    Appearance, UA Hazy (*)     Protein, UA Trace (*)     Ketones, UA 3+ (*)     Leukocytes, UA 3+ (*)     All other components within normal limits   CBC W/ AUTO DIFFERENTIAL - Abnormal; Notable for the following components:    Gran % 74.6 (*)     All other components within normal limits   URINALYSIS MICROSCOPIC - Abnormal; Notable for the following components:    WBC, UA 40 (*)     All other components within normal limits   BASIC METABOLIC PANEL - Abnormal; Notable for the following components:    Chloride 111 (*)     CO2 18 (*)     BUN 3 (*)     Calcium 8.3 (*)     Anion Gap 7 (*)     All other components within normal limits    Narrative:     After 3rd Liter of IV fluids       US OB <14 Wks TransAbd & TransVag, Single Gestation (XPD)   Final Result      Single living intrauterine gestation, crown-rump length correlates with an estimated gestational age of 8 weeks 2 days, cardiac activity documented at 172 beats per minute.         Electronically signed  "by: Ramsey Larry MD   Date:    12/04/2024   Time:    19:27          Final Diagnosis:  1. Nausea and vomiting, unspecified vomiting type    2. Dizziness    3. Lower abdominal pain    4. Vaginal bleeding in pregnancy, first trimester    5. Lower abdominal pain    6. Hyperemesis gravidarum        Plan:  ***    Discharge Condition: {DISCHARGE CONDITION:87212::"Good"}    Disposition: {Disposition:784928460::"Home or Self Care"}     Time spent on the discharge of the patient including review of hospital course with the patient. reviewing discharge medications and arranging follow-up care 35*** minutes.  Patient was seen and examined on the date of discharge and determined to be suitable for discharge.    Follow Up:   ED Disposition Condition    Discharge Stable            ED Prescriptions       Medication Sig Dispense Start Date End Date Auth. Provider    metoclopramide HCl (REGLAN) 10 MG tablet Take 1 tablet (10 mg total) by mouth every 6 (six) hours. 30 tablet 12/5/2024 -- Lissa Dawkins FNP    promethazine (PHENERGAN) 25 MG suppository Place 1 suppository (25 mg total) rectally every 6 (six) hours as needed for Nausea. 10 suppository 12/5/2024 -- Lissa Dawkins FNP    pyridoxine, vitamin B6, (B-6) 25 MG Tab Take 1 tablet (25 mg total) by mouth 2 (two) times a day. 20 tablet 12/5/2024 -- Lissa Dawkins FNP    doxylamine succinate 25 mg tablet Take 1 tablet (25 mg total) by mouth 2 (two) times a day. 20 tablet 12/5/2024 -- Lissa Dawkins FNP          Follow-up Information       Follow up With Specialties Details Why Contact Info    [facility] - Emergency Dept Emergency Medicine Go to  If symptoms worsen [address] [facility] [address] [phone]            Future Appointments   Date Time Provider Department Center   12/30/2024  7:30 AM [facility] USOP1 [facility]           "

## 2024-12-06 LAB
BACTERIA UR CULT: NORMAL
BACTERIA UR CULT: NORMAL

## 2024-12-06 NOTE — DISCHARGE SUMMARY
[facility] ED Observation Unit  Discharge Summary        History of Present Illness:    19-year-old female Patient presents dizziness, generalized weakness has been more prominent, and persistent since discharge on November 29 for MRSA UTI treated with 3 days IV vanc.  Patient states that when she got home after discharge, she felt shaky, had trouble focusing when she got in the shower, and had some difficulty operating that controls.  She has since developed gradually worsening nausea, vomiting.  She states that she has not been able to tolerate any foods, and most liquids for about a week.  She denies any fevers.  She denies any urinary symptoms.  She notes recurrence of intermittent vaginal spotting, has vulvar discomfort and irritation, as well as a new weird white discharge.  She also describes bilateral pelvic pain worse on the right.  She denies any associated back pain.       ED Course:  Have dry mucous membranes an ill-appearing, treated with fluids with a mild improvement in her symptoms.  Persistent hypotension however this appears to be baseline for the patient per chart review.  I have a reassuring CBC and CMP in the emergency department.  Patient did not feel comfortable with discharge home due to her symptoms plan for admission to the EDOU for IV fluids antiemetics and monitoring.  Ob was contacted and reviewed part, they do not believe she needs additional antibiotics as the sent urine cultures were negative.     Observation Course:    She received and additional L of fluids, repeat labs this a.m. are reassuring.  She was transitioned to a soft diet and was able tolerate lunch.  No vomiting since yesterday.    Brief Physical Exam/Reassessment   Review of Systems   Constitutional:  Negative for chills and fever.   Respiratory:  Negative for shortness of breath.    Cardiovascular:  Negative for chest pain.   Gastrointestinal:  Negative for abdominal pain, nausea and vomiting.   Neurological:   Negative for dizziness, weakness and headaches.   All other systems reviewed and are negative.      Physical Exam    Nursing note and vitals reviewed.  Constitutional: She appears well-developed and well-nourished. She does not appear ill. No distress.   Neck: Neck supple.   Cardiovascular:  Normal rate, regular rhythm, S1 normal, S2 normal and normal heart sounds.           Pulmonary/Chest: Effort normal and breath sounds normal. She has no decreased breath sounds. She has no wheezes.   Musculoskeletal:      Cervical back: Neck supple.     Neurological: She is alert and oriented to person, place, and time. GCS eye subscore is 4. GCS verbal subscore is 5. GCS motor subscore is 6.   Skin: Skin is warm, dry and intact.   Psychiatric: She has a normal mood and affect. Her behavior is normal.         ED/OBS Workup:  Vitals:    12/05/24 0240 12/05/24 0257 12/05/24 0352 12/05/24 0825   BP:  (!) 95/54 (!) 90/53 (!) 90/54   Pulse: (!) 58 61 (!) 59 62   Resp:  18 18 18   Temp:   98 °F (36.7 °C) 98.5 °F (36.9 °C)   TempSrc:   Oral Oral   SpO2: 99% 100% 98% 98%   Weight:       Height:        12/05/24 1227   BP: (!) 90/54   Pulse: 65   Resp: 18   Temp: 98 °F (36.7 °C)   TempSrc: Oral   SpO2: 99%   Weight:    Height:        Abnormal Labs Reviewed   VAGINAL SCREEN - Abnormal; Notable for the following components:       Result Value    WBC - Vaginal Screen Rare (*)     Bacteria - Vaginal Screen Occasional (*)     All other components within normal limits    Narrative:     Release to patient->Immediate   URINALYSIS - Abnormal; Notable for the following components:    Appearance, UA Hazy (*)     Protein, UA Trace (*)     Ketones, UA 3+ (*)     Leukocytes, UA 3+ (*)     All other components within normal limits   CBC W/ AUTO DIFFERENTIAL - Abnormal; Notable for the following components:    Gran % 74.6 (*)     All other components within normal limits   URINALYSIS MICROSCOPIC - Abnormal; Notable for the following components:    WBC,  UA 40 (*)     All other components within normal limits   BASIC METABOLIC PANEL - Abnormal; Notable for the following components:    Chloride 111 (*)     CO2 18 (*)     BUN 3 (*)     Calcium 8.3 (*)     Anion Gap 7 (*)     All other components within normal limits    Narrative:     After 3rd Liter of IV fluids       US OB <14 Wks TransAbd & TransVag, Single Gestation (XPD)   Final Result      Single living intrauterine gestation, crown-rump length correlates with an estimated gestational age of 8 weeks 2 days, cardiac activity documented at 172 beats per minute.         Electronically signed by: Ramsey Larry MD   Date:    12/04/2024   Time:    19:27          Final Diagnosis:  1. Nausea and vomiting, unspecified vomiting type    2. Dizziness    3. Lower abdominal pain    4. Vaginal bleeding in pregnancy, first trimester    5. Lower abdominal pain    6. Hyperemesis gravidarum        Plan:  Discharge home with bland diet instructions along with multiple antiemetics.  Discussed needs close follow up with her OBGYN.  Strict return precautions if she develops any persistent vomiting, dizziness or any other concerns she can return to emergency department for re-evaluation.  She stated understanding.    Discharge Condition: Good    Disposition: Home or Self Care     Time spent on the discharge of the patient including review of hospital course with the patient. reviewing discharge medications and arranging follow-up care 35 minutes.  Patient was seen and examined on the date of discharge and determined to be suitable for discharge.    Follow Up:   ED Disposition Condition    Discharge Stable            ED Prescriptions       Medication Sig Dispense Start Date End Date Auth. Provider    metoclopramide HCl (REGLAN) 10 MG tablet  (Status: Discontinued) Take 1 tablet (10 mg total) by mouth every 6 (six) hours. 30 tablet 12/5/2024 12/5/2024 Lissa Dawkins, FNP    promethazine (PHENERGAN) 25 MG suppository  (Status:  Discontinued) Place 1 suppository (25 mg total) rectally every 6 (six) hours as needed for Nausea. 10 suppository 12/5/2024 12/5/2024 Lissa Dawkins FNP    pyridoxine, vitamin B6, (B-6) 25 MG Tab  (Status: Discontinued) Take 1 tablet (25 mg total) by mouth 2 (two) times a day. 20 tablet 12/5/2024 12/5/2024 Lissa Dawkins, BENNYP    doxylamine succinate 25 mg tablet  (Status: Discontinued) Take 1 tablet (25 mg total) by mouth 2 (two) times a day. 20 tablet 12/5/2024 12/5/2024 Lissa Dawkins FNP    doxylamine succinate 25 mg tablet Take 1 tablet (25 mg total) by mouth 2 (two) times a day. 20 tablet 12/5/2024 -- Lissa Dawkins FNP    metoclopramide HCl (REGLAN) 10 MG tablet Take 1 tablet (10 mg total) by mouth every 6 (six) hours. 30 tablet 12/5/2024 -- Lissa Dawkins FNP    promethazine (PHENERGAN) 25 MG suppository Place 1 suppository (25 mg total) rectally every 6 (six) hours as needed for Nausea. 10 suppository 12/5/2024 -- Lissa Dawkins FNP    pyridoxine, vitamin B6, (B-6) 25 MG Tab Take 1 tablet (25 mg total) by mouth 2 (two) times a day. 20 tablet 12/5/2024 -- Lissa Dawkins FNP          Follow-up Information       Follow up With Specialties Details Why Contact Info    [facility] - Emergency Dept Emergency Medicine Go to  If symptoms worsen [address]  [facility] [address] [phone]            Future Appointments   Date Time Provider Department Center   12/30/2024  7:30 AM [facility] USOP1 [facility]

## 2024-12-10 LAB
BACTERIA BLD CULT: NORMAL
BACTERIA BLD CULT: NORMAL

## 2024-12-12 NOTE — DISCHARGE INSTRUCTIONS
No care due was identified.  [facility] Embedded Care Due Messages. Reference number: [id number].   12/12/2024 10:10:00 AM CST   Please follow up with your Ob-gyn in one to two weeks. Please return if you have fever, vomiting, lightheadedness, or significant vaginal or abdominal pain that does not go away with Ibuprofen or tylenol. Please use protection whenever you have sexual intercourse unless you are planning to get pregnant. We thank you for allowing us to assist in your care.

## 2024-12-23 ENCOUNTER — HOSPITAL ENCOUNTER (EMERGENCY)
Facility: HOSPITAL | Age: 19
Discharge: HOME | End: 2024-12-23
Attending: STUDENT IN AN ORGANIZED HEALTH CARE EDUCATION/TRAINING PROGRAM
Payer: MEDICAID

## 2024-12-23 VITALS
WEIGHT: 119.94 LBS | TEMPERATURE: 98 F | HEIGHT: 61 IN | DIASTOLIC BLOOD PRESSURE: 58 MMHG | BODY MASS INDEX: 22.64 KG/M2 | SYSTOLIC BLOOD PRESSURE: 120 MMHG | HEART RATE: 73 BPM | OXYGEN SATURATION: 98 % | RESPIRATION RATE: 16 BRPM

## 2024-12-23 DIAGNOSIS — B96.89 BACTERIAL VAGINOSIS IN PREGNANCY: Primary | ICD-10-CM

## 2024-12-23 DIAGNOSIS — Z86.59 HISTORY OF BEHAVIORAL AND MENTAL HEALTH PROBLEMS: ICD-10-CM

## 2024-12-23 DIAGNOSIS — O23.599 BACTERIAL VAGINOSIS IN PREGNANCY: Primary | ICD-10-CM

## 2024-12-23 DIAGNOSIS — O21.9 NAUSEA AND VOMITING IN PREGNANCY: ICD-10-CM

## 2024-12-23 LAB
ALBUMIN SERPL BCP-MCNC: 3.7 G/DL (ref 3.5–5.2)
ALP SERPL-CCNC: 46 U/L (ref 40–150)
ALT SERPL W/O P-5'-P-CCNC: 9 U/L (ref 10–44)
ANION GAP SERPL CALC-SCNC: 10 MMOL/L (ref 8–16)
AST SERPL-CCNC: 13 U/L (ref 10–40)
BACTERIA #/AREA URNS AUTO: ABNORMAL /HPF
BACTERIA GENITAL QL WET PREP: ABNORMAL
BASOPHILS # BLD AUTO: 0.05 K/UL (ref 0–0.2)
BASOPHILS NFR BLD: 0.6 % (ref 0–1.9)
BILIRUB SERPL-MCNC: 0.6 MG/DL (ref 0.1–1)
BILIRUB UR QL STRIP: NEGATIVE
BUN SERPL-MCNC: 6 MG/DL (ref 6–20)
C TRACH DNA SPEC QL NAA+PROBE: NOT DETECTED
CALCIUM SERPL-MCNC: 9.1 MG/DL (ref 8.7–10.5)
CHLORIDE SERPL-SCNC: 107 MMOL/L (ref 95–110)
CLARITY UR REFRACT.AUTO: ABNORMAL
CLUE CELLS VAG QL WET PREP: ABNORMAL
CO2 SERPL-SCNC: 20 MMOL/L (ref 23–29)
COLOR UR AUTO: YELLOW
CREAT SERPL-MCNC: 0.6 MG/DL (ref 0.5–1.4)
DIFFERENTIAL METHOD BLD: ABNORMAL
EOSINOPHIL # BLD AUTO: 0.1 K/UL (ref 0–0.5)
EOSINOPHIL NFR BLD: 0.8 % (ref 0–8)
ERYTHROCYTE [DISTWIDTH] IN BLOOD BY AUTOMATED COUNT: 12.8 % (ref 11.5–14.5)
EST. GFR  (NO RACE VARIABLE): >60 ML/MIN/1.73 M^2
FILAMENT FUNGI VAG WET PREP-#/AREA: ABNORMAL
GLUCOSE SERPL-MCNC: 130 MG/DL (ref 70–110)
GLUCOSE UR QL STRIP: ABNORMAL
HCT VFR BLD AUTO: 36 % (ref 37–48.5)
HGB BLD-MCNC: 12.1 G/DL (ref 12–16)
HGB UR QL STRIP: NEGATIVE
HYALINE CASTS UR QL AUTO: 0 /LPF
IMM GRANULOCYTES # BLD AUTO: 0.03 K/UL (ref 0–0.04)
IMM GRANULOCYTES NFR BLD AUTO: 0.3 % (ref 0–0.5)
KETONES UR QL STRIP: ABNORMAL
LEUKOCYTE ESTERASE UR QL STRIP: ABNORMAL
LYMPHOCYTES # BLD AUTO: 2 K/UL (ref 1–4.8)
LYMPHOCYTES NFR BLD: 23.7 % (ref 18–48)
MAGNESIUM SERPL-MCNC: 1.8 MG/DL (ref 1.6–2.6)
MCH RBC QN AUTO: 30.5 PG (ref 27–31)
MCHC RBC AUTO-ENTMCNC: 33.6 G/DL (ref 32–36)
MCV RBC AUTO: 91 FL (ref 82–98)
MICROSCOPIC COMMENT: ABNORMAL
MONOCYTES # BLD AUTO: 0.5 K/UL (ref 0.3–1)
MONOCYTES NFR BLD: 5.3 % (ref 4–15)
N GONORRHOEA DNA SPEC QL NAA+PROBE: NOT DETECTED
NEUTROPHILS # BLD AUTO: 6 K/UL (ref 1.8–7.7)
NEUTROPHILS NFR BLD: 69.3 % (ref 38–73)
NITRITE UR QL STRIP: NEGATIVE
NRBC BLD-RTO: 0 /100 WBC
PH UR STRIP: 6 [PH] (ref 5–8)
PLATELET # BLD AUTO: 219 K/UL (ref 150–450)
PMV BLD AUTO: 9.7 FL (ref 9.2–12.9)
POTASSIUM SERPL-SCNC: 3.4 MMOL/L (ref 3.5–5.1)
PROT SERPL-MCNC: 6.7 G/DL (ref 6–8.4)
PROT UR QL STRIP: ABNORMAL
RBC # BLD AUTO: 3.97 M/UL (ref 4–5.4)
RBC #/AREA URNS AUTO: 10 /HPF (ref 0–4)
SODIUM SERPL-SCNC: 137 MMOL/L (ref 136–145)
SP GR UR STRIP: >1.03 (ref 1–1.03)
SPECIMEN SOURCE: ABNORMAL
SQUAMOUS #/AREA URNS AUTO: 7 /HPF
T VAGINALIS GENITAL QL WET PREP: ABNORMAL
URN SPEC COLLECT METH UR: ABNORMAL
WBC # BLD AUTO: 8.62 K/UL (ref 3.9–12.7)
WBC #/AREA URNS AUTO: 22 /HPF (ref 0–5)
WBC #/AREA VAG WET PREP: ABNORMAL
YEAST GENITAL QL WET PREP: ABNORMAL

## 2024-12-23 PROCEDURE — 81001 URINALYSIS AUTO W/SCOPE: CPT | Performed by: STUDENT IN AN ORGANIZED HEALTH CARE EDUCATION/TRAINING PROGRAM

## 2024-12-23 PROCEDURE — 96375 TX/PRO/DX INJ NEW DRUG ADDON: CPT

## 2024-12-23 PROCEDURE — 25000003 PHARM REV CODE 250: Performed by: STUDENT IN AN ORGANIZED HEALTH CARE EDUCATION/TRAINING PROGRAM

## 2024-12-23 PROCEDURE — 80053 COMPREHEN METABOLIC PANEL: CPT | Performed by: STUDENT IN AN ORGANIZED HEALTH CARE EDUCATION/TRAINING PROGRAM

## 2024-12-23 PROCEDURE — 25000003 PHARM REV CODE 250: Performed by: EMERGENCY MEDICINE

## 2024-12-23 PROCEDURE — 83735 ASSAY OF MAGNESIUM: CPT | Performed by: STUDENT IN AN ORGANIZED HEALTH CARE EDUCATION/TRAINING PROGRAM

## 2024-12-23 PROCEDURE — 96365 THER/PROPH/DIAG IV INF INIT: CPT

## 2024-12-23 PROCEDURE — 96361 HYDRATE IV INFUSION ADD-ON: CPT

## 2024-12-23 PROCEDURE — 87210 SMEAR WET MOUNT SALINE/INK: CPT | Performed by: STUDENT IN AN ORGANIZED HEALTH CARE EDUCATION/TRAINING PROGRAM

## 2024-12-23 PROCEDURE — 85025 COMPLETE CBC W/AUTO DIFF WBC: CPT | Performed by: STUDENT IN AN ORGANIZED HEALTH CARE EDUCATION/TRAINING PROGRAM

## 2024-12-23 PROCEDURE — 99284 EMERGENCY DEPT VISIT MOD MDM: CPT | Mod: 25

## 2024-12-23 PROCEDURE — 87086 URINE CULTURE/COLONY COUNT: CPT | Performed by: STUDENT IN AN ORGANIZED HEALTH CARE EDUCATION/TRAINING PROGRAM

## 2024-12-23 PROCEDURE — 63600175 PHARM REV CODE 636 W HCPCS: Performed by: STUDENT IN AN ORGANIZED HEALTH CARE EDUCATION/TRAINING PROGRAM

## 2024-12-23 PROCEDURE — 87591 N.GONORRHOEAE DNA AMP PROB: CPT | Performed by: STUDENT IN AN ORGANIZED HEALTH CARE EDUCATION/TRAINING PROGRAM

## 2024-12-23 RX ORDER — ONDANSETRON HYDROCHLORIDE 2 MG/ML
4 INJECTION, SOLUTION INTRAVENOUS
Status: COMPLETED | OUTPATIENT
Start: 2024-12-23 | End: 2024-12-23

## 2024-12-23 RX ORDER — ONDANSETRON 4 MG/1
4 TABLET, ORALLY DISINTEGRATING ORAL EVERY 8 HOURS PRN
Qty: 15 TABLET | Refills: 0 | Status: SHIPPED | OUTPATIENT
Start: 2024-12-23

## 2024-12-23 RX ORDER — METRONIDAZOLE 7.5 MG/G
1 GEL VAGINAL NIGHTLY
Qty: 70 G | Refills: 0 | Status: SHIPPED | OUTPATIENT
Start: 2024-12-23 | End: 2024-12-28

## 2024-12-23 RX ORDER — DOCUSATE SODIUM 100 MG
600 CAPSULE ORAL
Status: DISCONTINUED | OUTPATIENT
Start: 2024-12-23 | End: 2024-12-23 | Stop reason: HOSPADM

## 2024-12-23 RX ADMIN — PYRIDOXINE HYDROCHLORIDE 50 MG: 100 INJECTION, SOLUTION INTRAMUSCULAR; INTRAVENOUS at 05:12

## 2024-12-23 RX ADMIN — SODIUM CHLORIDE, POTASSIUM CHLORIDE, SODIUM LACTATE AND CALCIUM CHLORIDE 1000 ML: 600; 310; 30; 20 INJECTION, SOLUTION INTRAVENOUS at 07:12

## 2024-12-23 RX ADMIN — SODIUM CHLORIDE, POTASSIUM CHLORIDE, SODIUM LACTATE AND CALCIUM CHLORIDE 1000 ML: 600; 310; 30; 20 INJECTION, SOLUTION INTRAVENOUS at 02:12

## 2024-12-23 RX ADMIN — Medication 600 ML: at 11:12

## 2024-12-23 RX ADMIN — ONDANSETRON 4 MG: 2 INJECTION INTRAMUSCULAR; INTRAVENOUS at 02:12

## 2024-12-23 RX ADMIN — Medication 600 ML: at 08:12

## 2024-12-23 NOTE — Clinical Note
"Yaquelin"Martha Cochran was seen and treated in our emergency department on 12/23/2024.  She may return to work on 12/26/2024.       If you have any questions or concerns, please don't hesitate to call.      Moody Jennings, DO"

## 2024-12-23 NOTE — ED TRIAGE NOTES
Yaquelin Cochran, a 19 y.o. female presents to the ED w/ complaint of 5/10 constant lower abdominal cramping described as similar to menstrual cramps. Pt is 11 weeks pregnant . Pt has also been vomiting for one week. Pt denies vaginal bleeding/discharge.    Triage note:  Chief Complaint   Patient presents with    Abdominal Pain    Emesis During Pregnancy     Pt is 11 wks pregnant w/ c/o lower abd pain/ cramping beginning around 5 pm. +N/V. +vaginal discharge. Denies spotting/ vaginal bleeding.      Review of patient's allergies indicates:  No Known Allergies  Past Medical History:   Diagnosis Date    Behavior problem in childhood     COVID-19 2021    Ectopic pregnancy 2023    Suicidal ideation

## 2024-12-23 NOTE — PROVIDER PROGRESS NOTES - EMERGENCY DEPT.
"Encounter Date: 12/23/2024    ED Physician Progress Notes        ED Resident HAND-OFF NOTE:  Yaquelin Cochran is a 19 y.o. female who presented to the ED on 12/23/2024, patient C/O hyperemesis gravidarum. I assumed care of patient from off-going ED physician team patient pending reassessment.    On my evaluation, Yaquelin Cochran appears well, hemodynamically stable and in NAD. Thus far, Yaquelin Cochran has received:  Medications   pyridoxine (vitamin B6) (B-6) 50 mg in 0.9% NaCl 50 mL IVPB (50 mg Intravenous Not Given 12/23/24 0900)   electrolytes-dextrose (Pedialyte) oral solution 600 mL (600 mLs Oral Given 12/23/24 1135)   ondansetron injection 4 mg (4 mg Intravenous Given 12/23/24 0229)   lactated ringers bolus 1,000 mL (0 mLs Intravenous Stopped 12/23/24 0531)       BP (!) 120/58 (BP Location: Right arm)   Pulse 73   Temp 98.3 °F (36.8 °C) (Oral)   Resp 16   Ht 5' 1" (1.549 m)   Wt 54.4 kg (119 lb 14.9 oz)   LMP 09/29/2024 (Exact Date)   SpO2 98%   Breastfeeding No   BMI 22.66 kg/m²         Disposition: I anticipate patient will discharged  ______________________  Lopez Mcclendon MD   Emergency Medicine Resident      UPDATE:  Patient received fluid bolus she is able to tolerate food and fluids by mouth without difficulty.  She is asymptomatic.  We will discharge the patient with return precautions.        :  Bacterial vaginosis in pregnancy (Primary)  Nausea and vomiting in pregnancy  History of behavioral and mental health problems    "

## 2024-12-23 NOTE — DISCHARGE INSTRUCTIONS
You were evaluated in the emergency department today for vaginal discharge and vomiting.  Although there were no findings of concern to necessitate admission to the hospital or warrant immediate surgical intervention, disease exists on a spectrum and your disease process may progress.  If this is the case, please watch your symptoms and return to the emergency department if you feel worse and are unable to discuss care with your primary care doctor in follow up in the next several days.  Specific information regarding your complaint has been provided.  Thank you for choosing [facility]!    You were found to have bacterial vaginosis.  You were found to have some nausea and vomiting in pregnancy.  You were given medications for this.  They have been sent to your pharmacy.  Please follow up with your OBGYN.      You did have some white blood cells in your urine test which may be related to the amount of discharge that you were having.  If you develop painful urination, bleeding with urination, significant fever, pain in the bladder or back or any other concerns, please return for re-evaluation.

## 2024-12-24 LAB — BACTERIA UR CULT: NORMAL

## 2025-01-06 ENCOUNTER — HOSPITAL ENCOUNTER (EMERGENCY)
Facility: OTHER | Age: 20
Discharge: HOME | End: 2025-01-06
Attending: EMERGENCY MEDICINE
Payer: MEDICAID

## 2025-01-06 VITALS
WEIGHT: 130 LBS | HEIGHT: 61 IN | HEART RATE: 89 BPM | OXYGEN SATURATION: 100 % | TEMPERATURE: 99 F | RESPIRATION RATE: 16 BRPM | SYSTOLIC BLOOD PRESSURE: 96 MMHG | BODY MASS INDEX: 24.55 KG/M2 | DIASTOLIC BLOOD PRESSURE: 59 MMHG

## 2025-01-06 DIAGNOSIS — N39.0 URINARY TRACT INFECTION WITHOUT HEMATURIA, SITE UNSPECIFIED: ICD-10-CM

## 2025-01-06 DIAGNOSIS — Z3A.12 12 WEEKS GESTATION OF PREGNANCY: Primary | ICD-10-CM

## 2025-01-06 DIAGNOSIS — B37.9 YEAST INFECTION: ICD-10-CM

## 2025-01-06 LAB
ALBUMIN SERPL BCP-MCNC: 3.4 G/DL (ref 3.5–5.2)
ALP SERPL-CCNC: 57 U/L (ref 40–150)
ALT SERPL W/O P-5'-P-CCNC: 10 U/L (ref 10–44)
ANION GAP SERPL CALC-SCNC: 9 MMOL/L (ref 8–16)
AST SERPL-CCNC: 13 U/L (ref 10–40)
B-HCG UR QL: POSITIVE
BACTERIA #/AREA URNS HPF: ABNORMAL /HPF
BACTERIA GENITAL QL WET PREP: ABNORMAL
BASOPHILS # BLD AUTO: 0.04 K/UL (ref 0–0.2)
BASOPHILS NFR BLD: 0.5 % (ref 0–1.9)
BILIRUB SERPL-MCNC: 0.5 MG/DL (ref 0.1–1)
BILIRUB UR QL STRIP: NEGATIVE
BUN SERPL-MCNC: 5 MG/DL (ref 6–20)
CALCIUM SERPL-MCNC: 9.2 MG/DL (ref 8.7–10.5)
CHLORIDE SERPL-SCNC: 106 MMOL/L (ref 95–110)
CLARITY UR: ABNORMAL
CLUE CELLS VAG QL WET PREP: ABNORMAL
CO2 SERPL-SCNC: 21 MMOL/L (ref 23–29)
COLOR UR: YELLOW
CREAT SERPL-MCNC: 0.6 MG/DL (ref 0.5–1.4)
CTP QC/QA: YES
DIFFERENTIAL METHOD BLD: ABNORMAL
EOSINOPHIL # BLD AUTO: 0.1 K/UL (ref 0–0.5)
EOSINOPHIL NFR BLD: 1 % (ref 0–8)
ERYTHROCYTE [DISTWIDTH] IN BLOOD BY AUTOMATED COUNT: 12.8 % (ref 11.5–14.5)
EST. GFR  (NO RACE VARIABLE): >60 ML/MIN/1.73 M^2
FILAMENT FUNGI VAG WET PREP-#/AREA: ABNORMAL
GLUCOSE SERPL-MCNC: 140 MG/DL (ref 70–110)
GLUCOSE UR QL STRIP: ABNORMAL
HCG INTACT+B SERPL-ACNC: NORMAL MIU/ML
HCT VFR BLD AUTO: 35 % (ref 37–48.5)
HGB BLD-MCNC: 12.1 G/DL (ref 12–16)
HGB UR QL STRIP: NEGATIVE
IMM GRANULOCYTES # BLD AUTO: 0.04 K/UL (ref 0–0.04)
IMM GRANULOCYTES NFR BLD AUTO: 0.5 % (ref 0–0.5)
KETONES UR QL STRIP: ABNORMAL
LEUKOCYTE ESTERASE UR QL STRIP: ABNORMAL
LYMPHOCYTES # BLD AUTO: 1.9 K/UL (ref 1–4.8)
LYMPHOCYTES NFR BLD: 25.8 % (ref 18–48)
MCH RBC QN AUTO: 30.7 PG (ref 27–31)
MCHC RBC AUTO-ENTMCNC: 34.6 G/DL (ref 32–36)
MCV RBC AUTO: 89 FL (ref 82–98)
MICROSCOPIC COMMENT: ABNORMAL
MONOCYTES # BLD AUTO: 0.4 K/UL (ref 0.3–1)
MONOCYTES NFR BLD: 5.2 % (ref 4–15)
NEUTROPHILS # BLD AUTO: 4.9 K/UL (ref 1.8–7.7)
NEUTROPHILS NFR BLD: 67 % (ref 38–73)
NITRITE UR QL STRIP: NEGATIVE
NRBC BLD-RTO: 0 /100 WBC
PH UR STRIP: 6 [PH] (ref 5–8)
PLATELET # BLD AUTO: 187 K/UL (ref 150–450)
PMV BLD AUTO: 9.8 FL (ref 9.2–12.9)
POTASSIUM SERPL-SCNC: 3.4 MMOL/L (ref 3.5–5.1)
PROT SERPL-MCNC: 6.6 G/DL (ref 6–8.4)
PROT UR QL STRIP: ABNORMAL
RBC # BLD AUTO: 3.94 M/UL (ref 4–5.4)
RBC #/AREA URNS HPF: 7 /HPF (ref 0–4)
SODIUM SERPL-SCNC: 136 MMOL/L (ref 136–145)
SP GR UR STRIP: 1.03 (ref 1–1.03)
SPECIMEN SOURCE: ABNORMAL
SQUAMOUS #/AREA URNS HPF: 13 /HPF
T VAGINALIS GENITAL QL WET PREP: ABNORMAL
URN SPEC COLLECT METH UR: ABNORMAL
UROBILINOGEN UR STRIP-ACNC: ABNORMAL EU/DL
WBC # BLD AUTO: 7.33 K/UL (ref 3.9–12.7)
WBC #/AREA URNS HPF: 23 /HPF (ref 0–5)
WBC #/AREA VAG WET PREP: ABNORMAL
YEAST GENITAL QL WET PREP: ABNORMAL
YEAST URNS QL MICRO: ABNORMAL

## 2025-01-06 PROCEDURE — 99284 EMERGENCY DEPT VISIT MOD MDM: CPT | Mod: 25

## 2025-01-06 PROCEDURE — 80053 COMPREHEN METABOLIC PANEL: CPT | Performed by: EMERGENCY MEDICINE

## 2025-01-06 PROCEDURE — 87086 URINE CULTURE/COLONY COUNT: CPT | Performed by: EMERGENCY MEDICINE

## 2025-01-06 PROCEDURE — 84702 CHORIONIC GONADOTROPIN TEST: CPT | Performed by: EMERGENCY MEDICINE

## 2025-01-06 PROCEDURE — 25000003 PHARM REV CODE 250: Performed by: EMERGENCY MEDICINE

## 2025-01-06 PROCEDURE — 81000 URINALYSIS NONAUTO W/SCOPE: CPT | Performed by: EMERGENCY MEDICINE

## 2025-01-06 PROCEDURE — 81025 URINE PREGNANCY TEST: CPT | Performed by: EMERGENCY MEDICINE

## 2025-01-06 PROCEDURE — 85025 COMPLETE CBC W/AUTO DIFF WBC: CPT | Performed by: EMERGENCY MEDICINE

## 2025-01-06 PROCEDURE — 87210 SMEAR WET MOUNT SALINE/INK: CPT | Performed by: EMERGENCY MEDICINE

## 2025-01-06 RX ORDER — ASPIRIN 325 MG
1 TABLET, DELAYED RELEASE (ENTERIC COATED) ORAL NIGHTLY
Qty: 45 G | Refills: 0 | Status: SHIPPED | OUTPATIENT
Start: 2025-01-06 | End: 2025-01-13

## 2025-01-06 RX ORDER — ACETAMINOPHEN 325 MG/1
650 TABLET ORAL
Status: COMPLETED | OUTPATIENT
Start: 2025-01-06 | End: 2025-01-06

## 2025-01-06 RX ORDER — CEPHALEXIN 500 MG/1
500 CAPSULE ORAL EVERY 8 HOURS
Qty: 15 CAPSULE | Refills: 0 | Status: SHIPPED | OUTPATIENT
Start: 2025-01-06 | End: 2025-01-11

## 2025-01-06 RX ADMIN — ACETAMINOPHEN 650 MG: 325 TABLET, FILM COATED ORAL at 12:01

## 2025-01-06 NOTE — ED NOTES
Yaquelin Cochran, an 19 y.o. female presents to the ED     Chief Complaint   Patient presents with    Abdominal Pain     Diffuse lower abd / pelvic discomfort over the past 10 hours   Currently 13 weeks pregnant at this time    Vaginal Discharge     Pt is also reporting that she has been having a thick yellowish discharge over the past week.     Review of patient's allergies indicates:  No Known Allergies  Past Medical History:   Diagnosis Date    Behavior problem in childhood     COVID-19 08/08/2021    Ectopic pregnancy 5/24/2023    Suicidal ideation

## 2025-01-06 NOTE — ED NOTES
Yaquelin Cochran, an 19 y.o. female presents to the ED via POV  [id number] p/w/d ambulated into assigned Dispo Room at her own accord without issue.  C/O diffuse lower abd pain X10 hours (13 weeks pregnant).    C/O this yellowish vaginal discharge over the past week as well.        (-)CP, SOB, dizziness, weakness or NVD at this time  (-)Neuro deficits  (-)otc medication attempted pta  (-)vaginal hemorrhaging   (-)dysuria / hematuria       Chief Complaint   Patient presents with    Abdominal Pain     Diffuse lower abd / pelvic discomfort over the past 10 hours   Currently 13 weeks pregnant at this time    Vaginal Discharge     Pt is also reporting that she has been having a thick yellowish discharge over the past week.     Review of patient's allergies indicates:  No Known Allergies  Past Medical History:   Diagnosis Date    Behavior problem in childhood     COVID-19 08/08/2021    Ectopic pregnancy 5/24/2023    Suicidal ideation

## 2025-01-06 NOTE — ED PROVIDER NOTES
Encounter Date: 1/6/2025    SCRIBE #1 NOTE: I, Jessenia Cerda, am scribing for, and in the presence of,  Kunal Childress MD. I have scribed the following portions of the note - Other sections scribed: HPI, ROS.       History     Chief Complaint   Patient presents with    Abdominal Pain     Diffuse lower abd / pelvic discomfort over the past 10 hours   Currently 13 weeks pregnant at this time    Vaginal Discharge     Pt is also reporting that she has been having a thick yellowish discharge over the past week.     Time seen by provider: 1:11 AM    This is a 19 y.o. female at 13 weeks gestation who presents with complaint of abdominal pain that started today. She notes that the pain is focused in the lower part of her abdomen and feels like bad period cramping. She states that earlier today her toddler jumped and landed in that area of her stomach and she just wanted to get checked out. She also reports vaginal discharge for the past week. She notes that the discharge is a thick and yellow. She denies any change of sexual partner but notes that she gets recurring BV during pregnancy. She denies any complications during her pregnancies. She does not a history of back surgeries, She is not on any daily medications. NKDA. She does not smoke, drink, or use any illicit drugs. This is the extent of the patient's complaints at this time.          The history is provided by the patient. No  was used.     Review of patient's allergies indicates:  No Known Allergies  Past Medical History:   Diagnosis Date    Behavior problem in childhood     COVID-19 08/08/2021    Ectopic pregnancy 5/24/2023    Suicidal ideation      Past Surgical History:   Procedure Laterality Date    BACK SURGERY  2016    Rods in place, had issues with previous epidural    DIAGNOSTIC LAPAROSCOPY N/A 05/29/2023    Procedure: LAPAROSCOPY, DIAGNOSTIC;  Surgeon: Juana Fung MD;  Location: [facility];  Service: OB/GYN;  Laterality: N/A;     DILATION AND CURETTAGE OF UTERUS       Family History   Problem Relation Name Age of Onset    Throat cancer Paternal Grandfather      No Known Problems Father      No Known Problems Mother      Breast cancer Neg Hx      Diabetes Neg Hx      Colon cancer Neg Hx      Ovarian cancer Neg Hx       Social History     Tobacco Use    Smoking status: Never    Smokeless tobacco: Never   Substance Use Topics    Alcohol use: No    Drug use: No     Review of Systems  Constitutional-no fever  HEENT-no congestion  Eyes-no redness  Respiratory-no shortness of breath  Cardio-no chest pain  GI-abdominal pain  Endocrine-no cold intolerance  -no difficulty urinating, vaginal discharge  MSK-no myalgias  Skin-no rashes  Allergy-no environmental allergy  Neurologic-, no headache  Hematology-no swollen nodes  Behavioral-no confusion\  Physical Exam     Initial Vitals [01/06/25 0014]   BP Pulse Resp Temp SpO2   (!) 101/59 81 16 98.6 °F (37 °C) 99 %      MAP       --         Physical Exam  Constitutional: Well appearing, no distress.  Eyes: Conjunctivae normal.  ENT       Head: Normocephalic, atraumatic.       Nose: No congestion.       Mouth/Throat: Mucous membranes are moist.  Hematological/Lymphatic/Immunilogical: No cervical lymphadenopathy.  Cardiovascular: Normal rate, regular rhythm. Normal and symmetric distal pulses.  Respiratory: Normal respiratory effort. Breath sounds are normal.  Gastrointestinal: Soft, nontender.   Musculoskeletal: Normal range of motion in all extremities. No obvious deformities or swelling.  Neurologic: Alert, oriented. Normal speech and language. No gross focal neurologic deficits are appreciated.  Skin: Skin is warm, dry. No rash noted.  Psychiatric: Mood and affect are normal.     ED Course   Procedures  Labs Reviewed   VAGINAL SCREEN - Abnormal       Result Value    Trichomonas None      Clue Cells Rare (*)     Budding Yeast Occasional (*)     Fungal Hyphae Occasional (*)     WBC - Vaginal Screen  Occasional (*)     Bacteria - Vaginal Screen Few (*)     Wet Prep Source Vagina      Narrative:     Release to patient->Immediate   CBC W/ AUTO DIFFERENTIAL - Abnormal    WBC 7.33      RBC 3.94 (*)     Hemoglobin 12.1      Hematocrit 35.0 (*)     MCV 89      MCH 30.7      MCHC 34.6      RDW 12.8      Platelets 187      MPV 9.8      Immature Granulocytes 0.5      Gran # (ANC) 4.9      Immature Grans (Abs) 0.04      Lymph # 1.9      Mono # 0.4      Eos # 0.1      Baso # 0.04      nRBC 0      Gran % 67.0      Lymph % 25.8      Mono % 5.2      Eosinophil % 1.0      Basophil % 0.5      Differential Method Automated      Narrative:     Release to patient->Immediate   COMPREHENSIVE METABOLIC PANEL - Abnormal    Sodium 136      Potassium 3.4 (*)     Chloride 106      CO2 21 (*)     Glucose 140 (*)     BUN 5 (*)     Creatinine 0.6      Calcium 9.2      Total Protein 6.6      Albumin 3.4 (*)     Total Bilirubin 0.5      Alkaline Phosphatase 57      AST 13      ALT 10      eGFR >60      Anion Gap 9      Narrative:     Release to patient->Immediate   URINALYSIS, REFLEX TO URINE CULTURE - Abnormal    Specimen UA Urine, Clean Catch      Color, UA Yellow      Appearance, UA Hazy (*)     pH, UA 6.0      Specific Gravity, UA 1.030      Protein, UA Trace (*)     Glucose, UA 3+ (*)     Ketones, UA 2+ (*)     Bilirubin (UA) Negative      Occult Blood UA Negative      Nitrite, UA Negative      Urobilinogen, UA 2.0-3.0 (*)     Leukocytes, UA 3+ (*)     Narrative:     Specimen Source->Urine   URINALYSIS MICROSCOPIC - Abnormal    RBC, UA 7 (*)     WBC, UA 23 (*)     Bacteria Few (*)     Yeast, UA Occasional (*)     Squam Epithel, UA 13      Microscopic Comment SEE COMMENT      Narrative:     Specimen Source->Urine   POCT URINE PREGNANCY - Abnormal    POC Preg Test, Ur Positive (*)      Acceptable Yes     CULTURE, URINE   HCG, QUANTITATIVE    HCG Quant [id number]      Narrative:     Release to patient->Immediate          Imaging  Results              US OB <14 Wks, TransAbd, Single Gestation (Final result)  Result time 01/06/25 00:47:21   Procedure changed from US OB <14 Wks TransAbd & TransVag, Single Gestation (XPD)     Final result by Alice Nazario MD (01/06/25 00:47:21)                   Impression:      Live IUP with crown-rump length measurements corresponding to a gestational age of 12 weeks 4 days with an estimated ultrasound due date of 07/17/2025.    Slight decrease in size of bilateral ovarian cysts.  No evidence of ovarian torsion.      Electronically signed by: Alice Nazario  Date:    01/06/2025  Time:    00:47               Narrative:    EXAMINATION:  ULTRASOUND OBSTETRICAL ULTRASOUND LESS THAN 14 WEEKS WITH TRANSVAGINAL    CLINICAL HISTORY:  Vag Bleeding;    TECHNIQUE:  Real-time ultrasound obstetrical ultrasound less than 14 weeks was performed transabdominally and  transvaginally.    COMPARISON:  12/04/2024    FINDINGS:  The uterus measures 11.5 x 8.4 x 9.9 cm. There are no uterine masses.  A fetal pole and gestational sac are visualized.  The fetal pole has a crown-rump length of 60.5 mm, which corresponds to a gestational age of 12 weeks 4 days.  The fetal heart rate is 165 beats per minute.  There is no subchorionic hemorrhage.  The previously seen yolk sac is not appreciated    The right ovary measures 3 x 2.3 x 4.3 cm.  There is a 2.6 x 1.5 x 2.1 cm anechoic structure within the right ovary and is compared to a previous measurement of 2.3 x 1.8 x 1.7 cm.  The left ovary measures 4.2 x 2.7 x 3.7 cm.  A 2.5 x 1.9 x2.9 cm anechoic structure seen within the left ovary.  On the previous examination, there is an anechoic structure measuring 2.1 x 1.5 x 3.1 cm.  Vascular flow is seen in both ovaries.    There is no free fluid in the posterior cul-de-sac.                                       Medications   acetaminophen tablet 650 mg (650 mg Oral Given 1/6/25 0044)     Medical Decision Making  Ddx- ovarian cyst, pid,  cervicitis, pregnancy, bv, yeast, uti    Problems Addressed:  12 weeks gestation of pregnancy: acute illness or injury  Urinary tract infection without hematuria, site unspecified: acute illness or injury  Yeast infection: acute illness or injury    Amount and/or Complexity of Data Reviewed  External Data Reviewed: notes.     Details: Bv and pregnancy related care  Labs: ordered. Decision-making details documented in ED Course.  Radiology: ordered and independent interpretation performed. Decision-making details documented in ED Course.    Risk  OTC drugs.  Prescription drug management.            Scribe Attestation:   Scribe #1: I performed the above scribed service and the documentation accurately describes the services I performed. I attest to the accuracy of the note.              Physician Attestation for Scribe: I, kunal childress, reviewed documentation as scribed in my presence, which is both accurate and complete.                   Clinical Impression:  Final diagnoses:  [Z3A.12] 12 weeks gestation of pregnancy (Primary)  [B37.9] Yeast infection  [N39.0] Urinary tract infection without hematuria, site unspecified          ED Disposition Condition    Discharge Stable          ED Prescriptions       Medication Sig Dispense Start Date End Date Auth. Provider    cephALEXin (KEFLEX) 500 MG capsule Take 1 capsule (500 mg total) by mouth every 8 (eight) hours. for 5 days 15 capsule 1/6/2025 1/11/2025 Kunal Childress MD    clotrimazole (LOTRIMIN) 1 % Crea Place 1 suppository (1 applicator total) vaginally nightly. for 7 days 45 g 1/6/2025 1/13/2025 Kunal Childress MD          Follow-up Information       Follow up With Specialties Details Why Contact Info    Britton Ramos MD Obstetrics and Gynecology Call in 1 day If symptoms worsen, For a follow up visit about today [id number] [facility] [address]  [phone]               Kunal Childress MD  01/06/25 [id number]

## 2025-01-06 NOTE — DISCHARGE INSTRUCTIONS
Mrs. Cochran,    Thank you for letting me care for you today! It was nice meeting you, and I hope you feel better soon.   If you would like access to your chart and what was done today please utilize the [facility].   Please come back to [facility] for all of your future medical needs.    Our goal in the emergency department is to always give you outstanding care and exceptional service. You may receive a survey by mail or e-mail in the next week regarding your experience in our ED. We would greatly appreciate you completing and returning the survey. Your feedback provides us with a way to recognize our staff who give very good care and it helps us learn how to improve when your experience was below our aspiration of excellence.     Sincerely,    Kunal Childress MD  Board Certified Emergency Physician

## 2025-01-07 LAB
BACTERIA UR CULT: NORMAL
BACTERIA UR CULT: NORMAL

## 2025-02-07 ENCOUNTER — LAB VISIT (OUTPATIENT)
Dept: LAB | Facility: OTHER | Age: 20
End: 2025-02-07
Payer: MEDICAID

## 2025-02-07 ENCOUNTER — INITIAL PRENATAL (OUTPATIENT)
Dept: OBSTETRICS AND GYNECOLOGY | Facility: CLINIC | Age: 20
End: 2025-02-07
Payer: MEDICAID

## 2025-02-07 VITALS
WEIGHT: 121.06 LBS | HEART RATE: 83 BPM | SYSTOLIC BLOOD PRESSURE: 93 MMHG | DIASTOLIC BLOOD PRESSURE: 62 MMHG | BODY MASS INDEX: 22.87 KG/M2

## 2025-02-07 DIAGNOSIS — Z32.01 POSITIVE PREGNANCY TEST: ICD-10-CM

## 2025-02-07 DIAGNOSIS — Z86.59 HISTORY OF DEPRESSION: ICD-10-CM

## 2025-02-07 DIAGNOSIS — Z3A.14 14 WEEKS GESTATION OF PREGNANCY: Primary | ICD-10-CM

## 2025-02-07 LAB
ABO + RH BLD: NORMAL
ANION GAP SERPL CALC-SCNC: 8 MMOL/L (ref 8–16)
BASOPHILS # BLD AUTO: 0.05 K/UL (ref 0–0.2)
BASOPHILS NFR BLD: 0.6 % (ref 0–1.9)
BLD GP AB SCN CELLS X3 SERPL QL: NORMAL
BUN SERPL-MCNC: 8 MG/DL (ref 6–20)
CALCIUM SERPL-MCNC: 9.4 MG/DL (ref 8.7–10.5)
CHLORIDE SERPL-SCNC: 107 MMOL/L (ref 95–110)
CO2 SERPL-SCNC: 22 MMOL/L (ref 23–29)
CREAT SERPL-MCNC: 0.6 MG/DL (ref 0.5–1.4)
DIFFERENTIAL METHOD BLD: ABNORMAL
EOSINOPHIL # BLD AUTO: 0.1 K/UL (ref 0–0.5)
EOSINOPHIL NFR BLD: 0.6 % (ref 0–8)
ERYTHROCYTE [DISTWIDTH] IN BLOOD BY AUTOMATED COUNT: 13.2 % (ref 11.5–14.5)
EST. GFR  (NO RACE VARIABLE): >60 ML/MIN/1.73 M^2
GLUCOSE SERPL-MCNC: 72 MG/DL (ref 70–110)
HBV SURFACE AG SERPL QL IA: NORMAL
HCT VFR BLD AUTO: 36.3 % (ref 37–48.5)
HCV AB SERPL QL IA: NEGATIVE
HGB BLD-MCNC: 12.3 G/DL (ref 12–16)
HIV 1+2 AB+HIV1 P24 AG SERPL QL IA: NEGATIVE
IMM GRANULOCYTES # BLD AUTO: 0.04 K/UL (ref 0–0.04)
IMM GRANULOCYTES NFR BLD AUTO: 0.5 % (ref 0–0.5)
LYMPHOCYTES # BLD AUTO: 1.9 K/UL (ref 1–4.8)
LYMPHOCYTES NFR BLD: 24 % (ref 18–48)
MCH RBC QN AUTO: 30.4 PG (ref 27–31)
MCHC RBC AUTO-ENTMCNC: 33.9 G/DL (ref 32–36)
MCV RBC AUTO: 90 FL (ref 82–98)
MONOCYTES # BLD AUTO: 0.3 K/UL (ref 0.3–1)
MONOCYTES NFR BLD: 4.3 % (ref 4–15)
NEUTROPHILS # BLD AUTO: 5.5 K/UL (ref 1.8–7.7)
NEUTROPHILS NFR BLD: 70 % (ref 38–73)
NRBC BLD-RTO: 0 /100 WBC
PLATELET # BLD AUTO: 205 K/UL (ref 150–450)
PMV BLD AUTO: 9.7 FL (ref 9.2–12.9)
POTASSIUM SERPL-SCNC: 3.8 MMOL/L (ref 3.5–5.1)
RBC # BLD AUTO: 4.05 M/UL (ref 4–5.4)
SODIUM SERPL-SCNC: 137 MMOL/L (ref 136–145)
SPECIMEN OUTDATE: NORMAL
TREPONEMA PALLIDUM IGG+IGM AB [PRESENCE] IN SERUM OR PLASMA BY IMMUNOASSAY: NONREACTIVE
WBC # BLD AUTO: 7.86 K/UL (ref 3.9–12.7)

## 2025-02-07 PROCEDURE — 87340 HEPATITIS B SURFACE AG IA: CPT

## 2025-02-07 PROCEDURE — 36415 COLL VENOUS BLD VENIPUNCTURE: CPT

## 2025-02-07 PROCEDURE — 86762 RUBELLA ANTIBODY: CPT

## 2025-02-07 PROCEDURE — 80048 BASIC METABOLIC PNL TOTAL CA: CPT

## 2025-02-07 PROCEDURE — 86803 HEPATITIS C AB TEST: CPT

## 2025-02-07 PROCEDURE — 99999 PR PBB SHADOW E&M-EST. PATIENT-LVL III: CPT | Mod: PBBFAC,,,

## 2025-02-07 PROCEDURE — 99213 OFFICE O/P EST LOW 20 MIN: CPT | Mod: PBBFAC,TH

## 2025-02-07 PROCEDURE — 86900 BLOOD TYPING SEROLOGIC ABO: CPT

## 2025-02-07 PROCEDURE — 86593 SYPHILIS TEST NON-TREP QUANT: CPT

## 2025-02-07 PROCEDURE — 87389 HIV-1 AG W/HIV-1&-2 AB AG IA: CPT

## 2025-02-07 PROCEDURE — 85025 COMPLETE CBC W/AUTO DIFF WBC: CPT

## 2025-02-07 PROCEDURE — 99203 OFFICE O/P NEW LOW 30 MIN: CPT | Mod: S$PBB,TH,,

## 2025-02-07 NOTE — PROGRESS NOTES
Yaquelin Cochran is a 19 y.o. , presents today for amenorrhea.      Patient reports has not seen any other provider for this pregnancy    HPI: Reports amenorrhea since Patient's last menstrual period was 2024 (exact date).. Prior to LMP, menses were regular occuring every 38-40 days.  She is not currently on any contraception.  Also reports no nausea no vomiting. Has noticed breast tenderness. Denies vaginal bleeding since LMP.    SOCIAL HISTORY: Denies emotional/mental/physical/sexual violence or abuse. Feels safe at home. Accompanied today by daughterPaz Morgan- father of baby. 5th pregnancy. 3rd  baby for both.   Discussed use of alcohol, tobacco, and illicit substances - pt denies during pregnancy.    PAP HISTORY: last pap 2024, result NILM, denies any history of abnormal pap smear or STDs.     Reports no long-term chronic medical conditions     Review of patient's allergies indicates:  No Known Allergies  Past Medical History:   Diagnosis Date    Behavior problem in childhood     COVID-19 2021    Ectopic pregnancy 2023    Suicidal ideation      Past Surgical History:   Procedure Laterality Date    BACK SURGERY      Rods in place, had issues with previous epidural    DIAGNOSTIC LAPAROSCOPY N/A 2023    Procedure: LAPAROSCOPY, DIAGNOSTIC;  Surgeon: Juana Fung MD;  Location: [facility] OR;  Service: OB/GYN;  Laterality: N/A;    DILATION AND CURETTAGE OF UTERUS       Past Surgical History:   Procedure Laterality Date    BACK SURGERY      Rods in place, had issues with previous epidural    DIAGNOSTIC LAPAROSCOPY N/A 2023    Procedure: LAPAROSCOPY, DIAGNOSTIC;  Surgeon: Juana Fung MD;  Location: [facility] OR;  Service: OB/GYN;  Laterality: N/A;    DILATION AND CURETTAGE OF UTERUS       OB History    Para Term  AB Living   5 2 2 0 2 2   SAB IAB Ectopic Multiple Live Births   1 0 1 0 2      # Outcome Date GA Lbr Fran/2nd Weight Sex Type Anes PTL Lv   5  Current            4 Term 03/06/24 37w5d / 00:01 2.55 kg (5 lb 10 oz) M Vag-Spont None N HERMANN   3 Ectopic 05/2023     ECTOPIC      2 Term 02/18/22 40w0d  2.637 kg (5 lb 13 oz) F Vag-Spont  N HERMANN   1 SAB 2020     SAB         Complications: Blighted ovum, History of D&C     Social History     Socioeconomic History    Marital status: Single   Tobacco Use    Smoking status: Never    Smokeless tobacco: Never   Substance and Sexual Activity    Alcohol use: No    Drug use: No    Sexual activity: Yes     Partners: Male     Birth control/protection: Condom     Social Drivers of Health     Financial Resource Strain: Medium Risk (11/28/2024)    Overall Financial Resource Strain (CARDIA)     Difficulty of Paying Living Expenses: Somewhat hard   Food Insecurity: Food Insecurity Present (11/28/2024)    Hunger Vital Sign     Worried About Running Out of Food in the Last Year: Sometimes true     Ran Out of Food in the Last Year: Sometimes true   Transportation Needs: No Transportation Needs (11/28/2024)    TRANSPORTATION NEEDS     Transportation : No   Stress: No Stress Concern Present (11/28/2024)    [employer] [place] [employer] - Occupational Stress Questionnaire     Feeling of Stress : Only a little   Housing Stability: Low Risk  (11/28/2024)    Housing Stability Vital Sign     Unable to Pay for Housing in the Last Year: No     Homeless in the Last Year: No     Family History   Problem Relation Name Age of Onset    Throat cancer Paternal Grandfather      No Known Problems Father      No Known Problems Mother      Breast cancer Neg Hx      Diabetes Neg Hx      Colon cancer Neg Hx      Ovarian cancer Neg Hx       Social History     Substance and Sexual Activity   Sexual Activity Yes    Partners: Male    Birth control/protection: Condom       GENETIC SCREENING   Patient's age 35 years or older as of estimated date of delivery? No   Neural tube defect (meningomyelocele, spina bifida, or anencephaly)? no  Down syndrome?  no  Kiel-Sachs (Ashkenazi Sikh, Cajun, [place]ymraes [place])? n  Canavan disease (Ashkenazi Sikh)? no  Familial dysautonomia (Ashkenazi Sikh)? no  Sickle cell disease or trait ()? no  Hemophilia or other blood disorders? no  Cystic fibrosis? no  Muscular dystrophy? no  [place]'s chorea? no  Thalassemia (Italian, Greek, Mediterranean, or  background) MCV less than 80? no  Congenital heart defect? no  Mental retardation/autism? no   If Yes, was person tested for Fragile X? N/a  Other inherited genetic or chromosomal disorder? No   Maternal metabolic disorder (e.g. type 1 diabetes, PKU)? no  Patient or baby's father had a child with birth defects not listed above? no  Recurrent pregnancy loss or a stillbirth: no  Medications (including supplements, vitamins, herbs or OTC drugs)/illicit/recreational drugs/alcohol since last menstrual period? pnv       INFECTION HISTORY  Live with someone with TB or exposed to TB: no  Patient or partner has history of genital herpes: no  Rash or viral illness since last menstrual period: no  Patient or partner has hepatitis B or C: no  History of STD, gonorrhea, chlamydia, HPV, HIV, syphilis (list all that apply): no      No, Primary Doctor     ROS:  Constitutional/Gen: Denies fevers, chills, malaise, or weight loss.  Psych: Denies depression, anxiety  Eyes: Denies changes in vision or scotomata  Ears, nose, mouth, throat: Denies sinus tenderness, swelling, or dentition problems  CV/vasc: Denies heart palpitations or edema  Resp: Denies SOB or dyspnea  Breasts: Denies mass, nipple discharge, or trauma.   GI: Denies constipation or diarrhea.  : Denies vaginal discharge, dysuria or pelvic pain.   MS: Denies weakness, soreness, or changes in ROM    OBJECTIVE:  BP 93/62   Pulse 83   Wt 54.9 kg (121 lb 0.5 oz)   LMP 09/29/2024 (Exact Date)   BMI 22.87 kg/m²   Constitutional/Gen: NAD, appears stated age, well groomed  Neck: supple, no masses or enlargement  Head:  normocephalic  Skin: warm and dry w/o rash  Lung: normal resp effort, CTAB  Heart: normal HR, RRR   Back: negative CVAT  Breasts: bilaterally--no masses, tenderness, skin changes, or nipple discharge noted  Abdomen: soft, nontender, no masses, and bowel sounds normal, no enlargement  Decline pelvic exam at this visit.  Adnexa: no masses or tenderness  Anus/Perineum: normal appearance, with no lesions or discharge. Internal exam deferred.  Extremities: FROM, with no edema or tenderness.  Neurologic: A&O x 4, non-focal, cranial nerves 2-12 grossly intact  Psych: affect appropriate and without signs of mood, thought or memory difficulty appreciated        ASSESSMENT:  19 y.o. female  with amenorrhea  Likely at 18w5d via LMP  Patient Active Problem List   Diagnosis    Anxiety    History of ectopic pregnancy    Pregnancy    History of depression    Complex ovarian cyst    Irregular menses    Abnormal facial hair    Occipital lymphadenopathy    Acute cystitis with hematuria       PLAN:  Amenorrhea  -- + UPT in office, Patient's last menstrual period was 2024 (exact date). --> Estimated Date of Delivery: 25  -- Dating US ordered/scheduled  -- Routine serum and urine prenatal labs today    Physical exam today.   --Discussed ASCCP guidelines. Next pap due not later than 2027.     Pregnancy education and couseling; handouts and booklet provided  -- Oriented to practice and anticipated prenatal course of care and how to contact us  -- Reviewed ABC guidelines and admission, exclusion, and transfer criteria  -- Precautions/warning signs reviewed  -- Common complaints of pregnancy  -- Routine prenatal labs including HIV  -- Ultrasounds  -- Childbirth education/hospital/ABC facilities  -- Nutrition, prepregnant BMI, and recommended weight gain  -- Toxoplasmosis precautions (Cats/Raw Meat)  -- Sexual activity and exercise  -- Environmental/Work hazards  -- Travel  -- Tobacco, as well as alcohol and drug use  --  Use of any medications (Including supplements, Vitamins, Herbs, or OTC Drugs)  -- Domestic violence screen  neg    Nausea  in pregnancy  -- Education regarding lifestyle and dietary modifications  -- Reviewed use of B6/Unisom prn. Pt will notify us if no relief/worsening symptoms, will consider alternative therapies prn      Reviewed genetic testing options.    -- Reviewed available first trimester and/or second trimester screening options. Reviewed risk of false positive/negative results and recommendation of referral to MFM in event of a positive result, for NIPT, US, and/or amniocentesis.  -- Patient desires genetic screening. MT21    Birth Center Risk Assessment: 0- Meets birth center guidelines    CNM management in ABC  CNM management on L&D  Consultation with OB to develop  plan of care  Collaborative CNM/OB management with delivery on L&D  Permanent referral of care to MD    Reviewed warning signs, precautions, and how/when to contact us.     RTC x 4 weeks, call or present sooner prn.     Updated Medication List:  Current Outpatient Medications   Medication Sig Dispense Refill    prenatal vit no.130-iron-folic (PRENATAL VITAMIN) 27 mg iron- 800 mcg Tab Take 1 tablet by mouth Daily. 300 tablet 0    doxylamine succinate 25 mg tablet Take 1 tablet (25 mg total) by mouth 2 (two) times a day. (Patient not taking: Reported on 2/7/2025) 20 tablet 0    metoclopramide HCl (REGLAN) 10 MG tablet Take 1 tablet (10 mg total) by mouth every 6 (six) hours. (Patient not taking: Reported on 2/7/2025) 30 tablet 0    ondansetron (ZOFRAN-ODT) 4 MG TbDL Take 1 tablet (4 mg total) by mouth every 8 (eight) hours as needed (nausea). (Patient not taking: Reported on 2/7/2025) 15 tablet 0    polyethylene glycol (GLYCOLAX) 17 gram/dose powder Take 17 g by mouth once daily. (Patient not taking: Reported on 2/7/2025) 116 g 0    promethazine (PHENERGAN) 25 MG suppository Place 1 suppository (25 mg total) rectally every 6 (six) hours as  needed for Nausea. (Patient not taking: Reported on 2/7/2025) 10 suppository 0    pyridoxine, vitamin B6, (B-6) 25 MG Tab Take 1 tablet (25 mg total) by mouth 2 (two) times a day. (Patient not taking: Reported on 2/7/2025) 20 tablet 0     No current facility-administered medications for this visit.         DAVE [place]

## 2025-02-08 NOTE — PROGRESS NOTES
I have seen the patient, reviewed the Student Nurse-Midwife's assessment and plan. I have personally interviewed and examined the patient at bedside and: agree with the findings.  Kimberly Kemp CNM

## 2025-02-10 ENCOUNTER — PATIENT MESSAGE (OUTPATIENT)
Dept: MATERNAL FETAL MEDICINE | Facility: CLINIC | Age: 20
End: 2025-02-10
Payer: MEDICAID

## 2025-02-10 LAB
RUBV IGG SER-ACNC: 13.2 IU/ML
RUBV IGG SER-IMP: REACTIVE

## 2025-02-10 NOTE — PLAN OF CARE
Pt ambulating and voiding without difficulty. Patient safety maintained, side rails up, bed low and locked position.  Pain well controlled with PRN pain medication. Fundus midline, firm, with moderate lochia. VSS. Bonding with infant - but plans for adoption of baby. Adoptive parents in another room with infant currently. SW consult completed. Will continue to monitor.    Sedation Plan  ASA: class 3 - patient with severe systemic disease     Mallampati class: II - soft palate, uvula, fauces visible.    Sedation plan: local anesthesia and moderate (conscious sedation)    Risks, benefits, and alternatives discussed with patient.  Use of blood products discussed with patient who consented to blood products.       Immediate reassessment prior to sedation:  Patient's status reviewed and vital signs assessed; acceptable to perform procedure and proceed to administer sedation as planned.

## 2025-02-14 ENCOUNTER — PATIENT MESSAGE (OUTPATIENT)
Dept: OBSTETRICS AND GYNECOLOGY | Facility: CLINIC | Age: 20
End: 2025-02-14
Payer: MEDICAID

## 2025-02-14 LAB
ABOUT THE TEST: NORMAL
APPROVED BY: NORMAL
FETAL FRACTION: NORMAL
FETAL SEX RESULT: NORMAL
GESTATIONAL AGE > 9: YES
GESTATIONAL AGE: NORMAL
LAB DIRECTOR COMMENTS: NORMAL
LIMITATIONS:: NORMAL
MONOSOMY X RESULT: NOT DETECTED
NEGATIVE PREDICTIVE VALUE: NORMAL
NOTE: NORMAL
PERFORMANCE CHARACTERISTICS: NORMAL
PERFORMANCE: NORMAL
POSITIVE PREDICTIVE VALUE: NORMAL
RESULT: NEGATIVE
SERVICE CMNT 04-IMP: NORMAL
TEST METHODOLOGY:: NORMAL
TRISOMY 13 (T13): NEGATIVE
TRISOMY 18: NEGATIVE
TRISOMY 21 (T21): NEGATIVE
XXX (TRIPLE X SYNDROME): NOT DETECTED
XXY (KLINEFELTER SYNDROME): NOT DETECTED
XYY (JACOBS SYNDROME): NOT DETECTED

## 2025-02-16 ENCOUNTER — RESULTS FOLLOW-UP (OUTPATIENT)
Dept: OBSTETRICS AND GYNECOLOGY | Facility: HOSPITAL | Age: 20
End: 2025-02-16

## 2025-02-17 ENCOUNTER — TELEPHONE (OUTPATIENT)
Dept: MATERNAL FETAL MEDICINE | Facility: CLINIC | Age: 20
End: 2025-02-17
Payer: MEDICAID

## 2025-02-24 ENCOUNTER — PATIENT MESSAGE (OUTPATIENT)
Dept: OTHER | Facility: OTHER | Age: 20
End: 2025-02-24
Payer: MEDICAID

## 2025-03-03 ENCOUNTER — PATIENT MESSAGE (OUTPATIENT)
Dept: OBSTETRICS AND GYNECOLOGY | Facility: CLINIC | Age: 20
End: 2025-03-03
Payer: MEDICAID

## 2025-03-13 ENCOUNTER — PROCEDURE VISIT (OUTPATIENT)
Dept: MATERNAL FETAL MEDICINE | Facility: CLINIC | Age: 20
End: 2025-03-13
Payer: MEDICAID

## 2025-03-13 DIAGNOSIS — Z3A.14 14 WEEKS GESTATION OF PREGNANCY: ICD-10-CM

## 2025-03-13 DIAGNOSIS — Z32.01 POSITIVE PREGNANCY TEST: ICD-10-CM

## 2025-03-13 PROCEDURE — 76805 OB US >/= 14 WKS SNGL FETUS: CPT | Mod: PBBFAC | Performed by: OBSTETRICS & GYNECOLOGY

## 2025-03-18 ENCOUNTER — HOSPITAL ENCOUNTER (EMERGENCY)
Facility: OTHER | Age: 20
Discharge: HOME | End: 2025-03-18
Attending: OBSTETRICS & GYNECOLOGY
Payer: MEDICAID

## 2025-03-18 VITALS
BODY MASS INDEX: 23.92 KG/M2 | SYSTOLIC BLOOD PRESSURE: 89 MMHG | HEART RATE: 66 BPM | RESPIRATION RATE: 17 BRPM | OXYGEN SATURATION: 100 % | HEIGHT: 62 IN | TEMPERATURE: 98 F | WEIGHT: 130 LBS | DIASTOLIC BLOOD PRESSURE: 58 MMHG

## 2025-03-18 DIAGNOSIS — R07.9 CHEST PAIN: ICD-10-CM

## 2025-03-18 DIAGNOSIS — Z3A.23 23 WEEKS GESTATION OF PREGNANCY: ICD-10-CM

## 2025-03-18 DIAGNOSIS — R07.89 CHEST WALL PAIN: Primary | ICD-10-CM

## 2025-03-18 LAB
OHS QRS DURATION: 78 MS
OHS QTC CALCULATION: 427 MS
TROPONIN I SERPL DL<=0.01 NG/ML-MCNC: 0.03 NG/ML (ref 0–0.03)

## 2025-03-18 PROCEDURE — 93005 ELECTROCARDIOGRAM TRACING: CPT

## 2025-03-18 PROCEDURE — 59025 FETAL NON-STRESS TEST: CPT

## 2025-03-18 PROCEDURE — 93010 ELECTROCARDIOGRAM REPORT: CPT | Mod: ,,, | Performed by: INTERNAL MEDICINE

## 2025-03-18 PROCEDURE — 99284 EMERGENCY DEPT VISIT MOD MDM: CPT | Mod: 25

## 2025-03-18 PROCEDURE — 84484 ASSAY OF TROPONIN QUANT: CPT

## 2025-03-18 NOTE — ED PROVIDER NOTES
"Encounter Date: 3/18/2025       History     Chief Complaint   Patient presents with    Chest Pain     Chest pain, LUE "soreness" x2 weeks, worse today, approx. 23 weeks pregnant.      Yaquelin Cochran is a 19 y.o. [id number] at 23w1d presents complaining of chest pain.   This IUP is complicated by anxiety/depression.  Patient denies contractions, denies vaginal bleeding, denies LOF.   Fetal Movement: normal    Patient reports central chest pressure for 2 weeks. She felt two episodes of sharp pain radiating to left arm today, which is why she presented to the ED. Her chest pain changes with position. She also endorses SOB with exertion. She was evaluated in the ED downstairs and had a normal EKG. VSS on arrival to downstairs ED and FÁTIMA.   She denies N/V, palpitations.     Of note, patient with history of chest pain with elevated troponin in prior pregnancy (). She had an ECHO at that time that was normal. She was referred to cardiology but did not follow up.       Review of patient's allergies indicates:  No Known Allergies  Past Medical History:   Diagnosis Date    Behavior problem in childhood     COVID-19 2021    Ectopic pregnancy 2023    Suicidal ideation      Past Surgical History:   Procedure Laterality Date    BACK SURGERY  2016    Rods in place, had issues with previous epidural    DIAGNOSTIC LAPAROSCOPY N/A 2023    Procedure: LAPAROSCOPY, DIAGNOSTIC;  Surgeon: Juana Fung MD;  Location: [facility];  Service: OB/GYN;  Laterality: N/A;    DILATION AND CURETTAGE OF UTERUS       Family History   Problem Relation Name Age of Onset    Throat cancer Paternal Grandfather      No Known Problems Father      No Known Problems Mother      Breast cancer Neg Hx      Diabetes Neg Hx      Colon cancer Neg Hx      Ovarian cancer Neg Hx       Social History[1]  Review of Systems   Constitutional:  Negative for chills and fatigue.   HENT:  Negative for congestion and sore throat.    Eyes:  Negative " for visual disturbance.   Respiratory:  Positive for chest tightness and shortness of breath.    Cardiovascular:  Positive for chest pain.   Gastrointestinal:  Negative for abdominal pain.   Genitourinary:  Negative for dysuria, vaginal bleeding and vaginal discharge.   Neurological:  Negative for headaches.       Physical Exam     Initial Vitals [03/18/25 1335]   BP Pulse Resp Temp SpO2   (!) 92/53 77 18 98.2 °F (36.8 °C) 99 %      MAP       --         Physical Exam    Constitutional: She appears well-developed and well-nourished. No distress.   HENT:   Head: Normocephalic and atraumatic.   Neck: Neck supple.   Normal range of motion.  Pulmonary/Chest: No respiratory distress.   Abdominal: There is no abdominal tenderness.   Gravid abdomen   Musculoskeletal:      Cervical back: Normal range of motion and neck supple.     Neurological: She is alert and oriented to person, place, and time.   Skin: Skin is warm and dry.   Psychiatric: She has a normal mood and affect.         ED Course   Fetal non-stress test    Date/Time: 3/18/2025 3:36 PM    Performed by: Idalia Jacob MD  Authorized by: Idalia Jacob MD    Nonstress Test:     Variability:  6-25 BPM    Decelerations:  None    Accelerations:  10 bpm    Baseline:  135    Uterine Irritability: No      Contractions:  Not present  Biophysical Profile:     Nonstress Test Interpretation: appropriate for gestational age      Overall Impression:  Reassuring  Post-procedure:     Patient tolerance:  Patient tolerated the procedure well with no immediate complications    Labs Reviewed   TROPONIN I - Abnormal       Result Value    Troponin I 0.029 (*)         ECG Results              EKG 12-lead (Final result)        Collection Time Result Time QRS Duration OHS QTC Calculation    03/18/25 13:30:00 03/18/25 15:00:53 78 427                     Final result by Interface, Lab In [facility] (03/18/25 15:00:56)                   Narrative:    Test Reason : R07.9,    Vent. Rate :  72  BPM     Atrial Rate :  72 BPM     P-R Int : 134 ms          QRS Dur :  78 ms      QT Int : 390 ms       P-R-T Axes :   0  82   3 degrees    QTcB Int : 427 ms    Normal sinus rhythm  Normal ECG    Confirmed by CROW Weiner ([id number]) on 3/18/2025 3:00:49 PM    Referred By: AAAREFERRAL SELF           Confirmed By: CROW Weiner                                  Imaging Results    None          Medications - No data to display  Medical Decision Making  Yaquelin Cochran is a 19 y.o. [id number] at 23w1d presents complaining of chest pain.   Temp:  [98.2 °F (36.8 °C)] 98.2 °F (36.8 °C)  Pulse:  [60-77] 63  Resp:  [17-18] 17  SpO2:  [99 %-100 %] 100 %  BP: ()/(53-64) 100/64    NST reactive and reassuring  [place] quiet    Chest Pain:   -VSS  -EKG normal   -Troponin 0.029    Low suspicion for cardiac process given normal vital signs, EKG, and troponin. Patient counseled that pain is like MSK in nature, and she can take tylenol for relief. Return to ED precautions given for increase in chest pain, SOB, or palpitations. Outpatient cardiology referral placed for elevated troponin.   Patient stable for discharge at this time.     Idalia Jacob MD  Obstetrics & Gynecology, PGY-1      Amount and/or Complexity of Data Reviewed  Labs:  Decision-making details documented in ED Course.              Attending Attestation:   Physician Attestation Statement for Resident:  As the supervising MD   Physician Attestation Statement: I have personally seen and examined this patient.   I agree with the above history.  -:   As the supervising MD I agree with the above PE.     As the supervising MD I agree with the above treatment, course, plan, and disposition.   -:     NST appropriate for gestational age, toco with no contractions.  EKG normal and patient with h/o elevated troponin and normal ECHO.  Normal heart and lung exam.  Troponin mildly elevated with CP that is positional and noted at rest.  Pain does not increase with exercise or  walking and no orthopnea.  Troponin mildly elevated.  Agree with discharge to home with outpt cardiology consult.    Leila Shetty MD,NORAH  Date and Time: 2025 9:34 AM  OB Hospitalist    I was personally present during the critical portions of the procedure(s) performed by the resident and was immediately available in the ED to provide services and assistance as needed during the entire procedure.  I have reviewed and agree with the residents interpretation of the following: lab data and EKG.  I have reviewed the following: old records at this facility.                ED Course as of 25 5415   Tue Mar 18, 2025   6301 Patient is a 19 y.o.  at 23 1/7 weeks presents with 2 weeks of chest pain that does not increase with exercise.  Patient reports having elevated troponin in the past with a normal ECHO.  EKG normal.  VSS.  Will order troponin. [CD]   1536 BP(!): 92/53 [CD]   1536 Temp: 98.2 °F (36.8 °C) [CD]   1536 Pulse: 77 [CD]   1536 Resp: 18 [CD]   1536 SpO2: 99 % [CD]   1536 BP(!): 92/55 [CD]   1536 MAP (mmHg): 67 [CD]   1536 Pulse: 60 [CD]   1536 Resp: 17 [CD]   1536 BP: 100/64 [CD]   1536 MAP (mmHg): 77 [CD]   1536 Pulse: 63 [CD]   1639 Troponin I(!): 0.029 [CD]      ED Course User Index  [CD] Leila Shetty MD                           Clinical Impression:  Final diagnoses:  [R07.9] Chest pain  [Z3A.23] 23 weeks gestation of pregnancy  [R07.89] Chest wall pain (Primary)          ED Disposition Condition    Discharge Stable          ED Prescriptions    None       Follow-up Information    None            Idalia Jacob MD  Resident  25 1811         [1]   Social History  Tobacco Use    Smoking status: Never    Smokeless tobacco: Never   Substance Use Topics    Alcohol use: No    Drug use: No        Leila Shetty MD  25 [id number]

## 2025-03-18 NOTE — DISCHARGE INSTRUCTIONS
Call clinic ([phone]) or Labor & Delivery ([phone]) for any of the following:   - Vaginal bleeding   - Leakage of fluid  - Contractions 3-5 minutes apart for 2 hours or more  - Decreased fetal movement (10 or more movements in 2 hours, beginning at 28 weeks gestation)   - Headache unrelieved by Tylenol, pain underneath your right breast, or blurry vision  - Temperature of 100.4 or greater

## 2025-03-22 ENCOUNTER — HOSPITAL ENCOUNTER (EMERGENCY)
Facility: HOSPITAL | Age: 20
Discharge: HOME | End: 2025-03-22
Attending: STUDENT IN AN ORGANIZED HEALTH CARE EDUCATION/TRAINING PROGRAM
Payer: MEDICAID

## 2025-03-22 VITALS
WEIGHT: 130 LBS | OXYGEN SATURATION: 100 % | HEIGHT: 62 IN | BODY MASS INDEX: 23.92 KG/M2 | SYSTOLIC BLOOD PRESSURE: 91 MMHG | HEART RATE: 77 BPM | DIASTOLIC BLOOD PRESSURE: 56 MMHG | RESPIRATION RATE: 20 BRPM | TEMPERATURE: 98 F

## 2025-03-22 DIAGNOSIS — R19.8 ABDOMINAL TIGHTNESS: Primary | ICD-10-CM

## 2025-03-22 PROCEDURE — 99281 EMR DPT VST MAYX REQ PHY/QHP: CPT

## 2025-03-22 NOTE — ED TRIAGE NOTES
Yaquelin Cochran, a 19 y.o. female presents to the ED w/ complaint of lower abdominal pain that started on today.     Triage note:  Chief Complaint   Patient presents with    Abdominal Pain     24 wks pregnant. C/o lower abd pain, worse with walking. Has been having [place] bateman but not new. Lower back pain starting today. No bleeding or regular contractions.     Review of patient's allergies indicates:  No Known Allergies  Past Medical History:   Diagnosis Date    Behavior problem in childhood     COVID-19 08/08/2021    Ectopic pregnancy 5/24/2023    Suicidal ideation

## 2025-03-22 NOTE — ED PROVIDER NOTES
Encounter Date: 3/22/2025       History     Chief Complaint   Patient presents with    Abdominal Pain     24 wks pregnant. C/o lower abd pain, worse with walking. Has been having [place] bateman but not new. Lower back pain starting today. No bleeding or regular contractions.     19-year-old [id number] at 23w5d who presents to the emergency department for evaluation of abdominal tightness.  Patient reports her tolerated jumped on her abdomen earlier today causing mild abdominal pain that resolved.  However a few hours later she reports feeling tightness along the lower portion of her abdomen with ambulation.  She reports being mildly nauseous but denies lightheadedness, dizziness, vomiting, and vaginal bleeding.  Patient reports feeling the baby kick prior to arrival but concerned because she has not felt abdominal tightness before.  She currently denies lightheadedness, dizziness, chest pain, shortness of breath, UTI symptoms, or difficulty with ambulation.    The history is provided by the patient.     Review of patient's allergies indicates:  No Known Allergies  Past Medical History:   Diagnosis Date    Behavior problem in childhood     COVID-19 2021    Ectopic pregnancy 2023    Suicidal ideation      Past Surgical History:   Procedure Laterality Date    BACK SURGERY  2016    Rods in place, had issues with previous epidural    DIAGNOSTIC LAPAROSCOPY N/A 2023    Procedure: LAPAROSCOPY, DIAGNOSTIC;  Surgeon: Juana Fung MD;  [facility];  Service: OB/GYN;  Laterality: N/A;    DILATION AND CURETTAGE OF UTERUS       Family History   Problem Relation Name Age of Onset    Throat cancer Paternal Grandfather      No Known Problems Father      No Known Problems Mother      Breast cancer Neg Hx      Diabetes Neg Hx      Colon cancer Neg Hx      Ovarian cancer Neg Hx       Social History[1]  Review of Systems   Reason unable to perform ROS: As per HPI.       Physical Exam     Initial Vitals [25  "1758]   BP Pulse Resp Temp SpO2   108/66 79 18 98.3 °F (36.8 °C) 97 %      MAP       --         Physical Exam    Constitutional: She appears well-developed and well-nourished. She is not diaphoretic.   HENT:   Head: Normocephalic and atraumatic.   Eyes: EOM are normal.   Cardiovascular:  Normal rate and regular rhythm.           Pulmonary/Chest: No respiratory distress.   Abdominal: Abdomen is soft. She exhibits distension. There is no abdominal tenderness. There is no rebound and no guarding.   Musculoskeletal:         General: No tenderness or edema.     Neurological: No sensory deficit. Gait normal.         ED Course   Procedures  Labs Reviewed - No data to display       Imaging Results    None          Medications - No data to display  Medical Decision Making  Differential diagnosis including but not limited to appendicitis, constipation, UTI, and threatened .    Patient denies UTI symptoms and vomiting.  Physical exam unremarkable for abdominal tenderness and patient noted to ambulate without difficulty.  Patient declined labs and requested evaluation of fetal heart rate.  Doppler ultrasound notable for .  Patient declined antiemetic and analgesic because" she does not want to take any medication".  She reports feeling safe and noted to be hemodynamically stable prior to being discharged from the emergency department with strict return precautions and recommendations to follow up outpatient with her OB gyn.               ED Course as of 25 2141   Sat Mar 22, 2025   1858 Bedside Doppler with a fetal heart rate of 152 [AH]      ED Course User Index  [AH] Yolanda Church MD                           Clinical Impression:  Final diagnoses:  [R19.8] Abdominal tightness (Primary)          ED Disposition Condition    Discharge Stable          ED Prescriptions    None       Follow-up Information       Follow up With Specialties Details Why Contact Info    [facility] - Emergency Dept Emergency " Medicine  As needed [address]  [facility] [phone]    OB Gyn                     [1]   Social History  Tobacco Use    Smoking status: Never    Smokeless tobacco: Never   Substance Use Topics    Alcohol use: No    Drug use: No        Yolanda Church MD  Resident  03/22/25 6583

## 2025-03-23 NOTE — ED NOTES
Dr Dumont aware of bp 91/56, pt has normal mentation and is asymptomatic at this time. Per provider okay to proceed with discharge.

## 2025-03-23 NOTE — DISCHARGE INSTRUCTIONS
Please present back to the emergency department if your symptoms begin to worsen including but not limited to vaginal bleeding, shortness of breath, chest pain, and loss of consciousness.    Please follow up with your OB gyn for follow-up evaluation within 1-2 weeks after being discharged from emergency department.    Please refrain from strenuous activity.

## 2025-03-24 ENCOUNTER — PATIENT MESSAGE (OUTPATIENT)
Dept: OTHER | Facility: OTHER | Age: 20
End: 2025-03-24
Payer: MEDICAID

## 2025-03-24 ENCOUNTER — PATIENT OUTREACH (OUTPATIENT)
Facility: OTHER | Age: 20
End: 2025-03-24
Payer: MEDICAID

## 2025-03-24 NOTE — PROGRESS NOTES
Kojo Guillory LPN  ED Navigator  Emergency Department    Project: [facility] ED Navigator  Role: Community Health Worker    Date: 03/25/2025  Patient Name: Yaquelin Cochran  MRN: [id number]  PCP: No, Primary Doctor    Assessment:     Yaquelin Cochran is a 19 y.o. female who has presented to ED for abdominal pain. Patient has visited the ED 3 times in the past 3 months. Patient did not contact PCP.     ED Navigator Initial Assessment    ED Navigator Enrollment Documentation  Consent to Services  Does patient consent to completing the assessment?: Yes  Contact  Method of Initial Contact: Phone  Transportation  Insurance Coverage  Do you have coverage/adequate coverage?: Yes  Specialist Appointment  Did the patient come to the ED to see a specialist?: No  Does the patient have a pending specialist referral?: No  Does the patient have a specialist appointment made?: No  PCP Follow Up Appointment  Medications  Is patient able to afford medication?: Yes  Psychological  Food  Communication/Education  Other Financial Concerns  Other Social Barriers/Concerns  Primary Barrier  Plan: Provided [facility] PCP assistance line ([phone])         Social History     Socioeconomic History    Marital status: Single   Tobacco Use    Smoking status: Never    Smokeless tobacco: Never   Substance and Sexual Activity    Alcohol use: No    Drug use: No    Sexual activity: Yes     Partners: Male     Birth control/protection: Condom     Social Drivers of Health     Financial Resource Strain: Low Risk  (3/25/2025)    Overall Financial Resource Strain (CARDIA)     Difficulty of Paying Living Expenses: Not very hard   Food Insecurity: No Food Insecurity (3/25/2025)    Hunger Vital Sign     Worried About Running Out of Food in the Last Year: Never true     Ran Out of Food in the Last Year: Never true   Transportation Needs: No Transportation Needs (3/25/2025)    PRAPARE - Transportation     Lack of Transportation (Medical): No     Lack of  Transportation (Non-Medical): No   Physical Activity: Inactive (3/25/2025)    Exercise Vital Sign     Days of Exercise per Week: 0 days     Minutes of Exercise per Session: 0 min   Stress: No Stress Concern Present (3/25/2025)    [employer] [place] [employer] - Occupational Stress Questionnaire     Feeling of Stress : Not at all   Housing Stability: Low Risk  (3/25/2025)    Housing Stability Vital Sign     Unable to Pay for Housing in the Last Year: No     Homeless in the Last Year: No       Plan:   Spoke with Pt regarding her recent  ED visit for abdominal pain. She denies having any pain or vomiting. She does endorse nausea.  Pt denies wanting to schedule any appt's at this time. Patient was given education on Rent/Mortgage payment assistance for their region. Patient was given utility assistance resources for their area.  Patient was given food bank/pantry resources for their region. ED navigator will follow-up with patient to assist as needed and to remind of upcoming appt's. Kojo Guillory          Appointment made with: Arin, Primary Doctor

## 2025-03-26 ENCOUNTER — HOSPITAL ENCOUNTER (EMERGENCY)
Facility: OTHER | Age: 20
Discharge: HOME | End: 2025-03-26
Attending: OBSTETRICS & GYNECOLOGY
Payer: MEDICAID

## 2025-03-26 VITALS
HEART RATE: 93 BPM | BODY MASS INDEX: 23.92 KG/M2 | TEMPERATURE: 98 F | DIASTOLIC BLOOD PRESSURE: 61 MMHG | WEIGHT: 130 LBS | HEIGHT: 62 IN | RESPIRATION RATE: 18 BRPM | SYSTOLIC BLOOD PRESSURE: 102 MMHG | OXYGEN SATURATION: 98 %

## 2025-03-26 DIAGNOSIS — E86.0 DEHYDRATION: ICD-10-CM

## 2025-03-26 DIAGNOSIS — R11.2 NAUSEA AND VOMITING, UNSPECIFIED VOMITING TYPE: Primary | ICD-10-CM

## 2025-03-26 DIAGNOSIS — Z3A.24 24 WEEKS GESTATION OF PREGNANCY: ICD-10-CM

## 2025-03-26 DIAGNOSIS — K52.9 GASTROENTERITIS: ICD-10-CM

## 2025-03-26 LAB
ALBUMIN SERPL BCP-MCNC: 3.3 G/DL (ref 3.5–5.2)
ALP SERPL-CCNC: 64 UNIT/L (ref 40–150)
ALT SERPL W/O P-5'-P-CCNC: 8 UNIT/L (ref 10–44)
ANION GAP (OHS): 10 MMOL/L (ref 8–16)
AST SERPL-CCNC: 12 UNIT/L (ref 11–45)
BILIRUB SERPL-MCNC: 0.9 MG/DL (ref 0.1–1)
BUN SERPL-MCNC: 8 MG/DL (ref 6–20)
CALCIUM SERPL-MCNC: 8.6 MG/DL (ref 8.7–10.5)
CHLORIDE SERPL-SCNC: 111 MMOL/L (ref 95–110)
CO2 SERPL-SCNC: 18 MMOL/L (ref 23–29)
CREAT SERPL-MCNC: 0.6 MG/DL (ref 0.5–1.4)
GFR SERPLBLD CREATININE-BSD FMLA CKD-EPI: >60 ML/MIN/1.73/M2
GLUCOSE SERPL-MCNC: 80 MG/DL (ref 70–110)
MAGNESIUM SERPL-MCNC: 1.7 MG/DL (ref 1.6–2.6)
PHOSPHATE SERPL-MCNC: 2.7 MG/DL (ref 2.7–4.5)
POTASSIUM SERPL-SCNC: 4.2 MMOL/L (ref 3.5–5.1)
PROT SERPL-MCNC: 6.4 GM/DL (ref 6–8.4)
SODIUM SERPL-SCNC: 139 MMOL/L (ref 136–145)

## 2025-03-26 PROCEDURE — 96361 HYDRATE IV INFUSION ADD-ON: CPT

## 2025-03-26 PROCEDURE — 59025 FETAL NON-STRESS TEST: CPT

## 2025-03-26 PROCEDURE — 83735 ASSAY OF MAGNESIUM: CPT

## 2025-03-26 PROCEDURE — 63600175 PHARM REV CODE 636 W HCPCS

## 2025-03-26 PROCEDURE — 80053 COMPREHEN METABOLIC PANEL: CPT

## 2025-03-26 PROCEDURE — 25000003 PHARM REV CODE 250

## 2025-03-26 PROCEDURE — 84100 ASSAY OF PHOSPHORUS: CPT

## 2025-03-26 PROCEDURE — 99284 EMERGENCY DEPT VISIT MOD MDM: CPT | Mod: 25

## 2025-03-26 PROCEDURE — 96374 THER/PROPH/DIAG INJ IV PUSH: CPT

## 2025-03-26 RX ORDER — ONDANSETRON 8 MG/1
8 TABLET, ORALLY DISINTEGRATING ORAL ONCE
Status: COMPLETED | OUTPATIENT
Start: 2025-03-26 | End: 2025-03-26

## 2025-03-26 RX ORDER — ONDANSETRON 4 MG/1
8 TABLET, ORALLY DISINTEGRATING ORAL 2 TIMES DAILY
Qty: 30 TABLET | Refills: 1 | Status: SHIPPED | OUTPATIENT
Start: 2025-03-26

## 2025-03-26 RX ORDER — PROCHLORPERAZINE EDISYLATE 5 MG/ML
5 INJECTION INTRAMUSCULAR; INTRAVENOUS EVERY 6 HOURS PRN
Status: DISCONTINUED | OUTPATIENT
Start: 2025-03-26 | End: 2025-03-26 | Stop reason: HOSPADM

## 2025-03-26 RX ORDER — PROCHLORPERAZINE MALEATE 10 MG
10 TABLET ORAL EVERY 6 HOURS PRN
Qty: 15 TABLET | Refills: 0 | Status: SHIPPED | OUTPATIENT
Start: 2025-03-26

## 2025-03-26 RX ADMIN — PROCHLORPERAZINE EDISYLATE 5 MG: 5 INJECTION INTRAMUSCULAR; INTRAVENOUS at 07:03

## 2025-03-26 RX ADMIN — ONDANSETRON 8 MG: 8 TABLET, ORALLY DISINTEGRATING ORAL at 05:03

## 2025-03-26 RX ADMIN — SODIUM CHLORIDE, POTASSIUM CHLORIDE, SODIUM LACTATE AND CALCIUM CHLORIDE 1000 ML: 600; 310; 30; 20 INJECTION, SOLUTION INTRAVENOUS at 05:03

## 2025-03-26 NOTE — ED PROVIDER NOTES
Encounter Date: 3/26/2025       History     Chief Complaint   Patient presents with    Emesis     Pt reporting fatigue and multiple episodes of vomiting x 2 hours. Pt also reporting intense abdominal cramping. Denies bleeding. 24 weeks pregnant.      Yaquelin Cochran is a 19 y.o. [id number] at 24w2d presents complaining of nausea/vomiting.   This IUP is complicated by anxiety/depression.  Patient denies contractions, denies vaginal bleeding, denies LOF.   Fetal Movement: normal    Patient reports nausea and vomiting since 1pm today. She reports having 15 episodes of emesis today. She has not been able to keep down any food or drink.   She denies fevers/chills, cough, congestion, dysuria, constipation or diarrhea.       Review of patient's allergies indicates:  No Known Allergies  Past Medical History:   Diagnosis Date    Behavior problem in childhood     COVID-19 2021    Ectopic pregnancy 2023    Suicidal ideation      Past Surgical History:   Procedure Laterality Date    BACK SURGERY  2016    Rods in place, had issues with previous epidural    DIAGNOSTIC LAPAROSCOPY N/A 2023    Procedure: LAPAROSCOPY, DIAGNOSTIC;  Surgeon: Juana Fung MD;  Location: [facility];  Service: OB/GYN;  Laterality: N/A;    DILATION AND CURETTAGE OF UTERUS       Family History   Problem Relation Name Age of Onset    Throat cancer Paternal Grandfather      No Known Problems Father      No Known Problems Mother      Breast cancer Neg Hx      Diabetes Neg Hx      Colon cancer Neg Hx      Ovarian cancer Neg Hx       Social History[1]  Review of Systems   Constitutional:  Negative for chills and fatigue.   HENT:  Negative for congestion and sore throat.    Eyes:  Negative for visual disturbance.   Respiratory:  Negative for chest tightness and shortness of breath.    Cardiovascular:  Negative for chest pain and leg swelling.   Gastrointestinal:  Positive for nausea and vomiting. Negative for abdominal pain.   Genitourinary:   Negative for dysuria, vaginal bleeding and vaginal discharge.   Neurological:  Negative for headaches.       Physical Exam     Initial Vitals [03/26/25 1628]   BP Pulse Resp Temp SpO2   105/64 100 18 97.9 °F (36.6 °C) 96 %      MAP       --       Temp:  [97.8 °F (36.6 °C)-97.9 °F (36.6 °C)] 97.8 °F (36.6 °C)  Pulse:  [] 93  Resp:  [18] 18  SpO2:  [96 %-100 %] 98 %  BP: ()/(54-65) 102/61        Physical Exam    Constitutional: She appears well-developed and well-nourished. No distress.   HENT:   Head: Normocephalic and atraumatic.   Neck: Neck supple.   Normal range of motion.  Pulmonary/Chest: No respiratory distress.   Abdominal: There is no abdominal tenderness.   Gravid abdomen There is no rebound and no guarding.   Musculoskeletal:      Cervical back: Normal range of motion and neck supple.     Neurological: She is alert and oriented to person, place, and time.   Skin: Skin is warm and dry.   Psychiatric: She has a normal mood and affect.       ED Course   Fetal non-stress test    Date/Time: 3/26/2025 6:00 PM    Performed by: Idalia Jacob MD  Authorized by: Idalia Jacob MD    Nonstress Test:     Variability:  6-25 BPM    Decelerations:  None    Accelerations:  15 bpm    Baseline:  130    Uterine Irritability: No      Contractions:  Not present  Biophysical Profile:     Nonstress Test Interpretation: reactive      Overall Impression:  Reassuring    Labs Reviewed   COMPREHENSIVE METABOLIC PANEL - Abnormal       Result Value    Sodium 139      Potassium 4.2      Chloride 111 (*)     CO2 18 (*)     Glucose 80      BUN 8      Creatinine 0.6      Calcium 8.6 (*)     Protein Total 6.4      Albumin 3.3 (*)     Bilirubin Total 0.9      ALP 64      AST 12      ALT 8 (*)     Anion Gap 10      eGFR >60     MAGNESIUM - Normal    Magnesium  1.7     PHOSPHORUS - Normal    Phosphorus Level 2.7            Imaging Results    None          Medications   prochlorperazine injection Soln 5 mg (5 mg Intravenous Given  3/26/25 1928)   ondansetron disintegrating tablet 8 mg (8 mg Oral Given 3/26/25 1718)   lactated ringers bolus 1,000 mL (0 mLs Intravenous Stopped 3/26/25 1834)     Medical Decision Making  Yaquelin Cochran is a 19 y.o. [id number] at 24w2d presents complaining of nausea/vomiting.   Temp:  [97.8 °F (36.6 °C)-97.9 °F (36.6 °C)] 97.8 °F (36.6 °C)  Pulse:  [] 84  Resp:  [18] 18  SpO2:  [96 %-100 %] 100 %  BP: (102-105)/(63-65) 103/64    NST reactive and reassuring  [place]: quiet    N/V  - Labs: Phos: 2.7, M.7,  CMP: unremarkable   -s/p 1L LR & 1 L D5LR   -8mg zofran > 5 mg compazine with improvement of symptoms   - Tolerated PO challenge w/o N/V  - Rx pharmacy: Zofran & Compazine sent to pharmacy     HPI, PE & initial workup by Dr. Cleaning, who signed out the patient to me. Patient initially presenting d/t complaints of persistent nausea and vomiting. Electrolyte workup WNL. Patient reported improvement of symptoms after fluid resuscitation and antiemetic therapy. Strict ED return precautions given. All questions answered.    Judy Kruger MD PGY-1  Obstetrics and Gynecology  [facility]    Amount and/or Complexity of Data Reviewed  Labs: ordered.    Risk  Prescription drug management.              Attending Attestation:   Physician Attestation Statement for Resident:  As the supervising MD   Physician Attestation Statement: I have personally seen and examined this patient.   I agree with the above history.  -:   As the supervising MD I agree with the above PE.     As the supervising MD I agree with the above treatment, course, plan, and disposition.   I was personally present during the critical portions of the procedure(s) performed by the resident and was immediately available in the ED to provide services and assistance as needed during the entire procedure.              Attending ED Notes:   I have personally seen and examined the patient. I agree with the resident's note . Questions  welcomed and answered to patient satisfaction.      @ 24w2d presenting for dehydration secondary to suspected gastritis  NST: reactive and reassuring  Reports 15 episodes of small vomitus, denies fevers/chills. Stable BP/HR  Electrolytes wnl, magnesium replaced  Received 2L IVF (D5LR and LR) with subjective improvement, passed PO challenge  Discharged to home with precautions, antiemetics.     Agree with discharge to home, and given the following instructions: Resume normal activities, encouraged to hydrate well (3L or 100oz of fluids daily). Return to the OB ED for acute concerns such as vaginal bleeding, frequent or painful contractions, loss of fluid or decreased fetal movement.     Return to clinic in 3-5 days    Jing Greer MD  3/27/2025 4:27 AM                                 Clinical Impression:  Final diagnoses:  [R11.2] Nausea and vomiting, unspecified vomiting type (Primary)  [Z3A.24] 24 weeks gestation of pregnancy  [K52.9] Gastroenteritis  [E86.0] Dehydration              Judy Kruger MD  Resident  25 5955         [1]   Social History  Tobacco Use    Smoking status: Never    Smokeless tobacco: Never   Substance Use Topics    Alcohol use: No    Drug use: No        Jing Levy MD  25 2974

## 2025-03-27 NOTE — DISCHARGE INSTRUCTIONS
Call clinic [phone] or L & D after hours at [phone] for vaginal bleeding, leakage of fluids, regular contractions every 5 mins for 2 hours, decreased fetal movements ( 10 kicks in 2 hours), headache not relieved by Tylenol, blurry vision, or temp of 100.4 or greater.  Begin doing fetal kick counts, at least 10 movements in 2 hours starting at 28 weeks gestation.  Keep next clinic appointment

## 2025-04-07 ENCOUNTER — PATIENT MESSAGE (OUTPATIENT)
Dept: OTHER | Facility: OTHER | Age: 20
End: 2025-04-07
Payer: MEDICAID

## 2025-04-18 ENCOUNTER — PATIENT MESSAGE (OUTPATIENT)
Dept: OBSTETRICS AND GYNECOLOGY | Facility: CLINIC | Age: 20
End: 2025-04-18
Payer: MEDICAID

## 2025-04-21 ENCOUNTER — PATIENT MESSAGE (OUTPATIENT)
Dept: OTHER | Facility: OTHER | Age: 20
End: 2025-04-21
Payer: MEDICAID

## 2025-04-21 DIAGNOSIS — Z3A.28 28 WEEKS GESTATION OF PREGNANCY: Primary | ICD-10-CM

## 2025-04-22 ENCOUNTER — RESULTS FOLLOW-UP (OUTPATIENT)
Dept: OBSTETRICS AND GYNECOLOGY | Facility: HOSPITAL | Age: 20
End: 2025-04-22

## 2025-04-22 ENCOUNTER — ROUTINE PRENATAL (OUTPATIENT)
Dept: OBSTETRICS AND GYNECOLOGY | Facility: CLINIC | Age: 20
End: 2025-04-22
Payer: MEDICAID

## 2025-04-22 ENCOUNTER — LAB VISIT (OUTPATIENT)
Dept: LAB | Facility: OTHER | Age: 20
End: 2025-04-22
Attending: ADVANCED PRACTICE MIDWIFE
Payer: MEDICAID

## 2025-04-22 VITALS
HEART RATE: 71 BPM | BODY MASS INDEX: 22.72 KG/M2 | SYSTOLIC BLOOD PRESSURE: 93 MMHG | DIASTOLIC BLOOD PRESSURE: 62 MMHG | WEIGHT: 124.25 LBS

## 2025-04-22 DIAGNOSIS — O26.893 RH NEGATIVE, ANTEPARTUM, THIRD TRIMESTER: ICD-10-CM

## 2025-04-22 DIAGNOSIS — Z23 NEED FOR TDAP VACCINATION: ICD-10-CM

## 2025-04-22 DIAGNOSIS — Z87.59 HISTORY OF ECTOPIC PREGNANCY: ICD-10-CM

## 2025-04-22 DIAGNOSIS — Z86.59 HISTORY OF DEPRESSION: ICD-10-CM

## 2025-04-22 DIAGNOSIS — Z3A.28 28 WEEKS GESTATION OF PREGNANCY: Primary | ICD-10-CM

## 2025-04-22 DIAGNOSIS — Z3A.28 28 WEEKS GESTATION OF PREGNANCY: ICD-10-CM

## 2025-04-22 DIAGNOSIS — Z98.890 HISTORY OF BACK SURGERY: ICD-10-CM

## 2025-04-22 DIAGNOSIS — F41.9 ANXIETY: ICD-10-CM

## 2025-04-22 DIAGNOSIS — O26.13 LOW WEIGHT GAIN DURING PREGNANCY IN THIRD TRIMESTER: ICD-10-CM

## 2025-04-22 DIAGNOSIS — N83.299 COMPLEX OVARIAN CYST: ICD-10-CM

## 2025-04-22 DIAGNOSIS — Z67.91 RH NEGATIVE, ANTEPARTUM, THIRD TRIMESTER: ICD-10-CM

## 2025-04-22 DIAGNOSIS — O09.33 LIMITED PRENATAL CARE IN THIRD TRIMESTER: ICD-10-CM

## 2025-04-22 LAB
ABSOLUTE EOSINOPHIL (OHS): 0.08 K/UL
ABSOLUTE MONOCYTE (OHS): 0.48 K/UL (ref 0.3–1)
ABSOLUTE NEUTROPHIL COUNT (OHS): 3.79 K/UL (ref 1.8–7.7)
BASOPHILS # BLD AUTO: 0.04 K/UL
BASOPHILS NFR BLD AUTO: 0.6 %
ERYTHROCYTE [DISTWIDTH] IN BLOOD BY AUTOMATED COUNT: 13 % (ref 11.5–14.5)
GLUCOSE SERPL-MCNC: 100 MG/DL (ref 70–140)
HCT VFR BLD AUTO: 33 % (ref 37–48.5)
HGB BLD-MCNC: 10.6 GM/DL (ref 12–16)
HIV 1+2 AB+HIV1 P24 AG SERPL QL IA: NEGATIVE
IMM GRANULOCYTES # BLD AUTO: 0.04 K/UL (ref 0–0.04)
IMM GRANULOCYTES NFR BLD AUTO: 0.6 % (ref 0–0.5)
INDIRECT COOMBS: NORMAL
LYMPHOCYTES # BLD AUTO: 2 K/UL (ref 1–4.8)
MCH RBC QN AUTO: 29.6 PG (ref 27–31)
MCHC RBC AUTO-ENTMCNC: 32.1 G/DL (ref 32–36)
MCV RBC AUTO: 92 FL (ref 82–98)
NUCLEATED RBC (/100WBC) (OHS): 0 /100 WBC
PLATELET # BLD AUTO: 180 K/UL (ref 150–450)
PMV BLD AUTO: 9.9 FL (ref 9.2–12.9)
RBC # BLD AUTO: 3.58 M/UL (ref 4–5.4)
RELATIVE EOSINOPHIL (OHS): 1.2 %
RELATIVE LYMPHOCYTE (OHS): 31.1 % (ref 18–48)
RELATIVE MONOCYTE (OHS): 7.5 % (ref 4–15)
RELATIVE NEUTROPHIL (OHS): 59 % (ref 38–73)
RH BLD: NORMAL
SPECIMEN OUTDATE: NORMAL
T PALLIDUM IGG+IGM SER QL: NEGATIVE
WBC # BLD AUTO: 6.43 K/UL (ref 3.9–12.7)

## 2025-04-22 PROCEDURE — 99213 OFFICE O/P EST LOW 20 MIN: CPT | Mod: PBBFAC,TH

## 2025-04-22 PROCEDURE — 36415 COLL VENOUS BLD VENIPUNCTURE: CPT

## 2025-04-22 PROCEDURE — 85025 COMPLETE CBC W/AUTO DIFF WBC: CPT

## 2025-04-22 PROCEDURE — 99213 OFFICE O/P EST LOW 20 MIN: CPT | Mod: TH,S$PBB,UC,

## 2025-04-22 PROCEDURE — 99999 PR PBB SHADOW E&M-EST. PATIENT-LVL III: CPT | Mod: PBBFAC,,,

## 2025-04-22 PROCEDURE — 86901 BLOOD TYPING SEROLOGIC RH(D): CPT | Performed by: ADVANCED PRACTICE MIDWIFE

## 2025-04-22 PROCEDURE — 82950 GLUCOSE TEST: CPT

## 2025-04-22 PROCEDURE — 87086 URINE CULTURE/COLONY COUNT: CPT

## 2025-04-22 PROCEDURE — 87389 HIV-1 AG W/HIV-1&-2 AB AG IA: CPT

## 2025-04-22 PROCEDURE — 86593 SYPHILIS TEST NON-TREP QUANT: CPT

## 2025-04-22 NOTE — PROGRESS NOTES
19 y.o. female  at 28w1d by Estimated Date of Delivery: 25    Denies UCs, LOF, VB. Reports good fetal movement.  Patient states she has not come to any prenatal care visits because no one called her to schedule them.    Reviewed TWlbs    PHYSICAL EXAM  BP 93/62   Pulse 71   Wt 56.4 kg (124 lb 3.7 oz)   LMP 2024 (Exact Date)   BMI 22.72 kg/m²     GENERAL: No acute distress  HEENT: Normocephalic, atraumatic  NEURO: Alert and oriented x3  PULMONARY: Non-labored respiration; no tachypnea  ABD: Soft, gravid, nontender.    ASSESSMENT AND PLAN  28 weeks gestation of pregnancy  -     Urine culture  -     C. trachomatis/N. gonorrhoeae by AMP DNA  -     Treponema Pallidium Antibodies IgG, IgM; Future; Expected date: 2025    Anxiety  Comments:  No meds, mood stable  Plan 2 week PP mood check    History of depression  Comments:  No meds, mood stable  Plan 2 week PP mood check    Rh negative, antepartum, third trimester  Comments:  NEEDS RHOGAM AFTER BLOOD DRAW!  Plan to repeat PP if indicated.  Orders:  -     rho(D) immune globulin (RhoGAM/HyperRHO) IM injection    Complex ovarian cyst  Comments:  10/24-Left ovarian complex cystic lesion with appearance most consistent with a hemorrhagic cyst.    Limited prenatal care in third trimester  Comments:  This is prenatal visit #2. Has only had one visit prior to today (@ 17 weeks). Explained importance of regular/ routine prenatal care.    History of back surgery  Comments:  Age 10, scoliosis. Rods in place. Trouble with previous epidural placements.    History of ectopic pregnancy  Comments:      Need for Tdap vaccination  -     Tdap (BOOSTRIX) vaccine injection 0.5 mL    Low weight gain during pregnancy in third trimester  Comments:  +2 lb @ 28 weeks      Patient has only had one prenatal visit prior to today (@17 weeks). Discussed importance of visits and compliance.   3T labs: Ordered previously but not done. States she will have them done  today right after her appointment, then will go to injection clinic for rhogam.  Blood type: (O NEG). NEEDS RHOGAM AFTER BLOOD DRAW!  Anatomy scan reviewed- Complete and WNL  TDAP: Desires, order placed will have done today.     Reviewed warning signs, normal FKCs,  labor precautions and how/when to call.    Follow-up: 2 weeks, call or present sooner PRN.    Nevaeh Zelaya, JAYLA, APRN

## 2025-04-22 NOTE — PROGRESS NOTES
S.020  Ph: 7  Leuk: ++  Nitrite: pos  Protein: +30  Glucose: 500  Ketones: neg  Urobilinogen: 8  Bilirubin: +  Blood: about 250

## 2025-04-23 ENCOUNTER — CLINICAL SUPPORT (OUTPATIENT)
Dept: OBSTETRICS AND GYNECOLOGY | Facility: CLINIC | Age: 20
End: 2025-04-23
Payer: MEDICAID

## 2025-04-23 DIAGNOSIS — Z67.91 RH NEGATIVE, ANTEPARTUM, THIRD TRIMESTER: Primary | ICD-10-CM

## 2025-04-23 DIAGNOSIS — O26.893 RH NEGATIVE, ANTEPARTUM, THIRD TRIMESTER: Primary | ICD-10-CM

## 2025-04-23 PROCEDURE — 99999PBSHW PR PBB SHADOW TECHNICAL ONLY FILED TO HB: Mod: PBBFAC,,,

## 2025-04-23 PROCEDURE — 96372 THER/PROPH/DIAG INJ SC/IM: CPT | Mod: PBBFAC

## 2025-04-23 RX ADMIN — RHO(D) IMMUNE GLOBULIN (HUMAN) 300 MCG: 1500 SOLUTION INTRAMUSCULAR at 01:04

## 2025-04-23 NOTE — PROGRESS NOTES
Here for rhogam injection, no complaints at this time verified patient is Rh negative. No complaints of pain prior to or post injection patient advised to wait 15 minutes before leaving and to report and adverse reactions.      Site: lb

## 2025-04-24 ENCOUNTER — PATIENT MESSAGE (OUTPATIENT)
Dept: OBSTETRICS AND GYNECOLOGY | Facility: HOSPITAL | Age: 20
End: 2025-04-24
Payer: MEDICAID

## 2025-04-24 ENCOUNTER — TELEPHONE (OUTPATIENT)
Dept: OBSTETRICS AND GYNECOLOGY | Facility: HOSPITAL | Age: 20
End: 2025-04-24
Payer: MEDICAID

## 2025-04-26 ENCOUNTER — PATIENT MESSAGE (OUTPATIENT)
Dept: OBSTETRICS AND GYNECOLOGY | Facility: CLINIC | Age: 20
End: 2025-04-26
Payer: MEDICAID

## 2025-04-26 LAB — BACTERIA UR CULT: ABNORMAL

## 2025-04-26 RX ORDER — NITROFURANTOIN 25; 75 MG/1; MG/1
100 CAPSULE ORAL 2 TIMES DAILY
Qty: 14 CAPSULE | Refills: 0 | Status: SHIPPED | OUTPATIENT
Start: 2025-04-26 | End: 2025-05-03

## 2025-04-30 ENCOUNTER — PATIENT OUTREACH (OUTPATIENT)
Facility: OTHER | Age: 20
End: 2025-04-30
Payer: MEDICAID

## 2025-04-30 NOTE — PROGRESS NOTES
Call placed per ED Navigator to f/u from last encounter. No answer. V/m left. ED navigator will follow-up with patient and assist.

## 2025-05-05 ENCOUNTER — PATIENT MESSAGE (OUTPATIENT)
Dept: OTHER | Facility: OTHER | Age: 20
End: 2025-05-05
Payer: MEDICAID

## 2025-05-06 ENCOUNTER — PATIENT OUTREACH (OUTPATIENT)
Facility: OTHER | Age: 20
End: 2025-05-06
Payer: MEDICAID

## 2025-05-06 NOTE — PROGRESS NOTES
ED Navigator called to remind Pt of appt with, [facility], Obstetrics And Gynecology on 5-7-25 at 3:40. Detailed v/m left.

## 2025-05-07 ENCOUNTER — TELEPHONE (OUTPATIENT)
Dept: OBSTETRICS AND GYNECOLOGY | Facility: CLINIC | Age: 20
End: 2025-05-07
Payer: MEDICAID

## 2025-05-19 ENCOUNTER — PATIENT MESSAGE (OUTPATIENT)
Dept: OTHER | Facility: OTHER | Age: 20
End: 2025-05-19
Payer: MEDICAID

## 2025-05-22 ENCOUNTER — PATIENT MESSAGE (OUTPATIENT)
Dept: OBSTETRICS AND GYNECOLOGY | Facility: CLINIC | Age: 20
End: 2025-05-22
Payer: MEDICAID

## 2025-05-27 ENCOUNTER — HOSPITAL ENCOUNTER (EMERGENCY)
Facility: OTHER | Age: 20
Discharge: HOME | End: 2025-05-27
Attending: OBSTETRICS & GYNECOLOGY
Payer: MEDICAID

## 2025-05-27 VITALS
SYSTOLIC BLOOD PRESSURE: 98 MMHG | TEMPERATURE: 98 F | DIASTOLIC BLOOD PRESSURE: 63 MMHG | RESPIRATION RATE: 19 BRPM | HEART RATE: 67 BPM | OXYGEN SATURATION: 96 %

## 2025-05-27 DIAGNOSIS — N30.00 ACUTE CYSTITIS WITHOUT HEMATURIA: Primary | ICD-10-CM

## 2025-05-27 DIAGNOSIS — Z3A.33 33 WEEKS GESTATION OF PREGNANCY: ICD-10-CM

## 2025-05-27 LAB
BILIRUBIN, POC UA: ABNORMAL
BLOOD, POC UA: ABNORMAL
CLARITY, UA: CLEAR
COLOR, UA: YELLOW
GLUCOSE, POC UA: NEGATIVE
KETONES, POC UA: >=160 MG/DL
LEUKOCYTE EST, POC UA: NEGATIVE
NITRITE, POC UA: NEGATIVE
PH UR STRIP: 6 [PH] (ref 5–8)
PROTEIN, POC UA: 100 MG/DL
SPECIFIC GRAVITY, POC UA: >=1.03 (ref 1–1.03)
UROBILINOGEN, POC UA: 1 E.U./DL

## 2025-05-27 PROCEDURE — 63600175 PHARM REV CODE 636 W HCPCS

## 2025-05-27 PROCEDURE — 96374 THER/PROPH/DIAG INJ IV PUSH: CPT

## 2025-05-27 PROCEDURE — 99284 EMERGENCY DEPT VISIT MOD MDM: CPT

## 2025-05-27 PROCEDURE — 87186 SC STD MICRODIL/AGAR DIL: CPT

## 2025-05-27 PROCEDURE — 99284 EMERGENCY DEPT VISIT MOD MDM: CPT | Mod: 25,,, | Performed by: OBSTETRICS & GYNECOLOGY

## 2025-05-27 PROCEDURE — 25000003 PHARM REV CODE 250

## 2025-05-27 PROCEDURE — 59025 FETAL NON-STRESS TEST: CPT | Mod: 26,,, | Performed by: OBSTETRICS & GYNECOLOGY

## 2025-05-27 PROCEDURE — 59025 FETAL NON-STRESS TEST: CPT

## 2025-05-27 RX ORDER — CEFTRIAXONE 1 G/1
1 INJECTION, POWDER, FOR SOLUTION INTRAMUSCULAR; INTRAVENOUS ONCE
Status: COMPLETED | OUTPATIENT
Start: 2025-05-27 | End: 2025-05-27

## 2025-05-27 RX ORDER — ACETAMINOPHEN 500 MG
1000 TABLET ORAL ONCE
Status: COMPLETED | OUTPATIENT
Start: 2025-05-27 | End: 2025-05-27

## 2025-05-27 RX ORDER — ONDANSETRON HYDROCHLORIDE 2 MG/ML
4 INJECTION, SOLUTION INTRAVENOUS ONCE
Status: DISCONTINUED | OUTPATIENT
Start: 2025-05-27 | End: 2025-05-27 | Stop reason: HOSPADM

## 2025-05-27 RX ORDER — NITROFURANTOIN 25; 75 MG/1; MG/1
100 CAPSULE ORAL 2 TIMES DAILY
Qty: 14 CAPSULE | Refills: 0 | Status: SHIPPED | OUTPATIENT
Start: 2025-05-27 | End: 2025-06-03

## 2025-05-27 RX ORDER — NITROFURANTOIN 25; 75 MG/1; MG/1
100 CAPSULE ORAL 2 TIMES DAILY
Qty: 14 CAPSULE | Refills: 0 | Status: SHIPPED | OUTPATIENT
Start: 2025-05-27 | End: 2025-05-27

## 2025-05-27 RX ADMIN — CEFTRIAXONE 1 G: 1 INJECTION, POWDER, FOR SOLUTION INTRAMUSCULAR; INTRAVENOUS at 07:05

## 2025-05-27 RX ADMIN — ACETAMINOPHEN 500 MG: 500 TABLET, FILM COATED ORAL at 07:05

## 2025-05-29 LAB — BACTERIA UR CULT: ABNORMAL

## 2025-06-09 ENCOUNTER — PATIENT MESSAGE (OUTPATIENT)
Dept: OTHER | Facility: OTHER | Age: 20
End: 2025-06-09
Payer: MEDICAID

## 2025-06-09 PROBLEM — Z34.83 MULTIGRAVIDA IN THIRD TRIMESTER: Status: ACTIVE | Noted: 2023-11-06

## 2025-06-09 PROBLEM — O09.33 LIMITED PRENATAL CARE IN THIRD TRIMESTER: Status: ACTIVE | Noted: 2025-06-09

## 2025-06-10 ENCOUNTER — ROUTINE PRENATAL (OUTPATIENT)
Dept: OBSTETRICS AND GYNECOLOGY | Facility: CLINIC | Age: 20
End: 2025-06-10
Payer: MEDICAID

## 2025-06-10 VITALS
HEART RATE: 99 BPM | WEIGHT: 124.44 LBS | BODY MASS INDEX: 22.76 KG/M2 | DIASTOLIC BLOOD PRESSURE: 58 MMHG | SYSTOLIC BLOOD PRESSURE: 106 MMHG

## 2025-06-10 DIAGNOSIS — O09.33 LIMITED PRENATAL CARE IN THIRD TRIMESTER: ICD-10-CM

## 2025-06-10 DIAGNOSIS — F41.9 ANXIETY: ICD-10-CM

## 2025-06-10 DIAGNOSIS — O26.893 RH NEGATIVE, ANTEPARTUM, THIRD TRIMESTER: ICD-10-CM

## 2025-06-10 DIAGNOSIS — O23.43 URINARY TRACT INFECTION IN MOTHER DURING THIRD TRIMESTER OF PREGNANCY: ICD-10-CM

## 2025-06-10 DIAGNOSIS — Z67.91 RH NEGATIVE, ANTEPARTUM, THIRD TRIMESTER: ICD-10-CM

## 2025-06-10 DIAGNOSIS — O26.843 UTERINE SIZE-DATE DISCREPANCY IN THIRD TRIMESTER: ICD-10-CM

## 2025-06-10 DIAGNOSIS — Z86.59 HISTORY OF DEPRESSION: ICD-10-CM

## 2025-06-10 DIAGNOSIS — O26.13 INSUFFICIENT WEIGHT GAIN DURING PREGNANCY IN THIRD TRIMESTER: Primary | ICD-10-CM

## 2025-06-10 DIAGNOSIS — Z34.83 MULTIGRAVIDA IN THIRD TRIMESTER: ICD-10-CM

## 2025-06-10 DIAGNOSIS — Z98.890 HISTORY OF BACK SURGERY: ICD-10-CM

## 2025-06-10 PROCEDURE — 99999 PR PBB SHADOW E&M-EST. PATIENT-LVL II: CPT | Mod: PBBFAC,,, | Performed by: ADVANCED PRACTICE MIDWIFE

## 2025-06-10 PROCEDURE — 99212 OFFICE O/P EST SF 10 MIN: CPT | Mod: PBBFAC,TH | Performed by: ADVANCED PRACTICE MIDWIFE

## 2025-06-10 NOTE — PROGRESS NOTES
Specific Gravity -1020  PH- 5  Leukocytes- +  Nitrite - Neg   Protein- Trace   Glucose- Norm   Ketones -+++  Urobilnogen- 4  Bilirubin- ++  Blood - About

## 2025-06-10 NOTE — PROGRESS NOTES
"19 y.o.,  at 35w1d, presents for routine OB appt.  Denies LOF, VB, or UCs. Reports good FM.  Doing well today, without concerns.   TW lbs     PHYSICAL EXAM  GENERAL: No acute distress  HEENT: Normocephalic, atraumatic  NEURO: Alert and oriented x3  PULMONARY: Non-labored respiration; no tachypnea  ABD: Soft, gravid, nontender.    BP (!) 106/58   Pulse 99   Wt 56.4 kg (124 lb 7.2 oz)   LMP 2024 (Exact Date)   BMI 22.76 kg/m²     ASSESSMENT AND PLAN  Insufficient weight gain during pregnancy in third trimester  Comments:  Growth US discussed and ordered  Orders:  -     US MFM Procedure ([facility]); Future; Expected date: 2025    Multigravida in third trimester    Rh negative, antepartum, third trimester  Comments:  Rhogam given 25  Repeat PP if infant RH pos    History of depression  Comments:  No meds, mood stable  Plan 2 week PP mood check    Anxiety  Comments:  No meds, mood stable  Plan 2 week PP mood check    Limited prenatal care in third trimester  Comments:  This is prenatal visit #3. Has only had 2 visit prior to today, 17 and 28 weeks.   Explained importance of regular/ routine prenatal care  NO ABC for delivery    Urinary tract infection in mother during third trimester of pregnancy  Comments:  Treated in F[facility]TIM[facility] 25  AZIZA next visit    History of back surgery  Comments:  Age 10, scoliosis. Rods in place. Trouble with previous epidural placements.  Anesthesia eval at admit. Aware may not be able to get epidural    Uterine size-date discrepancy in third trimester  Growth US discussed and ordered            Consents signed today,  6/10/25        BF, girl "Katy"  Reviewed upcoming 36wk labs GBS next visit.  Normal 3rd trimester labs.   Reviewed patient does NOT have a history of genital HSV.     Reviewed ABC risk assessment with the patient:  Birth Center Risk Assessment: 1-management on labor and Delivery  CNM management in ABC  CNM management on L&D, no ABC. Patient " aware  Consultation with OB to develop  plan of care  Collaborative CNM/OB management with delivery on L&D  Permanent referral of care to MD    Discussed collaborative practice with OB/GYN physicians/residents, and upcoming changes to shared call coverage. Questions answered regarding potential implications of this, reassurance provided.    Reviewed warning signs, normal FM,  labor precautions, and how/when to call.  Confirmed pt has after-hours number.    Follow-up: 1 weeks, call or present sooner GEORGE Fernando CNM, APRN

## 2025-06-11 ENCOUNTER — PATIENT MESSAGE (OUTPATIENT)
Dept: OBSTETRICS AND GYNECOLOGY | Facility: CLINIC | Age: 20
End: 2025-06-11
Payer: MEDICAID

## 2025-06-17 ENCOUNTER — HOSPITAL ENCOUNTER (OUTPATIENT)
Facility: OTHER | Age: 20
LOS: 1 days | Discharge: HOME | End: 2025-06-18
Attending: OBSTETRICS & GYNECOLOGY | Admitting: OBSTETRICS & GYNECOLOGY
Payer: MEDICAID

## 2025-06-17 ENCOUNTER — PROCEDURE VISIT (OUTPATIENT)
Dept: MATERNAL FETAL MEDICINE | Facility: CLINIC | Age: 20
End: 2025-06-17
Payer: MEDICAID

## 2025-06-17 DIAGNOSIS — Z3A.36 36 WEEKS GESTATION OF PREGNANCY: ICD-10-CM

## 2025-06-17 DIAGNOSIS — O36.8390 FETAL HEART RATE DECELERATIONS AFFECTING MANAGEMENT OF MOTHER: Primary | ICD-10-CM

## 2025-06-17 DIAGNOSIS — O26.13 INSUFFICIENT WEIGHT GAIN DURING PREGNANCY IN THIRD TRIMESTER: ICD-10-CM

## 2025-06-17 DIAGNOSIS — O47.9 UTERINE CONTRACTIONS DURING PREGNANCY: ICD-10-CM

## 2025-06-17 DIAGNOSIS — O26.893 RH NEGATIVE STATUS DURING PREGNANCY IN THIRD TRIMESTER: ICD-10-CM

## 2025-06-17 DIAGNOSIS — Z67.91 RH NEGATIVE STATUS DURING PREGNANCY IN THIRD TRIMESTER: ICD-10-CM

## 2025-06-17 LAB
ABSOLUTE EOSINOPHIL (OHS): 0.03 K/UL
ABSOLUTE MONOCYTE (OHS): 0.7 K/UL (ref 0.3–1)
ABSOLUTE NEUTROPHIL COUNT (OHS): 6.59 K/UL (ref 1.8–7.7)
BASOPHILS # BLD AUTO: 0.05 K/UL
BASOPHILS NFR BLD AUTO: 0.5 %
BLOOD GROUP ANTIBODIES SERPL: NORMAL
ERYTHROCYTE [DISTWIDTH] IN BLOOD BY AUTOMATED COUNT: 12.9 % (ref 11.5–14.5)
HCT VFR BLD AUTO: 29.2 % (ref 37–48.5)
HGB BLD-MCNC: 10 GM/DL (ref 12–16)
IMM GRANULOCYTES # BLD AUTO: 0.05 K/UL (ref 0–0.04)
IMM GRANULOCYTES NFR BLD AUTO: 0.5 % (ref 0–0.5)
INDIRECT COOMBS: ABNORMAL
LYMPHOCYTES # BLD AUTO: 2.21 K/UL (ref 1–4.8)
MCH RBC QN AUTO: 29.1 PG (ref 27–31)
MCHC RBC AUTO-ENTMCNC: 34.2 G/DL (ref 32–36)
MCV RBC AUTO: 85 FL (ref 82–98)
NUCLEATED RBC (/100WBC) (OHS): 0 /100 WBC
PLATELET # BLD AUTO: 174 K/UL (ref 150–450)
PMV BLD AUTO: 11.1 FL (ref 9.2–12.9)
RBC # BLD AUTO: 3.44 M/UL (ref 4–5.4)
RELATIVE EOSINOPHIL (OHS): 0.3 %
RELATIVE LYMPHOCYTE (OHS): 22.9 % (ref 18–48)
RELATIVE MONOCYTE (OHS): 7.3 % (ref 4–15)
RELATIVE NEUTROPHIL (OHS): 68.5 % (ref 38–73)
RH BLD: ABNORMAL
SPECIMEN OUTDATE: ABNORMAL
T PALLIDUM IGG+IGM SER QL: NEGATIVE
WBC # BLD AUTO: 9.63 K/UL (ref 3.9–12.7)

## 2025-06-17 PROCEDURE — 99285 EMERGENCY DEPT VISIT HI MDM: CPT | Mod: 25

## 2025-06-17 PROCEDURE — 76816 OB US FOLLOW-UP PER FETUS: CPT | Mod: PBBFAC | Performed by: OBSTETRICS & GYNECOLOGY

## 2025-06-17 PROCEDURE — 87653 STREP B DNA AMP PROBE: CPT

## 2025-06-17 PROCEDURE — 87086 URINE CULTURE/COLONY COUNT: CPT

## 2025-06-17 PROCEDURE — 86901 BLOOD TYPING SEROLOGIC RH(D): CPT

## 2025-06-17 PROCEDURE — 85025 COMPLETE CBC W/AUTO DIFF WBC: CPT

## 2025-06-17 PROCEDURE — G0378 HOSPITAL OBSERVATION PER HR: HCPCS

## 2025-06-17 PROCEDURE — 59025 FETAL NON-STRESS TEST: CPT

## 2025-06-17 PROCEDURE — 86593 SYPHILIS TEST NON-TREP QUANT: CPT

## 2025-06-17 PROCEDURE — 96361 HYDRATE IV INFUSION ADD-ON: CPT

## 2025-06-17 PROCEDURE — 86870 RBC ANTIBODY IDENTIFICATION: CPT

## 2025-06-17 PROCEDURE — 63600175 PHARM REV CODE 636 W HCPCS

## 2025-06-17 RX ORDER — AMOXICILLIN 250 MG
1 CAPSULE ORAL NIGHTLY PRN
Status: DISCONTINUED | OUTPATIENT
Start: 2025-06-17 | End: 2025-06-18 | Stop reason: HOSPADM

## 2025-06-17 RX ORDER — ONDANSETRON 8 MG/1
8 TABLET, ORALLY DISINTEGRATING ORAL EVERY 8 HOURS PRN
Status: DISCONTINUED | OUTPATIENT
Start: 2025-06-17 | End: 2025-06-18 | Stop reason: HOSPADM

## 2025-06-17 RX ORDER — DIPHENHYDRAMINE HCL 25 MG
25 CAPSULE ORAL EVERY 4 HOURS PRN
Status: DISCONTINUED | OUTPATIENT
Start: 2025-06-17 | End: 2025-06-18 | Stop reason: HOSPADM

## 2025-06-17 RX ORDER — AMPICILLIN 500 MG/1
500 CAPSULE ORAL EVERY 6 HOURS
Qty: 28 CAPSULE | Refills: 0 | Status: SHIPPED | OUTPATIENT
Start: 2025-06-17 | End: 2025-06-24

## 2025-06-17 RX ORDER — SODIUM CHLORIDE 0.9 % (FLUSH) 0.9 %
10 SYRINGE (ML) INJECTION
Status: DISCONTINUED | OUTPATIENT
Start: 2025-06-17 | End: 2025-06-18 | Stop reason: HOSPADM

## 2025-06-17 RX ORDER — PRENATAL WITH FERROUS FUM AND FOLIC ACID 3080; 920; 120; 400; 22; 1.84; 3; 20; 10; 1; 12; 200; 27; 25; 2 [IU]/1; [IU]/1; MG/1; [IU]/1; MG/1; MG/1; MG/1; MG/1; MG/1; MG/1; UG/1; MG/1; MG/1; MG/1; MG/1
1 TABLET ORAL DAILY
Status: DISCONTINUED | OUTPATIENT
Start: 2025-06-18 | End: 2025-06-18 | Stop reason: HOSPADM

## 2025-06-17 RX ORDER — ACETAMINOPHEN 325 MG/1
650 TABLET ORAL EVERY 6 HOURS PRN
Status: DISCONTINUED | OUTPATIENT
Start: 2025-06-17 | End: 2025-06-18 | Stop reason: HOSPADM

## 2025-06-17 RX ORDER — DIPHENHYDRAMINE HYDROCHLORIDE 50 MG/ML
25 INJECTION, SOLUTION INTRAMUSCULAR; INTRAVENOUS EVERY 4 HOURS PRN
Status: DISCONTINUED | OUTPATIENT
Start: 2025-06-17 | End: 2025-06-18 | Stop reason: HOSPADM

## 2025-06-17 RX ORDER — AMPICILLIN 500 MG/1
500 CAPSULE ORAL EVERY 6 HOURS
Status: DISCONTINUED | OUTPATIENT
Start: 2025-06-18 | End: 2025-06-17

## 2025-06-17 RX ORDER — SIMETHICONE 80 MG
1 TABLET,CHEWABLE ORAL EVERY 6 HOURS PRN
Status: DISCONTINUED | OUTPATIENT
Start: 2025-06-17 | End: 2025-06-18 | Stop reason: HOSPADM

## 2025-06-17 RX ADMIN — SODIUM CHLORIDE, SODIUM LACTATE, POTASSIUM CHLORIDE, CALCIUM CHLORIDE AND DEXTROSE MONOHYDRATE 1000 ML: 5; 600; 310; 30; 20 INJECTION, SOLUTION INTRAVENOUS at 08:06

## 2025-06-17 NOTE — ED PROVIDER NOTES
Encounter Date: 2025       History     Chief Complaint   Patient presents with    Contractions     Yaquelin Cochran is a 19 y.o. [id number] at 36w1d presents complaining of contractions.    Patient reports irregular contractions for 2 days. She feels them as close together as every 5 min though not consistently. She also reports dysuria. She was recently treated for UTI and reports she completed full course of ampicillin QID x 7days. Denies F/C, N/V, flank pain, hematuria.      Patient reports contractions, denies vaginal bleeding, denies LOF.   Fetal Movement: normal.  This IUP is complicated by limited PNC, scoliosis, Rh neg, Anemia.                    Review of patient's allergies indicates:  No Known Allergies  Past Medical History:   Diagnosis Date    Behavior problem in childhood     COVID-19 2021    Ectopic pregnancy 2023    Suicidal ideation      Past Surgical History:   Procedure Laterality Date    BACK SURGERY  2016    Rods in place, had issues with previous epidural    DIAGNOSTIC LAPAROSCOPY N/A 2023    Procedure: LAPAROSCOPY, DIAGNOSTIC;  Surgeon: Juana Fung MD;  Location: [facility];  Service: OB/GYN;  Laterality: N/A;    DILATION AND CURETTAGE OF UTERUS       Family History   Problem Relation Name Age of Onset    Throat cancer Paternal Grandfather      No Known Problems Father      No Known Problems Mother      Breast cancer Neg Hx      Diabetes Neg Hx      Colon cancer Neg Hx      Ovarian cancer Neg Hx       Social History[1]  Review of Systems   Constitutional:  Negative for fever.   HENT:  Negative for sore throat.    Eyes:  Negative for visual disturbance.   Respiratory:  Negative for shortness of breath.    Cardiovascular:  Negative for chest pain and leg swelling.   Gastrointestinal:  Positive for abdominal pain (Contractions). Negative for nausea and vomiting.   Genitourinary:  Negative for dysuria and vaginal bleeding.   Musculoskeletal:  Negative for back pain.    Skin:  Negative for rash.   Neurological:  Negative for weakness and headaches.   Hematological:  Does not bruise/bleed easily.   Psychiatric/Behavioral: Negative.         Physical Exam     Initial Vitals [06/17/25 1814]   BP Pulse Resp Temp SpO2   103/66 78 18 98.4 °F (36.9 °C) 99 %      MAP       --         Physical Exam    Vitals reviewed.  Constitutional: Vital signs are normal. She appears well-developed and well-nourished.   HENT:   Head: Normocephalic and atraumatic.   Eyes: EOM are normal.   Cardiovascular:  Normal rate.           Pulmonary/Chest: No respiratory distress.   Abdominal: Abdomen is soft. There is no abdominal tenderness.   Gravid habitus There is no rebound and no guarding.   Genitourinary:    Vagina normal.     Musculoskeletal:         General: No tenderness (no flank pain).     Neurological: She is alert and oriented to person, place, and time.   Skin: Skin is warm and dry.   Psychiatric: She has a normal mood and affect. Her speech is normal and behavior is normal. Judgment and thought content normal.     OB LABOR EXAM:   Pre-Term Labor: No.   Membranes ruptured: No.   Method: Sterile vaginal exam per MD.   Vaginal Bleeding: none present.     Dilatation: 0.   Station: -4.   Effacement: 40%.   Amniotic Fluid Color: no fluid.   Amniotic Fluid Amount: none noted.         ED Course   Obtain Fetal nonstress test (NST)    Date/Time: 6/17/2025 7:06 PM    Performed by: Divina Gaitan MD  Authorized by: Judy Kruger MD    Nonstress Test:     Variability:  6-25 BPM    Decelerations:  Variable    Accelerations:  15 bpm    Acoustic Stimulator: No      Baseline:  125    Uterine Irritability: No      Contractions:  Irregular    Contraction Frequency:  Q6-8 min  Biophysical Profile:     Nonstress Test Interpretation: reactive      Overall Impression:  Reassuring    Labs Reviewed   CBC WITH DIFFERENTIAL - Abnormal       Result Value    WBC 9.63      RBC 3.44 (*)     HGB 10.0 (*)     HCT 29.2  (*)     MCV 85      MCH 29.1      MCHC 34.2      RDW 12.9      Platelet Count 174      MPV 11.1      Nucleated RBC 0      Neut % 68.5      Lymph % 22.9      Mono % 7.3      Eos % 0.3      Basophil % 0.5      Imm Grans % 0.5      Neut # 6.59      Lymph # 2.21      Mono # 0.70      Eos # 0.03      Baso # 0.05      Imm Grans # 0.05 (*)    CBC W/ AUTO DIFFERENTIAL    Narrative:     The following orders were created for panel order CBC auto differential.  Procedure                               Abnormality         Status                     ---------                               -----------         ------                     CBC with Differential[[id number]]       Abnormal            Final result                 Please view results for these tests on the individual orders.          Imaging Results    None          Medications   dextrose 5% lactated ringers bolus 1,000 mL (0 mLs Intravenous Stopped 25 2530)     Medical Decision Making  Yaquelin Cochran is a 19 y.o. [id number] at 36w1d presents complaining of contractions.     Temp:  [98.4 °F (36.9 °C)] 98.4 °F (36.9 °C)  Pulse:  [78-93] 93  Resp:  [18] 18  SpO2:  [98 %-99 %] 98 %  BP: (101-104)/(57-66) 104/57     - PE: As stated above, SVE: cl/th/hi  - NST/[place]: 125 bpm, mod vera, +accels/+ recurrent variable decels, toco irregular ctx   - Labs: T&S O POS, GBS pend, Trep pend, CBC 9/10/22/174, Ucx re-ordered  - Meds: 1L D5LR > improvement in CTX     Patient admitted to Obs in the setting of Cat 2 tracing. Please see H&P for full details. Consents signed. Cephalic on BSUS. GBS, CBC, T&S, Trep collected.     Amount and/or Complexity of Data Reviewed  Labs: ordered.                                      Clinical Impression:  Final diagnoses:  [O47.9] Uterine contractions during pregnancy  [Z3A.36] 36 weeks gestation of pregnancy  [O36.8390] Fetal heart rate decelerations affecting management of mother (Primary)          ED Disposition Condition    Observation                             [1]   Social History  Tobacco Use    Smoking status: Never    Smokeless tobacco: Never   Substance Use Topics    Alcohol use: No    Drug use: No        Divina Gaitan MD  Resident  06/20/25 [id number]

## 2025-06-18 VITALS
TEMPERATURE: 98 F | SYSTOLIC BLOOD PRESSURE: 94 MMHG | HEART RATE: 82 BPM | OXYGEN SATURATION: 99 % | RESPIRATION RATE: 18 BRPM | DIASTOLIC BLOOD PRESSURE: 52 MMHG

## 2025-06-18 PROBLEM — M41.9 SCOLIOSIS: Status: ACTIVE | Noted: 2025-06-18

## 2025-06-18 PROBLEM — Z3A.36 36 WEEKS GESTATION OF PREGNANCY: Status: ACTIVE | Noted: 2025-06-18

## 2025-06-18 PROCEDURE — 59025 FETAL NON-STRESS TEST: CPT | Mod: 26,,, | Performed by: OBSTETRICS & GYNECOLOGY

## 2025-06-18 PROCEDURE — G0378 HOSPITAL OBSERVATION PER HR: HCPCS

## 2025-06-18 PROCEDURE — 99222 1ST HOSP IP/OBS MODERATE 55: CPT | Mod: 25,,, | Performed by: OBSTETRICS & GYNECOLOGY

## 2025-06-18 PROCEDURE — 99238 HOSP IP/OBS DSCHRG MGMT 30/<: CPT | Mod: ,,, | Performed by: OBSTETRICS & GYNECOLOGY

## 2025-06-18 PROCEDURE — 96365 THER/PROPH/DIAG IV INF INIT: CPT

## 2025-06-18 PROCEDURE — 63600175 PHARM REV CODE 636 W HCPCS

## 2025-06-18 PROCEDURE — 96366 THER/PROPH/DIAG IV INF ADDON: CPT

## 2025-06-18 PROCEDURE — 25000003 PHARM REV CODE 250

## 2025-06-18 RX ADMIN — SODIUM CHLORIDE 500 MG: 9 INJECTION, SOLUTION INTRAVENOUS at 12:06

## 2025-06-18 RX ADMIN — SODIUM CHLORIDE 500 MG: 9 INJECTION, SOLUTION INTRAVENOUS at 05:06

## 2025-06-18 NOTE — H&P
Maternal Fetal Medicine  Progress Note          Subjective:    Yaquelin Cochran is a 19 y.o.  at 36w2d admitted for fetal heart rate decelerations. Please see H&P for details regarding presentation at admission. This pregnancy is complicated by RH neg, scoliosis, limited PNC, anxiety/depression.    Interim HPI: NAEON. Patient reports feeling well overnight. Reports her contractions have spaced out and are now rare. Denies VB, LOF. Reports normal FM.    Past Medical History[1]Past Surgical History[2]   Social, Family, and Obstetric History: reviewed in chart  Current Medications[3]     Objective:   BP (!) 81/46 (BP Location: Right arm, Patient Position: Lying) Comment: Patient asymptomatic. MFM notified. Comment (Patient Position): Left side-lying  Pulse 61   Temp 98.6 °F (37 °C) (Oral)   Resp 17   LMP 2024 (Exact Date)   SpO2 96%     Focused Physical Examination   General: well developed, no acute distress  Pulmonary: respiratory effort normal with no retractions  Abdomen: gravid  Cervix:     Fetal Monitoring  EFM: 130 baseline/ moderate variability/ accels/ decels  Tocometer: very irregular, q 20-30+ min    Lab Results  CBC   Recent Labs   Lab 25   WBC 9.63   RBC 3.44*   HGB 10.0*   HCT 29.2*      MCV 85   MCH 29.1   MCHC 34.2    UA: protein, ketones, blood   Urine culture: pending              Assessment:   19 y.o.  at 36w2d admitted for fetal decelerations    Plan:   Fetal Heart Rate Decelerations  - Patient noted to have variable decelerations while in the FÁTIMA  - Monitoring has been reassuring overnight with no further decelerations   - As monitoring has remained reassuring will plan for discharge today with close followup with her primary CNM team      UTI  - UA as above, culture pending   - On ampicillin inpatient, will discharge home with  PO abx    Rh Neg  - S/p rhogam at 28wga   - Will need rhogam workup postpartum     Anxiety/Depression  - No medications   -  Mood stable     Lizette Sheldon MD  OBGYN, PGY-4           [1]   Past Medical History:  Diagnosis Date    Behavior problem in childhood     COVID-19 08/08/2021    Ectopic pregnancy 5/24/2023    Suicidal ideation    [2]   Past Surgical History:  Procedure Laterality Date    BACK SURGERY  2016    Rods in place, had issues with previous epidural    DIAGNOSTIC LAPAROSCOPY N/A 05/29/2023    Procedure: LAPAROSCOPY, DIAGNOSTIC;  Surgeon: Juana Fung MD;  Location: [facility];  Service: OB/GYN;  Laterality: N/A;    DILATION AND CURETTAGE OF UTERUS     [3]   Current Facility-Administered Medications   Medication Dose Route Frequency Provider Last Rate Last Admin    acetaminophen tablet 650 mg  650 mg Oral Q6H PRN Baylee Aviles MD        ampicillin 500 mg in 0.9% NaCl 100 mL IVPB (MB+)  500 mg Intravenous Q6H Baylee Aviles MD   Stopped at 06/18/25 0615    diphenhydrAMINE capsule 25 mg  25 mg Oral Q4H PRN Baylee Aviles MD        diphenhydrAMINE injection 25 mg  25 mg Intravenous Q4H PRN Baylee Aviles MD        ondansetron disintegrating tablet 8 mg  8 mg Oral Q8H PRN Baylee Aviles MD        prenatal vitamin oral tablet  1 tablet Oral Daily Baylee Aviles MD        senna-docusate 8.6-50 mg per tablet 1 tablet  1 tablet Oral Nightly PRN Baylee Aviles MD        simethicone chewable tablet 80 mg  1 tablet Oral Q6H PRN Baylee Aviles MD        sodium chloride 0.9% flush 10 mL  10 mL Intravenous PRN Baylee Aviles MD

## 2025-06-18 NOTE — PLAN OF CARE
Problem: UTI (Urinary Tract Infection)  Goal: Improved Infection Symptoms  Outcome: Progressing     Problem: Adult Inpatient Plan of Care  Goal: Plan of Care Review  Outcome: Progressing     Problem:  Fall Injury Risk  Goal: Absence of Fall, Infant Drop and Related Injury  Outcome: Progressing    Patient afebrile, eupneic and normotensive throughout shift.  Ambulating in room without difficulty.  Plan of care reviewed with patient.  Answered all questions  Encouraged to verbalize concerns.

## 2025-06-18 NOTE — H&P
HISTORY AND PHYSICAL  ANTEPARTUM          Subjective:       Berta Cochran is a 19 y.o.  female with IUP at 36w1d measured by 8w ultrasound with significant hx of limited PNC, scoliosis, Rh neg, anemia who is being admitted for Cat 2 tracing.    Patient presented to the F[facility]TIM[facility] for irregular CTX and dysuria. She reports 2 days of irregular contractions as close together as q5 min. She also endorses dysuria. She was recently treated for Enterococcus UTI (sensitive to Macrobid, Ampicillin and Vancomycin) with Rocephin and Macrobid but continues to endorse dysuria.      Patient reports contractions, denies vaginal bleeding, denies LOF.   Fetal Movement: normal.     PMHx:   Past Medical History:   Diagnosis Date    Behavior problem in childhood     COVID-19 2021    Ectopic pregnancy 2023    Suicidal ideation        PSHx:   Past Surgical History:   Procedure Laterality Date    BACK SURGERY  2016    Rods in place, had issues with previous epidural    DIAGNOSTIC LAPAROSCOPY N/A 2023    Procedure: LAPAROSCOPY, DIAGNOSTIC;  Surgeon: Juana Fung MD;  Location: [facility];  Service: OB/GYN;  Laterality: N/A;    DILATION AND CURETTAGE OF UTERUS         All: Review of patient's allergies indicates:  No Known Allergies    Meds: Prescriptions Prior to Admission[1]    SH: Social History[2]    FH:   Family History   Problem Relation Name Age of Onset    Throat cancer Paternal Grandfather      No Known Problems Father      No Known Problems Mother      Breast cancer Neg Hx      Diabetes Neg Hx      Colon cancer Neg Hx      Ovarian cancer Neg Hx         OBHx:   OB History    Para Term  AB Living   5 2 2 0 2 2   SAB IAB Ectopic Multiple Live Births   1 0 1 0 2      # Outcome Date GA Lbr Fran/2nd Weight Sex Type Anes PTL Lv   5 Current            4 Term 24 37w5d / 00:01 2.55 kg (5 lb 10 oz) M Vag-Spont None N HERMANN      Name: TEETEE,BOY BERTA      Apgar1: 8  Apgar5: 9   3 Ectopic 2023      ECTOPIC      2 Term 02/18/22 40w0d  2.637 kg (5 lb 13 oz) F Vag-Spont  N HERMANN   1 SAB 2020     SAB         Complications: Blighted ovum, History of D&C       Objective:       BP (!) 89/55 (BP Location: Left arm, Patient Position: Sitting)   Pulse 72   Temp 97.8 °F (36.6 °C) (Oral)   Resp 18   LMP 09/29/2024 (Exact Date)   SpO2 98%     Vitals:    06/17/25 1844 06/17/25 1845 06/17/25 1915 06/17/25 2100   BP: (!) 104/57  (!) 100/55 (!) 89/55   BP Location:    Left arm   Patient Position:    Sitting   Pulse: 84 93 75 72   Resp:    18   Temp:    97.8 °F (36.6 °C)   TempSrc:    Oral   SpO2: 98% 98% 98% 98%       General:   alert, appears stated age, and cooperative   Lungs:   clear to auscultation bilaterally   Heart:   regular rate and rhythm, S1, S2 normal, no murmur, click, rub or gallop and regular rate and rhythm   Abdomen:  soft, non-tender; bowel sounds normal; no masses,  no organomegaly   Extremities negative edema, negative erythema   FHT: 120 bpm/mod btbv/+accels/+ variable/spontaneous decels Cat 2 (overall reassuring with good variability between rare decels)                 TOCO: irregular   Presentations: cephalic by U/s   Cervix:     Dilation: Closed    Effacement: Long    Station:  -3    Consistency: firm    Position: posterior     Lab Review  Blood Type O NEG  GBBS: unknown   Rubella: Immune  Trep: negative  HIV: negative  HepB: negative      Assessment:       36w1d weeks gestation admitted to Observation for prolonged monitoring.     Active Hospital Problems    Diagnosis  POA    *Fetal heart rate decelerations affecting management of mother [O36.8390]  Unknown    Acute cystitis with hematuria [N30.01]  Yes      Resolved Hospital Problems   No resolved problems to display.            Plan:      Risks, benefits, alternatives and possible complications have been discussed in detail with the patient.   - Consents signed and to chart  - Admit to antefloor    Fetal Deceleration  - NST in F[facility]TIM[facility] w/ multiple  variable/spontaneous decels, good variability and accels in between, overall reassuring  - Admitted for prolonged monitoring  - US @ 36w0d w/ EFW 2685 (28%), AC 31.6 cm, normal amniotic fluid  - Cephalic on BSUS  - SVE: cl/th/hi    GBS Unknown  - GBS PCR pending    Dysuria  - Hx Enterococcus UTI s/p Rocephin and Macrobid  - Continues to report dysuria  - No suprapubic tenderness/flank pain  - Repeat urine culture ordered    Rh negative  - Will need postpartum rhogam work up    Limited PNC  - Prenatal labs UTD    Scoliosis  - Epidural per anesthesia if moving towards delivery    Divina Gaitan MD  Obstetrics & Gynecology, PGY-1                 [1]   Facility-Administered Medications Prior to Admission   Medication Dose Route Frequency Provider Last Rate Last Admin    Tdap (BOOSTRIX) vaccine injection 0.5 mL  0.5 mL Intramuscular 1 time in Clinic/HOD          Medications Prior to Admission   Medication Sig Dispense Refill Last Dose/Taking    ampicillin (PRINCIPEN) 500 MG capsule Take 1 capsule (500 mg total) by mouth every 6 (six) hours. for 7 days 28 capsule 0     doxylamine succinate 25 mg tablet Take 1 tablet (25 mg total) by mouth 2 (two) times a day. 20 tablet 0     metoclopramide HCl (REGLAN) 10 MG tablet Take 1 tablet (10 mg total) by mouth every 6 (six) hours. 30 tablet 0     ondansetron (ZOFRAN-ODT) 4 MG TbDL Take 2 tablets (8 mg total) by mouth 2 (two) times daily. 30 tablet 1     polyethylene glycol (GLYCOLAX) 17 gram/dose powder Take 17 g by mouth once daily. 116 g 0     prenatal vit no.130-iron-folic (PRENATAL VITAMIN) 27 mg iron- 800 mcg Tab Take 1 tablet by mouth Daily. 300 tablet 0     prochlorperazine (COMPAZINE) 10 MG tablet Take 1 tablet (10 mg total) by mouth every 6 (six) hours as needed. 15 tablet 0     promethazine (PHENERGAN) 25 MG suppository Place 1 suppository (25 mg total) rectally every 6 (six) hours as needed for Nausea. 10 suppository 0     pyridoxine, vitamin B6, (B-6) 25 MG Tab Take  1 tablet (25 mg total) by mouth 2 (two) times a day. 20 tablet 0    [2]   Social History  Socioeconomic History    Marital status: Single   Tobacco Use    Smoking status: Never    Smokeless tobacco: Never   Substance and Sexual Activity    Alcohol use: No    Drug use: No    Sexual activity: Yes     Partners: Male     Birth control/protection: Condom     Social Drivers of Health     Financial Resource Strain: Low Risk  (3/25/2025)    Overall Financial Resource Strain (CARDIA)     Difficulty of Paying Living Expenses: Not very hard   Food Insecurity: No Food Insecurity (3/25/2025)    Hunger Vital Sign     Worried About Running Out of Food in the Last Year: Never true     Ran Out of Food in the Last Year: Never true   Transportation Needs: No Transportation Needs (3/25/2025)    PRAPARE - Transportation     Lack of Transportation (Medical): No     Lack of Transportation (Non-Medical): No   Physical Activity: Inactive (3/25/2025)    Exercise Vital Sign     Days of Exercise per Week: 0 days     Minutes of Exercise per Session: 0 min   Stress: No Stress Concern Present (3/25/2025)    [employer] - Occupational Stress Questionnaire     Feeling of Stress : Not at all   Housing Stability: Low Risk  (3/25/2025)    Housing Stability Vital Sign     Unable to Pay for Housing in the Last Year: No     Homeless in the Last Year: No

## 2025-06-18 NOTE — PROGRESS NOTES
Obstetrics  Progress Note                                                       Subjective:      Subjective  Yaquelin Cochran is a 19 y.o.  at 36w2d admitted for fetal heart rate decelerations. Please see H&P for details regarding presentation at admission. This pregnancy is complicated by RH neg, scoliosis, limited PNC, anxiety/depression.     Interim HPI: NAEON. Patient reports feeling well overnight. Reports her contractions have spaced out and are now rare. Denies VB, LOF. Reports normal FM.     [Past Medical History][Past Surgical History]     [Past Medical History]       Diagnosis Date    Behavior problem in childhood      COVID-19 2021    Ectopic pregnancy 2023    Suicidal ideation         [Past Surgical History]        Procedure Laterality Date    BACK SURGERY   2016     Rods in place, had issues with previous epidural    DIAGNOSTIC LAPAROSCOPY N/A 2023     Procedure: LAPAROSCOPY, DIAGNOSTIC;  Surgeon: Juana Fung MD;  Location: [facility];  Service: OB/GYN;  Laterality: N/A;    DILATION AND CURETTAGE OF UTERUS         Social, Family, and Obstetric History: reviewed in chart  [Current Medications]     [Current Medications]            Current Facility-Administered Medications   Medication Dose Route Frequency Provider Last Rate Last Admin    acetaminophen tablet 650 mg  650 mg Oral Q6H PRN Baylee Aviles MD        ampicillin 500 mg in 0.9% NaCl 100 mL IVPB (MB+)  500 mg Intravenous Q6H Baylee Aviles MD   Stopped at 25 0615    diphenhydrAMINE capsule 25 mg  25 mg Oral Q4H PRN Baylee Aviles MD        diphenhydrAMINE injection 25 mg  25 mg Intravenous Q4H PRN Baylee Aviles MD        ondansetron disintegrating tablet 8 mg  8 mg Oral Q8H PRN Baylee Aviles MD        prenatal vitamin oral tablet  1 tablet Oral Daily Baylee Aviles MD        senna-docusate 8.6-50 mg per tablet 1 tablet  1 tablet Oral Nightly PRN Baylee Aviles MD        simethicone chewable tablet 80 mg  1  tablet Oral Q6H PRN Baylee Aviles MD        sodium chloride 0.9% flush 10 mL  10 mL Intravenous PRN Baylee Aviles MD            Objective:   BP (!) 81/46 (BP Location: Right arm, Patient Position: Lying) Comment: Patient asymptomatic. MFM notified. Comment (Patient Position): Left side-lying  Pulse 61   Temp 98.6 °F (37 °C) (Oral)   Resp 17   LMP 2024 (Exact Date)   SpO2 96%      Focused Physical Examination   General: well developed, no acute distress  Pulmonary: respiratory effort normal with no retractions  Abdomen: gravid  Cervix:      Fetal Monitoring  EFM: 130 baseline/ moderate variability/ accels/ decels  Tocometer: very irregular, q 20-30+ min     Lab Results  CBC       Recent Labs   Lab 25   WBC 9.63   RBC 3.44*   HGB 10.0*   HCT 29.2*      MCV 85   MCH 29.1   MCHC 34.2    UA: protein, ketones, blood   Urine culture: pending                   Assessment:   19 y.o.  at 36w2d admitted for fetal decelerations    Plan:   Fetal Heart Rate Decelerations  - Patient noted to have variable decelerations while in the [facility]  - Monitoring has been reassuring overnight with no further decelerations   - As monitoring has remained reassuring will plan for discharge today with close followup with her primary CNM team      UTI  - UA as above, culture pending   - On ampicillin inpatient, will discharge home with  PO abx     Rh Neg  - S/p rhogam at 28wga   - Will need rhogam workup postpartum      Anxiety/Depression  - No medications   - Mood stable      Lizette Sheldon MD  OBGYN, PGY-4

## 2025-06-18 NOTE — DISCHARGE SUMMARY
[facility] - [facility]  Obstetrics  Discharge Summary      Patient Name: Yaquelin Cochran  MRN: [id number]  Admission Date: 2025  Hospital Length of Stay: 1 days  Discharge Date and Time: 2025 7:26 AM  Attending Physician: Eva Gandhi MD   Discharging Provider: Lizette Sheldon MD  Primary Care Provider: Katarina Champion MD    HPI: Yaquelin Cochran is a 19 y.o.  female with IUP at 36w1d measured by 8w ultrasound with significant hx of limited PNC, scoliosis, Rh neg, anemia who is being admitted for Cat 2 tracing.     Patient presented to the [facility] for irregular CTX and dysuria. She reports 2 days of irregular contractions as close together as q5 min. She also endorses dysuria. She was recently treated for Enterococcus UTI (sensitive to Macrobid, Ampicillin and Vancomycin) with Rocephin and Macrobid but continues to endorse dysuria.      Patient reports contractions, denies vaginal bleeding, denies LOF.   Fetal Movement: normal.    Hospital Course: Patient admitted for continuous monitoring in the setting of fetal decelerations. Monitoring remained reassuring. Patient stable for discharge without outpatient followup.      EFM: 130 baseline/ moderate variability/ accels/ decels  Tocometer: very irregular, q 20-30+ min      Final Active Diagnoses:    Diagnosis Date Noted POA    PRINCIPAL PROBLEM:  Fetal heart rate decelerations affecting management of mother [O36.8390] 2025 Yes    Scoliosis [M41.9] 2025 Yes    Rh negative status during pregnancy in third trimester [O26.893, Z67.91] 2025 Yes    Acute cystitis with hematuria [N30.01] 2024 Yes      Problems Resolved During this Admission:        Labs: All labs within the past 24 hours have been reviewed    Immunizations       Date Immunization Status Dose Route/Site Given by    04 0000 DTaP / Hep B / IPV Given  Intramuscular/Left quadriceps     04 0000 DTaP / Hep B / IPV Given  Intramuscular/Left quadriceps      12/08/03 0000 DTaP / Hep B / IPV Given  Intramuscular/Left quadriceps     01/26/05 0000 DTaP / HiB Given  Intramuscular/Left arm     04/08/04 0000 HIB Given  Intramuscular/Right quadriceps     02/12/04 0000 HIB Given  Intramuscular/Right quadriceps     10/10/03 0000 Hep B / HiB Given  Intramuscular/Right quadriceps     10/14/04 0000 MMR Given  Subcutaneous/Right arm     02/12/04 0000 Pneumococcal Conjugate - 7 Valent Given  Intramuscular/Left arm     12/08/03 0000 Pneumococcal Conjugate - 7 Valent Given  Intramuscular/Right arm     04/27/05 0000 Pneumococcal Conjugate - 7 Valent Given  Intramuscular/Left arm     01/26/05 0000 Pneumococcal Conjugate - 7 Valent Given  Intramuscular/Right arm     10/14/04 0000 Varicella Given  Subcutaneous/Left arm     11/08/11 0000 Influenza - Intranasal Given  Intranasal/Intranasal     06/18/25 0726 Tdap Deleted               This patient has no babies on file.  Pending Diagnostic Studies:       None            Discharged Condition: good    Disposition: Home or Self Care    Follow Up:   Follow-up Information       [facility] [place] [facility].    Specialty: Obstetrics and Gynecology  Why: Prenatal visit as scheduled  Contact information:  [address]  [facility] [address] [phone]  Additional information:  Turn at Entrance 1 on [facility] [place] [place] and take elevators to Floor 2. Follow signs to Hospital. Take Hospital Elevators to Floor 4 for [address].                         Patient Instructions:      Diet Adult Regular     Pelvic Rest   Order Comments: Pelvic rest until follow up visit. Nothing in vagina -no sex, tampons, douching, etc.     Notify your health care provider if you experience any of the following:  temperature >100.4     Notify your health care provider if you experience any of the following:  persistent nausea and vomiting or diarrhea     Notify  your health care provider if you experience any of the following:  severe uncontrolled pain     Notify your health care provider if you experience any of the following:  difficulty breathing or increased cough     Notify your health care provider if you experience any of the following:  severe persistent headache     Notify your health care provider if you experience any of the following:  persistent dizziness, light-headedness, or visual disturbances     Notify your health care provider if you experience any of the following:  increased confusion or weakness     Notify your health care provider if you experience any of the following:   Order Comments: - Vaginal bleeding  - Regular contractions  - Leakage of fluid like your water broke  - Decreased fetal movement     Medications:  Current Discharge Medication List        CONTINUE these medications which have NOT CHANGED    Details   ampicillin (PRINCIPEN) 500 MG capsule Take 1 capsule (500 mg total) by mouth every 6 (six) hours. for 7 days  Qty: 28 capsule, Refills: 0      prenatal vit no.130-iron-folic (PRENATAL VITAMIN) 27 mg iron- 800 mcg Tab Take 1 tablet by mouth Daily.  Qty: 300 tablet, Refills: 0           STOP taking these medications       doxylamine succinate 25 mg tablet Comments:   Reason for Stopping:         metoclopramide HCl (REGLAN) 10 MG tablet Comments:   Reason for Stopping:         ondansetron (ZOFRAN-ODT) 4 MG TbDL Comments:   Reason for Stopping:         polyethylene glycol (GLYCOLAX) 17 gram/dose powder Comments:   Reason for Stopping:         prochlorperazine (COMPAZINE) 10 MG tablet Comments:   Reason for Stopping:         promethazine (PHENERGAN) 25 MG suppository Comments:   Reason for Stopping:         pyridoxine, vitamin B6, (B-6) 25 MG Tab Comments:   Reason for Stopping:               Lizette Sheldon MD  Obstetrics  [facility]

## 2025-06-18 NOTE — PROGRESS NOTES
"19 y.o.,  at 36w2d here today for routine ob appt.    Complaints today: Reports headache earlier today that has now resolved. Reports "partial blindness" in right eye earlier today that has now resolved. Reports occasional vertigo. Denies LOF, VB, and cramping or regular contractions. Denies dysuria and any other s/s UTI. Reports good fetal movement. Requests SVE.     Recently seen in [facility] for dysuria and contractions. Fetal decels were noted in [facility] and patient kept overnight for observation.     TW lbs     PHYSICAL EXAM  GENERAL: No acute distress  HEENT: Normocephalic, atraumatic  NEURO: Alert and oriented x3  PULMONARY: Non-labored respiration; no tachypnea  ABD: Soft, gravid, nontender. Vertex per Leopold's.    ASSESSMENT AND PLAN  36 weeks gestation of pregnancy  -     Ambulatory referral/consult to  Behavioral Health; Future; Expected date: 2025    Anxiety  Comments:  We discussed PBH resources here, patient desires referral  Orders:  -     Ambulatory referral/consult to  Behavioral Health; Future; Expected date: 2025    History of depression    Urinary tract infection in mother during third trimester of pregnancy  Comments:  UC negative 25    Limited prenatal care in third trimester    Insufficient weight gain during pregnancy in third trimester  Comments:  36wk US AGA with EFW 2685 g (28%)    Uterine size-date discrepancy in third trimester  Comments:  36wk US AGA with EFW 2685 g (28%)      GBS collected during [facility] visit 25. Reviewed Allergies: Patient has no known allergies.    Reviewed warning signs, normal fetal movement,  labor precautions, and how/when to call.      Follow-up: 1 week, call or present sooner PRN    LINDA HATFIELD CNM/FERMIN      "

## 2025-06-19 ENCOUNTER — PATIENT MESSAGE (OUTPATIENT)
Dept: PSYCHIATRY | Facility: CLINIC | Age: 20
End: 2025-06-19
Payer: MEDICAID

## 2025-06-19 ENCOUNTER — ROUTINE PRENATAL (OUTPATIENT)
Dept: OBSTETRICS AND GYNECOLOGY | Facility: CLINIC | Age: 20
End: 2025-06-19
Payer: MEDICAID

## 2025-06-19 VITALS
SYSTOLIC BLOOD PRESSURE: 107 MMHG | DIASTOLIC BLOOD PRESSURE: 71 MMHG | HEART RATE: 75 BPM | BODY MASS INDEX: 23.17 KG/M2 | WEIGHT: 126.63 LBS

## 2025-06-19 DIAGNOSIS — O26.13 INSUFFICIENT WEIGHT GAIN DURING PREGNANCY IN THIRD TRIMESTER: ICD-10-CM

## 2025-06-19 DIAGNOSIS — O26.843 UTERINE SIZE-DATE DISCREPANCY IN THIRD TRIMESTER: ICD-10-CM

## 2025-06-19 DIAGNOSIS — O23.43 URINARY TRACT INFECTION IN MOTHER DURING THIRD TRIMESTER OF PREGNANCY: ICD-10-CM

## 2025-06-19 DIAGNOSIS — Z86.59 HISTORY OF DEPRESSION: ICD-10-CM

## 2025-06-19 DIAGNOSIS — O09.33 LIMITED PRENATAL CARE IN THIRD TRIMESTER: ICD-10-CM

## 2025-06-19 DIAGNOSIS — Z3A.36 36 WEEKS GESTATION OF PREGNANCY: Primary | ICD-10-CM

## 2025-06-19 DIAGNOSIS — F41.9 ANXIETY: ICD-10-CM

## 2025-06-19 LAB
BACTERIA UR CULT: NO GROWTH
GROUP B STREPTOCOCCUS, PCR (OHS): NEGATIVE

## 2025-06-19 PROCEDURE — 99999 PR PBB SHADOW E&M-EST. PATIENT-LVL III: CPT | Mod: PBBFAC,,,

## 2025-06-19 PROCEDURE — 99213 OFFICE O/P EST LOW 20 MIN: CPT | Mod: PBBFAC,TH

## 2025-06-20 ENCOUNTER — PATIENT MESSAGE (OUTPATIENT)
Dept: OBSTETRICS AND GYNECOLOGY | Facility: CLINIC | Age: 20
End: 2025-06-20
Payer: MEDICAID

## 2025-06-20 LAB
BILIRUBIN, POC UA: NEGATIVE
BLOOD, POC UA: ABNORMAL
CLARITY, UA: CLEAR
COLOR, UA: YELLOW
GLUCOSE, POC UA: NEGATIVE
KETONES, POC UA: >=160 MG/DL
LEUKOCYTE EST, POC UA: NEGATIVE
NITRITE, POC UA: NEGATIVE
PH UR STRIP: 6 [PH] (ref 5–8)
PROTEIN, POC UA: 30 MG/DL
SPECIFIC GRAVITY, POC UA: >=1.03 (ref 1–1.03)
UROBILINOGEN, POC UA: 0.2 E.U./DL

## 2025-06-26 NOTE — TELEPHONE ENCOUNTER
Behavioral Health [facility] [place] [facility]  Initial Assessment  Completed by: Saadia Mcfarland      Date:  2025    Patient Enrollment in  Behavioral Health Program:   OB/GYN referred: Miriam Pope     Pregnancy Status: 37 weeks  Pt reports struggling with  depression and being recently dx with OCD. Pt states OCD worsens depression. Pt states she no longer sees provider who dx OCD. Pt states that she has taken an SSRI in the past but can't recall which one. Pr is not currently on any medication for mental health.  Call Summary   Pt interested in program and placed on schedule.    Follow Up     Next appointment date: 2025

## 2025-06-27 ENCOUNTER — PATIENT MESSAGE (OUTPATIENT)
Dept: OBSTETRICS AND GYNECOLOGY | Facility: CLINIC | Age: 20
End: 2025-06-27
Payer: MEDICAID

## 2025-06-27 DIAGNOSIS — B07.9 VIRAL WARTS, UNSPECIFIED TYPE: Primary | ICD-10-CM

## 2025-07-06 ENCOUNTER — HOSPITAL ENCOUNTER (INPATIENT)
Facility: OTHER | Age: 20
LOS: 4 days | Discharge: HOME | End: 2025-07-10
Attending: STUDENT IN AN ORGANIZED HEALTH CARE EDUCATION/TRAINING PROGRAM | Admitting: OBSTETRICS & GYNECOLOGY
Payer: MEDICAID

## 2025-07-06 DIAGNOSIS — Z34.90 ENCOUNTER FOR INDUCTION OF LABOR: ICD-10-CM

## 2025-07-06 DIAGNOSIS — Z3A.38 38 WEEKS GESTATION OF PREGNANCY: ICD-10-CM

## 2025-07-06 DIAGNOSIS — F42.9 OBSESSIVE-COMPULSIVE DISORDER, UNSPECIFIED TYPE: ICD-10-CM

## 2025-07-06 DIAGNOSIS — O36.8130 DECREASED FETAL MOVEMENTS IN THIRD TRIMESTER, SINGLE OR UNSPECIFIED FETUS: ICD-10-CM

## 2025-07-06 DIAGNOSIS — O36.8131 DECREASED FETAL MOVEMENTS IN THIRD TRIMESTER, FETUS 1 OF MULTIPLE GESTATION: ICD-10-CM

## 2025-07-06 DIAGNOSIS — O36.8390 FETAL HEART RATE DECELERATIONS AFFECTING MANAGEMENT OF MOTHER: ICD-10-CM

## 2025-07-06 DIAGNOSIS — O47.9 UTERINE CONTRACTIONS: ICD-10-CM

## 2025-07-06 LAB
ABO + RH BLD: ABNORMAL
ABSOLUTE EOSINOPHIL (OHS): 0.02 K/UL
ABSOLUTE MONOCYTE (OHS): 0.72 K/UL (ref 0.3–1)
ABSOLUTE NEUTROPHIL COUNT (OHS): 8.59 K/UL (ref 1.8–7.7)
BASOPHILS # BLD AUTO: 0.05 K/UL
BASOPHILS NFR BLD AUTO: 0.4 %
BLD GP AB SCN CELLS X3 SERPL QL: ABNORMAL
BLOOD GROUP ANTIBODIES SERPL: NORMAL
ERYTHROCYTE [DISTWIDTH] IN BLOOD BY AUTOMATED COUNT: 13.2 % (ref 11.5–14.5)
HCT VFR BLD AUTO: 35.7 % (ref 37–48.5)
HGB BLD-MCNC: 11.6 GM/DL (ref 12–16)
HIV 1+2 AB+HIV1 P24 AG SERPL QL IA: NEGATIVE
IMM GRANULOCYTES # BLD AUTO: 0.05 K/UL (ref 0–0.04)
IMM GRANULOCYTES NFR BLD AUTO: 0.4 % (ref 0–0.5)
INDIRECT COOMBS: ABNORMAL
LYMPHOCYTES # BLD AUTO: 2.13 K/UL (ref 1–4.8)
MCH RBC QN AUTO: 27.8 PG (ref 27–31)
MCHC RBC AUTO-ENTMCNC: 32.5 G/DL (ref 32–36)
MCV RBC AUTO: 86 FL (ref 82–98)
NUCLEATED RBC (/100WBC) (OHS): 0 /100 WBC
PLATELET # BLD AUTO: 224 K/UL (ref 150–450)
PMV BLD AUTO: 10.3 FL (ref 9.2–12.9)
RBC # BLD AUTO: 4.17 M/UL (ref 4–5.4)
RELATIVE EOSINOPHIL (OHS): 0.2 %
RELATIVE LYMPHOCYTE (OHS): 18.4 % (ref 18–48)
RELATIVE MONOCYTE (OHS): 6.2 % (ref 4–15)
RELATIVE NEUTROPHIL (OHS): 74.4 % (ref 38–73)
RH BLD: ABNORMAL
ROSETTE - FMH (FETAL BLEED SCREEN): NORMAL
SPECIMEN OUTDATE: ABNORMAL
T PALLIDUM IGG+IGM SER QL: NEGATIVE
WBC # BLD AUTO: 11.56 K/UL (ref 3.9–12.7)

## 2025-07-06 PROCEDURE — 86900 BLOOD TYPING SEROLOGIC ABO: CPT

## 2025-07-06 PROCEDURE — 25000003 PHARM REV CODE 250

## 2025-07-06 PROCEDURE — 72200005 HC VAGINAL DELIVERY LEVEL II

## 2025-07-06 PROCEDURE — 87389 HIV-1 AG W/HIV-1&-2 AB AG IA: CPT

## 2025-07-06 PROCEDURE — 11000001 HC ACUTE MED/SURG PRIVATE ROOM

## 2025-07-06 PROCEDURE — 86593 SYPHILIS TEST NON-TREP QUANT: CPT

## 2025-07-06 PROCEDURE — 99285 EMERGENCY DEPT VISIT HI MDM: CPT | Mod: 25

## 2025-07-06 PROCEDURE — 4A1HXCZ MONITORING OF PRODUCTS OF CONCEPTION, CARDIAC RATE, EXTERNAL APPROACH: ICD-10-PCS | Performed by: OBSTETRICS & GYNECOLOGY

## 2025-07-06 PROCEDURE — 36415 COLL VENOUS BLD VENIPUNCTURE: CPT

## 2025-07-06 PROCEDURE — 76815 OB US LIMITED FETUS(S): CPT | Mod: 26,,, | Performed by: STUDENT IN AN ORGANIZED HEALTH CARE EDUCATION/TRAINING PROGRAM

## 2025-07-06 PROCEDURE — 3E033VJ INTRODUCTION OF OTHER HORMONE INTO PERIPHERAL VEIN, PERCUTANEOUS APPROACH: ICD-10-PCS

## 2025-07-06 PROCEDURE — 85025 COMPLETE CBC W/AUTO DIFF WBC: CPT

## 2025-07-06 PROCEDURE — 86870 RBC ANTIBODY IDENTIFICATION: CPT

## 2025-07-06 PROCEDURE — 85461 HEMOGLOBIN FETAL: CPT

## 2025-07-06 PROCEDURE — 59025 FETAL NON-STRESS TEST: CPT

## 2025-07-06 PROCEDURE — 63600175 PHARM REV CODE 636 W HCPCS

## 2025-07-06 PROCEDURE — 10907ZC DRAINAGE OF AMNIOTIC FLUID, THERAPEUTIC FROM PRODUCTS OF CONCEPTION, VIA NATURAL OR ARTIFICIAL OPENING: ICD-10-PCS

## 2025-07-06 PROCEDURE — 59025 FETAL NON-STRESS TEST: CPT | Mod: 26,59,, | Performed by: STUDENT IN AN ORGANIZED HEALTH CARE EDUCATION/TRAINING PROGRAM

## 2025-07-06 PROCEDURE — 72100002 HC LABOR CARE, 1ST 8 HOURS

## 2025-07-06 PROCEDURE — 86901 BLOOD TYPING SEROLOGIC RH(D): CPT | Mod: 91

## 2025-07-06 PROCEDURE — 0HQ9XZZ REPAIR PERINEUM SKIN, EXTERNAL APPROACH: ICD-10-PCS

## 2025-07-06 PROCEDURE — 99285 EMERGENCY DEPT VISIT HI MDM: CPT | Mod: 25,,, | Performed by: STUDENT IN AN ORGANIZED HEALTH CARE EDUCATION/TRAINING PROGRAM

## 2025-07-06 PROCEDURE — 85460 HEMOGLOBIN FETAL: CPT

## 2025-07-06 RX ORDER — OXYTOCIN-SODIUM CHLORIDE 0.9% IV SOLN 30 UNIT/500ML 30-0.9/5 UT/ML-%
95 SOLUTION INTRAVENOUS CONTINUOUS PRN
Status: DISCONTINUED | OUTPATIENT
Start: 2025-07-06 | End: 2025-07-08

## 2025-07-06 RX ORDER — DIPHENOXYLATE HYDROCHLORIDE AND ATROPINE SULFATE 2.5; .025 MG/1; MG/1
2 TABLET ORAL EVERY 6 HOURS PRN
Status: DISCONTINUED | OUTPATIENT
Start: 2025-07-06 | End: 2025-07-06

## 2025-07-06 RX ORDER — TRANEXAMIC ACID 10 MG/ML
1000 INJECTION, SOLUTION INTRAVENOUS EVERY 30 MIN PRN
Status: DISCONTINUED | OUTPATIENT
Start: 2025-07-06 | End: 2025-07-06

## 2025-07-06 RX ORDER — SIMETHICONE 80 MG
1 TABLET,CHEWABLE ORAL 4 TIMES DAILY PRN
Status: DISCONTINUED | OUTPATIENT
Start: 2025-07-06 | End: 2025-07-06

## 2025-07-06 RX ORDER — OXYTOCIN 10 [USP'U]/ML
10 INJECTION, SOLUTION INTRAMUSCULAR; INTRAVENOUS ONCE AS NEEDED
Status: DISCONTINUED | OUTPATIENT
Start: 2025-07-06 | End: 2025-07-06

## 2025-07-06 RX ORDER — MISOPROSTOL 200 UG/1
800 TABLET ORAL ONCE AS NEEDED
Status: DISCONTINUED | OUTPATIENT
Start: 2025-07-06 | End: 2025-07-10 | Stop reason: HOSPADM

## 2025-07-06 RX ORDER — OXYTOCIN-SODIUM CHLORIDE 0.9% IV SOLN 30 UNIT/500ML 30-0.9/5 UT/ML-%
30 SOLUTION INTRAVENOUS ONCE AS NEEDED
Status: DISCONTINUED | OUTPATIENT
Start: 2025-07-06 | End: 2025-07-06

## 2025-07-06 RX ORDER — DIPHENHYDRAMINE HCL 25 MG
25 CAPSULE ORAL EVERY 4 HOURS PRN
Status: DISCONTINUED | OUTPATIENT
Start: 2025-07-06 | End: 2025-07-10 | Stop reason: HOSPADM

## 2025-07-06 RX ORDER — SODIUM CHLORIDE, SODIUM LACTATE, POTASSIUM CHLORIDE, CALCIUM CHLORIDE 600; 310; 30; 20 MG/100ML; MG/100ML; MG/100ML; MG/100ML
INJECTION, SOLUTION INTRAVENOUS CONTINUOUS
Status: DISCONTINUED | OUTPATIENT
Start: 2025-07-06 | End: 2025-07-06

## 2025-07-06 RX ORDER — HYDROCORTISONE 25 MG/G
CREAM TOPICAL 3 TIMES DAILY PRN
Status: DISCONTINUED | OUTPATIENT
Start: 2025-07-06 | End: 2025-07-10 | Stop reason: HOSPADM

## 2025-07-06 RX ORDER — CARBOPROST TROMETHAMINE 250 UG/ML
250 INJECTION, SOLUTION INTRAMUSCULAR
Status: DISCONTINUED | OUTPATIENT
Start: 2025-07-06 | End: 2025-07-10 | Stop reason: HOSPADM

## 2025-07-06 RX ORDER — DIPHENHYDRAMINE HYDROCHLORIDE 50 MG/ML
25 INJECTION, SOLUTION INTRAMUSCULAR; INTRAVENOUS EVERY 4 HOURS PRN
Status: DISCONTINUED | OUTPATIENT
Start: 2025-07-06 | End: 2025-07-10 | Stop reason: HOSPADM

## 2025-07-06 RX ORDER — OXYCODONE HYDROCHLORIDE 10 MG/1
10 TABLET ORAL EVERY 4 HOURS PRN
Refills: 0 | Status: DISCONTINUED | OUTPATIENT
Start: 2025-07-06 | End: 2025-07-10 | Stop reason: HOSPADM

## 2025-07-06 RX ORDER — MISOPROSTOL 200 UG/1
800 TABLET ORAL ONCE AS NEEDED
Status: DISCONTINUED | OUTPATIENT
Start: 2025-07-06 | End: 2025-07-06

## 2025-07-06 RX ORDER — LIDOCAINE HYDROCHLORIDE 10 MG/ML
10 INJECTION, SOLUTION INFILTRATION; PERINEURAL ONCE AS NEEDED
Status: COMPLETED | OUTPATIENT
Start: 2025-07-06 | End: 2025-07-06

## 2025-07-06 RX ORDER — SODIUM CHLORIDE 0.9 % (FLUSH) 0.9 %
10 SYRINGE (ML) INJECTION
Status: DISCONTINUED | OUTPATIENT
Start: 2025-07-06 | End: 2025-07-10 | Stop reason: HOSPADM

## 2025-07-06 RX ORDER — OXYTOCIN-SODIUM CHLORIDE 0.9% IV SOLN 30 UNIT/500ML 30-0.9/5 UT/ML-%
95 SOLUTION INTRAVENOUS CONTINUOUS PRN
Status: DISCONTINUED | OUTPATIENT
Start: 2025-07-06 | End: 2025-07-06

## 2025-07-06 RX ORDER — METHYLERGONOVINE MALEATE 0.2 MG/ML
200 INJECTION INTRAVENOUS ONCE AS NEEDED
Status: DISCONTINUED | OUTPATIENT
Start: 2025-07-06 | End: 2025-07-10 | Stop reason: HOSPADM

## 2025-07-06 RX ORDER — OXYTOCIN-SODIUM CHLORIDE 0.9% IV SOLN 30 UNIT/500ML 30-0.9/5 UT/ML-%
0-32 SOLUTION INTRAVENOUS CONTINUOUS
Status: DISCONTINUED | OUTPATIENT
Start: 2025-07-06 | End: 2025-07-06

## 2025-07-06 RX ORDER — OXYTOCIN 10 [USP'U]/ML
10 INJECTION, SOLUTION INTRAMUSCULAR; INTRAVENOUS ONCE AS NEEDED
Status: DISCONTINUED | OUTPATIENT
Start: 2025-07-06 | End: 2025-07-10 | Stop reason: HOSPADM

## 2025-07-06 RX ORDER — TRANEXAMIC ACID 10 MG/ML
1000 INJECTION, SOLUTION INTRAVENOUS EVERY 30 MIN PRN
Status: DISCONTINUED | OUTPATIENT
Start: 2025-07-06 | End: 2025-07-10 | Stop reason: HOSPADM

## 2025-07-06 RX ORDER — OXYTOCIN-SODIUM CHLORIDE 0.9% IV SOLN 30 UNIT/500ML 30-0.9/5 UT/ML-%
10 SOLUTION INTRAVENOUS ONCE AS NEEDED
Status: DISCONTINUED | OUTPATIENT
Start: 2025-07-06 | End: 2025-07-06

## 2025-07-06 RX ORDER — DOCUSATE SODIUM 100 MG/1
200 CAPSULE, LIQUID FILLED ORAL 2 TIMES DAILY PRN
Status: DISCONTINUED | OUTPATIENT
Start: 2025-07-06 | End: 2025-07-08

## 2025-07-06 RX ORDER — SIMETHICONE 80 MG
1 TABLET,CHEWABLE ORAL EVERY 6 HOURS PRN
Status: DISCONTINUED | OUTPATIENT
Start: 2025-07-06 | End: 2025-07-10 | Stop reason: HOSPADM

## 2025-07-06 RX ORDER — DIPHENOXYLATE HYDROCHLORIDE AND ATROPINE SULFATE 2.5; .025 MG/1; MG/1
2 TABLET ORAL EVERY 6 HOURS PRN
Status: DISCONTINUED | OUTPATIENT
Start: 2025-07-06 | End: 2025-07-10 | Stop reason: HOSPADM

## 2025-07-06 RX ORDER — IBUPROFEN 600 MG/1
600 TABLET, FILM COATED ORAL EVERY 6 HOURS
Status: DISCONTINUED | OUTPATIENT
Start: 2025-07-06 | End: 2025-07-10 | Stop reason: HOSPADM

## 2025-07-06 RX ORDER — OXYCODONE HYDROCHLORIDE 5 MG/1
5 TABLET ORAL EVERY 4 HOURS PRN
Refills: 0 | Status: DISCONTINUED | OUTPATIENT
Start: 2025-07-06 | End: 2025-07-10 | Stop reason: HOSPADM

## 2025-07-06 RX ORDER — ONDANSETRON 8 MG/1
8 TABLET, ORALLY DISINTEGRATING ORAL EVERY 8 HOURS PRN
Status: DISCONTINUED | OUTPATIENT
Start: 2025-07-06 | End: 2025-07-10 | Stop reason: HOSPADM

## 2025-07-06 RX ORDER — CALCIUM CARBONATE 200(500)MG
500 TABLET,CHEWABLE ORAL 3 TIMES DAILY PRN
Status: DISCONTINUED | OUTPATIENT
Start: 2025-07-06 | End: 2025-07-06

## 2025-07-06 RX ORDER — CARBOPROST TROMETHAMINE 250 UG/ML
250 INJECTION, SOLUTION INTRAMUSCULAR
Status: DISCONTINUED | OUTPATIENT
Start: 2025-07-06 | End: 2025-07-06

## 2025-07-06 RX ORDER — SODIUM CHLORIDE 9 MG/ML
INJECTION, SOLUTION INTRAVENOUS
Status: DISCONTINUED | OUTPATIENT
Start: 2025-07-06 | End: 2025-07-06

## 2025-07-06 RX ORDER — OXYTOCIN-SODIUM CHLORIDE 0.9% IV SOLN 30 UNIT/500ML 30-0.9/5 UT/ML-%
95 SOLUTION INTRAVENOUS ONCE AS NEEDED
Status: DISCONTINUED | OUTPATIENT
Start: 2025-07-06 | End: 2025-07-06

## 2025-07-06 RX ORDER — ONDANSETRON 8 MG/1
8 TABLET, ORALLY DISINTEGRATING ORAL EVERY 8 HOURS PRN
Status: DISCONTINUED | OUTPATIENT
Start: 2025-07-06 | End: 2025-07-06

## 2025-07-06 RX ORDER — OXYTOCIN-SODIUM CHLORIDE 0.9% IV SOLN 30 UNIT/500ML 30-0.9/5 UT/ML-%
30 SOLUTION INTRAVENOUS ONCE AS NEEDED
Status: DISCONTINUED | OUTPATIENT
Start: 2025-07-06 | End: 2025-07-10 | Stop reason: HOSPADM

## 2025-07-06 RX ORDER — METHYLERGONOVINE MALEATE 0.2 MG/ML
200 INJECTION INTRAVENOUS ONCE AS NEEDED
Status: DISCONTINUED | OUTPATIENT
Start: 2025-07-06 | End: 2025-07-06

## 2025-07-06 RX ORDER — ACETAMINOPHEN 325 MG/1
650 TABLET ORAL EVERY 6 HOURS SCHEDULED
Status: DISCONTINUED | OUTPATIENT
Start: 2025-07-06 | End: 2025-07-10 | Stop reason: HOSPADM

## 2025-07-06 RX ORDER — OXYTOCIN-SODIUM CHLORIDE 0.9% IV SOLN 30 UNIT/500ML 30-0.9/5 UT/ML-%
95 SOLUTION INTRAVENOUS ONCE AS NEEDED
Status: DISCONTINUED | OUTPATIENT
Start: 2025-07-06 | End: 2025-07-10 | Stop reason: HOSPADM

## 2025-07-06 RX ORDER — PRENATAL WITH FERROUS FUM AND FOLIC ACID 3080; 920; 120; 400; 22; 1.84; 3; 20; 10; 1; 12; 200; 27; 25; 2 [IU]/1; [IU]/1; MG/1; [IU]/1; MG/1; MG/1; MG/1; MG/1; MG/1; MG/1; UG/1; MG/1; MG/1; MG/1; MG/1
1 TABLET ORAL DAILY
Status: DISCONTINUED | OUTPATIENT
Start: 2025-07-07 | End: 2025-07-10 | Stop reason: HOSPADM

## 2025-07-06 RX ADMIN — Medication 4 MILLI-UNITS/MIN: at 09:07

## 2025-07-06 RX ADMIN — ACETAMINOPHEN 650 MG: 325 TABLET ORAL at 08:07

## 2025-07-06 RX ADMIN — LIDOCAINE HYDROCHLORIDE 10 ML: 10 INJECTION, SOLUTION INFILTRATION; PERINEURAL at 03:07

## 2025-07-06 RX ADMIN — IBUPROFEN 600 MG: 600 TABLET ORAL at 08:07

## 2025-07-06 RX ADMIN — SODIUM CHLORIDE, POTASSIUM CHLORIDE, SODIUM LACTATE AND CALCIUM CHLORIDE: 600; 310; 30; 20 INJECTION, SOLUTION INTRAVENOUS at 09:07

## 2025-07-06 NOTE — PROGRESS NOTES
LABOR NOTE    S:  Patient reporting increased intensity of contractions and intermittent rectal pressure. Reports trickle of fluid after using the bathroom. Consents to SVE.     O: /70   Pulse 70   Temp 98.1 °F (36.7 °C) (Oral)   Resp 17   LMP 2024 (Exact Date)   SpO2 100%   Breastfeeding No     GENERAL: Calm and appropriate affect  NEURO: Alert, oriented, normal speech  ABDOMEN: Nontender, Fundus palpates soft between UC's.  FHT: Baseline 130, moderate BTBV, positive accels, no decels. Cat 1, reassuring.  CTX: q 2-3 minutes  SVE: 5/80/-2, posterior and ballotable   Bloody show noted on exam but patient does not appear to be ruptured    ASSESSMENT:   19 y.o.  IUP at 38w6d here for IOL 2/2 fetal decelerations, FHT reassuring/Cat 1    Problem List[1]    PLAN:  Continue close Maternal/Fetal monitoring  Titrate pitocin per protocol - currently infusing at 8mu  AROM attempted but cervix posterior and head ballotable between contractions. Will reassess and attempt at next exam.  Recheck 2-4 hours or PRN  Nitrous/epidural per anesthesia at pt's request    Caro Pineda CNM         [1]   Patient Active Problem List  Diagnosis    Anxiety    History of back surgery    History of ectopic pregnancy    Multigravida in third trimester    History of depression    Complex ovarian cyst    Irregular menses    Abnormal facial hair    Occipital lymphadenopathy    Acute cystitis with hematuria    Rh negative status during pregnancy in third trimester    Limited prenatal care in third trimester    Urinary tract infection in mother during third trimester of pregnancy    Insufficient weight gain during pregnancy in third trimester    Uterine size-date discrepancy in third trimester    Fetal heart rate decelerations affecting management of mother    Scoliosis    36 weeks gestation of pregnancy    Encounter for induction of labor

## 2025-07-06 NOTE — HOSPITAL COURSE
"25: Admit to L&D for IOL 2/2 late decelerations  25 PPD1 Doing well, Plan to dc in the AM. Rhogam indicated Baby o+.   25 PPD2 PTEPDS: 19 last pm. Awaiting Tele Psych/SW referral prior to dc. PT has Flat affect today, denies any chages to her moods since yesterday's assessment. Will cont IN PT status until plan/referrals have been made.   25 ppd3 On assessment pt is teary eyed and when questioned about her moods she reports" I do not think I can take care of myself and the baby". She denies SI/HI, SO at bs. She reports that she is afraid of the neighbor downstairs from them in their apartment complex and her OCD tendencies. Discussed medication that was prescribed and timing of effectiveness (2 weeks). She reports " I am a failure". When questioned why she felt like this, she diverted the question. Does not have baby bed but does report she has a car seat When questioned alone, she denies any hx of Domestic Abuse currently or in the past. She is withdrawn today. Discussed options of possible foster care both permanent and temporary as an option if she would like. She does not request information on this at this time. She has appropriate behavior with her baby as she is actively breastfeeding during my rounds with her. Will not DC PT today based on her affect, resources and to assess more her decision of living situation with . Positive support offered with myself and Charge Nurse. DW  and Dr Syed.   "

## 2025-07-06 NOTE — SUBJECTIVE & OBJECTIVE
Obstetric HPI:  As above    OB History    Para Term  AB Living   5 2 2 0 2 2   SAB IAB Ectopic Multiple Live Births   1 0 1 0 2      # Outcome Date GA Lbr Fran/2nd Weight Sex Type Anes PTL Lv   5 Current            4 Term 24 37w5d / 00:01 2.55 kg (5 lb 10 oz) M Vag-Spont None N HERMANN      Name: KADE KINGSLEY      Apgar1: 8  Apgar5: 9   3 Ectopic 2023     ECTOPIC      2 Term 22 40w0d  2.637 kg (5 lb 13 oz) F Vag-Spont  N HERMANN   1 SAB      SAB         Complications: Blighted ovum, History of D&C     Past Medical History:   Diagnosis Date    Behavior problem in childhood     COVID-19 2021    Ectopic pregnancy 2023    Suicidal ideation      Past Surgical History:   Procedure Laterality Date    BACK SURGERY      Rods in place, had issues with previous epidural    DIAGNOSTIC LAPAROSCOPY N/A 2023    Procedure: LAPAROSCOPY, DIAGNOSTIC;  Surgeon: Juana Fung MD;  Location: [facility];  Service: OB/GYN;  Laterality: N/A;    DILATION AND CURETTAGE OF UTERUS         (Not in a hospital admission)      Review of patient's allergies indicates:  No Known Allergies     Family History       Problem Relation (Age of Onset)    No Known Problems Father, Mother    Throat cancer Paternal Grandfather          Tobacco Use    Smoking status: Never    Smokeless tobacco: Never   Substance and Sexual Activity    Alcohol use: No    Drug use: No    Sexual activity: Yes     Partners: Male     Birth control/protection: Condom     Review of Systems   Constitutional: Negative.    HENT: Negative.     Eyes: Negative.    Respiratory: Negative.  Negative for shortness of breath.    Cardiovascular:  Negative for chest pain.   Gastrointestinal:  Positive for abdominal pain (contractions). Negative for nausea and vomiting.   Endocrine: Negative.    Genitourinary: Negative.  Negative for vaginal bleeding and vaginal discharge.   Musculoskeletal: Negative.    Integumentary:  Positive for rash (dry,  erythematous rash on hands bilaterally (eczema per patient)).   Neurological: Negative.  Negative for headaches.   Hematological: Negative.    Psychiatric/Behavioral: Negative.     All other systems reviewed and are negative.  Breast: negative.       Objective:     Vital Signs (Most Recent):  Temp: 98.1 °F (36.7 °C) (07/06/25 0651)  Pulse: 70 (07/06/25 0651)  Resp: 17 (07/06/25 0651)  BP: 104/70 (07/06/25 0651)  SpO2: 100 % (07/06/25 0651) Vital Signs (24h Range):  Temp:  [98.1 °F (36.7 °C)] 98.1 °F (36.7 °C)  Pulse:  [70] 70  Resp:  [17] 17  SpO2:  [100 %] 100 %  BP: (104)/(70) 104/70        There is no height or weight on file to calculate BMI.    FHT: 120 Cat 2 (reassuring)  TOCO:  Q 3-5 minutes     Physical Exam:   Constitutional: She is oriented to person, place, and time. She appears well-developed and well-nourished.    HENT:   Head: Normocephalic and atraumatic.    Eyes: Pupils are equal, round, and reactive to light. EOM are normal.     Cardiovascular:  Normal rate.             Pulmonary/Chest: Effort normal. No respiratory distress.        Abdominal: Soft. She exhibits distension (gravid).     Genitourinary:    Vagina and uterus normal.   No vaginal discharge in the vagina.           Musculoskeletal: Normal range of motion and moves all extremeties.       Neurological: She is alert and oriented to person, place, and time.    Skin: Skin is warm and dry.    Psychiatric: She has a normal mood and affect. Her behavior is normal. Judgment and thought content normal.        Cervix:  Dilation:  3  Effacement:  70%  Station: -3  Presentation: Vertex     Significant Labs:  Lab Results   Component Value Date    GROUPTRH O NEG 06/17/2025    HEPBSAG Non-reactive 02/07/2025    STREPBCULT No Group B Streptococcus isolated 03/06/2024       CBC:   Recent Labs   Lab 07/06/25  0752   WBC 11.56   RBC 4.17   HGB 11.6*   HCT 35.7*      MCV 86   MCH 27.8   MCHC 32.5     I have personallly reviewed all pertinent lab  results from the last 24 hours.

## 2025-07-06 NOTE — PROGRESS NOTES
LABOR NOTE    S:  To bedside after patient requested SVE. Patient laboring in lateral position with peanut ball. She reports increased intensity of contractions and intermittent rectal pressure.     O: /76   Pulse 61   Temp 98.4 °F (36.9 °C) (Oral)   Resp 18   LMP 2024 (Exact Date)   SpO2 100%   Breastfeeding No     GENERAL: Calm and appropriate affect  NEURO: Alert, oriented, normal speech  ABDOMEN: Nontender, Fundus palpates soft between UC's.  FHT: Baseline 135, moderate BTBV, positive accels, no decels. Cat 1, reassuring.  CTX: q 2-3 minutes  SVE: 5/80/-2, BBOW; AROM clear fluid    ASSESSMENT:   19 y.o.  IUP at 38w6d here for IOL 2/2 fetal decelerations, FHT reassuring/ Cat 1    Problem List[1]    PLAN:  Continue close Maternal/Fetal monitoring  Titrate pitocin per protocol - currently infusing at 12mu  Expect entry into active stage of labor   Nitrous/epidural per anesthesia at pt's request  OB team updated     Caro Pineda CNM         [1]   Patient Active Problem List  Diagnosis    Anxiety    History of back surgery    History of ectopic pregnancy    Multigravida in third trimester    History of depression    Complex ovarian cyst    Irregular menses    Abnormal facial hair    Occipital lymphadenopathy    Acute cystitis with hematuria    Rh negative status during pregnancy in third trimester    Limited prenatal care in third trimester    Urinary tract infection in mother during third trimester of pregnancy    Insufficient weight gain during pregnancy in third trimester    Uterine size-date discrepancy in third trimester    Fetal heart rate decelerations affecting management of mother    Scoliosis    36 weeks gestation of pregnancy    Encounter for induction of labor

## 2025-07-06 NOTE — HPI
"Yaquelin Cochran is a 19 y.o. female  at 38w6d with Estimated Date of Delivery: 25 based on 12wk US who presents to [facility] for decreased fetal movement and contractions.      Reports decreased fetal movement over the last 2 days. Has felt baby move but "not as much." Also reports irregular uterine contractions that started yesterday that are now 4-5 minutes apart and increasing in intensity and duration. Patient denies vaginal bleeding. Patient denies loss of fluid.      Last ate a turkey sandwich at 0600, last drank water at bedside.      This pregnancy has been complicated by   Patient Active Problem List:     Anxiety     History of back surgery     History of ectopic pregnancy     Multigravida in third trimester     History of depression     Complex ovarian cyst     Irregular menses     Abnormal facial hair     Occipital lymphadenopathy     Acute cystitis with hematuria     Rh negative status during pregnancy in third trimester     Limited prenatal care in third trimester     Urinary tract infection in mother during third trimester of pregnancy     Insufficient weight gain during pregnancy in third trimester     Uterine size-date discrepancy in third trimester     Fetal heart rate decelerations affecting management of mother     Scoliosis           Presentation: Vertex via beside US  Estimated Fetal Weight: 5#15     Birth Center Risk Assessment: 1-management on labor and Delivery     CNM management in ABC  CNM management on L&D  Consultation with OB to develop  plan of care  Collaborative CNM/OB management with delivery on L&D              4-   Permanent referral of care to MD            "

## 2025-07-06 NOTE — L&D DELIVERY NOTE
"[facility] - Labor & Delivery  Vaginal Delivery   Operative Note    SUMMARY     Normal spontaneous vaginal delivery of live infant, was placed on mothers abdomen for skin to skin and bulb suctioning performed.  Infant delivered position OA over perineum.  Nuchal cord: No.    Spontaneous delivery of placenta and IV pitocin given noting good uterine tone.  1st degree laceration noted. Local anesthesia administered and laceration was repaired in normal fashion with Vicryl.   Patient tolerated delivery well. Sponge needle and lap counted correctly x2.  Patient and  stable upon CNM leaving the room.     Indications:  (spontaneous vaginal delivery)  Pregnancy complicated by: Problem List[1]  Admitting GA: 38w6d    Delivery Information for [facility]    Birth information:  YOB: 2027   Time of birth: 2:42 PM   Sex: female   Head Delivery Date/Time: 2025  2:40 PM   Delivery type: Vaginal, Spontaneous   Gestational Age: 38w6d       Delivery Providers    Delivering clinician: Caro Pineda, JAYLA           Measurements    Weight: 2970 g  Weight (lbs): 6 lb 8.8 oz  Length: 49.5 cm  Length (in): 19.5"  Head circumference: 35 cm  Chest circumference: 32 cm         Apgars    Living status: Living  Apgar Component Scores:  1 min.:  5 min.:  10 min.:  15 min.:  20 min.:    Skin color:  0  1       Heart rate:  2  2       Reflex irritability:  2  2       Muscle tone:  2  2       Respiratory effort:  2  2       Total:  8  9       Apgars assigned by: QUANG ARREOLA RN         Operative Delivery    Forceps attempted?: No  Vacuum extractor attempted?: No         Shoulder Dystocia    Shoulder dystocia present?: No           Presentation    Presentation: Vertex  Position: Middle Occiput Anterior           Interventions/Resuscitation    Method: Bulb Suctioning, Tactile Stimulation       Cord    Vessels: 3 vessels  Complications: None  Delayed Cord Clamping?: No  Cord Blood Disposition: Discarded  Gases Sent?: " No  Stem Cell Collection (by MD): No       Placenta    Placenta delivery date/time:   Placenta removal:            Labor Events:       labor: No     Labor Onset Date/Time: 2025 09:00     Dilation Complete Date/Time: 2025 14:40     Start Pushing Date/Time: 2025 14:40       Start Pushing Date/Time: 2025 14:40     Rupture Date/Time: 25 1358        Rupture type: ARM (Artificial Rupture)        Fluid Amount:       Fluid Color: Clear              steroids: None     Antibiotics given for GBS: No     Induction: oxytocin     Indications for induction:        Augmentation:       Indications for augmentation:       Labor complications: None     Additional complications:          Cervical ripening:                     Delivery:      Episiotomy: None     Indication for Episiotomy:       Perineal Lacerations: 1st Repaired:      Periurethral Laceration:   Repaired:     Labial Laceration:   Repaired:     Sulcus Laceration:   Repaired:     Vaginal Laceration:   Repaired:     Cervical Laceration:   Repaired:     Repair suture:       Repair # of packets: 1     Last Value - EBL - Nursing (mL):       Sum - EBL - Nursing (mL): 0     Last Value - EBL - Anesthesia (mL):      Calculated QBL (mL):       Running total QBL (mL):       Vaginal Sweep Performed: No     Surgicount Correct: Yes     Vaginal Packing: No Quantity:       Other providers:       Anesthesia    Method: None          Details (if applicable):  Trial of Labor      Categorization:      Priority:     Indications for :     Incision Type:       Additional  information:  Forceps:    Vacuum:    Breech:    Observed anomalies    Other (Comments):              [1]   Patient Active Problem List  Diagnosis    Anxiety    History of back surgery    History of ectopic pregnancy    Multigravida in third trimester    History of depression    Complex ovarian cyst    Irregular menses    Abnormal facial hair     Occipital lymphadenopathy    Acute cystitis with hematuria    Rh negative status during pregnancy in third trimester    Limited prenatal care in third trimester    Urinary tract infection in mother during third trimester of pregnancy    Scoliosis    36 weeks gestation of pregnancy     (spontaneous vaginal delivery)    ABLA (acute blood loss anemia)

## 2025-07-06 NOTE — ED PROVIDER NOTES
"Encounter Date: 2025       History     Chief Complaint   Patient presents with    Contractions    Decreased Fetal Movement     Last felt movement 2 days ago     Yaquelin Cochran is a 19 y.o. female  at 38w6d with Estimated Date of Delivery: 25 based on 12wk US who presents to [facility] for decreased fetal movement and contractions.     Reports decreased fetal movement over the last 2 days. Has felt baby move but "not as much." Also reports irregular uterine contractions that started yesterday that are now 4-5 minutes apart and increasing in intensity and duration. Patient denies vaginal bleeding. Patient denies loss of fluid.     Last ate a turkey sandwich at 0600, last drank water at bedside.     This pregnancy has been complicated by   Patient Active Problem List:     Anxiety     History of back surgery     History of ectopic pregnancy     Multigravida in third trimester     History of depression     Complex ovarian cyst     Irregular menses     Abnormal facial hair     Occipital lymphadenopathy     Acute cystitis with hematuria     Rh negative status during pregnancy in third trimester     Limited prenatal care in third trimester     Urinary tract infection in mother during third trimester of pregnancy     Insufficient weight gain during pregnancy in third trimester     Uterine size-date discrepancy in third trimester     Fetal heart rate decelerations affecting management of mother     Scoliosis         Presentation: Vertex via beside US  Estimated Fetal Weight: 5#15    Birth Center Risk Assessment: 1-management on labor and Delivery    CNM management in ABC  CNM management on L&D  Consultation with OB to develop  plan of care  Collaborative CNM/OB management with delivery on L&D   4-   Permanent referral of care to MD          Review of patient's allergies indicates:  No Known Allergies  Past Medical History:   Diagnosis Date    Behavior problem in childhood     COVID-19 2021    Ectopic " pregnancy 5/24/2023    Suicidal ideation      Past Surgical History:   Procedure Laterality Date    BACK SURGERY  2016    Rods in place, had issues with previous epidural    DIAGNOSTIC LAPAROSCOPY N/A 05/29/2023    Procedure: LAPAROSCOPY, DIAGNOSTIC;  Surgeon: Juana Fung MD;  Location: [facility];  Service: OB/GYN;  Laterality: N/A;    DILATION AND CURETTAGE OF UTERUS       Family History   Problem Relation Name Age of Onset    Throat cancer Paternal Grandfather      No Known Problems Father      No Known Problems Mother      Breast cancer Neg Hx      Diabetes Neg Hx      Colon cancer Neg Hx      Ovarian cancer Neg Hx       Social History[1]  Review of Systems   Constitutional:  Positive for activity change (breathing through contractions). Negative for fever.   HENT: Negative.     Eyes: Negative.    Respiratory:  Negative for shortness of breath.    Cardiovascular:  Negative for chest pain.   Gastrointestinal:  Positive for constipation. Negative for nausea.   Genitourinary:  Positive for decreased urine volume. Negative for dysuria.   Skin:  Positive for rash (patient reports eczema on hands bilaterally).   Allergic/Immunologic: Negative.    Neurological: Negative.    Hematological: Negative.    Psychiatric/Behavioral: Negative.     All other systems reviewed and are negative.      Physical Exam     Initial Vitals [07/06/25 0651]   BP Pulse Resp Temp SpO2   104/70 70 17 98.1 °F (36.7 °C) 100 %      MAP       --         Physical Exam    Nursing note and vitals reviewed.  HENT:   Head: Normocephalic and atraumatic.   Eyes: Right eye exhibits no discharge. Left eye exhibits no discharge.   Cardiovascular:  Normal rate.           Pulmonary/Chest: No respiratory distress.   Abdominal: Abdomen is soft. She exhibits distension (gravid).   Genitourinary:    Vagina and uterus normal.      No vaginal discharge.     Musculoskeletal:         General: Normal range of motion.     Neurological: She is alert and oriented to  person, place, and time.   Skin: Skin is warm and dry. Rash (dry, flaky skin on hands bilaterally) noted. There is erythema (on hands bilaterally).   Psychiatric: She has a normal mood and affect. Her behavior is normal. Judgment and thought content normal.     OB LABOR EXAM:   Pre-Term Labor: No.   Membranes ruptured: No.   Method: Other (see comments).       Dilatation: 3.   Station: -3.   Effacement: 70%.   Amniotic Fluid Color: no fluid.     Comments: SVE per CNM     Vertex via bedside US    ED Course   Obtain Fetal nonstress test (NST)    Date/Time: 7/6/2025 7:30 AM    Performed by: Caro Pineda CNM  Authorized by: Caro Pineda CNM    Nonstress Test:     Variability:  6-25 BPM    Decelerations:  Late    Accelerations:  15 bpm    Contractions:  Regular    Contraction Frequency:  3-5  Biophysical Profile:     Nonstress Test Interpretation: nonreactive      Overall Impression:  Reassuring  Post-procedure:     Patient tolerance:  Patient tolerated the procedure well with no immediate complications    Labs Reviewed   CBC WITH DIFFERENTIAL - Abnormal       Result Value    WBC 11.56      RBC 4.17      HGB 11.6 (*)     HCT 35.7 (*)     MCV 86      MCH 27.8      MCHC 32.5      RDW 13.2      Platelet Count 224      MPV 10.3      Nucleated RBC 0      Neut % 74.4 (*)     Lymph % 18.4      Mono % 6.2      Eos % 0.2      Basophil % 0.4      Imm Grans % 0.4      Neut # 8.59 (*)     Lymph # 2.13      Mono # 0.72      Eos # 0.02      Baso # 0.05      Imm Grans # 0.05 (*)    CBC W/ AUTO DIFFERENTIAL    Narrative:     The following orders were created for panel order CBC auto differential.  Procedure                               Abnormality         Status                     ---------                               -----------         ------                     CBC with Differential[[id number]]       Abnormal            Final result                 Please view results for these tests on the individual orders.           Imaging Results    None          Medications   lactated ringers bolus 1,000 mL (has no administration in time range)   lactated ringers bolus 500 mL (has no administration in time range)   lactated ringers infusion ( Intravenous New Bag 25 6916)   0.9% NaCl infusion (has no administration in time range)   oxytocin 30 units/500 mL (60 milliunits/mL) in 0.9% NaCl IV bolus from bag (has no administration in time range)   oxytocin 30 units/500 mL (60 milliunits/mL) in 0.9% NaCl (non-titrating) (has no administration in time range)   ondansetron disintegrating tablet 8 mg (has no administration in time range)   calcium carbonate 200 mg calcium (500 mg) chewable tablet 500 mg (has no administration in time range)   simethicone chewable tablet 80 mg (has no administration in time range)   LIDOcaine HCL 10 mg/ml (1%) injection 10 mL (has no administration in time range)   oxytocin 30 units/500 mL (60 milliunits/mL) in 0.9% NaCl IV bolus from bag (has no administration in time range)   oxytocin 30 units/500 mL (60 milliunits/mL) in 0.9% NaCl (non-titrating) (has no administration in time range)   oxytocin injection 10 Units (has no administration in time range)   miSOPROStoL tablet 800 mcg (has no administration in time range)   miSOPROStoL tablet 800 mcg (has no administration in time range)   methylergonovine injection 200 mcg (has no administration in time range)   carboprost injection 250 mcg (has no administration in time range)   tranexamic acid in NaCl,iso-os IVPB 1,000 mg (has no administration in time range)   diphenoxylate-atropine 2.5-0.025 mg per tablet 2 tablet (has no administration in time range)   lactated ringers bolus 500 mL (has no administration in time range)   oxytocin 30 units/500 mL (60 milliunits/mL) in 0.9% NaCl (TITRATING) (4 lin-units/min Intravenous New Bag 25 6280)     Medical Decision Making  Yaquelin Cochran is a 19 y.o. female  at 38w6d with Estimated Date of  Delivery: 7/14/25 based on 12wk US who presents to [facility] for decreased fetal movement and contractions.   Late deceleration noted on NST  Discussed IOL d/t decreased fetal movement and late deceleration, patient agrees  Admit to L&D  OB hospitalist updated and in agreement with plan  Vertex presentation verified via beside US  Consents signed and witnessed (in media)        Amount and/or Complexity of Data Reviewed  Labs: ordered.    Risk  Decision regarding hospitalization.              Attending Attestation:   Physician Attestation Statement for Resident:  As the supervising MD   Physician Attestation Statement: I have personally seen and examined this patient.   I agree with the above history.  -:   As the supervising MD I agree with the above PE.     As the supervising MD I agree with the above treatment, course, plan, and disposition.   I was personally present during the critical portions of the procedure(s) performed by the resident and was immediately available in the ED to provide services and assistance as needed during the entire procedure.  I have reviewed and agree with the residents interpretation of the following: lab data.  I have reviewed the following: old records at this facility.                                       Clinical Impression:  Final diagnoses:  [O47.9] Uterine contractions  [O36.8390] Fetal heart rate decelerations affecting management of mother (Primary)  [O36.8131] Decreased fetal movements in third trimester, fetus 1 of multiple gestation  [Z3A.38] 38 weeks gestation of pregnancy          ED Disposition Condition    Send to Caro Leo CNM  07/06/25 7921         [1]   Social History  Tobacco Use    Smoking status: Never    Smokeless tobacco: Never   Substance Use Topics    Alcohol use: No    Drug use: No        Katheryn Urrutia MD  07/06/25 3792

## 2025-07-06 NOTE — H&P
"  [facility] (Obstetrics)  Obstetrics  History & Physical    Patient Name: Yaquelin Cochran  MRN: [id number]  Admission Date: 2025  Primary Care Provider: Katarina Champion MD    Subjective:     Principal Problem:Encounter for induction of labor    History of Present Illness:  Yaquelin Cochran is a 19 y.o. female  at 38w6d with Estimated Date of Delivery: 25 based on 12wk US who presents to [facility] for decreased fetal movement and contractions.      Reports decreased fetal movement over the last 2 days. Has felt baby move but "not as much." Also reports irregular uterine contractions that started yesterday that are now 4-5 minutes apart and increasing in intensity and duration. Patient denies vaginal bleeding. Patient denies loss of fluid.      Last ate a turkey sandwich at 0600, last drank water at bedside.      This pregnancy has been complicated by   Patient Active Problem List:     Anxiety     History of back surgery     History of ectopic pregnancy     Multigravida in third trimester     History of depression     Complex ovarian cyst     Irregular menses     Abnormal facial hair     Occipital lymphadenopathy     Acute cystitis with hematuria     Rh negative status during pregnancy in third trimester     Limited prenatal care in third trimester     Urinary tract infection in mother during third trimester of pregnancy     Insufficient weight gain during pregnancy in third trimester     Uterine size-date discrepancy in third trimester     Fetal heart rate decelerations affecting management of mother     Scoliosis           Presentation: Vertex via beside US  Estimated Fetal Weight: 5#15     Birth Center Risk Assessment: 1-management on labor and Delivery     CNM management in ABC  CNM management on L&D  Consultation with OB to develop  plan of care  Collaborative CNM/OB management with delivery on L&D              4-   Permanent referral of care to MD              Obstetric " HPI:  As above    OB History    Para Term  AB Living   5 2 2 0 2 2   SAB IAB Ectopic Multiple Live Births   1 0 1 0 2      # Outcome Date GA Lbr Fran/2nd Weight Sex Type Anes PTL Lv   5 Current            4 Term 24 37w5d / 00:01 2.55 kg (5 lb 10 oz) M Vag-Spont None N HERMANN      Name: KADE KINGSLEY      Apgar1: 8  Apgar5: 9   3 Ectopic 2023     ECTOPIC      2 Term 22 40w0d  2.637 kg (5 lb 13 oz) F Vag-Spont  N HERMANN   1 SAB      SAB         Complications: Blighted ovum, History of D&C     Past Medical History:   Diagnosis Date    Behavior problem in childhood     COVID-19 2021    Ectopic pregnancy 2023    Suicidal ideation      Past Surgical History:   Procedure Laterality Date    BACK SURGERY      Rods in place, had issues with previous epidural    DIAGNOSTIC LAPAROSCOPY N/A 2023    Procedure: LAPAROSCOPY, DIAGNOSTIC;  Surgeon: Juana Fung MD;  Location: [facility];  Service: OB/GYN;  Laterality: N/A;    DILATION AND CURETTAGE OF UTERUS         (Not in a hospital admission)      Review of patient's allergies indicates:  No Known Allergies     Family History       Problem Relation (Age of Onset)    No Known Problems Father, Mother    Throat cancer Paternal Grandfather          Tobacco Use    Smoking status: Never    Smokeless tobacco: Never   Substance and Sexual Activity    Alcohol use: No    Drug use: No    Sexual activity: Yes     Partners: Male     Birth control/protection: Condom     Review of Systems   Constitutional: Negative.    HENT: Negative.     Eyes: Negative.    Respiratory: Negative.  Negative for shortness of breath.    Cardiovascular:  Negative for chest pain.   Gastrointestinal:  Positive for abdominal pain (contractions). Negative for nausea and vomiting.   Endocrine: Negative.    Genitourinary: Negative.  Negative for vaginal bleeding and vaginal discharge.   Musculoskeletal: Negative.    Integumentary:  Positive for rash (dry, erythematous  rash on hands bilaterally (eczema per patient)).   Neurological: Negative.  Negative for headaches.   Hematological: Negative.    Psychiatric/Behavioral: Negative.     All other systems reviewed and are negative.  Breast: negative.       Objective:     Vital Signs (Most Recent):  Temp: 98.1 °F (36.7 °C) (07/06/25 0651)  Pulse: 70 (07/06/25 0651)  Resp: 17 (07/06/25 0651)  BP: 104/70 (07/06/25 0651)  SpO2: 100 % (07/06/25 0651) Vital Signs (24h Range):  Temp:  [98.1 °F (36.7 °C)] 98.1 °F (36.7 °C)  Pulse:  [70] 70  Resp:  [17] 17  SpO2:  [100 %] 100 %  BP: (104)/(70) 104/70        There is no height or weight on file to calculate BMI.    FHT: 120 Cat 2 (reassuring)  TOCO:  Q 3-5 minutes     Physical Exam:   Constitutional: She is oriented to person, place, and time. She appears well-developed and well-nourished.    HENT:   Head: Normocephalic and atraumatic.    Eyes: Pupils are equal, round, and reactive to light. EOM are normal.     Cardiovascular:  Normal rate.             Pulmonary/Chest: Effort normal. No respiratory distress.        Abdominal: Soft. She exhibits distension (gravid).     Genitourinary:    Vagina and uterus normal.   No vaginal discharge in the vagina.           Musculoskeletal: Normal range of motion and moves all extremeties.       Neurological: She is alert and oriented to person, place, and time.    Skin: Skin is warm and dry.    Psychiatric: She has a normal mood and affect. Her behavior is normal. Judgment and thought content normal.        Cervix:  Dilation:  3  Effacement:  70%  Station: -3  Presentation: Vertex     Significant Labs:  Lab Results   Component Value Date    GROUPTRH O NEG 06/17/2025    HEPBSAG Non-reactive 02/07/2025    STREPBCULT No Group B Streptococcus isolated 03/06/2024       CBC:   Recent Labs   Lab 07/06/25  0752   WBC 11.56   RBC 4.17   HGB 11.6*   HCT 35.7*      MCV 86   MCH 27.8   MCHC 32.5     I have personallly reviewed all pertinent lab results from the  last 24 hours.  Assessment/Plan:     19 y.o. female  at 38w6d for:    * Encounter for induction of labor  Admit to L&D  IV and admit/3T labs  Initiate cEFM  Begin pitocin per protocol  Recheck in 4 hours or PRN  OB team updated    Fetal heart rate decelerations affecting management of mother  Admit to L&D for IOL  cEFM    Rh negative status during pregnancy in third trimester  Rhogam PP if indicated    Insufficient weight gain during pregnancy in third trimester  36wk US AGA EFW 2685 g (28%)    Uterine size-date discrepancy in third trimester  36wk US AGA EFW 2685 g (28%)    History of depression  No current meds  Plan 2-week mood check  EPDS before discharge    Anxiety  No current meds  Plan 2-week mood check  EPDS before discharge        LINDA HATFIELD CNM  Obstetrics  [facility] - [facility] Dept (Obstetrics)

## 2025-07-06 NOTE — ASSESSMENT & PLAN NOTE
Admit to L&D  IV and admit/3T labs  Initiate cEFM  Begin pitocin per protocol  Recheck in 4 hours or PRN  OB team updated

## 2025-07-06 NOTE — ED PROVIDER NOTES
Encounter Date: 2025       History     Chief Complaint   Patient presents with    Contractions    Decreased Fetal Movement     Last felt movement 2 days ago     Yaquelin Cochran is a 19 y.o.  at 38w6d who presents to the OB ED today (2025) with CC of contractions and DFM.    She reports *** vaginal bleeding, *** leakage of fluid, and *** contractions.   She reports good*** fetal movement.  This pregnancy is complicated by ***.        Presented with, limited PNC.     SVE: ///    US: vtx?              Review of patient's allergies indicates:  No Known Allergies  Past Medical History:   Diagnosis Date    Behavior problem in childhood     COVID-19 2021    Ectopic pregnancy 2023    Suicidal ideation      Past Surgical History:   Procedure Laterality Date    BACK SURGERY      Rods in place, had issues with previous epidural    DIAGNOSTIC LAPAROSCOPY N/A 2023    Procedure: LAPAROSCOPY, DIAGNOSTIC;  Surgeon: Juana Fung MD;  Location: [facility] OR;  Service: OB/GYN;  Laterality: N/A;    DILATION AND CURETTAGE OF UTERUS       Family History   Problem Relation Name Age of Onset    Throat cancer Paternal Grandfather      No Known Problems Father      No Known Problems Mother      Breast cancer Neg Hx      Diabetes Neg Hx      Colon cancer Neg Hx      Ovarian cancer Neg Hx       Social History[1]  Review of Systems   Constitutional:  Negative for fever.   HENT:  Negative for sore throat.    Respiratory:  Negative for shortness of breath.    Cardiovascular:  Negative for chest pain.   Gastrointestinal:  Negative for nausea.   Genitourinary:  Negative for dysuria.   Musculoskeletal:  Negative for back pain.   Skin:  Negative for rash.   Neurological:  Negative for weakness.   Hematological:  Does not bruise/bleed easily.       Physical Exam     Initial Vitals [25 0651]   BP Pulse Resp Temp SpO2   104/70 70 17 98.1 °F (36.7 °C) 100 %      MAP       --         Physical Exam    Vitals  "reviewed.  Constitutional: She appears well-developed and well-nourished.   HENT:   Head: Normocephalic and atraumatic.   Eyes: EOM are normal.   Neck:   Normal range of motion.  Cardiovascular:  Normal rate.           Pulmonary/Chest: No respiratory distress.   Abdominal: Abdomen is soft. There is no abdominal tenderness.   Musculoskeletal:         General: Normal range of motion.      Cervical back: Normal range of motion.     Neurological: She is oriented to person, place, and time.   Skin: Skin is warm and dry.   Psychiatric: She has a normal mood and affect.         ED Course   Obtain Fetal nonstress test (NST)    Date/Time: 7/6/2025 7:03 AM    Performed by: Luz Oneill MD  Authorized by: Luz Oneill MD    Nonstress Test:     Variability:  6-25 BPM    Decelerations:  Late    Accelerations:  15 bpm    Baseline:  110    Contractions:  Regular  Post-procedure:     Patient tolerance:  Patient tolerated the procedure well with no immediate complications     Upon arrival, prolonged decel to 90's x 4 minutes    Labs Reviewed - No data to display       Imaging Results    None          Medications - No data to display  Medical Decision Making                                    Clinical Impression:   ***Please document a Clinical Impression and click the "Refresh" button to refresh your note and automatically pull in before signing.***                    [1]   Social History  Tobacco Use    Smoking status: Never    Smokeless tobacco: Never   Substance Use Topics    Alcohol use: No    Drug use: No     "

## 2025-07-07 PROBLEM — O36.8390 FETAL HEART RATE DECELERATIONS AFFECTING MANAGEMENT OF MOTHER: Status: RESOLVED | Noted: 2025-06-17 | Resolved: 2025-07-07

## 2025-07-07 PROBLEM — O26.13 INSUFFICIENT WEIGHT GAIN DURING PREGNANCY IN THIRD TRIMESTER: Status: RESOLVED | Noted: 2025-06-10 | Resolved: 2025-07-07

## 2025-07-07 PROBLEM — D62 ABLA (ACUTE BLOOD LOSS ANEMIA): Status: ACTIVE | Noted: 2025-07-07

## 2025-07-07 PROBLEM — Z87.898 HISTORY OF DOMESTIC VIOLENCE: Status: ACTIVE | Noted: 2025-07-07

## 2025-07-07 PROBLEM — O26.843 UTERINE SIZE-DATE DISCREPANCY IN THIRD TRIMESTER: Status: RESOLVED | Noted: 2025-06-10 | Resolved: 2025-07-07

## 2025-07-07 LAB
ABSOLUTE EOSINOPHIL (OHS): 0.07 K/UL
ABSOLUTE MONOCYTE (OHS): 0.83 K/UL (ref 0.3–1)
ABSOLUTE NEUTROPHIL COUNT (OHS): 6.57 K/UL (ref 1.8–7.7)
BASOPHILS # BLD AUTO: 0.04 K/UL
BASOPHILS NFR BLD AUTO: 0.4 %
ERYTHROCYTE [DISTWIDTH] IN BLOOD BY AUTOMATED COUNT: 13.3 % (ref 11.5–14.5)
FETAL RBC/1000 RBC BLD KLEIH BETKE-RTO: NORMAL
HCT VFR BLD AUTO: 29.4 % (ref 37–48.5)
HGB BLD-MCNC: 9.5 GM/DL (ref 12–16)
IMM GRANULOCYTES # BLD AUTO: 0.04 K/UL (ref 0–0.04)
IMM GRANULOCYTES NFR BLD AUTO: 0.4 % (ref 0–0.5)
LYMPHOCYTES # BLD AUTO: 2.39 K/UL (ref 1–4.8)
MCH RBC QN AUTO: 27.9 PG (ref 27–31)
MCHC RBC AUTO-ENTMCNC: 32.3 G/DL (ref 32–36)
MCV RBC AUTO: 86 FL (ref 82–98)
NUCLEATED RBC (/100WBC) (OHS): 0 /100 WBC
PLATELET # BLD AUTO: 195 K/UL (ref 150–450)
PMV BLD AUTO: 10.6 FL (ref 9.2–12.9)
RBC # BLD AUTO: 3.41 M/UL (ref 4–5.4)
RELATIVE EOSINOPHIL (OHS): 0.7 %
RELATIVE LYMPHOCYTE (OHS): 24 % (ref 18–48)
RELATIVE MONOCYTE (OHS): 8.4 % (ref 4–15)
RELATIVE NEUTROPHIL (OHS): 66.1 % (ref 38–73)
RH IMMUNE GLOBULIN: NORMAL
WBC # BLD AUTO: 9.94 K/UL (ref 3.9–12.7)

## 2025-07-07 PROCEDURE — 36415 COLL VENOUS BLD VENIPUNCTURE: CPT

## 2025-07-07 PROCEDURE — 25000003 PHARM REV CODE 250

## 2025-07-07 PROCEDURE — 3E0234Z INTRODUCTION OF SERUM, TOXOID AND VACCINE INTO MUSCLE, PERCUTANEOUS APPROACH: ICD-10-PCS | Performed by: OBSTETRICS & GYNECOLOGY

## 2025-07-07 PROCEDURE — 99231 SBSQ HOSP IP/OBS SF/LOW 25: CPT | Mod: UC,,, | Performed by: ADVANCED PRACTICE MIDWIFE

## 2025-07-07 PROCEDURE — 63600519 RHOGAM PHARM REV CODE 636 ALT 250 W HCPCS

## 2025-07-07 PROCEDURE — 85025 COMPLETE CBC W/AUTO DIFF WBC: CPT

## 2025-07-07 PROCEDURE — 11000001 HC ACUTE MED/SURG PRIVATE ROOM

## 2025-07-07 RX ORDER — LANOLIN ALCOHOL/MO/W.PET/CERES
1 CREAM (GRAM) TOPICAL DAILY
Status: DISCONTINUED | OUTPATIENT
Start: 2025-07-07 | End: 2025-07-10 | Stop reason: HOSPADM

## 2025-07-07 RX ADMIN — ACETAMINOPHEN 650 MG: 325 TABLET ORAL at 11:07

## 2025-07-07 RX ADMIN — OXYCODONE HYDROCHLORIDE 5 MG: 5 TABLET ORAL at 08:07

## 2025-07-07 RX ADMIN — ACETAMINOPHEN 650 MG: 325 TABLET ORAL at 02:07

## 2025-07-07 RX ADMIN — IBUPROFEN 600 MG: 600 TABLET ORAL at 02:07

## 2025-07-07 RX ADMIN — IBUPROFEN 600 MG: 600 TABLET ORAL at 08:07

## 2025-07-07 RX ADMIN — DOCUSATE SODIUM 200 MG: 100 CAPSULE, LIQUID FILLED ORAL at 08:07

## 2025-07-07 RX ADMIN — FERROUS SULFATE TAB 325 MG (65 MG ELEMENTAL FE) 1 EACH: 325 (65 FE) TAB at 08:07

## 2025-07-07 RX ADMIN — HUMAN RHO(D) IMMUNE GLOBULIN 300 MCG: 300 INJECTION, SOLUTION INTRAMUSCULAR at 11:07

## 2025-07-07 RX ADMIN — IBUPROFEN 600 MG: 600 TABLET ORAL at 11:07

## 2025-07-07 RX ADMIN — ACETAMINOPHEN 650 MG: 325 TABLET ORAL at 08:07

## 2025-07-07 RX ADMIN — PRENATAL VIT W/ FE FUMARATE-FA TAB 27-0.8 MG 1 TABLET: 27-0.8 TAB at 08:07

## 2025-07-07 NOTE — SUBJECTIVE & OBJECTIVE
Interval History:     She is doing well this morning. She is tolerating a regular diet without nausea or vomiting. She is voiding spontaneously. She is ambulating. She has passed flatus, and has not a BM. Vaginal bleeding is mild. She denies fever or chills. Abdominal pain is mild and controlled with oral medications. She Is breastfeeding.   Objective:     Vital Signs (Most Recent):  Temp: 98.7 °F (37.1 °C) (07/07/25 0850)  Pulse: 66 (07/07/25 0850)  Resp: 17 (07/07/25 0850)  BP: (!) 98/53 (07/07/25 0850)  SpO2: 98 % (07/07/25 0850) Vital Signs (24h Range):  Temp:  [97.8 °F (36.6 °C)-98.7 °F (37.1 °C)] 98.7 °F (37.1 °C)  Pulse:  [61-83] 66  Resp:  [16-18] 17  SpO2:  [97 %-98 %] 98 %  BP: ()/(50-76) 98/53        There is no height or weight on file to calculate BMI.    No intake or output data in the 24 hours ending 07/07/25 0929      Significant Labs:  Lab Results   Component Value Date    GROUPTRH O NEG 07/06/2025    HEPBSAG Non-reactive 02/07/2025    STREPBCULT No Group B Streptococcus isolated 03/06/2024     Recent Labs   Lab 07/07/25  0533   HGB 9.5*   HCT 29.4*       I have personallly reviewed all pertinent lab results from the last 24 hours.    Physical Exam:   Constitutional: She is oriented to person, place, and time. She appears well-developed and well-nourished.    HENT:   Head: Normocephalic.    Eyes: Pupils are equal, round, and reactive to light.     Cardiovascular:  Normal rate.             Pulmonary/Chest: Effort normal.        Abdominal: Soft.     Genitourinary:    Vagina and uterus normal.             Musculoskeletal: Normal range of motion.       Neurological: She is alert and oriented to person, place, and time.    Skin: Skin is warm and dry.    Psychiatric: She has a normal mood and affect. Her behavior is normal. Judgment and thought content normal.       Review of Systems   All other systems reviewed and are negative.

## 2025-07-07 NOTE — PROGRESS NOTES
[facility] - [facility] ([place])  Obstetrics  Postpartum Progress Note    Patient Name: Yaquelin Cochran  MRN: [id number]  Admission Date: 2025  Hospital Length of Stay: 1 days  Attending Physician: Charley Moreira MD  Primary Care Provider: Katarina Champion MD    Subjective:     Principal Problem: (spontaneous vaginal delivery)    Hospital Course:  25: Admit to L&D for IOL 2/2 late decelerations  25 PPD1 Doing well, Plan to dc in the AM. Rhogam indicated Baby o+.     Interval History:     She is doing well this morning. She is tolerating a regular diet without nausea or vomiting. She is voiding spontaneously. She is ambulating. She has passed flatus, and has not a BM. Vaginal bleeding is mild. She denies fever or chills. Abdominal pain is mild and controlled with oral medications. She Is breastfeeding.   Objective:     Vital Signs (Most Recent):  Temp: 98.7 °F (37.1 °C) (25 0850)  Pulse: 66 (25 0850)  Resp: 17 (25 0850)  BP: (!) 98/53 (25 0850)  SpO2: 98 % (25 0850) Vital Signs (24h Range):  Temp:  [97.8 °F (36.6 °C)-98.7 °F (37.1 °C)] 98.7 °F (37.1 °C)  Pulse:  [61-83] 66  Resp:  [16-18] 17  SpO2:  [97 %-98 %] 98 %  BP: ()/(50-76) 98/53        There is no height or weight on file to calculate BMI.    No intake or output data in the 24 hours ending 25 0929      Significant Labs:  Lab Results   Component Value Date    GROUPTRH O NEG 2025    HEPBSAG Non-reactive 2025    STREPBCULT No Group B Streptococcus isolated 2024     Recent Labs   Lab 25  0533   HGB 9.5*   HCT 29.4*       I have personallly reviewed all pertinent lab results from the last 24 hours.    Physical Exam:   Constitutional: She is oriented to person, place, and time. She appears well-developed and well-nourished.    HENT:   Head: Normocephalic.    Eyes: Pupils are equal, round, and reactive to light.     Cardiovascular:  Normal rate.             Pulmonary/Chest:  Effort normal.        Abdominal: Soft.     Genitourinary:    Vagina and uterus normal.             Musculoskeletal: Normal range of motion.       Neurological: She is alert and oriented to person, place, and time.    Skin: Skin is warm and dry.    Psychiatric: She has a normal mood and affect. Her behavior is normal. Judgment and thought content normal.       Review of Systems   All other systems reviewed and are negative.    Assessment/Plan:     19 y.o. female  for:    *  (spontaneous vaginal delivery)  Admit to L&D  IV and admit/3T labs  Initiate cEFM  Begin pitocin per protocol  Recheck in 4 hours or PRN  OB team updated    Rh negative status during pregnancy in third trimester  Rhogam PP if indicated    History of depression  No current meds  Plan 2-week mood check  EPDS before discharge    Anxiety  No current meds  Plan 2-week mood check  EPDS before discharge        Disposition: As patient meets milestones, will plan to discharge tomorrow.    Selene Nelson CNM  Obstetrics  [facility] - Mother [facility] Baby ([place])
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
## 2025-07-07 NOTE — LACTATION NOTE
"   07/07/25 1300   Maternal Assessment   Breast Shape Bilateral:;round   Breast Density Bilateral:;soft   Areola Bilateral:;elastic   Nipples Bilateral:;graspable   Left Nipple Symptoms tender   Right Nipple Symptoms tender   Maternal Infant Feeding   Maternal Emotional State other (see comments)  (flat)   Infant Positioning cross-cradle   Signs of Milk Transfer audible swallow;infant jaw motion present   Pain with Feeding no   Latch Assistance yes   Community Referrals   Community Referrals WIC (women, infants and children) program;other (see comments)  (DME)     Pt on couch self and baby awkwardly positioned. LC introduced self. Pt agreeable to receiving assistance stating, " I know it is not suppose to hurt and this hurts." Pt requested to move to bed. LC assisted with placing Pt in comfortable position. LC demonstrated technique to obtain deep latch. Pt denies any pain. LC encourage Pt to use breast compression and stimulation to keep baby active. Lactation Basics education completed. LC reviewed Breastfeeding section and encouraged tracking feeds and output. Encouraged use of STS, frequent feeds based on baby's cues, and avoiding artificial nipples. Pt requested assistance with latching baby to right side. Pt denied any pain. LC provide Pt all paperwork and information to obtain double electric breast pump through insurance. Pt verbalized understanding and questions answered. Pt aware to call LC for assistance with feeding.   "

## 2025-07-08 PROBLEM — F42.2 MIXED OBSESSIONAL THOUGHTS AND ACTS: Status: ACTIVE | Noted: 2025-07-08

## 2025-07-08 PROBLEM — F32.A DEPRESSION: Status: ACTIVE | Noted: 2025-07-08

## 2025-07-08 PROCEDURE — 11000001 HC ACUTE MED/SURG PRIVATE ROOM

## 2025-07-08 PROCEDURE — 25000003 PHARM REV CODE 250

## 2025-07-08 PROCEDURE — 25000003 PHARM REV CODE 250: Performed by: ADVANCED PRACTICE MIDWIFE

## 2025-07-08 PROCEDURE — 99233 SBSQ HOSP IP/OBS HIGH 50: CPT | Mod: AF,HB,95, | Performed by: STUDENT IN AN ORGANIZED HEALTH CARE EDUCATION/TRAINING PROGRAM

## 2025-07-08 PROCEDURE — 99231 SBSQ HOSP IP/OBS SF/LOW 25: CPT | Mod: UC,,, | Performed by: ADVANCED PRACTICE MIDWIFE

## 2025-07-08 RX ORDER — DOCUSATE SODIUM 100 MG/1
200 CAPSULE, LIQUID FILLED ORAL 2 TIMES DAILY
Status: DISCONTINUED | OUTPATIENT
Start: 2025-07-08 | End: 2025-07-10 | Stop reason: HOSPADM

## 2025-07-08 RX ORDER — SERTRALINE HYDROCHLORIDE 50 MG/1
50 TABLET, FILM COATED ORAL DAILY
Status: DISCONTINUED | OUTPATIENT
Start: 2025-07-08 | End: 2025-07-10 | Stop reason: HOSPADM

## 2025-07-08 RX ADMIN — PRENATAL VIT W/ FE FUMARATE-FA TAB 27-0.8 MG 1 TABLET: 27-0.8 TAB at 09:07

## 2025-07-08 RX ADMIN — FERROUS SULFATE TAB 325 MG (65 MG ELEMENTAL FE) 1 EACH: 325 (65 FE) TAB at 09:07

## 2025-07-08 RX ADMIN — ACETAMINOPHEN 650 MG: 325 TABLET ORAL at 04:07

## 2025-07-08 RX ADMIN — SERTRALINE HYDROCHLORIDE 50 MG: 50 TABLET ORAL at 07:07

## 2025-07-08 RX ADMIN — IBUPROFEN 600 MG: 600 TABLET ORAL at 05:07

## 2025-07-08 RX ADMIN — DOCUSATE SODIUM 200 MG: 100 CAPSULE, LIQUID FILLED ORAL at 09:07

## 2025-07-08 RX ADMIN — ACETAMINOPHEN 650 MG: 325 TABLET ORAL at 05:07

## 2025-07-08 RX ADMIN — IBUPROFEN 600 MG: 600 TABLET ORAL at 04:07

## 2025-07-08 NOTE — DISCHARGE INSTRUCTIONS
[facility] Phone Number: [phone]  [facility] [phone]  [facility]: [phone]    Community Resources for Breastfeeding Mothers:   Hospital Breastfeeding Centers/ Lactation Consultants:   [facility]........................................................................................[phone]  [facility]....................................................[phone]   [facility]..........................................................................................[phone]   [facility].................................................................................[phone]   [facility].......................[phone]   [facility].................................[phone]   [facility].......................................................................[phone]   [facility]..................................................[phone]   [facility].....................................................................[phone]      [facility] (Poison Control)...........[phone]  PoisonHelp.org   Free medical advice 24/7 through the Poison Help Line and the online tool      Online Resources:   International Breastfeeding [employer] ...............................................................................ibconline.ca   Dr Chino Mejia online resource provides videos, articles, and information sheets.     Coeffective...............................................................................................................coeffective.com   Download the free mobile deisy to help get off to a great start with breastfeeding.    Droplet.....................................................................................................................Rentobo.3C Plus Media...........................................................................................[employer].Apsara Therapeutics   Videos that teach and empower mothers and caregivers   Infant Risk Center.............................................................................[phone]      Entitle   Provides up to date information for medication use by moms during pregnancy and while breastfeeding.   Izzy Lyons....................................................................................................................Coradiantmom.com   Provides online information on breastfeeding and parenting      La Leche Leharish........................................................................................ lllalmsla.org   /   llli.org   Mother-to-mother support groups with education, information support, and encouragement    Work and Pump........................................................................................... DTI - Diesel Technical Innovations.com   Information about breastfeeding for working moms     [place] Resources:   [place] Breastfeeding [facility]............................... [phone]    [facility]   Find local breastfeeding support   [place] Breastfeeding Support............................................................ LaBreastfeedingSport.org   Zip code search of breastfeeding resources in your area   Partners for Healthy Babies............................................................[phone]   3230463oedy.org   Connects [place] moms and their families to health and pregnancy resources.  24/7   WIC (Women, Infant, Children)......................................................... [phone]   [facility].la.gov/WIC   Download the Pacify deisy, get code from [facility] office    [facility] Resources:     Baby  Cafe............................................................................................................. babycafeusa.org   Free, drop in, informal breastfeeding support groups offering professional lactation care and intervention.    [facility] [place] [facility]....................................... birthmarkdoulas.com   Infant feeding drop in clinics, Lactation services, support groups, education programs   Cafe au Lait...............................................[phone]   Asterisk.com/groups/[facility]   Free breastfeeding support group for families of color   Mothers Milk Bank of [place] at [facility]....................................................[phone]                                                             [facility]/services/mothers-milk-bank-at-[facility] LagConceptua MathReynolds [facility] Lactation, LLC.................... [email or web address]..................[phone]    WILLIE Nesting..................................................................................[phone] PaperSharelanReaction.com   In person and virtual support for families through pregnancy, birth, and early parenthood.       [facility] [place]- Dr. Martina Jones.......................[phone] [address] [place] [address]                                  [email or web address]   [email or web address] Software Corporation Lactation Care, [facility] (Charlene Riggs, RN, IBCLC) ............................[phone]    [email or web address]    Healthy Start [place].....................................[phone] ([place])  [phone] ([place])   willie.gov/health-department/healthy-start   Serves women of childbearing age and addresses issues for pregnant women and their children from birth  to age two.          La Leche League- [place].............................  Doodle.Matrimony.com/ Riskclick.com/ sunil   In person and virtual mother to mother support groups with education, information support and   encouragement to women who want to breastfeed      [place] Resources:   Breastfeeding Resources- [facility].....msd.ms.gov (under womens services)   Find resources and info about planning for breastfeeding, its benefits, and help with breastfeeding  s uccessfully.    Center For Pregnancy Choices- [place]....................................... Simple Admit   [phone]   [address] [place]. Call or text 24/7   [facility] Breastfeeding Center.......................................................AwdiobreastfeedingAcusphereer.Matrimony.com   [facility] Lactation Consultants sere [facility], including [facility],   [facility], and surrounding areas.    [place] Breastfeeding Coalition...............................................................................msc.org   Promotes and supports breastfeeding with families, health providers, and communities.   [facility] breastfeeding Coalition.....................................................................smbfc.org   Find breastfeeding resources and support groups in your area.    WIC Nutrition Program- [facility] of Health.................................... ms.gov

## 2025-07-08 NOTE — LACTATION NOTE
07/08/25 1250   Maternal Assessment   Breast Shape Bilateral:;round   Breast Density Bilateral:;soft   Areola Bilateral:;elastic   Nipples Bilateral:;everted   Left Nipple Symptoms abraded;tender   Right Nipple Symptoms abraded;tender   Maternal Infant Feeding   Maternal Emotional State relaxed   Infant Positioning cross-cradle   Signs of Milk Transfer audible swallow;infant jaw motion present   Pain with Feeding no   Latch Assistance yes  (minimal on 1 side)   Equipment Type   Breast Pump Type manual   Breast Pump Flange Type hard   Breast Pump Flange Size 21 mm     Pt latching infant bilaterally. Encouraged pt to use cc positioning to ensure deep latch. Used rolled receiving blanket to provide hand/wrist support. Pt's nipples abraded and tender. Brought hydrogels and lanolin, reviewed use and care. Brought manual pump with 21 mm flange for home use. Reviewed basic breastfeeding education. All questions answered and # on whiteboard for further assistance.

## 2025-07-08 NOTE — ASSESSMENT & PLAN NOTE
No current meds pt declined 25   Plan 2-week mood check  EPDS : 21   Telepsych/SW consults placed prior to possible dc   Will refer to OT PT  Behavioral Health on DC

## 2025-07-08 NOTE — SUBJECTIVE & OBJECTIVE
Interval History:     She reports no change to her moods today this morning. FOB present in the room for entire visit.  She HAS A FLAT AFFECT TODAY. Awaiting Tele Psych/SW referrals.  She is tolerating a regular diet without nausea or vomiting. She is voiding spontaneously. She is ambulating. She has passed flatus, and has not a BM. Vaginal bleeding is mild. She denies fever or chills. Abdominal pain is mild and controlled with oral medications. She Is breastfeeding.   Objective:     Vital Signs (Most Recent):  Temp: 98.4 °F (36.9 °C) (07/08/25 0812)  Pulse: 60 (07/08/25 0812)  Resp: 16 (07/07/25 2324)  BP: 100/62 (07/08/25 0812)  SpO2: 98 % (07/08/25 0812) Vital Signs (24h Range):  Temp:  [97.6 °F (36.4 °C)-98.4 °F (36.9 °C)] 98.4 °F (36.9 °C)  Pulse:  [60-75] 60  Resp:  [16-18] 16  SpO2:  [97 %-99 %] 98 %  BP: (100-103)/(62-66) 100/62        There is no height or weight on file to calculate BMI.    No intake or output data in the 24 hours ending 07/08/25 1049      Significant Labs:  Lab Results   Component Value Date    GROUPTRH O NEG 07/06/2025    HEPBSAG Non-reactive 02/07/2025    STREPBCULT No Group B Streptococcus isolated 03/06/2024     Recent Labs   Lab 07/07/25  0533   HGB 9.5*   HCT 29.4*       I have personallly reviewed all pertinent lab results from the last 24 hours.    Physical Exam:   Constitutional: She is oriented to person, place, and time. She appears well-developed and well-nourished.    HENT:   Head: Normocephalic.    Eyes: Pupils are equal, round, and reactive to light.     Cardiovascular:  Normal rate.             Pulmonary/Chest: Effort normal.        Abdominal: Soft.     Genitourinary:    Vagina and uterus normal.             Musculoskeletal: Normal range of motion.       Neurological: She is alert and oriented to person, place, and time.    Skin: Skin is warm and dry.    Psychiatric: Her behavior is normal. Judgment and thought content normal.   Flat affect        Review of Systems    Psychiatric/Behavioral:          Reports OCD/Manic tendencies during this pregnancy tele psych referral placed on 7/7/25   All other systems reviewed and are negative.

## 2025-07-08 NOTE — PROGRESS NOTES
SW met with pt at bedside. Pt appeared to be alert and well oriented. SW informed pt of the role as the SW and the services/resources that can be provided.SW informed pt of transportation benefits available through her insurance, local food stanley and local agencies that support with bills (if criteria is met). SW provided pt with a mother/baby resource Packet which includes: Family Connects, Diaper Resources, Formula Resources, Food Stanley, [facility] List, [place] CAFÉ Benefits, WIC eligibility form, [place] [facility], Healthy Start, [facility], Postpartum Depression, Healthy Moms Strong Babies, [place] Parent Line, Services for Expecting & New Parents: [facility] Area, Positive Parenting Tips for Healthy Child Development, Therapy Resource List, How to Apply for SNAP, Head Start, and [facility] Resource List. SW placed referral to Family Connects, Postpartum Support International and Healthy Start.SW to bedside to evaluate patient's mood given EPDS score of 21. CASEY met with pt at bedside. Pt alert and oriented. SW informed pt of the role as the SW and the services/resources that can be provided. Patient questioned thoroughly about current mood. She reports being stable, however, expresses signs of depression. Denies SI/HI. Patient understands she has support (partner) . Risk precautions given. Advised patient to reach out with any concerns. She agrees with plan and verbalizes understanding. SW placed referral to PSI. SW provided PPD Resource packet which includes: Postpartum Symptoms Tracking Chart, Depression Self Care Action Plan, Depression is Treatable Handout, Information for Fathers and Partners, What is Postpartum Depression, My Postpartum Care Checklist, and More Than just the Blues.

## 2025-07-08 NOTE — CONSULTS
"[facility]   DEPARTMENT OF PSYCHIATRY    SERVICE: Telepsychiatry  ENCOUNTER: initial    CHIEF COMPLAINT: depression    TELEPSYCHIATRY (AUDIOVISUAL): Each patient who is provided psychiatric services via telehealth is: (1) informed of the relationship between the psychiatric provider and patient, as well as the respective role of any other health care staff/providers with respect to management of the patient; and (2) notified that he or she may decline to receive psychiatric services by telehealth and may withdraw from such care at any time.  Risks of telehealth include the potential for security breaches (HIPPA compliant platforms notwithstanding) and technological failure, as well as the limitations to physical examination inherent to the modality. The patient was agreeable to the use of telehealth services.    START TIME: 7/8/2025 3:10 PM  STOP TIME: 7/8/2025 5:44 PM  TOTAL ENCOUNTER TIME: see above start/stop times    -- PATIENT IDENTIFIERS: Yaquelin Cochran  [id number]  2005  19 y.o.  female  -- REQUESTING PROVIDER: Charley Moreira MD *  -- LOCATION OF PATIENT: L&D    -- MODE OF ARRIVAL: self-presented  -- PRESENT WITH PATIENT DURING EVALUATION: baby.  -- SOURCES OF INFORMATION: PATIENT, staff, provider(s), EHR/chart  -- LOCATION OF ENCOUNTER PROVIDER: [place], [place]    -- ENCOUNTER PROVIDER: Kyree Quarles MD      Subjective:     History of Present Illness:  19-year-old woman with a history of depression and anxiety admitted to labor and delivery.  Consulted for depression.  It is noted in some of the notes that she has a flat affect.     Over past few years feels that she is developing OCD. It is related to "contamination". Every few days it changes but has gotten worse with this pregnancy. Worsens with stress related to any reason. Lots of hand washing, will walk in a room that she feels is dirty and won't touch anything until she can find the mental fortitude to touch something. " "  Anxiety related to fear of losing daughter even though nothing is wrong with the daughter.  Mood over past couple of weeks has been "erratic", arguing with family. Has been having difficulty with sleep, fatigue, difficulty with concentration, decreased appetite prior to hospitalization.   No known history of bipolar disorder. No known family history of it.  Reports recurrent persistent thoughts and urges that are intrusive and unwanted that cause her anxiety.  She attempts to ignore in suppress these neutralize them with other thoughts or actions.  Endorses repetitive behaviors such as handwashing and checking in order to stopped the obsessions.  The compulsions reduce anxiety and distress.  The obsessions and compulsions sometimes cause her to take excessive time out of the day (2-3 hours in the shower). Ongoing for years.  No true suicidal ideation, homicidal ideation, AH, VH, IOR, paranoia, other delusions, TI, TW, thought broadcasting.  Agreeable to trial of medication.       Grandparents were hoarders when she was growing up and it was VERY unclean.     Psychiatric History:   Previous Psychiatric Hospitalizations: Yes, ages 10 and 12 for depression. Self admission a couple of years ago  Previous Medication Trials: Yes, zoloft, lexapro for about a month with no bad side effects and felt it.   Previous Suicide Attempts: no  History of Violence: no  History of Depression: yes  History of Annalee: questionable  History of Auditory/Visual Hallucination: no  History of Delusions: no  Outpatient psychiatrist (current & past): No    Substance Abuse History:  Tobacco:No  Alcohol: No  Illicit Substances:No  Detox/Rehab: No    Legal History: Past charges/incarcerations: No     Family Psychiatric History: none known      Social History:  Developmental/Childhood:Achieved all developmental milestones timely  *Education:GED  Employment Status/Finances:whole foods   Relationship Status/Sexual Orientation: not " "  Children: 3  Housing Status: Home    history:  NO  Access to gun: NO  Congregational:not asked  Recreational activities:Time with family    [facility] Toolkit ASQ Suicide Screening Tool:  In the past few weeks, have you wished you were dead? No  In the past few weeks, have you felt that you or your family would be better off if you were dead? No  In the past week, have you been having thoughts about killing yourself? No  Have you ever tried to kill yourself? No  Are you having thoughts of killing yourself right now? No    Psychiatric Mental Status Exam:  Arousal: alert  Sensorium/Orientation: oriented to person, place, situation, time/date  Behavior/Cooperation: normal, cooperative   Speech: normal tone, normal rate, normal pitch, normal volume  Language: grossly intact  Mood: " Ok "   Affect: reactive, appropriate  Thought Process: normal and logical  Thought Content:   Auditory hallucinations: NO  Visual hallucinations: NO  Paranoia: NO  Delusions:  NO  Suicidal ideation: NO  Homicidal ideation: NO  Attention/Concentration:  Intact to conversation  Memory:    Recent:  Intact   Remote: Intact  Fund of Knowledge: Vocabulary appropriate    Abstract reasoning: intact to conversation  Insight: has awareness of illness  Judgment: behavior is adequate to circumstances      Past Medical History:   Past Medical History:   Diagnosis Date    Behavior problem in childhood     COVID-19 08/08/2021    Ectopic pregnancy 5/24/2023    Suicidal ideation       Laboratory Data:   Labs Reviewed   CBC WITH DIFFERENTIAL - Abnormal       Result Value    WBC 11.56      RBC 4.17      HGB 11.6 (*)     HCT 35.7 (*)     MCV 86      MCH 27.8      MCHC 32.5      RDW 13.2      Platelet Count 224      MPV 10.3      Nucleated RBC 0      Neut % 74.4 (*)     Lymph % 18.4      Mono % 6.2      Eos % 0.2      Basophil % 0.4      Imm Grans % 0.4      Neut # 8.59 (*)     Lymph # 2.13      Mono # 0.72      Eos # 0.02      Baso # 0.05      Imm Grans # " 0.05 (*)    CBC W/ AUTO DIFFERENTIAL    Narrative:     The following orders were created for panel order CBC auto differential.  Procedure                               Abnormality         Status                     ---------                               -----------         ------                     CBC with Differential[[id number]]       Abnormal            Final result                 Please view results for these tests on the individual orders.       Neurological History:  Seizures: No  Head trauma: No    Allergies:   Review of patient's allergies indicates:  No Known Allergies    Medications in ER:   Medications   sodium chloride 0.9% flush 10 mL (has no administration in time range)   oxytocin 30 units/500 mL (60 milliunits/mL) in 0.9% NaCl (non-titrating) (has no administration in time range)   acetaminophen tablet 650 mg (650 mg Oral Given 7/8/25 5736)   lanolin cream (has no administration in time range)   benzocaine-lanolin (DERMOPLAST) topical spray (has no administration in time range)   hydrocortisone 2.5 % rectal cream (has no administration in time range)   ondansetron disintegrating tablet 8 mg (has no administration in time range)   simethicone chewable tablet 80 mg (has no administration in time range)   diphenhydrAMINE capsule 25 mg (has no administration in time range)   diphenhydrAMINE injection 25 mg (has no administration in time range)   prenatal vitamin oral tablet (1 tablet Oral Given 7/8/25 0943)   measles, mumps and rubella vaccine 1,000-12,500 TCID50/0.5 mL injection 0.5 mL (has no administration in time range)   Tdap vaccine injection 0.5 mL (has no administration in time range)   oxytocin 30 units/500 mL (60 milliunits/mL) in 0.9% NaCl IV bolus from bag (has no administration in time range)   oxytocin 30 units/500 mL (60 milliunits/mL) in 0.9% NaCl (non-titrating) (95 lin-units/min Intravenous Rate/Dose Change 7/6/25 4541)   oxytocin injection 10 Units (has no administration in time  range)   miSOPROStoL tablet 800 mcg (has no administration in time range)   miSOPROStoL tablet 800 mcg (has no administration in time range)   methylergonovine injection 200 mcg (has no administration in time range)   carboprost injection 250 mcg (has no administration in time range)   tranexamic acid in NaCl,iso-os IVPB 1,000 mg (has no administration in time range)   diphenoxylate-atropine 2.5-0.025 mg per tablet 2 tablet (has no administration in time range)   ibuprofen tablet 600 mg (600 mg Oral Given 7/8/25 0540)   oxyCODONE immediate release tablet 5 mg (5 mg Oral Given 7/7/25 2049)   oxyCODONE immediate release tablet Tab 10 mg (has no administration in time range)   ferrous sulfate tablet 1 each (1 each Oral Given 7/8/25 0915)   docusate sodium capsule 200 mg (200 mg Oral Given 7/8/25 0915)   LIDOcaine HCL 10 mg/ml (1%) injection 10 mL (10 mLs Intradermal Given by Provider 7/6/25 1524)   rho(D) immune globulin (RhoGAM/HyperRHO) IM injection (300 mcg Intramuscular Given 7/7/25 1132)       Medications at home: none      Assessment - Diagnosis - Goals:     Diagnosis/Impression:   Obsessive-compulsive disorder with good insight  Mild episode of major depression  Anxiety, unspecified      Rec:   - no indication for PEC, not an imminent danger to self, others, nor gravely disabled  - Start sertraline 50mg QD. Extensively reviewed risks including FDA black box warning, minimal presence in breast milk, sexual dysfunction, antidepressant withdrawal syndrome, etc.   - Provided with options for outpatient follow-up; ambulatory referral to [facility] placed. Phone numbers for [facility] and other clinics put into discharge instructions.    Plan of Care communicated to: CNM caring for patient.    Kyree Quarles MD   Psychiatry  [facility]    Inpatient consult to Telemedicine - Psych  Consult performed by: Kyree Quarles MD  Consult ordered by: Mirna Elizondo MD  Reason  for consult: depression        [facility] LOCATION ([facility]) [facility] MOTHER/BABY UNIT

## 2025-07-08 NOTE — PROGRESS NOTES
[facility] ([place])  Obstetrics  Postpartum Progress Note    Patient Name: Yaquelin Cochran  MRN: [id number]  Admission Date: 2025  Hospital Length of Stay: 2 days  Attending Physician: Charley Moreira MD  Primary Care Provider: Katarina Champion MD    Subjective:     Principal Problem: (spontaneous vaginal delivery)    Hospital Course:  25: Admit to L&D for IOL 2/2 late decelerations  25 PPD1 Doing well, Plan to dc in the AM. Rhogam indicated Baby o+.   25 PPD2 PTEPDS: 21 last pm. Awaiting Tele Psych/SW referral prior to dc. PT has Flat affect today, denies any chages to her moods since yesterday's assessment. Will cont IN PT status until plan/referrals have been made.     Interval History:     She reports no change to her moods today this morning. FOB present in the room for entire visit.  She HAS A FLAT AFFECT TODAY. Awaiting Tele Psych/SW referrals.  She is tolerating a regular diet without nausea or vomiting. She is voiding spontaneously. She is ambulating. She has passed flatus, and has not a BM. Vaginal bleeding is mild. She denies fever or chills. Abdominal pain is mild and controlled with oral medications. She Is breastfeeding.   Objective:     Vital Signs (Most Recent):  Temp: 98.4 °F (36.9 °C) (25 0812)  Pulse: 60 (25 0812)  Resp: 16 (25 2324)  BP: 100/62 (25 0812)  SpO2: 98 % (25 0812) Vital Signs (24h Range):  Temp:  [97.6 °F (36.4 °C)-98.4 °F (36.9 °C)] 98.4 °F (36.9 °C)  Pulse:  [60-75] 60  Resp:  [16-18] 16  SpO2:  [97 %-99 %] 98 %  BP: (100-103)/(62-66) 100/62        There is no height or weight on file to calculate BMI.    No intake or output data in the 24 hours ending 25 1049      Significant Labs:  Lab Results   Component Value Date    GROUPTRH O NEG 2025    HEPBSAG Non-reactive 2025    STREPBCULT No Group B Streptococcus isolated 2024     Recent Labs   Lab 25  0533   HGB 9.5*   HCT 29.4*       I have  personallly reviewed all pertinent lab results from the last 24 hours.    Physical Exam:   Constitutional: She is oriented to person, place, and time. She appears well-developed and well-nourished.    HENT:   Head: Normocephalic.    Eyes: Pupils are equal, round, and reactive to light.     Cardiovascular:  Normal rate.             Pulmonary/Chest: Effort normal.        Abdominal: Soft.     Genitourinary:    Vagina and uterus normal.             Musculoskeletal: Normal range of motion.       Neurological: She is alert and oriented to person, place, and time.    Skin: Skin is warm and dry.    Psychiatric: Her behavior is normal. Judgment and thought content normal.   Flat affect        Review of Systems   Psychiatric/Behavioral:          Reports OCD/Manic tendencies during this pregnancy tele psych referral placed on 25   All other systems reviewed and are negative.    Assessment/Plan:     19 y.o. female  for:    *  (spontaneous vaginal delivery)  Admit to L&D  IV and admit/3T labs  Initiate cEFM  Begin pitocin per protocol  Recheck in 4 hours or PRN  OB team updated    Rh negative status during pregnancy in third trimester  Rhogam PP if indicated    History of depression  No current meds pt declined 25   Plan 2-week mood check  EPDS : 21   Telepsych/SW consults placed prior to possible dc   Will refer to OT PT  Behavioral Health on DC     Anxiety  No current meds  Plan 2-week mood check  EPDS before discharge        Disposition: As patient meets milestones, will plan to discharge after mood is more stable, Tele psych and SW consults are completed with referral for OPT   Behav Health .    Selene Nelson CNM  Obstetrics  [facility] ([place])

## 2025-07-09 PROCEDURE — 25000003 PHARM REV CODE 250: Performed by: ADVANCED PRACTICE MIDWIFE

## 2025-07-09 PROCEDURE — 99231 SBSQ HOSP IP/OBS SF/LOW 25: CPT | Mod: UC,,, | Performed by: ADVANCED PRACTICE MIDWIFE

## 2025-07-09 PROCEDURE — 25000003 PHARM REV CODE 250

## 2025-07-09 PROCEDURE — 11000001 HC ACUTE MED/SURG PRIVATE ROOM

## 2025-07-09 RX ADMIN — ACETAMINOPHEN 650 MG: 325 TABLET ORAL at 05:07

## 2025-07-09 RX ADMIN — SERTRALINE HYDROCHLORIDE 50 MG: 50 TABLET ORAL at 09:07

## 2025-07-09 RX ADMIN — IBUPROFEN 600 MG: 600 TABLET ORAL at 11:07

## 2025-07-09 RX ADMIN — DOCUSATE SODIUM 200 MG: 100 CAPSULE, LIQUID FILLED ORAL at 09:07

## 2025-07-09 RX ADMIN — ACETAMINOPHEN 650 MG: 325 TABLET ORAL at 11:07

## 2025-07-09 RX ADMIN — IBUPROFEN 600 MG: 600 TABLET ORAL at 05:07

## 2025-07-09 RX ADMIN — IBUPROFEN 600 MG: 600 TABLET ORAL at 04:07

## 2025-07-09 RX ADMIN — ACETAMINOPHEN 650 MG: 325 TABLET ORAL at 04:07

## 2025-07-09 RX ADMIN — PRENATAL VIT W/ FE FUMARATE-FA TAB 27-0.8 MG 1 TABLET: 27-0.8 TAB at 09:07

## 2025-07-09 RX ADMIN — OXYCODONE HYDROCHLORIDE 5 MG: 5 TABLET ORAL at 01:07

## 2025-07-09 RX ADMIN — FERROUS SULFATE TAB 325 MG (65 MG ELEMENTAL FE) 1 EACH: 325 (65 FE) TAB at 09:07

## 2025-07-09 NOTE — PLAN OF CARE
VSS. Pain controlled with scheduled Tylenol and Ibuprofen. PRN Oxycodone. Breastfeeding with minimal difficulties. Fundus firm and midline with light lochia rubra. Voiding spontaneously with adequate output. Passing gas. No concerns at this time.

## 2025-07-09 NOTE — NURSING
0909: RN entered room.  Cee present in room. RN placed baby in basinet and proceeded to do a head to toe assessment and vital signs.  left room. RN noticed Mrs. Cochran was tearful and asked if patient was ok. Patient stated she was fine.     0920: RN called midwife about patient's mood/affect.
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
## 2025-07-09 NOTE — LACTATION NOTE
Basic Lactation education reviewed with breastfeeding guide. Encouraged mom to feed baby on hunger cue 8 or more times in 24hrs,  use breast compression and infant stimulation to keep baby active at breast. Encouraged to burp baby after first breast and offer second if still showing hunger cues. Encouraged more STS. LC number on board to call for further assistance, questions, or support.  Baby with brown stool, breasts filling.      07/09/25 1630   Maternal Assessment   Breast Shape Bilateral:;round;wide   Breast Density Bilateral:;filling   Areola Bilateral:;elastic   Nipples Bilateral:;everted   Maternal Infant Feeding   Maternal Emotional State relaxed   Latch Assistance no   Community Referrals   Community Referrals support group;pediatric care provider;outpatient lactation program

## 2025-07-09 NOTE — PROGRESS NOTES
"[facility]  Obstetrics  Postpartum Progress Note    Patient Name: Yaquelin Cochran  MRN: [id number]  Admission Date: 2025  Hospital Length of Stay: 3 days  Attending Physician: Charley Moreira MD  Primary Care Provider: Katarina Champion MD    Subjective:     Principal Problem: (spontaneous vaginal delivery)    Hospital Course:  25: Admit to L&D for IOL 2/2 late decelerations  25 PPD1 Doing well, Plan to dc in the AM. Rhogam indicated Baby o+.   25 PPD2 PTEPDS: 19 last pm. Awaiting Tele Psych/SW referral prior to dc. PT has Flat affect today, denies any chages to her moods since yesterday's assessment. Will cont IN PT status until plan/referrals have been made.   25 ppd3 On assessment pt is teary eyed and when questioned about her moods she reports" I do not think I can take care of myself and the baby". She denies SI/HI, SO at bs. She reports that she is afraid of the neighbor downstairs from them in their apartment complex and her OCD tendencies. Discussed medication that was prescribed and timing of effectiveness (2 weeks). She reports " I am a failure". When questioned why she felt like this, she diverted the question. Does not have baby bed but does report she has a car seat When questioned alone, she denies any hx of Domestic Abuse currently or in the past. She is withdrawn today. Discussed options of possible foster care both permanent and temporary as an option if she would like. She does not request information on this at this time. She has appropriate behavior with her baby as she is actively breastfeeding during my rounds with her. Will not DC PT today based on her affect, resources and to assess more her decision of living situation with . Positive support offered with myself and Charge Nurse. DW  and Dr Syed.     Interval History:      She is tolerating a regular diet without nausea or vomiting. She is voiding spontaneously. She is " Here for eval of rash, with some intermittent irritation in groin area. Pt has had some on and off feeling that he has hemorrhoids. Pt had some discomfort, and started using some over the counter preparation H. Did not seem to be helping at all, and was concerned that sx could be more of a rash than any swelling. No current mass, lesions but more irritation. Pt does get a little bit of blood when he wipes, sx are intermittent. Pt does not have regularly bowel movements, goes for a few days, then goes a few days between. That is chronic for years and years. Pt does not do any fiber supplementation at this time. Does try and eat healthy. Pt finds that he gets blood only when he wipes and for 3-4 d intermittently. Except as noted above in the history of present illness, the review of systems is  negative for headache, vision changes, chest pain, shortness of breath, abdominal pain, urinary sx,     Past medical, surgical, and social history reviewed and updated  Medications and allergies reviewed and updated         O: /79   Pulse 77   Temp 96.5 °F (35.8 °C) (Oral)   Resp 16   Wt 179 lb 6.4 oz (81.4 kg)   SpO2 98%   BMI 27.68 kg/m²   GEN: No acute distress, cooperative, well nourished, alert. CV: Regular rate and rhythm, no murmur, rubs, gallops. No edema. Resp: Clear to auscultation bilaterally good air entry bilaterally  No crackles, wheeze. Breathing comfortably. Abd: soft, nontender. normoactive bowels sounds  Rectal: mild to moderate perianal irritation with posterior anal fissure,         ASSESSMENT / PLAN:    1. Change in bowel habit  Add fiber to diet and monitor closely  Consider refer to GI for persistent sx    2. BRBPR (bright red blood per rectum)  D/t anal fissure  Local care discussed, soften bowels  lotrisone cream to affected area BID until resolved    3. Anal fissure  As abvoe    4.  Perianal rash  tx with lotrisone cream to affected area BID           Follow-up "ambulating. She has passed flatus, and has not a BM. Vaginal bleeding is mild. She denies fever or chills. Abdominal pain is mild and controlled with oral medications. She Is breastfeeding.   Objective:     Vital Signs (Most Recent):  Temp: 98.1 °F (36.7 °C) (07/09/25 0849)  Pulse: 64 (07/09/25 0849)  Resp: 16 (07/09/25 0849)  BP: 109/69 (07/09/25 0849)  SpO2: 98 % (07/09/25 0849) Vital Signs (24h Range):  Temp:  [98.1 °F (36.7 °C)-98.3 °F (36.8 °C)] 98.1 °F (36.7 °C)  Pulse:  [64-69] 64  Resp:  [16-18] 16  SpO2:  [97 %-98 %] 98 %  BP: (103-113)/(69-73) 109/69        There is no height or weight on file to calculate BMI.    No intake or output data in the 24 hours ending 07/09/25 1150      Significant Labs:  Lab Results   Component Value Date    GROUPTRH O NEG 07/06/2025    HEPBSAG Non-reactive 02/07/2025    STREPBCULT No Group B Streptococcus isolated 03/06/2024     No results for input(s): "HGB", "HCT" in the last 48 hours.    I have personallly reviewed all pertinent lab results from the last 24 hours.    Physical Exam:   Constitutional: She is oriented to person, place, and time. She appears well-developed and well-nourished.    HENT:   Head: Normocephalic.    Eyes: Pupils are equal, round, and reactive to light.     Cardiovascular:  Normal rate.             Pulmonary/Chest: Effort normal.        Abdominal: Soft.     Genitourinary:    Vagina and uterus normal.             Musculoskeletal: Normal range of motion.       Neurological: She is alert and oriented to person, place, and time.    Skin: Skin is warm and dry.    Psychiatric: Her behavior is normal. Judgment and thought content normal.       Review of Systems   Psychiatric/Behavioral:  Positive for depression.         On assessment pt is teary eyed and when questioned about her moods she reports" I do not think I can take care of myself and the baby". She denies SI/HI, SO at bs. She reports that she is afraid of the neighbor downstairs from them in their " appointment:   Call or return to clinic prn if these symptoms worsen or fail to improve as anticipated. Discussed use, benefit, and side effects of all prescribed medications. Barriers to medication compliance addressed. All patient questions answered. Pt voiced understanding. When applicable, patient's outside records were reviewed through [place]. The patient has signed appropriate paperworks/consents. "apartment complex and her OCD tendencies. Discussed medication that was prescribed and timing of effectiveness (2 weeks). She reports " I am a failure". When questioned why she felt like this, she diverted the question. Does not have baby bed but does report she has a car seat When questioned alone, she denies any hx of Domestic Abuse currently or in the past. She is withdrawn today. Discussed options of possible foster care both permanent and temporary as an option if she would like. She does not request information on this at this time. She has appropriate behavior with her baby as she is actively breastfeeding during my rounds with her. Will not DC PT today based on her affect, resources and to assess more her decision of living situation with . Positive support offered with myself and Charge Nurse. DW  and Dr Syed.      All other systems reviewed and are negative.    Assessment/Plan:     19 y.o. female  for:    *  (spontaneous vaginal delivery)  Admit to L&D  IV and admit/3T labs  Initiate cEFM  Begin pitocin per protocol  Recheck in 4 hours or PRN  OB team updated    Rh negative status during pregnancy in third trimester  Rhogam PP if indicated    History of depression  No current meds pt declined 25   Plan 2-week mood check  EPDS : 21   Telepsych/SW consults placed prior to possible dc   Will refer to OT PT  Behavioral Health on DC     Anxiety  No current meds  Plan 2-week mood check  EPDS before discharge        Disposition: As patient meets milestones, will plan to discharge pending.     Selene Nelson CNM  Obstetrics  [facility]      "

## 2025-07-10 VITALS
HEART RATE: 60 BPM | TEMPERATURE: 98 F | DIASTOLIC BLOOD PRESSURE: 60 MMHG | SYSTOLIC BLOOD PRESSURE: 103 MMHG | RESPIRATION RATE: 18 BRPM | OXYGEN SATURATION: 97 %

## 2025-07-10 PROCEDURE — 25000003 PHARM REV CODE 250

## 2025-07-10 PROCEDURE — 25000003 PHARM REV CODE 250: Performed by: ADVANCED PRACTICE MIDWIFE

## 2025-07-10 RX ORDER — ACETAMINOPHEN 325 MG/1
650 TABLET ORAL EVERY 6 HOURS PRN
Qty: 30 TABLET | Refills: 0 | Status: SHIPPED | OUTPATIENT
Start: 2025-07-10

## 2025-07-10 RX ORDER — DOCUSATE SODIUM 100 MG/1
200 CAPSULE, LIQUID FILLED ORAL 2 TIMES DAILY PRN
Qty: 60 CAPSULE | Refills: 0 | Status: SHIPPED | OUTPATIENT
Start: 2025-07-10

## 2025-07-10 RX ORDER — SERTRALINE HYDROCHLORIDE 50 MG/1
50 TABLET, FILM COATED ORAL DAILY
Qty: 30 TABLET | Refills: 11 | Status: SHIPPED | OUTPATIENT
Start: 2025-07-11

## 2025-07-10 RX ORDER — FERROUS SULFATE 325(65) MG
325 TABLET, DELAYED RELEASE (ENTERIC COATED) ORAL DAILY
Qty: 60 TABLET | Refills: 0 | Status: SHIPPED | OUTPATIENT
Start: 2025-07-10

## 2025-07-10 RX ORDER — IBUPROFEN 600 MG/1
600 TABLET, FILM COATED ORAL EVERY 8 HOURS PRN
Qty: 30 TABLET | Refills: 0 | Status: SHIPPED | OUTPATIENT
Start: 2025-07-10

## 2025-07-10 RX ADMIN — PRENATAL VIT W/ FE FUMARATE-FA TAB 27-0.8 MG 1 TABLET: 27-0.8 TAB at 08:07

## 2025-07-10 RX ADMIN — SERTRALINE HYDROCHLORIDE 50 MG: 50 TABLET ORAL at 08:07

## 2025-07-10 RX ADMIN — FERROUS SULFATE TAB 325 MG (65 MG ELEMENTAL FE) 1 EACH: 325 (65 FE) TAB at 08:07

## 2025-07-10 RX ADMIN — DOCUSATE SODIUM 200 MG: 100 CAPSULE, LIQUID FILLED ORAL at 08:07

## 2025-07-10 NOTE — NURSING
The following message was sent to the midwives staff:  Hi, can you please call ms howard to schedule a  week post partum mood check appt. Thank you   Please clarify if midwife would like a 1 or 2 week appt. Thank you   **As per MD note:  History of depression  Anxiety  No current meds pt declined 25   Plan 2-week mood check  EPDS : 21   Telepsych/SW consults placed prior to possible dc   Will refer to OT PT  Behavioral Health on DC   **As per D/C note:**  [facility]. Schedule an appointment as soon as possible for a visit in 1 week(s).   Specialty: Obstetrics and Gynecology  Why: for mood check

## 2025-07-10 NOTE — PLAN OF CARE
Pt ambulating, voiding, and passing flatus. Pt tolerating PO well and no SS of distress at this time. Pt's pain well controlled throughout shift by oral pain medication. Pts bleeding has been light throughout shift. Fundus is firm. Mother baby care guide reviewed. All questions answered. Pt verbalized understanding to follow up with OB 4-6 weeks. Reviewed medication list, when to call provider, and SS of infection. Pt stable at this time. ID band verified. All safety measures in place.     Mother is being discharged to the room while pending DCFS involvement regarding infant.
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
## 2025-07-10 NOTE — LACTATION NOTE
07/10/25 1230   Maternal Assessment   Breast Shape Bilateral:;round   Breast Density Bilateral:;filling   Areola Bilateral:;elastic   Nipples Bilateral:;everted   Left Nipple Symptoms tender;abraded   Right Nipple Symptoms tender   Maternal Infant Feeding   Maternal Emotional State independent   Infant Positioning clutch/football   Signs of Milk Transfer audible swallow;infant jaw motion present   Pain with Feeding no   Latch Assistance no   Equipment Type   Breast Pump Type manual   Community Referrals   Community Referrals outpatient lactation program;support group;WIC (women, infants and children) program  (community resources)     Pt able to latch baby to breast independently. Good tugs and pulls observed. Pt has left nipple with linear abrasion. Discussed achieving a deep latch to prevent any further skin breakdown.  Lanolin given. Discharge lactation education provided. Questions answered. Pt has lactation contact number and community resources.

## 2025-07-10 NOTE — PLAN OF CARE
[facility]imm[facility] to find out which [facility] worker has been assigned to this patient Dominguez Langford has been assigned as the worker. Jimmie was transferred to his phone, but he didn't answer. Jimmie left a voicemail asking that her call be returned.

## 2025-07-11 ENCOUNTER — PATIENT MESSAGE (OUTPATIENT)
Dept: OBSTETRICS AND GYNECOLOGY | Facility: OTHER | Age: 20
End: 2025-07-11
Payer: MEDICAID

## 2025-07-13 ENCOUNTER — LACTATION ENCOUNTER (OUTPATIENT)
Dept: OBSTETRICS AND GYNECOLOGY | Facility: OTHER | Age: 20
End: 2025-07-13

## 2025-07-13 NOTE — LACTATION NOTE
This note was copied from a baby's chart.  Lactation Round: Pt's mother shared that breastfeeding is going well. LC assisted with diaper change. LC noted redness on Pt's mother nipples. LC provided Pt's mother education on use and maintenance of hydrogels. All questions answered.

## 2025-07-15 ENCOUNTER — HOSPITAL ENCOUNTER (EMERGENCY)
Facility: OTHER | Age: 20
Discharge: HOME | End: 2025-07-15
Attending: OBSTETRICS & GYNECOLOGY
Payer: MEDICAID

## 2025-07-15 ENCOUNTER — LACTATION ENCOUNTER (OUTPATIENT)
Dept: OBSTETRICS AND GYNECOLOGY | Facility: OTHER | Age: 20
End: 2025-07-15

## 2025-07-15 VITALS
DIASTOLIC BLOOD PRESSURE: 73 MMHG | SYSTOLIC BLOOD PRESSURE: 109 MMHG | RESPIRATION RATE: 16 BRPM | TEMPERATURE: 98 F | OXYGEN SATURATION: 100 % | HEART RATE: 65 BPM

## 2025-07-15 DIAGNOSIS — R51.9 POSTPARTUM HEADACHE: Primary | ICD-10-CM

## 2025-07-15 LAB
ABSOLUTE EOSINOPHIL (OHS): 0.1 K/UL
ABSOLUTE MONOCYTE (OHS): 0.52 K/UL (ref 0.3–1)
ABSOLUTE NEUTROPHIL COUNT (OHS): 6.66 K/UL (ref 1.8–7.7)
ALBUMIN SERPL BCP-MCNC: 3.5 G/DL (ref 3.5–5.2)
ALP SERPL-CCNC: 109 UNIT/L (ref 40–150)
ALT SERPL W/O P-5'-P-CCNC: 14 UNIT/L (ref 10–44)
ANION GAP (OHS): 16 MMOL/L (ref 8–16)
AST SERPL-CCNC: 16 UNIT/L (ref 11–45)
BASOPHILS # BLD AUTO: 0.07 K/UL
BASOPHILS NFR BLD AUTO: 0.8 %
BILIRUB SERPL-MCNC: 0.6 MG/DL (ref 0.1–1)
BUN SERPL-MCNC: 11 MG/DL (ref 6–20)
CALCIUM SERPL-MCNC: 9.1 MG/DL (ref 8.7–10.5)
CHLORIDE SERPL-SCNC: 106 MMOL/L (ref 95–110)
CO2 SERPL-SCNC: 20 MMOL/L (ref 23–29)
CREAT SERPL-MCNC: 0.8 MG/DL (ref 0.5–1.4)
ERYTHROCYTE [DISTWIDTH] IN BLOOD BY AUTOMATED COUNT: 14.3 % (ref 11.5–14.5)
GFR SERPLBLD CREATININE-BSD FMLA CKD-EPI: >60 ML/MIN/1.73/M2
GLUCOSE SERPL-MCNC: 104 MG/DL (ref 70–110)
HCT VFR BLD AUTO: 37.9 % (ref 37–48.5)
HGB BLD-MCNC: 12 GM/DL (ref 12–16)
IMM GRANULOCYTES # BLD AUTO: 0.03 K/UL (ref 0–0.04)
IMM GRANULOCYTES NFR BLD AUTO: 0.3 % (ref 0–0.5)
LYMPHOCYTES # BLD AUTO: 1.71 K/UL (ref 1–4.8)
MCH RBC QN AUTO: 28 PG (ref 27–31)
MCHC RBC AUTO-ENTMCNC: 31.7 G/DL (ref 32–36)
MCV RBC AUTO: 89 FL (ref 82–98)
NUCLEATED RBC (/100WBC) (OHS): 0 /100 WBC
PLATELET # BLD AUTO: 309 K/UL (ref 150–450)
PMV BLD AUTO: 9.2 FL (ref 9.2–12.9)
POTASSIUM SERPL-SCNC: 3.9 MMOL/L (ref 3.5–5.1)
PROT SERPL-MCNC: 7 GM/DL (ref 6–8.4)
RBC # BLD AUTO: 4.28 M/UL (ref 4–5.4)
RELATIVE EOSINOPHIL (OHS): 1.1 %
RELATIVE LYMPHOCYTE (OHS): 18.8 % (ref 18–48)
RELATIVE MONOCYTE (OHS): 5.7 % (ref 4–15)
RELATIVE NEUTROPHIL (OHS): 73.3 % (ref 38–73)
SODIUM SERPL-SCNC: 142 MMOL/L (ref 136–145)
WBC # BLD AUTO: 9.09 K/UL (ref 3.9–12.7)

## 2025-07-15 PROCEDURE — 80053 COMPREHEN METABOLIC PANEL: CPT | Performed by: OBSTETRICS & GYNECOLOGY

## 2025-07-15 PROCEDURE — 85025 COMPLETE CBC W/AUTO DIFF WBC: CPT

## 2025-07-15 PROCEDURE — 99283 EMERGENCY DEPT VISIT LOW MDM: CPT

## 2025-07-15 PROCEDURE — 99284 EMERGENCY DEPT VISIT MOD MDM: CPT | Mod: ,,, | Performed by: OBSTETRICS & GYNECOLOGY

## 2025-07-15 NOTE — LACTATION NOTE
This note was copied from a baby's chart.  Lactation Round: Pt in crib. MOB shared that breastfeeding is going well. MOB denies having any additional questions related to breastfeeding. MOB shared that nipples are healing and denies any pain with feeding. LC reminded Pt's mother to utilize warm line if questions occur after discharge.

## 2025-07-15 NOTE — ED PROVIDER NOTES
Encounter Date: 7/15/2025       History     Chief Complaint   Patient presents with    Headache     Yaquelin Kayleen Cochran is a 19 y.o. [id number] who is PPD#9 s/p  who presents complaining of headache.      Patient reports a 2/10 headache with a burning on her head. Her headache was a 6/10 but improved with tylenol. She also reports a right sided toothache. She denies visual changes, RUQ pain, LLE, SOB.   She endorses occasional dizziness while showering.                Review of patient's allergies indicates:  No Known Allergies  Past Medical History:   Diagnosis Date    Behavior problem in childhood     COVID-19 2021    Ectopic pregnancy 2023    Suicidal ideation      Past Surgical History:   Procedure Laterality Date    BACK SURGERY  2016    Rods in place, had issues with previous epidural    DIAGNOSTIC LAPAROSCOPY N/A 2023    Procedure: LAPAROSCOPY, DIAGNOSTIC;  Surgeon: Juana Fung MD;  Location: [facility];  Service: OB/GYN;  Laterality: N/A;    DILATION AND CURETTAGE OF UTERUS       Family History   Problem Relation Name Age of Onset    Throat cancer Paternal Grandfather      No Known Problems Father      No Known Problems Mother      Breast cancer Neg Hx      Diabetes Neg Hx      Colon cancer Neg Hx      Ovarian cancer Neg Hx       Social History[1]  Review of Systems   Constitutional:  Negative for chills and fatigue.   HENT:  Negative for congestion and sore throat.    Eyes:  Negative for visual disturbance.   Respiratory:  Negative for chest tightness and shortness of breath.    Cardiovascular:  Negative for chest pain and leg swelling.   Gastrointestinal:  Negative for abdominal pain.   Genitourinary:  Negative for dysuria, vaginal bleeding and vaginal discharge.   Neurological:  Positive for headaches.       Physical Exam     Initial Vitals   BP Pulse Resp Temp SpO2   07/15/25 0533 07/15/25 0533 07/15/25 0533 07/15/25 0533 07/15/25 0534   103/63 71 16 98.1 °F (36.7 °C) 99 %      MAP        --                Physical Exam    Constitutional: She appears well-developed and well-nourished. No distress.   HENT:   Head: Normocephalic and atraumatic.   Neck: Neck supple.   Normal range of motion.  Pulmonary/Chest: No respiratory distress.   Abdominal: There is no abdominal tenderness.   Gravid abdomen   Musculoskeletal:      Cervical back: Normal range of motion and neck supple.     Neurological: She is alert and oriented to person, place, and time.   Skin: Skin is warm and dry.   Psychiatric: She has a normal mood and affect.         ED Course   Procedures  Labs Reviewed   CBC WITH DIFFERENTIAL - Abnormal       Result Value    WBC 9.09      RBC 4.28      HGB 12.0      HCT 37.9      MCV 89      MCH 28.0      MCHC 31.7 (*)     RDW 14.3      Platelet Count 309      MPV 9.2      Nucleated RBC 0      Neut % 73.3 (*)     Lymph % 18.8      Mono % 5.7      Eos % 1.1      Basophil % 0.8      Imm Grans % 0.3      Neut # 6.66      Lymph # 1.71      Mono # 0.52      Eos # 0.10      Baso # 0.07      Imm Grans # 0.03     COMPREHENSIVE METABOLIC PANEL - Abnormal    Sodium 142      Potassium 3.9      Chloride 106      CO2 20 (*)     Glucose 104      BUN 11      Creatinine 0.8      Calcium 9.1      Protein Total 7.0      Albumin 3.5      Bilirubin Total 0.6            AST 16      ALT 14      Anion Gap 16      eGFR >60     CBC W/ AUTO DIFFERENTIAL    Narrative:     The following orders were created for panel order CBC auto differential.  Procedure                               Abnormality         Status                     ---------                               -----------         ------                     CBC with Differential[[id number]]       Abnormal            Final result                 Please view results for these tests on the individual orders.          Imaging Results    None          Medications - No data to display  Medical Decision Making  Yaquelin Cochran is a 19 y.o. [id number] who is PPD#9 s/p   who presents complaining of headache.  Temp:  [98.1 °F (36.7 °C)] 98.1 °F (36.7 °C)  Pulse:  [62-83] 65  Resp:  [16] 16  SpO2:  [99 %-100 %] 100 %  BP: ()/(63-73) 109/73    Headache:   -2/10 headache  -Patient declines additional pain medication at that this time  -WBC 9, H/H 12  -CMP WNL    Patient with normal vital signs and improving headache. Return to ED precautions given for increase in headache, visual changes, RUQ pain, LLE, SOB, or worsening symptoms of dizziness.   Patient stable for discharge at this time.    Idalia Jacob MD  Obstetrics & Gynecology, PGY-2          Amount and/or Complexity of Data Reviewed  Labs: ordered.              Attending Attestation:   Physician Attestation Statement for Resident:  As the supervising MD   Physician Attestation Statement: I have personally seen and examined this patient.   I agree with the above history.  -:   As the supervising MD I agree with the above PE.     As the supervising MD I agree with the above treatment, course, plan, and disposition.   -:     See ED course.  SOLIZ appears to be related to dental pain.  No visible dental abscess.  Patient denies IPV and reports that she feels safe at home.  No sign of physical trauma.  CBC normal without anemia.  Agree with discharge to rooming in.    Leila Shetty MD,NORAH  Date and Time: 2025 9:09 PM  OB Hospitalist    I was personally present during the critical portions of the procedure(s) performed by the resident and was immediately available in the ED to provide services and assistance as needed during the entire procedure.  I have reviewed and agree with the residents interpretation of the following: lab data.  I have reviewed the following: old records at this facility.                ED Course as of 25e Jul 15, 2025   0542 Patient is a 19y.o.  s/p  7 days ago.  Patient reports having a headache with a burning sensation on the scalp.  She denies a h/o migraine  and reports feeling dizziness in the shower but no dizziness at present.  She is currently rooming in with infant and FOB on MBU. [CD]   0540 BP: 103/63 [CD]   0540 MAP (mmHg): 79 [CD]   0540 Temp: 98.1 °F (36.7 °C) [CD]   0540 Pulse: 71 [CD]   0540 Resp: 16 [CD]   0540 SpO2: 99 % [CD]   0600 Patient reports HA was initially 6/10 but now 3/10 after tylenol.  She reports having tooth pain on the same side and that pain radiating to the HA.  No lumps or bumps noted on the scalp and no pain with palpation of the scalp.  No obvious dental abscess, mild swelling around tooth with crown which is intact, discomfort with palpation of the gingiva.  CBC drawn. [CD]   0605 BP: 103/63 [CD]   0605 MAP (mmHg): 79 [CD]   0605 Temp: 98.1 °F (36.7 °C) [CD]   0605 Pulse: 71 [CD]   0605 Resp: 16 [CD]   0605 SpO2: 99 % [CD]   0606 She denies excessive VB. Patient denies IPV when interviewed alone. [CD]      ED Course User Index  [CD] Leila Shetty MD                               Clinical Impression:  Final diagnoses:  [O90.89, R51.9] Postpartum headache (Primary)          ED Disposition Condition    Discharge Stable          ED Prescriptions    None       Follow-up Information    None              Idalia Jacob MD  Resident  07/15/25 0656         [1]   Social History  Tobacco Use    Smoking status: Never    Smokeless tobacco: Never   Substance Use Topics    Alcohol use: No    Drug use: No        Leila Shetty MD  07/16/25 1672

## 2025-07-27 ENCOUNTER — PATIENT MESSAGE (OUTPATIENT)
Dept: OBSTETRICS AND GYNECOLOGY | Facility: CLINIC | Age: 20
End: 2025-07-27
Payer: MEDICAID

## 2025-07-29 ENCOUNTER — PATIENT MESSAGE (OUTPATIENT)
Dept: PSYCHIATRY | Facility: CLINIC | Age: 20
End: 2025-07-29
Payer: MEDICAID

## 2025-07-30 ENCOUNTER — PATIENT OUTREACH (OUTPATIENT)
Facility: OTHER | Age: 20
End: 2025-07-30
Payer: MEDICAID

## 2025-07-30 NOTE — PROGRESS NOTES
Spoke with Pt who states she is doing ok and denies having any needs at this time. She is spending time with her . Pt encouraged to reach out with any concerns at they arise.

## 2025-08-03 ENCOUNTER — HOSPITAL ENCOUNTER (EMERGENCY)
Facility: HOSPITAL | Age: 20
Discharge: HOME | End: 2025-08-03
Attending: STUDENT IN AN ORGANIZED HEALTH CARE EDUCATION/TRAINING PROGRAM
Payer: MEDICAID

## 2025-08-03 VITALS
HEART RATE: 66 BPM | OXYGEN SATURATION: 99 % | SYSTOLIC BLOOD PRESSURE: 107 MMHG | RESPIRATION RATE: 16 BRPM | BODY MASS INDEX: 23.19 KG/M2 | WEIGHT: 126 LBS | HEIGHT: 62 IN | TEMPERATURE: 98 F | DIASTOLIC BLOOD PRESSURE: 62 MMHG

## 2025-08-03 DIAGNOSIS — B34.9 VIRAL ILLNESS: Primary | ICD-10-CM

## 2025-08-03 LAB
INFLUENZA A MOLECULAR (OHS): NEGATIVE
INFLUENZA B MOLECULAR (OHS): NEGATIVE
SARS-COV-2 RDRP RESP QL NAA+PROBE: NEGATIVE

## 2025-08-03 PROCEDURE — 87502 INFLUENZA DNA AMP PROBE: CPT

## 2025-08-03 PROCEDURE — 99283 EMERGENCY DEPT VISIT LOW MDM: CPT

## 2025-08-03 PROCEDURE — 25000003 PHARM REV CODE 250

## 2025-08-03 PROCEDURE — U0002 COVID-19 LAB TEST NON-CDC: HCPCS

## 2025-08-03 RX ORDER — ACETAMINOPHEN 325 MG/1
650 TABLET ORAL
Status: COMPLETED | OUTPATIENT
Start: 2025-08-03 | End: 2025-08-03

## 2025-08-03 RX ADMIN — ACETAMINOPHEN 650 MG: 325 TABLET ORAL at 06:08

## 2025-08-05 ENCOUNTER — OFFICE VISIT (OUTPATIENT)
Dept: PSYCHIATRY | Facility: CLINIC | Age: 20
End: 2025-08-05
Payer: MEDICAID

## 2025-08-05 ENCOUNTER — TELEPHONE (OUTPATIENT)
Dept: PSYCHIATRY | Facility: CLINIC | Age: 20
End: 2025-08-05
Payer: MEDICAID

## 2025-08-05 ENCOUNTER — HOSPITAL ENCOUNTER (EMERGENCY)
Facility: HOSPITAL | Age: 20
Discharge: HOME | End: 2025-08-05
Attending: STUDENT IN AN ORGANIZED HEALTH CARE EDUCATION/TRAINING PROGRAM
Payer: MEDICAID

## 2025-08-05 VITALS
SYSTOLIC BLOOD PRESSURE: 103 MMHG | TEMPERATURE: 98 F | HEART RATE: 63 BPM | HEIGHT: 62 IN | WEIGHT: 126 LBS | DIASTOLIC BLOOD PRESSURE: 64 MMHG | BODY MASS INDEX: 23.19 KG/M2 | OXYGEN SATURATION: 96 % | RESPIRATION RATE: 16 BRPM

## 2025-08-05 DIAGNOSIS — Z3A.36 36 WEEKS GESTATION OF PREGNANCY: ICD-10-CM

## 2025-08-05 DIAGNOSIS — F41.9 ANXIETY: ICD-10-CM

## 2025-08-05 DIAGNOSIS — R45.851 SUICIDAL IDEATION: Primary | ICD-10-CM

## 2025-08-05 LAB
ABSOLUTE EOSINOPHIL (OHS): 0.26 K/UL
ABSOLUTE MONOCYTE (OHS): 0.4 K/UL (ref 0.3–1)
ABSOLUTE NEUTROPHIL COUNT (OHS): 2.82 K/UL (ref 1.8–7.7)
ALBUMIN SERPL BCP-MCNC: 4.6 G/DL (ref 3.5–5.2)
ALP SERPL-CCNC: 98 UNIT/L (ref 40–150)
ALT SERPL W/O P-5'-P-CCNC: 23 UNIT/L (ref 0–55)
AMPHET UR QL SCN: NEGATIVE
ANION GAP (OHS): 9 MMOL/L (ref 8–16)
APAP SERPL-MCNC: <3 UG/ML (ref 10–20)
AST SERPL-CCNC: 22 UNIT/L (ref 0–50)
BACTERIA #/AREA URNS AUTO: ABNORMAL /HPF
BARBITURATE SCN PRESENT UR: NEGATIVE
BASOPHILS # BLD AUTO: 0.06 K/UL
BASOPHILS NFR BLD AUTO: 0.9 %
BENZODIAZ UR QL SCN: NEGATIVE
BILIRUB SERPL-MCNC: 0.5 MG/DL (ref 0.1–1)
BILIRUB UR QL STRIP.AUTO: NEGATIVE
BUN SERPL-MCNC: 12 MG/DL (ref 6–20)
CALCIUM SERPL-MCNC: 9.9 MG/DL (ref 8.7–10.5)
CANNABINOIDS UR QL SCN: NEGATIVE
CAOX CRY UR QL COMP ASSIST: ABNORMAL
CHLORIDE SERPL-SCNC: 106 MMOL/L (ref 95–110)
CLARITY UR: CLEAR
CO2 SERPL-SCNC: 25 MMOL/L (ref 23–29)
COCAINE UR QL SCN: NEGATIVE
COLOR UR AUTO: YELLOW
CREAT SERPL-MCNC: 0.7 MG/DL (ref 0.5–1.4)
CREAT UR-MCNC: 273 MG/DL (ref 15–325)
ERYTHROCYTE [DISTWIDTH] IN BLOOD BY AUTOMATED COUNT: 13.8 % (ref 11.5–14.5)
ETHANOL SERPL-MCNC: <10 MG/DL
GFR SERPLBLD CREATININE-BSD FMLA CKD-EPI: >60 ML/MIN/1.73/M2
GLUCOSE SERPL-MCNC: 82 MG/DL (ref 70–110)
GLUCOSE UR QL STRIP: NEGATIVE
HCT VFR BLD AUTO: 38.6 % (ref 37–48.5)
HGB BLD-MCNC: 12.6 GM/DL (ref 12–16)
HGB UR QL STRIP: ABNORMAL
IMM GRANULOCYTES # BLD AUTO: 0.02 K/UL (ref 0–0.04)
IMM GRANULOCYTES NFR BLD AUTO: 0.3 % (ref 0–0.5)
KETONES UR QL STRIP: NEGATIVE
LEUKOCYTE ESTERASE UR QL STRIP: ABNORMAL
LYMPHOCYTES # BLD AUTO: 3.17 K/UL (ref 1–4.8)
MCH RBC QN AUTO: 28 PG (ref 27–31)
MCHC RBC AUTO-ENTMCNC: 32.6 G/DL (ref 32–36)
MCV RBC AUTO: 86 FL (ref 82–98)
METHADONE UR QL SCN: NEGATIVE
MICROSCOPIC COMMENT: ABNORMAL
NITRITE UR QL STRIP: NEGATIVE
NUCLEATED RBC (/100WBC) (OHS): 0 /100 WBC
OPIATES UR QL SCN: NEGATIVE
PCP UR QL: NEGATIVE
PH UR STRIP: 6 [PH]
PLATELET # BLD AUTO: 271 K/UL (ref 150–450)
PMV BLD AUTO: 9.9 FL (ref 9.2–12.9)
POTASSIUM SERPL-SCNC: 3.5 MMOL/L (ref 3.5–5.1)
PROT SERPL-MCNC: 7.4 GM/DL (ref 6–8.4)
PROT UR QL STRIP: ABNORMAL
RBC # BLD AUTO: 4.5 M/UL (ref 4–5.4)
RBC #/AREA URNS AUTO: 22 /HPF (ref 0–4)
RELATIVE EOSINOPHIL (OHS): 3.9 %
RELATIVE LYMPHOCYTE (OHS): 47.1 % (ref 18–48)
RELATIVE MONOCYTE (OHS): 5.9 % (ref 4–15)
RELATIVE NEUTROPHIL (OHS): 41.9 % (ref 38–73)
SALICYLATES SERPL-MCNC: <5 MG/DL (ref 15–30)
SODIUM SERPL-SCNC: 140 MMOL/L (ref 136–145)
SP GR UR STRIP: >=1.03
SQUAMOUS #/AREA URNS AUTO: <1 /HPF
UROBILINOGEN UR STRIP-ACNC: NEGATIVE EU/DL
WBC # BLD AUTO: 6.73 K/UL (ref 3.9–12.7)
WBC #/AREA URNS AUTO: 15 /HPF (ref 0–5)

## 2025-08-05 PROCEDURE — 82077 ASSAY SPEC XCP UR&BREATH IA: CPT

## 2025-08-05 PROCEDURE — 82040 ASSAY OF SERUM ALBUMIN: CPT

## 2025-08-05 PROCEDURE — 99283 EMERGENCY DEPT VISIT LOW MDM: CPT

## 2025-08-05 PROCEDURE — 87086 URINE CULTURE/COLONY COUNT: CPT

## 2025-08-05 PROCEDURE — 80143 DRUG ASSAY ACETAMINOPHEN: CPT

## 2025-08-05 PROCEDURE — 81001 URINALYSIS AUTO W/SCOPE: CPT

## 2025-08-05 PROCEDURE — 80179 DRUG ASSAY SALICYLATE: CPT

## 2025-08-05 PROCEDURE — 80307 DRUG TEST PRSMV CHEM ANLYZR: CPT

## 2025-08-05 PROCEDURE — 85025 COMPLETE CBC W/AUTO DIFF WBC: CPT

## 2025-08-05 RX ORDER — CEPHALEXIN 500 MG/1
500 CAPSULE ORAL 4 TIMES DAILY
Qty: 20 CAPSULE | Refills: 0 | Status: SHIPPED | OUTPATIENT
Start: 2025-08-05 | End: 2025-08-10

## 2025-08-05 NOTE — PROGRESS NOTES
The patient location is: [place]  The chief complaint leading to consultation is: anxiety, ppd    Visit type: audiovisual    Face to Face time with patient: 55 mins  65 minutes of total time spent on the encounter, which includes face to face time and non-face to face time preparing to see the patient (eg, review of tests), Obtaining and/or reviewing separately obtained history, Documenting clinical information in the electronic or other health record, Independently interpreting results (not separately reported) and communicating results to the patient/family/caregiver, or Care coordination (not separately reported).     Each patient to whom he or she provides medical services by telemedicine is:  (1) informed of the relationship between the physician and patient and the respective role of any other health care provider with respect to management of the patient; and (2) notified that he or she may decline to receive medical services by telemedicine and may withdraw from such care at any time.    Notes:   Psychiatry Initial Visit (PhD/LCSW)  Diagnostic Interview - CPT 17532    Patient Name: Yaquelin Kayleen Fair Play  Date: 08/05/2025  Site: [facility]   Referral Source: JAYLA Pineda    Chief complaint/reason for encounter: Psychological Evaluation  Clinical Status of Patient: Outpatient  Met With: Patient  CPT Code: 75900    Before this evaluation was initiated, the purpose and process of the assessment and the limits of confidentiality were discussed with the patient who expressed understanding of these issues and verbally consented to proceed with evaluation.     Reason for seeking therapeutic intervention at this time: Reports that she had a baby at the beginning of July. States that she was recently diagnosed with OCD. States that she has had the symptoms for a long time but never diagnosed. States that she understands that her compulsions are unnecessary but has to go through with them or is  "uncomfortable. States that recently thoughts of contamination have been the biggest struggle. States that she is better when she is out of the house. Feels like when she is home everything has to be perfect. Reports that she doesn't have to wash her hands a certain number of times or for a certain period. States she stops when she feels comfortable. Doesn't have any fear about herself or someone else getting sick and die because she doesn't wash her hands. Reports that she has to shower after using the bathroom to "get clean". States that as of late she doesn't feel clean even after showering. States that if she does leave the house will text obsessively to partner to make sure that everything is alright. Feels like symptoms at the moment are getting debilitating.     Reports that she feels like she is also struggled with PPD. Reports that she doesn't have any motivation, doesn't want to get up and do anything. States that she feels more emotional. Reports that she loves her baby, misses her being attached to her. Feels really teary when they are . Called the police this morning because she was up all night cleaning her hands. Felt like she was getting close to the point of possibly hurting herself. States that she felt like she wasn't sure how much longer she would be able to deal with her obsessive thoughts and compulsions before hurting herself. Denies thoughts or active plan. Discussed that she has also been dealing with issues towards partner. States that she will tell him first thing that she is upset with him. Feels a lot of anger/rage towards him. Several things happened in the past that have her holding resentment towards partner. One problem with relationship and issues with partner's mother. Reports that in 2023 had a son and had to make the decision to put him up for adoption. Felt like partner turned against her during this time. They weren't on the same page for this decision. Symptoms flare " "with stress.     Reports that sleep was "okay" for the first two weeks after baby. Steadily got worse and was giving into compulsion. Reports that she is getting naps. States that OCD symptoms have made it difficult to get rest. Reports that appetite is normal. Is breastfeeding and supplement with some formula. Denies problems with ADLs.     Brief Social history (marriage, employment, etc.): Born and raised in [facility]. Reports that childhood was "poor". 2 half siblings. Currently working on getting GED. 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 Would like to go back to work in the future, eases some of the OCD symptoms being away from home. Never , Eddie, 5 years. 3 children (one adopted out of family). 2 daughters living at home with patient and partner. Partner works as a [employer] in [place]. Never arrested. No .     Psychiatric Symptoms:   Mood: depressed mood, diminished interest, insomnia, fatigue, worthlessness/guilt, and tearfulness  Anxiety: excessive anxiety/worry, obsessions, and compulsions  Substance abuse: denied  Cognitive functioning: denied  Health behaviors: noncontributory    Psychiatric history: Saw someone as a child but didn't go well because mother was in session and not very open with her around. Has been hospitalized for psychiatric reasons (10, 11 y/o and 17 y/o) for depression. No suicide attempts. Reports that she has some hypomanic episodes in the past, states that she would order stuff to the house, feel it was contaminated, then throw it away, but then continue to order more things. Got into bad credit card debt due to this behavior. Stopped doing this two months ago. Currently on 50 mg of zoloft.     Medical history: none    Report of Coping Skills: liited     Support System: limited    Risk Factors:   Alcohol: none   Drugs: none   Tobacco: none   Caffeine: occasional   Access to Guns: no   Trauma: did not discuss    Current " medications and drug reactions (include OTC, herbal): see medication list     Strengths and liabilities: Strength: Patient accepts guidance/feedback, Strength: Patient is expressive/articulate., Strength: Patient is intelligent., Strength: Patient is motivated for change., Liability: Patient lacks coping skills.    Current Evaluation:     Mental Status Exam:  General Appearance:  unremarkable, age appropriate   Speech: normal tone, normal rate, normal pitch, normal volume      Level of Cooperation: guarded      Thought Processes: normal and logical   Mood: anxious      Thought Content: normal, no suicidality, no homicidality, delusions, or paranoia   Affect: congruent and appropriate   Orientation: Oriented x3   Memory: recent >  intact, remote >  intact   Attention Span & Concentration: intact   Fund of General Knowledge: intact and appropriate to age and level of education   Abstract Reasoning: Did not assess   Judgment & Insight: fair     Language  intact     Diagnosis:  1. 36 weeks gestation of pregnancy  Ambulatory referral/consult to  Behavioral Health      2. Anxiety  Ambulatory referral/consult to  Behavioral Health    We discussed [facility] resources here, patient desires referral           Plan: Yaquelin Cochran presents for psychotherapy evaluation. Reports that she has struggled with increased obsessive thoughts and compulsions to reduce anxiety. Reports that she has dealt with these issues for several years but have gotten worse since having children. Patient has several interpersonal stressors including an adoption, financial stress, and relationship tension. Reports that she feels like she has been dealing with PPD symptoms as of late on top of OCD. Discussed [facility] program. Discussed frequency and process of therapy. Scheduled follow up for . Patient to see Dr. Card for medication management .    Cassandra Rockweiler, LCSW

## 2025-08-06 LAB
BACTERIA UR CULT: NORMAL
HOLD SPECIMEN: NORMAL

## 2025-08-07 ENCOUNTER — TELEPHONE (OUTPATIENT)
Dept: EMERGENCY MEDICINE | Facility: HOSPITAL | Age: 20
End: 2025-08-07
Payer: MEDICAID

## 2025-08-07 NOTE — TELEPHONE ENCOUNTER
Called and left message to ensure follow up care.  Phone number given in case patient had additional questions.

## 2025-08-11 ENCOUNTER — TELEPHONE (OUTPATIENT)
Dept: PSYCHIATRY | Facility: CLINIC | Age: 20
End: 2025-08-11
Payer: MEDICAID

## 2025-08-18 ENCOUNTER — HOSPITAL ENCOUNTER (EMERGENCY)
Facility: HOSPITAL | Age: 20
Discharge: HOME | End: 2025-08-18
Attending: EMERGENCY MEDICINE
Payer: COMMERCIAL

## 2025-08-18 VITALS
OXYGEN SATURATION: 98 % | TEMPERATURE: 98 F | HEART RATE: 87 BPM | SYSTOLIC BLOOD PRESSURE: 116 MMHG | RESPIRATION RATE: 18 BRPM | BODY MASS INDEX: 23.21 KG/M2 | DIASTOLIC BLOOD PRESSURE: 71 MMHG | WEIGHT: 126.13 LBS | HEIGHT: 62 IN

## 2025-08-18 DIAGNOSIS — L60.0 INGROWN NAIL: Primary | ICD-10-CM

## 2025-08-18 PROBLEM — D62 ABLA (ACUTE BLOOD LOSS ANEMIA): Status: RESOLVED | Noted: 2025-07-07 | Resolved: 2025-08-18

## 2025-08-18 PROCEDURE — 99283 EMERGENCY DEPT VISIT LOW MDM: CPT

## 2025-08-18 RX ORDER — SULFAMETHOXAZOLE AND TRIMETHOPRIM 800; 160 MG/1; MG/1
1 TABLET ORAL 2 TIMES DAILY
Qty: 14 TABLET | Refills: 0 | Status: SHIPPED | OUTPATIENT
Start: 2025-08-18 | End: 2025-08-25

## 2025-08-20 ENCOUNTER — TELEPHONE (OUTPATIENT)
Dept: PSYCHIATRY | Facility: CLINIC | Age: 20
End: 2025-08-20
Payer: MEDICAID

## 2025-08-25 ENCOUNTER — TELEPHONE (OUTPATIENT)
Dept: PODIATRY | Facility: CLINIC | Age: 20
End: 2025-08-25
Payer: COMMERCIAL

## 2025-08-26 ENCOUNTER — PATIENT MESSAGE (OUTPATIENT)
Dept: PSYCHIATRY | Facility: CLINIC | Age: 20
End: 2025-08-26
Payer: COMMERCIAL

## 2025-09-04 ENCOUNTER — PATIENT OUTREACH (OUTPATIENT)
Facility: OTHER | Age: 20
End: 2025-09-04
Payer: COMMERCIAL

## (undated) DEVICE — SYR 10CC LUER LOCK

## (undated) DEVICE — UNDERGLOVE BIOGEL PI SZ 6.5 LF

## (undated) DEVICE — SYS SEE SHARP SCP ANTIFG LNG

## (undated) DEVICE — PACK LAPAROSCOPY BAPTIST

## (undated) DEVICE — SOL IRR SOD CHL .9% POUR

## (undated) DEVICE — TROCAR KII FIOS 5MM X 100MM

## (undated) DEVICE — SOL POVIDONE PREP IODINE 4 OZ

## (undated) DEVICE — NDL HYPO REG 25G X 1 1/2

## (undated) DEVICE — VIDEO RENTAL SERVICE

## (undated) DEVICE — PENCIL ELECTROSURG HOLST W/BLD

## (undated) DEVICE — BAG TISSUE RETRIEVAL 5MM

## (undated) DEVICE — NDL INSUFFLATION VERRES 120MM

## (undated) DEVICE — SOL POVIDONE SCRUB IODINE 4 OZ

## (undated) DEVICE — ELECTRODE REM PLYHSV RETURN 9

## (undated) DEVICE — SUT MCRYL PLUS 4-0 PS2 27IN

## (undated) DEVICE — GLOVE BIOGEL SKINSENSE PI 6.0

## (undated) DEVICE — JELLY SURGILUBE 5GR

## (undated) DEVICE — SEALER LIGASURE LAP 37CM 5MM

## (undated) DEVICE — KIT WING PAD POSITIONING

## (undated) DEVICE — SOL NORMAL USPCA 0.9%

## (undated) DEVICE — IRRIGATOR ENDOSCOPY DISP.